# Patient Record
Sex: MALE | Race: WHITE | Employment: OTHER | URBAN - METROPOLITAN AREA
[De-identification: names, ages, dates, MRNs, and addresses within clinical notes are randomized per-mention and may not be internally consistent; named-entity substitution may affect disease eponyms.]

---

## 2017-12-17 PROBLEM — I49.5 SICK SINUS SYNDROME (HCC): Status: ACTIVE | Noted: 2017-12-17

## 2017-12-17 PROBLEM — I35.0 AORTIC STENOSIS: Status: ACTIVE | Noted: 2017-12-17

## 2017-12-17 PROBLEM — I50.9 CHF WITH UNKNOWN LVEF (HCC): Status: ACTIVE | Noted: 2017-12-17

## 2017-12-17 PROBLEM — J81.1 PULMONARY EDEMA: Status: ACTIVE | Noted: 2017-12-17

## 2017-12-17 PROBLEM — Z95.0 PACEMAKER: Status: ACTIVE | Noted: 2017-12-17

## 2017-12-18 PROBLEM — J81.0 ACUTE PULMONARY EDEMA (HCC): Status: RESOLVED | Noted: 2017-12-17 | Resolved: 2017-12-18

## 2017-12-18 PROBLEM — J81.0 ACUTE PULMONARY EDEMA (HCC): Status: ACTIVE | Noted: 2017-12-17

## 2017-12-19 PROBLEM — I50.43 CHF (CONGESTIVE HEART FAILURE), NYHA CLASS I, ACUTE ON CHRONIC, COMBINED (HCC): Status: ACTIVE | Noted: 2017-12-19

## 2019-11-02 ENCOUNTER — HOSPITAL ENCOUNTER (OUTPATIENT)
Age: 84
Setting detail: OBSERVATION
Discharge: HOME | DRG: 683 | End: 2019-11-04
Attending: EMERGENCY MEDICINE | Admitting: FAMILY MEDICINE
Payer: MEDICARE

## 2019-11-02 ENCOUNTER — APPOINTMENT (OUTPATIENT)
Dept: ULTRASOUND IMAGING | Age: 84
DRG: 683 | End: 2019-11-02
Payer: MEDICARE

## 2019-11-02 ENCOUNTER — APPOINTMENT (OUTPATIENT)
Dept: GENERAL RADIOLOGY | Age: 84
DRG: 683 | End: 2019-11-02
Payer: MEDICARE

## 2019-11-02 DIAGNOSIS — I49.5 SICK SINUS SYNDROME (HCC): ICD-10-CM

## 2019-11-02 DIAGNOSIS — I10 HYPERTENSION, UNSPECIFIED TYPE: ICD-10-CM

## 2019-11-02 DIAGNOSIS — E87.5 HYPERKALEMIA: ICD-10-CM

## 2019-11-02 DIAGNOSIS — N17.9 AKI (ACUTE KIDNEY INJURY) (HCC): Primary | ICD-10-CM

## 2019-11-02 DIAGNOSIS — I10 ESSENTIAL HYPERTENSION: ICD-10-CM

## 2019-11-02 DIAGNOSIS — I35.0 AORTIC VALVE STENOSIS, ETIOLOGY OF CARDIAC VALVE DISEASE UNSPECIFIED: ICD-10-CM

## 2019-11-02 DIAGNOSIS — N28.9 ACUTE RENAL INSUFFICIENCY: ICD-10-CM

## 2019-11-02 DIAGNOSIS — E11.9 TYPE 2 DIABETES MELLITUS WITHOUT COMPLICATION, WITHOUT LONG-TERM CURRENT USE OF INSULIN (HCC): ICD-10-CM

## 2019-11-02 LAB
ALBUMIN SERPL-MCNC: 4.5 G/DL (ref 3.5–5.2)
ALP BLD-CCNC: 54 U/L (ref 40–129)
ALT SERPL-CCNC: 9 U/L (ref 0–40)
ANION GAP SERPL CALCULATED.3IONS-SCNC: 16 MMOL/L (ref 7–16)
AST SERPL-CCNC: 10 U/L (ref 0–39)
BASOPHILS ABSOLUTE: 0.02 E9/L (ref 0–0.2)
BASOPHILS RELATIVE PERCENT: 0.3 % (ref 0–2)
BILIRUB SERPL-MCNC: <0.2 MG/DL (ref 0–1.2)
BILIRUBIN URINE: NEGATIVE
BLOOD, URINE: NEGATIVE
BUN BLDV-MCNC: 83 MG/DL (ref 8–23)
CALCIUM SERPL-MCNC: 9.6 MG/DL (ref 8.6–10.2)
CHLORIDE BLD-SCNC: 98 MMOL/L (ref 98–107)
CLARITY: CLEAR
CO2: 21 MMOL/L (ref 22–29)
COLOR: YELLOW
CREAT SERPL-MCNC: 2.4 MG/DL (ref 0.7–1.2)
EOSINOPHILS ABSOLUTE: 0.21 E9/L (ref 0.05–0.5)
EOSINOPHILS RELATIVE PERCENT: 3.5 % (ref 0–6)
GFR AFRICAN AMERICAN: 31
GFR AFRICAN AMERICAN: 33
GFR NON-AFRICAN AMERICAN: 26 ML/MIN/1.73
GFR NON-AFRICAN AMERICAN: 27 ML/MIN/1.73
GLUCOSE BLD-MCNC: 153 MG/DL (ref 74–99)
GLUCOSE BLD-MCNC: 160 MG/DL (ref 74–99)
GLUCOSE URINE: 500 MG/DL
HCT VFR BLD CALC: 37.6 % (ref 37–54)
HEMOGLOBIN: 12.3 G/DL (ref 12.5–16.5)
IMMATURE GRANULOCYTES #: 0.03 E9/L
IMMATURE GRANULOCYTES %: 0.5 % (ref 0–5)
KETONES, URINE: NEGATIVE MG/DL
LEUKOCYTE ESTERASE, URINE: NEGATIVE
LYMPHOCYTES ABSOLUTE: 1.32 E9/L (ref 1.5–4)
LYMPHOCYTES RELATIVE PERCENT: 21.8 % (ref 20–42)
MCH RBC QN AUTO: 33.6 PG (ref 26–35)
MCHC RBC AUTO-ENTMCNC: 32.7 % (ref 32–34.5)
MCV RBC AUTO: 102.7 FL (ref 80–99.9)
METER GLUCOSE: 189 MG/DL (ref 74–99)
MONOCYTES ABSOLUTE: 0.38 E9/L (ref 0.1–0.95)
MONOCYTES RELATIVE PERCENT: 6.3 % (ref 2–12)
NEUTROPHILS ABSOLUTE: 4.09 E9/L (ref 1.8–7.3)
NEUTROPHILS RELATIVE PERCENT: 67.6 % (ref 43–80)
NITRITE, URINE: NEGATIVE
PDW BLD-RTO: 12.2 FL (ref 11.5–15)
PERFORMED ON: ABNORMAL
PH UA: 5.5 (ref 5–9)
PLATELET # BLD: 162 E9/L (ref 130–450)
PMV BLD AUTO: 9.8 FL (ref 7–12)
POC CHLORIDE: 105 MMOL/L (ref 100–108)
POC CREATININE: 2.3 MG/DL (ref 0.7–1.2)
POC POTASSIUM: 5.5 MMOL/L (ref 3.5–5)
POC SODIUM: 135 MMOL/L (ref 132–146)
POTASSIUM REFLEX MAGNESIUM: 5.5 MMOL/L (ref 3.5–5)
PROTEIN UA: NEGATIVE MG/DL
RBC # BLD: 3.66 E12/L (ref 3.8–5.8)
SODIUM BLD-SCNC: 135 MMOL/L (ref 132–146)
SPECIFIC GRAVITY UA: 1.01 (ref 1–1.03)
TOTAL PROTEIN: 8 G/DL (ref 6.4–8.3)
TROPONIN: <0.01 NG/ML (ref 0–0.03)
UROBILINOGEN, URINE: 0.2 E.U./DL
WBC # BLD: 6.1 E9/L (ref 4.5–11.5)

## 2019-11-02 PROCEDURE — 76770 US EXAM ABDO BACK WALL COMP: CPT

## 2019-11-02 PROCEDURE — G0378 HOSPITAL OBSERVATION PER HR: HCPCS

## 2019-11-02 PROCEDURE — 82947 ASSAY GLUCOSE BLOOD QUANT: CPT

## 2019-11-02 PROCEDURE — 84295 ASSAY OF SERUM SODIUM: CPT

## 2019-11-02 PROCEDURE — 2580000003 HC RX 258: Performed by: NURSE PRACTITIONER

## 2019-11-02 PROCEDURE — 71045 X-RAY EXAM CHEST 1 VIEW: CPT

## 2019-11-02 PROCEDURE — 82435 ASSAY OF BLOOD CHLORIDE: CPT

## 2019-11-02 PROCEDURE — 6370000000 HC RX 637 (ALT 250 FOR IP): Performed by: FAMILY MEDICINE

## 2019-11-02 PROCEDURE — 36415 COLL VENOUS BLD VENIPUNCTURE: CPT

## 2019-11-02 PROCEDURE — 82962 GLUCOSE BLOOD TEST: CPT

## 2019-11-02 PROCEDURE — 84132 ASSAY OF SERUM POTASSIUM: CPT

## 2019-11-02 PROCEDURE — 85025 COMPLETE CBC W/AUTO DIFF WBC: CPT

## 2019-11-02 PROCEDURE — 6370000000 HC RX 637 (ALT 250 FOR IP): Performed by: NURSE PRACTITIONER

## 2019-11-02 PROCEDURE — 80053 COMPREHEN METABOLIC PANEL: CPT

## 2019-11-02 PROCEDURE — 81003 URINALYSIS AUTO W/O SCOPE: CPT

## 2019-11-02 PROCEDURE — 99285 EMERGENCY DEPT VISIT HI MDM: CPT

## 2019-11-02 PROCEDURE — 82565 ASSAY OF CREATININE: CPT

## 2019-11-02 PROCEDURE — 84484 ASSAY OF TROPONIN QUANT: CPT

## 2019-11-02 PROCEDURE — 93005 ELECTROCARDIOGRAM TRACING: CPT | Performed by: NURSE PRACTITIONER

## 2019-11-02 RX ORDER — DEXTROSE MONOHYDRATE 50 MG/ML
100 INJECTION, SOLUTION INTRAVENOUS PRN
Status: DISCONTINUED | OUTPATIENT
Start: 2019-11-02 | End: 2019-11-04 | Stop reason: HOSPADM

## 2019-11-02 RX ORDER — SODIUM CHLORIDE 0.9 % (FLUSH) 0.9 %
10 SYRINGE (ML) INJECTION EVERY 12 HOURS SCHEDULED
Status: DISCONTINUED | OUTPATIENT
Start: 2019-11-02 | End: 2019-11-04 | Stop reason: HOSPADM

## 2019-11-02 RX ORDER — SODIUM CHLORIDE 0.9 % (FLUSH) 0.9 %
10 SYRINGE (ML) INJECTION PRN
Status: DISCONTINUED | OUTPATIENT
Start: 2019-11-02 | End: 2019-11-04 | Stop reason: HOSPADM

## 2019-11-02 RX ORDER — SODIUM POLYSTYRENE SULFONATE 4.1 MEQ/G
15 POWDER, FOR SUSPENSION ORAL; RECTAL ONCE
Status: COMPLETED | OUTPATIENT
Start: 2019-11-02 | End: 2019-11-02

## 2019-11-02 RX ORDER — ESCITALOPRAM OXALATE 10 MG/1
10 TABLET ORAL DAILY
Status: DISCONTINUED | OUTPATIENT
Start: 2019-11-03 | End: 2019-11-04 | Stop reason: HOSPADM

## 2019-11-02 RX ORDER — CLONIDINE HYDROCHLORIDE 0.1 MG/1
0.1 TABLET ORAL 2 TIMES DAILY
Status: DISCONTINUED | OUTPATIENT
Start: 2019-11-02 | End: 2019-11-02

## 2019-11-02 RX ORDER — DEXTROSE MONOHYDRATE 25 G/50ML
12.5 INJECTION, SOLUTION INTRAVENOUS PRN
Status: DISCONTINUED | OUTPATIENT
Start: 2019-11-02 | End: 2019-11-04 | Stop reason: HOSPADM

## 2019-11-02 RX ORDER — SODIUM CHLORIDE 9 MG/ML
INJECTION, SOLUTION INTRAVENOUS CONTINUOUS
Status: DISCONTINUED | OUTPATIENT
Start: 2019-11-02 | End: 2019-11-04 | Stop reason: HOSPADM

## 2019-11-02 RX ORDER — DIPHENHYDRAMINE HCL 25 MG
50 TABLET ORAL NIGHTLY PRN
Status: DISCONTINUED | OUTPATIENT
Start: 2019-11-02 | End: 2019-11-04 | Stop reason: HOSPADM

## 2019-11-02 RX ORDER — LOSARTAN POTASSIUM 50 MG/1
100 TABLET ORAL DAILY
Status: DISCONTINUED | OUTPATIENT
Start: 2019-11-03 | End: 2019-11-03

## 2019-11-02 RX ORDER — 0.9 % SODIUM CHLORIDE 0.9 %
500 INTRAVENOUS SOLUTION INTRAVENOUS ONCE
Status: COMPLETED | OUTPATIENT
Start: 2019-11-02 | End: 2019-11-02

## 2019-11-02 RX ORDER — ACETAMINOPHEN 325 MG/1
650 TABLET ORAL EVERY 4 HOURS PRN
Status: DISCONTINUED | OUTPATIENT
Start: 2019-11-02 | End: 2019-11-04 | Stop reason: HOSPADM

## 2019-11-02 RX ORDER — CHOLECALCIFEROL (VITAMIN D3) 50 MCG
5000 TABLET ORAL DAILY
Status: DISCONTINUED | OUTPATIENT
Start: 2019-11-03 | End: 2019-11-04 | Stop reason: HOSPADM

## 2019-11-02 RX ORDER — NICOTINE POLACRILEX 4 MG
15 LOZENGE BUCCAL PRN
Status: DISCONTINUED | OUTPATIENT
Start: 2019-11-02 | End: 2019-11-04 | Stop reason: HOSPADM

## 2019-11-02 RX ORDER — LEVOTHYROXINE SODIUM 0.05 MG/1
50 TABLET ORAL DAILY
Status: DISCONTINUED | OUTPATIENT
Start: 2019-11-03 | End: 2019-11-04 | Stop reason: HOSPADM

## 2019-11-02 RX ORDER — ACETAMINOPHEN 325 MG/1
650 TABLET ORAL 2 TIMES DAILY
Status: DISCONTINUED | OUTPATIENT
Start: 2019-11-03 | End: 2019-11-04 | Stop reason: HOSPADM

## 2019-11-02 RX ORDER — FOLIC ACID 1 MG/1
1000 TABLET ORAL DAILY
Status: DISCONTINUED | OUTPATIENT
Start: 2019-11-03 | End: 2019-11-02

## 2019-11-02 RX ORDER — OLMESARTAN MEDOXOMIL 20 MG/1
40 TABLET ORAL DAILY
Status: ON HOLD | COMMUNITY
End: 2019-11-04 | Stop reason: HOSPADM

## 2019-11-02 RX ORDER — ONDANSETRON 2 MG/ML
4 INJECTION INTRAMUSCULAR; INTRAVENOUS EVERY 6 HOURS PRN
Status: DISCONTINUED | OUTPATIENT
Start: 2019-11-02 | End: 2019-11-04 | Stop reason: HOSPADM

## 2019-11-02 RX ORDER — FOLIC ACID 1 MG/1
1 TABLET ORAL DAILY
Status: DISCONTINUED | OUTPATIENT
Start: 2019-11-03 | End: 2019-11-04 | Stop reason: HOSPADM

## 2019-11-02 RX ORDER — CLONIDINE HYDROCHLORIDE 0.2 MG/1
0.2 TABLET ORAL 2 TIMES DAILY
Status: DISCONTINUED | OUTPATIENT
Start: 2019-11-02 | End: 2019-11-04 | Stop reason: HOSPADM

## 2019-11-02 RX ORDER — ACETAMINOPHEN 325 MG/1
650 TABLET ORAL 2 TIMES DAILY
COMMUNITY

## 2019-11-02 RX ORDER — CHOLECALCIFEROL (VITAMIN D3) 10 MCG
1 TABLET ORAL DAILY
Status: DISCONTINUED | OUTPATIENT
Start: 2019-11-03 | End: 2019-11-04 | Stop reason: HOSPADM

## 2019-11-02 RX ORDER — ASPIRIN 81 MG/1
81 TABLET, CHEWABLE ORAL DAILY
Status: DISCONTINUED | OUTPATIENT
Start: 2019-11-03 | End: 2019-11-04 | Stop reason: HOSPADM

## 2019-11-02 RX ADMIN — METOPROLOL TARTRATE 25 MG: 25 TABLET ORAL at 23:18

## 2019-11-02 RX ADMIN — DIPHENHYDRAMINE HCL 50 MG: 25 TABLET ORAL at 23:18

## 2019-11-02 RX ADMIN — SODIUM CHLORIDE: 9 INJECTION, SOLUTION INTRAVENOUS at 23:26

## 2019-11-02 RX ADMIN — CLONIDINE HYDROCHLORIDE 0.2 MG: 0.2 TABLET ORAL at 23:19

## 2019-11-02 RX ADMIN — INSULIN LISPRO 1 UNITS: 100 INJECTION, SOLUTION INTRAVENOUS; SUBCUTANEOUS at 23:25

## 2019-11-02 RX ADMIN — ACETAMINOPHEN 650 MG: 325 TABLET, FILM COATED ORAL at 23:18

## 2019-11-02 RX ADMIN — SODIUM POLYSTYRENE SULFONATE 15 G: 1 POWDER ORAL; RECTAL at 23:23

## 2019-11-02 RX ADMIN — Medication 10 ML: at 23:26

## 2019-11-02 RX ADMIN — SODIUM CHLORIDE: 9 INJECTION, SOLUTION INTRAVENOUS at 19:33

## 2019-11-02 ASSESSMENT — PAIN SCALES - GENERAL: PAINLEVEL_OUTOF10: 2

## 2019-11-03 LAB
ALBUMIN SERPL-MCNC: 4 G/DL (ref 3.5–5.2)
ALP BLD-CCNC: 37 U/L (ref 40–129)
ALT SERPL-CCNC: 8 U/L (ref 0–40)
ANION GAP SERPL CALCULATED.3IONS-SCNC: 11 MMOL/L (ref 7–16)
ANION GAP SERPL CALCULATED.3IONS-SCNC: 16 MMOL/L (ref 7–16)
ANION GAP SERPL CALCULATED.3IONS-SCNC: 17 MMOL/L (ref 7–16)
AST SERPL-CCNC: 23 U/L (ref 0–39)
BILIRUB SERPL-MCNC: 0.3 MG/DL (ref 0–1.2)
BUN BLDV-MCNC: 55 MG/DL (ref 8–23)
BUN BLDV-MCNC: 67 MG/DL (ref 8–23)
BUN BLDV-MCNC: 70 MG/DL (ref 8–23)
CALCIUM SERPL-MCNC: 8.8 MG/DL (ref 8.6–10.2)
CALCIUM SERPL-MCNC: 8.9 MG/DL (ref 8.6–10.2)
CALCIUM SERPL-MCNC: 9.1 MG/DL (ref 8.6–10.2)
CHLORIDE BLD-SCNC: 102 MMOL/L (ref 98–107)
CHLORIDE BLD-SCNC: 104 MMOL/L (ref 98–107)
CHLORIDE BLD-SCNC: 107 MMOL/L (ref 98–107)
CO2: 17 MMOL/L (ref 22–29)
CO2: 18 MMOL/L (ref 22–29)
CO2: 21 MMOL/L (ref 22–29)
CREAT SERPL-MCNC: 1.6 MG/DL (ref 0.7–1.2)
CREAT SERPL-MCNC: 1.8 MG/DL (ref 0.7–1.2)
CREAT SERPL-MCNC: 1.8 MG/DL (ref 0.7–1.2)
CREATININE URINE: 50 MG/DL (ref 40–278)
EKG ATRIAL RATE: 69 BPM
EKG P AXIS: 37 DEGREES
EKG P-R INTERVAL: 180 MS
EKG Q-T INTERVAL: 378 MS
EKG QRS DURATION: 90 MS
EKG QTC CALCULATION (BAZETT): 405 MS
EKG R AXIS: -35 DEGREES
EKG T AXIS: 103 DEGREES
EKG VENTRICULAR RATE: 69 BPM
GFR AFRICAN AMERICAN: 43
GFR AFRICAN AMERICAN: 43
GFR AFRICAN AMERICAN: 49
GFR NON-AFRICAN AMERICAN: 36 ML/MIN/1.73
GFR NON-AFRICAN AMERICAN: 36 ML/MIN/1.73
GFR NON-AFRICAN AMERICAN: 41 ML/MIN/1.73
GLUCOSE BLD-MCNC: 147 MG/DL (ref 74–99)
GLUCOSE BLD-MCNC: 160 MG/DL (ref 74–99)
GLUCOSE BLD-MCNC: 164 MG/DL (ref 74–99)
HBA1C MFR BLD: 9.3 % (ref 4–5.6)
HCT VFR BLD CALC: 32.7 % (ref 37–54)
HEMOGLOBIN: 11.1 G/DL (ref 12.5–16.5)
LACTIC ACID: 1 MMOL/L (ref 0.5–2.2)
MAGNESIUM: 2.6 MG/DL (ref 1.6–2.6)
MCH RBC QN AUTO: 34.6 PG (ref 26–35)
MCHC RBC AUTO-ENTMCNC: 33.9 % (ref 32–34.5)
MCV RBC AUTO: 101.9 FL (ref 80–99.9)
METER GLUCOSE: 139 MG/DL (ref 74–99)
METER GLUCOSE: 154 MG/DL (ref 74–99)
METER GLUCOSE: 177 MG/DL (ref 74–99)
METER GLUCOSE: 186 MG/DL (ref 74–99)
PDW BLD-RTO: 12.2 FL (ref 11.5–15)
PLATELET # BLD: 143 E9/L (ref 130–450)
PMV BLD AUTO: 10.8 FL (ref 7–12)
POTASSIUM SERPL-SCNC: 4.7 MMOL/L (ref 3.5–5)
POTASSIUM SERPL-SCNC: 5.2 MMOL/L (ref 3.5–5)
POTASSIUM SERPL-SCNC: 5.6 MMOL/L (ref 3.5–5)
RBC # BLD: 3.21 E12/L (ref 3.8–5.8)
SODIUM BLD-SCNC: 136 MMOL/L (ref 132–146)
SODIUM BLD-SCNC: 138 MMOL/L (ref 132–146)
SODIUM BLD-SCNC: 139 MMOL/L (ref 132–146)
SODIUM URINE: 68 MMOL/L
TOTAL PROTEIN: 6.8 G/DL (ref 6.4–8.3)
WBC # BLD: 5.6 E9/L (ref 4.5–11.5)

## 2019-11-03 PROCEDURE — 84300 ASSAY OF URINE SODIUM: CPT

## 2019-11-03 PROCEDURE — 36415 COLL VENOUS BLD VENIPUNCTURE: CPT

## 2019-11-03 PROCEDURE — 6370000000 HC RX 637 (ALT 250 FOR IP): Performed by: FAMILY MEDICINE

## 2019-11-03 PROCEDURE — 96372 THER/PROPH/DIAG INJ SC/IM: CPT

## 2019-11-03 PROCEDURE — 6360000002 HC RX W HCPCS: Performed by: NURSE PRACTITIONER

## 2019-11-03 PROCEDURE — 83036 HEMOGLOBIN GLYCOSYLATED A1C: CPT

## 2019-11-03 PROCEDURE — 82962 GLUCOSE BLOOD TEST: CPT

## 2019-11-03 PROCEDURE — 80053 COMPREHEN METABOLIC PANEL: CPT

## 2019-11-03 PROCEDURE — 80048 BASIC METABOLIC PNL TOTAL CA: CPT

## 2019-11-03 PROCEDURE — 6370000000 HC RX 637 (ALT 250 FOR IP): Performed by: NURSE PRACTITIONER

## 2019-11-03 PROCEDURE — 83605 ASSAY OF LACTIC ACID: CPT

## 2019-11-03 PROCEDURE — 93010 ELECTROCARDIOGRAM REPORT: CPT | Performed by: INTERNAL MEDICINE

## 2019-11-03 PROCEDURE — 82570 ASSAY OF URINE CREATININE: CPT

## 2019-11-03 PROCEDURE — 85027 COMPLETE CBC AUTOMATED: CPT

## 2019-11-03 PROCEDURE — 2580000003 HC RX 258: Performed by: FAMILY MEDICINE

## 2019-11-03 PROCEDURE — G0378 HOSPITAL OBSERVATION PER HR: HCPCS

## 2019-11-03 PROCEDURE — 83735 ASSAY OF MAGNESIUM: CPT

## 2019-11-03 RX ORDER — LORAZEPAM 0.5 MG/1
0.5 TABLET ORAL NIGHTLY PRN
Status: DISCONTINUED | OUTPATIENT
Start: 2019-11-03 | End: 2019-11-04 | Stop reason: HOSPADM

## 2019-11-03 RX ORDER — LORAZEPAM 1 MG/1
1 TABLET ORAL NIGHTLY PRN
Status: DISCONTINUED | OUTPATIENT
Start: 2019-11-03 | End: 2019-11-04 | Stop reason: HOSPADM

## 2019-11-03 RX ORDER — 0.9 % SODIUM CHLORIDE 0.9 %
1000 INTRAVENOUS SOLUTION INTRAVENOUS ONCE
Status: COMPLETED | OUTPATIENT
Start: 2019-11-03 | End: 2019-11-03

## 2019-11-03 RX ORDER — ESZOPICLONE 1 MG/1
2 TABLET, FILM COATED ORAL NIGHTLY PRN
Status: DISCONTINUED | OUTPATIENT
Start: 2019-11-03 | End: 2019-11-03

## 2019-11-03 RX ADMIN — METOPROLOL TARTRATE 25 MG: 25 TABLET ORAL at 09:43

## 2019-11-03 RX ADMIN — METOPROLOL TARTRATE 25 MG: 25 TABLET ORAL at 22:01

## 2019-11-03 RX ADMIN — FOLIC ACID 1 MG: 1 TABLET ORAL at 09:43

## 2019-11-03 RX ADMIN — SODIUM CHLORIDE 1000 ML: 9 INJECTION, SOLUTION INTRAVENOUS at 09:45

## 2019-11-03 RX ADMIN — ESCITALOPRAM 10 MG: 10 TABLET, FILM COATED ORAL at 09:42

## 2019-11-03 RX ADMIN — ACETAMINOPHEN 650 MG: 325 TABLET, FILM COATED ORAL at 09:43

## 2019-11-03 RX ADMIN — NEPHROCAP 1 MG: 1 CAP ORAL at 09:42

## 2019-11-03 RX ADMIN — CLONIDINE HYDROCHLORIDE 0.2 MG: 0.2 TABLET ORAL at 22:00

## 2019-11-03 RX ADMIN — Medication 5000 UNITS: at 09:42

## 2019-11-03 RX ADMIN — INSULIN LISPRO 1 UNITS: 100 INJECTION, SOLUTION INTRAVENOUS; SUBCUTANEOUS at 22:03

## 2019-11-03 RX ADMIN — CLONIDINE HYDROCHLORIDE 0.2 MG: 0.2 TABLET ORAL at 09:42

## 2019-11-03 RX ADMIN — LEVOTHYROXINE SODIUM 50 MCG: 0.05 TABLET ORAL at 06:47

## 2019-11-03 RX ADMIN — INSULIN LISPRO 1 UNITS: 100 INJECTION, SOLUTION INTRAVENOUS; SUBCUTANEOUS at 12:28

## 2019-11-03 RX ADMIN — ENOXAPARIN SODIUM 30 MG: 30 INJECTION SUBCUTANEOUS at 09:44

## 2019-11-03 RX ADMIN — ASPIRIN 81 MG 81 MG: 81 TABLET ORAL at 09:43

## 2019-11-03 RX ADMIN — LORAZEPAM 0.5 MG: 0.5 TABLET ORAL at 22:20

## 2019-11-03 ASSESSMENT — PAIN SCALES - GENERAL
PAINLEVEL_OUTOF10: 0
PAINLEVEL_OUTOF10: 0

## 2019-11-04 VITALS
WEIGHT: 204.5 LBS | HEIGHT: 63 IN | DIASTOLIC BLOOD PRESSURE: 65 MMHG | BODY MASS INDEX: 36.23 KG/M2 | OXYGEN SATURATION: 95 % | TEMPERATURE: 98.6 F | HEART RATE: 71 BPM | RESPIRATION RATE: 16 BRPM | SYSTOLIC BLOOD PRESSURE: 142 MMHG

## 2019-11-04 PROBLEM — N28.9 ACUTE RENAL INSUFFICIENCY: Status: ACTIVE | Noted: 2019-11-04

## 2019-11-04 PROBLEM — E87.5 HYPERKALEMIA: Status: RESOLVED | Noted: 2019-11-02 | Resolved: 2019-11-04

## 2019-11-04 PROBLEM — N17.9 AKI (ACUTE KIDNEY INJURY) (HCC): Status: RESOLVED | Noted: 2019-11-02 | Resolved: 2019-11-04

## 2019-11-04 LAB
ANION GAP SERPL CALCULATED.3IONS-SCNC: 14 MMOL/L (ref 7–16)
BUN BLDV-MCNC: 40 MG/DL (ref 8–23)
CALCIUM SERPL-MCNC: 8.9 MG/DL (ref 8.6–10.2)
CHLORIDE BLD-SCNC: 107 MMOL/L (ref 98–107)
CO2: 21 MMOL/L (ref 22–29)
CREAT SERPL-MCNC: 1.3 MG/DL (ref 0.7–1.2)
GFR AFRICAN AMERICAN: >60
GFR NON-AFRICAN AMERICAN: 52 ML/MIN/1.73
GLUCOSE BLD-MCNC: 242 MG/DL (ref 74–99)
METER GLUCOSE: 148 MG/DL (ref 74–99)
METER GLUCOSE: 177 MG/DL (ref 74–99)
METER GLUCOSE: 180 MG/DL (ref 74–99)
POTASSIUM SERPL-SCNC: 4.6 MMOL/L (ref 3.5–5)
SODIUM BLD-SCNC: 142 MMOL/L (ref 132–146)

## 2019-11-04 PROCEDURE — 6370000000 HC RX 637 (ALT 250 FOR IP): Performed by: FAMILY MEDICINE

## 2019-11-04 PROCEDURE — 82962 GLUCOSE BLOOD TEST: CPT

## 2019-11-04 PROCEDURE — G0378 HOSPITAL OBSERVATION PER HR: HCPCS

## 2019-11-04 PROCEDURE — 36415 COLL VENOUS BLD VENIPUNCTURE: CPT

## 2019-11-04 PROCEDURE — 6370000000 HC RX 637 (ALT 250 FOR IP): Performed by: NURSE PRACTITIONER

## 2019-11-04 PROCEDURE — 1200000000 HC SEMI PRIVATE

## 2019-11-04 PROCEDURE — 96372 THER/PROPH/DIAG INJ SC/IM: CPT

## 2019-11-04 PROCEDURE — 6360000002 HC RX W HCPCS: Performed by: NURSE PRACTITIONER

## 2019-11-04 PROCEDURE — 80048 BASIC METABOLIC PNL TOTAL CA: CPT

## 2019-11-04 RX ADMIN — ENOXAPARIN SODIUM 30 MG: 30 INJECTION SUBCUTANEOUS at 10:04

## 2019-11-04 RX ADMIN — INSULIN LISPRO 1 UNITS: 100 INJECTION, SOLUTION INTRAVENOUS; SUBCUTANEOUS at 12:21

## 2019-11-04 RX ADMIN — ASPIRIN 81 MG 81 MG: 81 TABLET ORAL at 10:03

## 2019-11-04 RX ADMIN — INSULIN LISPRO 1 UNITS: 100 INJECTION, SOLUTION INTRAVENOUS; SUBCUTANEOUS at 06:57

## 2019-11-04 RX ADMIN — LEVOTHYROXINE SODIUM 50 MCG: 0.05 TABLET ORAL at 06:28

## 2019-11-04 RX ADMIN — ESCITALOPRAM 10 MG: 10 TABLET, FILM COATED ORAL at 10:02

## 2019-11-04 RX ADMIN — METOPROLOL TARTRATE 25 MG: 25 TABLET ORAL at 10:03

## 2019-11-04 RX ADMIN — NEPHROCAP 1 MG: 1 CAP ORAL at 10:02

## 2019-11-04 RX ADMIN — Medication 5000 UNITS: at 10:02

## 2019-11-04 RX ADMIN — CLONIDINE HYDROCHLORIDE 0.2 MG: 0.2 TABLET ORAL at 10:04

## 2019-11-04 RX ADMIN — FOLIC ACID 1 MG: 1 TABLET ORAL at 10:03

## 2019-11-04 ASSESSMENT — PAIN SCALES - GENERAL: PAINLEVEL_OUTOF10: 0

## 2019-11-13 ENCOUNTER — HOSPITAL ENCOUNTER (OUTPATIENT)
Age: 84
Discharge: HOME | End: 2019-11-13
Payer: MEDICARE

## 2019-11-13 LAB — POTASSIUM SERPL-SCNC: 4.9 MMOL/L (ref 3.5–5)

## 2019-11-13 PROCEDURE — 36415 COLL VENOUS BLD VENIPUNCTURE: CPT

## 2019-11-13 PROCEDURE — 84132 ASSAY OF SERUM POTASSIUM: CPT

## 2020-04-23 ENCOUNTER — HOSPITAL ENCOUNTER (OUTPATIENT)
Age: 85
Discharge: HOME | End: 2020-04-23
Payer: MEDICARE

## 2020-04-23 LAB
ALBUMIN SERPL-MCNC: 4.3 G/DL (ref 3.5–5.2)
ALP BLD-CCNC: 53 U/L (ref 40–129)
ALT SERPL-CCNC: 7 U/L (ref 0–40)
ANION GAP SERPL CALCULATED.3IONS-SCNC: 13 MMOL/L (ref 7–16)
AST SERPL-CCNC: 10 U/L (ref 0–39)
BILIRUB SERPL-MCNC: 0.3 MG/DL (ref 0–1.2)
BUN BLDV-MCNC: 26 MG/DL (ref 8–23)
CALCIUM SERPL-MCNC: 9.9 MG/DL (ref 8.6–10.2)
CHLORIDE BLD-SCNC: 97 MMOL/L (ref 98–107)
CO2: 25 MMOL/L (ref 22–29)
CREAT SERPL-MCNC: 1.2 MG/DL (ref 0.7–1.2)
GFR AFRICAN AMERICAN: >60
GFR NON-AFRICAN AMERICAN: 57 ML/MIN/1.73
GLUCOSE BLD-MCNC: 144 MG/DL (ref 74–99)
HBA1C MFR BLD: 6 % (ref 4–5.6)
PARATHYROID HORMONE INTACT: 32 PG/ML (ref 15–65)
POTASSIUM SERPL-SCNC: 5.2 MMOL/L (ref 3.5–5)
SODIUM BLD-SCNC: 135 MMOL/L (ref 132–146)
TOTAL PROTEIN: 7 G/DL (ref 6.4–8.3)
TSH SERPL DL<=0.05 MIU/L-ACNC: 2.05 UIU/ML (ref 0.27–4.2)

## 2020-04-23 PROCEDURE — 83036 HEMOGLOBIN GLYCOSYLATED A1C: CPT

## 2020-04-23 PROCEDURE — 80053 COMPREHEN METABOLIC PANEL: CPT

## 2020-04-23 PROCEDURE — 84443 ASSAY THYROID STIM HORMONE: CPT

## 2020-04-23 PROCEDURE — 83970 ASSAY OF PARATHORMONE: CPT

## 2020-04-23 PROCEDURE — 36415 COLL VENOUS BLD VENIPUNCTURE: CPT

## 2020-06-23 ENCOUNTER — HOSPITAL ENCOUNTER (OUTPATIENT)
Age: 85
Discharge: HOME | End: 2020-06-25
Payer: MEDICARE

## 2020-06-23 LAB
ALBUMIN SERPL-MCNC: 4.4 G/DL (ref 3.5–5.2)
ALP BLD-CCNC: 69 U/L (ref 40–129)
ALT SERPL-CCNC: 9 U/L (ref 0–40)
ANION GAP SERPL CALCULATED.3IONS-SCNC: 15 MMOL/L (ref 7–16)
AST SERPL-CCNC: 13 U/L (ref 0–39)
BASOPHILS ABSOLUTE: 0.02 E9/L (ref 0–0.2)
BASOPHILS RELATIVE PERCENT: 0.2 % (ref 0–2)
BILIRUB SERPL-MCNC: 0.4 MG/DL (ref 0–1.2)
BUN BLDV-MCNC: 28 MG/DL (ref 8–23)
CALCIUM SERPL-MCNC: 9.6 MG/DL (ref 8.6–10.2)
CHLORIDE BLD-SCNC: 97 MMOL/L (ref 98–107)
CO2: 23 MMOL/L (ref 22–29)
CREAT SERPL-MCNC: 1.2 MG/DL (ref 0.7–1.2)
EOSINOPHILS ABSOLUTE: 0.08 E9/L (ref 0.05–0.5)
EOSINOPHILS RELATIVE PERCENT: 0.9 % (ref 0–6)
GFR AFRICAN AMERICAN: >60
GFR NON-AFRICAN AMERICAN: 57 ML/MIN/1.73
GLUCOSE BLD-MCNC: 110 MG/DL (ref 74–99)
HCT VFR BLD CALC: 32.8 % (ref 37–54)
HEMOGLOBIN: 10.9 G/DL (ref 12.5–16.5)
IMMATURE GRANULOCYTES #: 0.04 E9/L
IMMATURE GRANULOCYTES %: 0.5 % (ref 0–5)
LYMPHOCYTES ABSOLUTE: 1.04 E9/L (ref 1.5–4)
LYMPHOCYTES RELATIVE PERCENT: 12.2 % (ref 20–42)
MCH RBC QN AUTO: 34 PG (ref 26–35)
MCHC RBC AUTO-ENTMCNC: 33.2 % (ref 32–34.5)
MCV RBC AUTO: 102.2 FL (ref 80–99.9)
MONOCYTES ABSOLUTE: 0.48 E9/L (ref 0.1–0.95)
MONOCYTES RELATIVE PERCENT: 5.6 % (ref 2–12)
NEUTROPHILS ABSOLUTE: 6.87 E9/L (ref 1.8–7.3)
NEUTROPHILS RELATIVE PERCENT: 80.6 % (ref 43–80)
PDW BLD-RTO: 12.6 FL (ref 11.5–15)
PLATELET # BLD: 204 E9/L (ref 130–450)
PMV BLD AUTO: 9.6 FL (ref 7–12)
POTASSIUM SERPL-SCNC: 4.8 MMOL/L (ref 3.5–5)
RBC # BLD: 3.21 E12/L (ref 3.8–5.8)
SODIUM BLD-SCNC: 135 MMOL/L (ref 132–146)
TOTAL PROTEIN: 7.5 G/DL (ref 6.4–8.3)
WBC # BLD: 8.5 E9/L (ref 4.5–11.5)

## 2020-06-23 PROCEDURE — 85025 COMPLETE CBC W/AUTO DIFF WBC: CPT

## 2020-06-23 PROCEDURE — 80053 COMPREHEN METABOLIC PANEL: CPT

## 2020-06-24 ENCOUNTER — HOSPITAL ENCOUNTER (OUTPATIENT)
Dept: GENERAL RADIOLOGY | Age: 85
Discharge: HOME | End: 2020-06-26
Payer: MEDICARE

## 2020-06-24 ENCOUNTER — HOSPITAL ENCOUNTER (OUTPATIENT)
Age: 85
Discharge: HOME | End: 2020-06-26
Payer: MEDICARE

## 2020-06-24 PROCEDURE — 72110 X-RAY EXAM L-2 SPINE 4/>VWS: CPT

## 2020-06-24 PROCEDURE — 72072 X-RAY EXAM THORAC SPINE 3VWS: CPT

## 2020-07-14 PROBLEM — S22.080A COMPRESSION FRACTURE OF T12 VERTEBRA (HCC): Status: ACTIVE | Noted: 2020-07-14

## 2020-07-22 ENCOUNTER — HOSPITAL ENCOUNTER (OUTPATIENT)
Age: 85
Discharge: HOME | End: 2020-07-24
Payer: MEDICARE

## 2020-07-22 LAB
ANION GAP SERPL CALCULATED.3IONS-SCNC: 13 MMOL/L (ref 7–16)
BUN BLDV-MCNC: 24 MG/DL (ref 8–23)
CALCIUM SERPL-MCNC: 9.5 MG/DL (ref 8.6–10.2)
CHLORIDE BLD-SCNC: 93 MMOL/L (ref 98–107)
CO2: 26 MMOL/L (ref 22–29)
CREAT SERPL-MCNC: 1.2 MG/DL (ref 0.7–1.2)
GFR AFRICAN AMERICAN: >60
GFR NON-AFRICAN AMERICAN: 57 ML/MIN/1.73
GLUCOSE BLD-MCNC: 116 MG/DL (ref 74–99)
HBA1C MFR BLD: 6.4 % (ref 4–5.6)
POTASSIUM SERPL-SCNC: 4.9 MMOL/L (ref 3.5–5)
SODIUM BLD-SCNC: 132 MMOL/L (ref 132–146)
TSH SERPL DL<=0.05 MIU/L-ACNC: 2.38 UIU/ML (ref 0.27–4.2)
VITAMIN D 25-HYDROXY: 68 NG/ML (ref 30–100)

## 2020-07-22 PROCEDURE — 84443 ASSAY THYROID STIM HORMONE: CPT

## 2020-07-22 PROCEDURE — 83036 HEMOGLOBIN GLYCOSYLATED A1C: CPT

## 2020-07-22 PROCEDURE — 82306 VITAMIN D 25 HYDROXY: CPT

## 2020-07-22 PROCEDURE — 80048 BASIC METABOLIC PNL TOTAL CA: CPT

## 2020-10-21 ENCOUNTER — HOSPITAL ENCOUNTER (OUTPATIENT)
Age: 85
Discharge: HOME | End: 2020-10-23
Payer: MEDICARE

## 2020-10-21 LAB
ALBUMIN SERPL-MCNC: 4.1 G/DL (ref 3.5–5.2)
ALP BLD-CCNC: 57 U/L (ref 40–129)
ALT SERPL-CCNC: 8 U/L (ref 0–40)
ANION GAP SERPL CALCULATED.3IONS-SCNC: 14 MMOL/L (ref 7–16)
AST SERPL-CCNC: 11 U/L (ref 0–39)
BASOPHILS ABSOLUTE: 0.02 E9/L (ref 0–0.2)
BASOPHILS RELATIVE PERCENT: 0.4 % (ref 0–2)
BILIRUB SERPL-MCNC: 0.4 MG/DL (ref 0–1.2)
BUN BLDV-MCNC: 24 MG/DL (ref 8–23)
CALCIUM SERPL-MCNC: 9.7 MG/DL (ref 8.6–10.2)
CHLORIDE BLD-SCNC: 98 MMOL/L (ref 98–107)
CHOLESTEROL, TOTAL: 167 MG/DL (ref 0–199)
CO2: 24 MMOL/L (ref 22–29)
CREAT SERPL-MCNC: 1.1 MG/DL (ref 0.7–1.2)
EOSINOPHILS ABSOLUTE: 0.11 E9/L (ref 0.05–0.5)
EOSINOPHILS RELATIVE PERCENT: 2.3 % (ref 0–6)
GFR AFRICAN AMERICAN: >60
GFR NON-AFRICAN AMERICAN: >60 ML/MIN/1.73
GLUCOSE BLD-MCNC: 132 MG/DL (ref 74–99)
HCT VFR BLD CALC: 31.8 % (ref 37–54)
HDLC SERPL-MCNC: 47 MG/DL
HEMOGLOBIN: 10.7 G/DL (ref 12.5–16.5)
IMMATURE GRANULOCYTES #: 0.02 E9/L
IMMATURE GRANULOCYTES %: 0.4 % (ref 0–5)
LDL CHOLESTEROL CALCULATED: 95 MG/DL (ref 0–99)
LYMPHOCYTES ABSOLUTE: 0.83 E9/L (ref 1.5–4)
LYMPHOCYTES RELATIVE PERCENT: 17.2 % (ref 20–42)
MCH RBC QN AUTO: 34.7 PG (ref 26–35)
MCHC RBC AUTO-ENTMCNC: 33.6 % (ref 32–34.5)
MCV RBC AUTO: 103.2 FL (ref 80–99.9)
MONOCYTES ABSOLUTE: 0.32 E9/L (ref 0.1–0.95)
MONOCYTES RELATIVE PERCENT: 6.6 % (ref 2–12)
NEUTROPHILS ABSOLUTE: 3.53 E9/L (ref 1.8–7.3)
NEUTROPHILS RELATIVE PERCENT: 73.1 % (ref 43–80)
PDW BLD-RTO: 13.2 FL (ref 11.5–15)
PLATELET # BLD: 183 E9/L (ref 130–450)
PMV BLD AUTO: 9.8 FL (ref 7–12)
POTASSIUM SERPL-SCNC: 4.9 MMOL/L (ref 3.5–5)
RBC # BLD: 3.08 E12/L (ref 3.8–5.8)
SODIUM BLD-SCNC: 136 MMOL/L (ref 132–146)
TOTAL PROTEIN: 6.8 G/DL (ref 6.4–8.3)
TRIGL SERPL-MCNC: 126 MG/DL (ref 0–149)
TSH SERPL DL<=0.05 MIU/L-ACNC: 2.55 UIU/ML (ref 0.27–4.2)
VITAMIN D 25-HYDROXY: 73 NG/ML (ref 30–100)
VLDLC SERPL CALC-MCNC: 25 MG/DL
WBC # BLD: 4.8 E9/L (ref 4.5–11.5)

## 2020-10-21 PROCEDURE — 84443 ASSAY THYROID STIM HORMONE: CPT

## 2020-10-21 PROCEDURE — 80061 LIPID PANEL: CPT

## 2020-10-21 PROCEDURE — 85025 COMPLETE CBC W/AUTO DIFF WBC: CPT

## 2020-10-21 PROCEDURE — 80053 COMPREHEN METABOLIC PANEL: CPT

## 2020-10-21 PROCEDURE — 82306 VITAMIN D 25 HYDROXY: CPT

## 2021-01-29 LAB
ALBUMIN SERPL-MCNC: 4.3 G/DL (ref 3.5–5.2)
ALP BLD-CCNC: 51 U/L (ref 40–129)
ALT SERPL-CCNC: 8 U/L (ref 0–40)
ANION GAP SERPL CALCULATED.3IONS-SCNC: 13 MMOL/L (ref 7–16)
AST SERPL-CCNC: 11 U/L (ref 0–39)
BILIRUB SERPL-MCNC: 0.3 MG/DL (ref 0–1.2)
BUN BLDV-MCNC: 26 MG/DL (ref 8–23)
CALCIUM SERPL-MCNC: 9.4 MG/DL (ref 8.6–10.2)
CHLORIDE BLD-SCNC: 98 MMOL/L (ref 98–107)
CHOLESTEROL, TOTAL: 159 MG/DL (ref 0–199)
CO2: 26 MMOL/L (ref 22–29)
CREAT SERPL-MCNC: 1.1 MG/DL (ref 0.7–1.2)
GFR AFRICAN AMERICAN: >60
GFR NON-AFRICAN AMERICAN: >60 ML/MIN/1.73
GLUCOSE BLD-MCNC: 121 MG/DL (ref 74–99)
HDLC SERPL-MCNC: 45 MG/DL
LDL CHOLESTEROL CALCULATED: 77 MG/DL (ref 0–99)
POTASSIUM SERPL-SCNC: 4.5 MMOL/L (ref 3.5–5)
SODIUM BLD-SCNC: 137 MMOL/L (ref 132–146)
TOTAL PROTEIN: 6.9 G/DL (ref 6.4–8.3)
TRIGL SERPL-MCNC: 183 MG/DL (ref 0–149)
VITAMIN D 25-HYDROXY: 56 NG/ML (ref 30–100)
VLDLC SERPL CALC-MCNC: 37 MG/DL

## 2021-04-24 ENCOUNTER — APPOINTMENT (OUTPATIENT)
Dept: GENERAL RADIOLOGY | Age: 86
DRG: 291 | End: 2021-04-24
Payer: MEDICARE

## 2021-04-24 ENCOUNTER — HOSPITAL ENCOUNTER (INPATIENT)
Age: 86
LOS: 3 days | Discharge: NURSING, REHAB OR LONG-TERM CARE | DRG: 291 | End: 2021-04-27
Attending: EMERGENCY MEDICINE | Admitting: FAMILY MEDICINE
Payer: MEDICARE

## 2021-04-24 ENCOUNTER — APPOINTMENT (OUTPATIENT)
Dept: CT IMAGING | Age: 86
DRG: 291 | End: 2021-04-24
Payer: MEDICARE

## 2021-04-24 DIAGNOSIS — R42 DIZZINESS: ICD-10-CM

## 2021-04-24 DIAGNOSIS — R09.02 HYPOXIA: Primary | ICD-10-CM

## 2021-04-24 DIAGNOSIS — I50.9 DECOMPENSATED HEART FAILURE (HCC): ICD-10-CM

## 2021-04-24 DIAGNOSIS — R31.21 ASYMPTOMATIC MICROSCOPIC HEMATURIA: ICD-10-CM

## 2021-04-24 DIAGNOSIS — I50.9 ACUTE ON CHRONIC CONGESTIVE HEART FAILURE, UNSPECIFIED HEART FAILURE TYPE (HCC): ICD-10-CM

## 2021-04-24 LAB
ALBUMIN SERPL-MCNC: 4.1 G/DL (ref 3.5–5.2)
ALP BLD-CCNC: 46 U/L (ref 40–129)
ALT SERPL-CCNC: 10 U/L (ref 0–40)
ANION GAP SERPL CALCULATED.3IONS-SCNC: 10 MMOL/L (ref 7–16)
ANISOCYTOSIS: ABNORMAL
AST SERPL-CCNC: 31 U/L (ref 0–39)
BACTERIA: ABNORMAL /HPF
BASOPHILS ABSOLUTE: 0.01 E9/L (ref 0–0.2)
BASOPHILS RELATIVE PERCENT: 0.2 % (ref 0–2)
BILIRUB SERPL-MCNC: 0.4 MG/DL (ref 0–1.2)
BILIRUBIN URINE: NEGATIVE
BLOOD, URINE: ABNORMAL
BUN BLDV-MCNC: 20 MG/DL (ref 6–23)
CALCIUM SERPL-MCNC: 9.3 MG/DL (ref 8.6–10.2)
CHLORIDE BLD-SCNC: 96 MMOL/L (ref 98–107)
CHOLESTEROL, FASTING: 154 MG/DL (ref 0–199)
CLARITY: CLEAR
CO2: 25 MMOL/L (ref 22–29)
COLOR: YELLOW
CREAT SERPL-MCNC: 1 MG/DL (ref 0.7–1.2)
EOSINOPHILS ABSOLUTE: 0.02 E9/L (ref 0.05–0.5)
EOSINOPHILS RELATIVE PERCENT: 0.4 % (ref 0–6)
GFR AFRICAN AMERICAN: >60
GFR NON-AFRICAN AMERICAN: >60 ML/MIN/1.73
GLUCOSE BLD-MCNC: 139 MG/DL (ref 74–99)
GLUCOSE URINE: NEGATIVE MG/DL
HBA1C MFR BLD: 5.7 % (ref 4–5.6)
HCT VFR BLD CALC: 28.8 % (ref 37–54)
HDLC SERPL-MCNC: 50 MG/DL
HEMOGLOBIN: 9.9 G/DL (ref 12.5–16.5)
IMMATURE GRANULOCYTES #: 0.03 E9/L
IMMATURE GRANULOCYTES %: 0.7 % (ref 0–5)
IRON SATURATION: 25 % (ref 20–55)
IRON: 71 MCG/DL (ref 59–158)
KETONES, URINE: ABNORMAL MG/DL
LDL CHOLESTEROL CALCULATED: 80 MG/DL (ref 0–99)
LEUKOCYTE ESTERASE, URINE: NEGATIVE
LV EF: 63 %
LVEF MODALITY: NORMAL
LYMPHOCYTES ABSOLUTE: 0.38 E9/L (ref 1.5–4)
LYMPHOCYTES RELATIVE PERCENT: 8.3 % (ref 20–42)
MCH RBC QN AUTO: 35 PG (ref 26–35)
MCHC RBC AUTO-ENTMCNC: 34.4 % (ref 32–34.5)
MCV RBC AUTO: 101.8 FL (ref 80–99.9)
METER GLUCOSE: 141 MG/DL (ref 74–99)
MONOCYTES ABSOLUTE: 0.26 E9/L (ref 0.1–0.95)
MONOCYTES RELATIVE PERCENT: 5.7 % (ref 2–12)
NEUTROPHILS ABSOLUTE: 3.88 E9/L (ref 1.8–7.3)
NEUTROPHILS RELATIVE PERCENT: 84.7 % (ref 43–80)
NITRITE, URINE: NEGATIVE
OVALOCYTES: ABNORMAL
PDW BLD-RTO: 13.2 FL (ref 11.5–15)
PH UA: 6 (ref 5–9)
PLATELET # BLD: 144 E9/L (ref 130–450)
PMV BLD AUTO: 10.1 FL (ref 7–12)
POIKILOCYTES: ABNORMAL
POTASSIUM REFLEX MAGNESIUM: 5.5 MMOL/L (ref 3.5–5)
POTASSIUM SERPL-SCNC: 4.3 MMOL/L (ref 3.5–5)
PRO-BNP: 4338 PG/ML (ref 0–450)
PROTEIN UA: 100 MG/DL
RBC # BLD: 2.83 E12/L (ref 3.8–5.8)
RBC UA: ABNORMAL /HPF (ref 0–2)
SODIUM BLD-SCNC: 131 MMOL/L (ref 132–146)
SPECIFIC GRAVITY UA: 1.02 (ref 1–1.03)
TEAR DROP CELLS: ABNORMAL
TOTAL IRON BINDING CAPACITY: 285 MCG/DL (ref 250–450)
TOTAL PROTEIN: 6.9 G/DL (ref 6.4–8.3)
TRIGLYCERIDE, FASTING: 118 MG/DL (ref 0–149)
TROPONIN: <0.01 NG/ML (ref 0–0.03)
TSH SERPL DL<=0.05 MIU/L-ACNC: 2.61 UIU/ML (ref 0.27–4.2)
UROBILINOGEN, URINE: 0.2 E.U./DL
VLDLC SERPL CALC-MCNC: 24 MG/DL
WBC # BLD: 4.6 E9/L (ref 4.5–11.5)
WBC UA: ABNORMAL /HPF (ref 0–5)

## 2021-04-24 PROCEDURE — 83036 HEMOGLOBIN GLYCOSYLATED A1C: CPT

## 2021-04-24 PROCEDURE — 84484 ASSAY OF TROPONIN QUANT: CPT

## 2021-04-24 PROCEDURE — 80053 COMPREHEN METABOLIC PANEL: CPT

## 2021-04-24 PROCEDURE — 83550 IRON BINDING TEST: CPT

## 2021-04-24 PROCEDURE — 84443 ASSAY THYROID STIM HORMONE: CPT

## 2021-04-24 PROCEDURE — 99285 EMERGENCY DEPT VISIT HI MDM: CPT

## 2021-04-24 PROCEDURE — 83880 ASSAY OF NATRIURETIC PEPTIDE: CPT

## 2021-04-24 PROCEDURE — 93005 ELECTROCARDIOGRAM TRACING: CPT | Performed by: EMERGENCY MEDICINE

## 2021-04-24 PROCEDURE — 84132 ASSAY OF SERUM POTASSIUM: CPT

## 2021-04-24 PROCEDURE — 6360000002 HC RX W HCPCS: Performed by: INTERNAL MEDICINE

## 2021-04-24 PROCEDURE — 83540 ASSAY OF IRON: CPT

## 2021-04-24 PROCEDURE — 96374 THER/PROPH/DIAG INJ IV PUSH: CPT

## 2021-04-24 PROCEDURE — 6370000000 HC RX 637 (ALT 250 FOR IP): Performed by: FAMILY MEDICINE

## 2021-04-24 PROCEDURE — 6370000000 HC RX 637 (ALT 250 FOR IP): Performed by: NURSE PRACTITIONER

## 2021-04-24 PROCEDURE — 6360000002 HC RX W HCPCS: Performed by: FAMILY MEDICINE

## 2021-04-24 PROCEDURE — 70450 CT HEAD/BRAIN W/O DYE: CPT

## 2021-04-24 PROCEDURE — 1200000000 HC SEMI PRIVATE

## 2021-04-24 PROCEDURE — 71045 X-RAY EXAM CHEST 1 VIEW: CPT

## 2021-04-24 PROCEDURE — 81001 URINALYSIS AUTO W/SCOPE: CPT

## 2021-04-24 PROCEDURE — 82962 GLUCOSE BLOOD TEST: CPT

## 2021-04-24 PROCEDURE — 80061 LIPID PANEL: CPT

## 2021-04-24 PROCEDURE — 36415 COLL VENOUS BLD VENIPUNCTURE: CPT

## 2021-04-24 PROCEDURE — 6360000002 HC RX W HCPCS: Performed by: STUDENT IN AN ORGANIZED HEALTH CARE EDUCATION/TRAINING PROGRAM

## 2021-04-24 PROCEDURE — 6360000004 HC RX CONTRAST MEDICATION: Performed by: FAMILY MEDICINE

## 2021-04-24 PROCEDURE — 93306 TTE W/DOPPLER COMPLETE: CPT

## 2021-04-24 PROCEDURE — 85025 COMPLETE CBC W/AUTO DIFF WBC: CPT

## 2021-04-24 PROCEDURE — 2580000003 HC RX 258: Performed by: FAMILY MEDICINE

## 2021-04-24 PROCEDURE — APPSS180 APP SPLIT SHARED TIME > 60 MINUTES: Performed by: NURSE PRACTITIONER

## 2021-04-24 PROCEDURE — 99223 1ST HOSP IP/OBS HIGH 75: CPT | Performed by: FAMILY MEDICINE

## 2021-04-24 PROCEDURE — 99223 1ST HOSP IP/OBS HIGH 75: CPT | Performed by: INTERNAL MEDICINE

## 2021-04-24 RX ORDER — PHENOL 1.4 %
10 AEROSOL, SPRAY (ML) MUCOUS MEMBRANE NIGHTLY
COMMUNITY

## 2021-04-24 RX ORDER — MELOXICAM 7.5 MG/1
7.5 TABLET ORAL DAILY PRN
Status: DISCONTINUED | OUTPATIENT
Start: 2021-04-24 | End: 2021-04-25

## 2021-04-24 RX ORDER — ACETAMINOPHEN 650 MG/1
650 SUPPOSITORY RECTAL EVERY 6 HOURS PRN
Status: DISCONTINUED | OUTPATIENT
Start: 2021-04-24 | End: 2021-04-27 | Stop reason: HOSPADM

## 2021-04-24 RX ORDER — LEVOTHYROXINE SODIUM 0.05 MG/1
50 TABLET ORAL DAILY
Status: DISCONTINUED | OUTPATIENT
Start: 2021-04-24 | End: 2021-04-27 | Stop reason: HOSPADM

## 2021-04-24 RX ORDER — PROMETHAZINE HYDROCHLORIDE 25 MG/1
12.5 TABLET ORAL EVERY 6 HOURS PRN
Status: DISCONTINUED | OUTPATIENT
Start: 2021-04-24 | End: 2021-04-27 | Stop reason: HOSPADM

## 2021-04-24 RX ORDER — ACETAMINOPHEN 325 MG/1
650 TABLET ORAL EVERY 6 HOURS PRN
Status: DISCONTINUED | OUTPATIENT
Start: 2021-04-24 | End: 2021-04-27 | Stop reason: HOSPADM

## 2021-04-24 RX ORDER — SODIUM CHLORIDE 0.9 % (FLUSH) 0.9 %
5-40 SYRINGE (ML) INJECTION PRN
Status: DISCONTINUED | OUTPATIENT
Start: 2021-04-24 | End: 2021-04-27 | Stop reason: HOSPADM

## 2021-04-24 RX ORDER — FUROSEMIDE 10 MG/ML
40 INJECTION INTRAMUSCULAR; INTRAVENOUS 2 TIMES DAILY
Status: DISCONTINUED | OUTPATIENT
Start: 2021-04-24 | End: 2021-04-26

## 2021-04-24 RX ORDER — FINASTERIDE 5 MG/1
5 TABLET, FILM COATED ORAL EVERY EVENING
COMMUNITY

## 2021-04-24 RX ORDER — DEXTROSE MONOHYDRATE 25 G/50ML
12.5 INJECTION, SOLUTION INTRAVENOUS PRN
Status: DISCONTINUED | OUTPATIENT
Start: 2021-04-24 | End: 2021-04-27 | Stop reason: HOSPADM

## 2021-04-24 RX ORDER — ASPIRIN 81 MG/1
81 TABLET, CHEWABLE ORAL DAILY
Status: DISCONTINUED | OUTPATIENT
Start: 2021-04-24 | End: 2021-04-27 | Stop reason: HOSPADM

## 2021-04-24 RX ORDER — POLYETHYLENE GLYCOL 3350 17 G/17G
17 POWDER, FOR SOLUTION ORAL DAILY PRN
Status: DISCONTINUED | OUTPATIENT
Start: 2021-04-24 | End: 2021-04-27 | Stop reason: HOSPADM

## 2021-04-24 RX ORDER — AMLODIPINE BESYLATE 10 MG/1
10 TABLET ORAL NIGHTLY
Status: DISCONTINUED | OUTPATIENT
Start: 2021-04-24 | End: 2021-04-27 | Stop reason: HOSPADM

## 2021-04-24 RX ORDER — VITAMIN B COMPLEX
1 TABLET ORAL DAILY
Status: DISCONTINUED | OUTPATIENT
Start: 2021-04-24 | End: 2021-04-24 | Stop reason: CLARIF

## 2021-04-24 RX ORDER — VITAMIN B COMPLEX
3000 TABLET ORAL DAILY
Status: DISCONTINUED | OUTPATIENT
Start: 2021-04-24 | End: 2021-04-27 | Stop reason: HOSPADM

## 2021-04-24 RX ORDER — LANOLIN ALCOHOL/MO/W.PET/CERES
3 CREAM (GRAM) TOPICAL NIGHTLY PRN
Status: DISCONTINUED | OUTPATIENT
Start: 2021-04-24 | End: 2021-04-24

## 2021-04-24 RX ORDER — ACETAMINOPHEN 325 MG/1
650 TABLET ORAL 2 TIMES DAILY
Status: DISCONTINUED | OUTPATIENT
Start: 2021-04-24 | End: 2021-04-27 | Stop reason: HOSPADM

## 2021-04-24 RX ORDER — LANOLIN ALCOHOL/MO/W.PET/CERES
10 CREAM (GRAM) TOPICAL NIGHTLY PRN
Status: DISCONTINUED | OUTPATIENT
Start: 2021-04-24 | End: 2021-04-25

## 2021-04-24 RX ORDER — TAMSULOSIN HYDROCHLORIDE 0.4 MG/1
0.4 CAPSULE ORAL EVERY EVENING
COMMUNITY

## 2021-04-24 RX ORDER — ONDANSETRON 2 MG/ML
4 INJECTION INTRAMUSCULAR; INTRAVENOUS EVERY 6 HOURS PRN
Status: DISCONTINUED | OUTPATIENT
Start: 2021-04-24 | End: 2021-04-27 | Stop reason: HOSPADM

## 2021-04-24 RX ORDER — ESCITALOPRAM OXALATE 10 MG/1
10 TABLET ORAL DAILY
Status: DISCONTINUED | OUTPATIENT
Start: 2021-04-24 | End: 2021-04-27 | Stop reason: HOSPADM

## 2021-04-24 RX ORDER — SODIUM CHLORIDE 9 MG/ML
25 INJECTION, SOLUTION INTRAVENOUS PRN
Status: DISCONTINUED | OUTPATIENT
Start: 2021-04-24 | End: 2021-04-24

## 2021-04-24 RX ORDER — CARVEDILOL 6.25 MG/1
6.25 TABLET ORAL 2 TIMES DAILY WITH MEALS
Status: DISCONTINUED | OUTPATIENT
Start: 2021-04-24 | End: 2021-04-25

## 2021-04-24 RX ORDER — FUROSEMIDE 10 MG/ML
20 INJECTION INTRAMUSCULAR; INTRAVENOUS ONCE
Status: COMPLETED | OUTPATIENT
Start: 2021-04-24 | End: 2021-04-24

## 2021-04-24 RX ORDER — CLONIDINE HYDROCHLORIDE 0.1 MG/1
0.1 TABLET ORAL 2 TIMES DAILY
Status: DISCONTINUED | OUTPATIENT
Start: 2021-04-24 | End: 2021-04-24

## 2021-04-24 RX ORDER — HYDRALAZINE HYDROCHLORIDE 20 MG/ML
5 INJECTION INTRAMUSCULAR; INTRAVENOUS ONCE
Status: COMPLETED | OUTPATIENT
Start: 2021-04-24 | End: 2021-04-24

## 2021-04-24 RX ORDER — NICOTINE POLACRILEX 4 MG
15 LOZENGE BUCCAL PRN
Status: DISCONTINUED | OUTPATIENT
Start: 2021-04-24 | End: 2021-04-27 | Stop reason: HOSPADM

## 2021-04-24 RX ORDER — LISINOPRIL 5 MG/1
5 TABLET ORAL DAILY
Status: DISCONTINUED | OUTPATIENT
Start: 2021-04-24 | End: 2021-04-24

## 2021-04-24 RX ORDER — AMLODIPINE BESYLATE 10 MG/1
10 TABLET ORAL NIGHTLY
COMMUNITY

## 2021-04-24 RX ORDER — MORPHINE SULFATE 4 MG/ML
4 INJECTION, SOLUTION INTRAMUSCULAR; INTRAVENOUS ONCE
Status: COMPLETED | OUTPATIENT
Start: 2021-04-24 | End: 2021-04-24

## 2021-04-24 RX ORDER — DEXTROSE MONOHYDRATE 50 MG/ML
100 INJECTION, SOLUTION INTRAVENOUS PRN
Status: DISCONTINUED | OUTPATIENT
Start: 2021-04-24 | End: 2021-04-27 | Stop reason: HOSPADM

## 2021-04-24 RX ORDER — VITAMIN C
1 TAB ORAL DAILY
Status: DISCONTINUED | OUTPATIENT
Start: 2021-04-24 | End: 2021-04-27 | Stop reason: HOSPADM

## 2021-04-24 RX ORDER — SODIUM CHLORIDE 0.9 % (FLUSH) 0.9 %
5-40 SYRINGE (ML) INJECTION EVERY 12 HOURS SCHEDULED
Status: DISCONTINUED | OUTPATIENT
Start: 2021-04-24 | End: 2021-04-27 | Stop reason: HOSPADM

## 2021-04-24 RX ORDER — LANOLIN ALCOHOL/MO/W.PET/CERES
800 CREAM (GRAM) TOPICAL DAILY
Status: DISCONTINUED | OUTPATIENT
Start: 2021-04-24 | End: 2021-04-24 | Stop reason: CLARIF

## 2021-04-24 RX ORDER — HYDRALAZINE HYDROCHLORIDE 20 MG/ML
10 INJECTION INTRAMUSCULAR; INTRAVENOUS EVERY 4 HOURS PRN
Status: DISCONTINUED | OUTPATIENT
Start: 2021-04-24 | End: 2021-04-27 | Stop reason: HOSPADM

## 2021-04-24 RX ORDER — FOLIC ACID 1 MG/1
1 TABLET ORAL DAILY
Status: DISCONTINUED | OUTPATIENT
Start: 2021-04-24 | End: 2021-04-27 | Stop reason: HOSPADM

## 2021-04-24 RX ADMIN — AMLODIPINE BESYLATE 10 MG: 10 TABLET ORAL at 20:10

## 2021-04-24 RX ADMIN — FUROSEMIDE 40 MG: 10 INJECTION, SOLUTION INTRAMUSCULAR; INTRAVENOUS at 18:00

## 2021-04-24 RX ADMIN — Medication 10 ML: at 20:10

## 2021-04-24 RX ADMIN — ACETAMINOPHEN 650 MG: 325 TABLET ORAL at 15:23

## 2021-04-24 RX ADMIN — FUROSEMIDE 20 MG: 10 INJECTION, SOLUTION INTRAMUSCULAR; INTRAVENOUS at 13:16

## 2021-04-24 RX ADMIN — FOLIC ACID 1 MG: 1 TABLET ORAL at 15:14

## 2021-04-24 RX ADMIN — SODIUM CHLORIDE, PRESERVATIVE FREE 10 ML: 5 INJECTION INTRAVENOUS at 15:13

## 2021-04-24 RX ADMIN — Medication 3000 UNITS: at 15:14

## 2021-04-24 RX ADMIN — HYDRALAZINE HYDROCHLORIDE 5 MG: 20 INJECTION, SOLUTION INTRAMUSCULAR; INTRAVENOUS at 23:52

## 2021-04-24 RX ADMIN — HYDRALAZINE HYDROCHLORIDE 10 MG: 20 INJECTION, SOLUTION INTRAMUSCULAR; INTRAVENOUS at 21:34

## 2021-04-24 RX ADMIN — Medication 1 TABLET: at 15:14

## 2021-04-24 RX ADMIN — ESCITALOPRAM OXALATE 10 MG: 10 TABLET ORAL at 15:14

## 2021-04-24 RX ADMIN — PERFLUTREN 1.65 MG: 6.52 INJECTION, SUSPENSION INTRAVENOUS at 15:13

## 2021-04-24 RX ADMIN — SODIUM CHLORIDE, PRESERVATIVE FREE 10 ML: 5 INJECTION INTRAVENOUS at 18:00

## 2021-04-24 RX ADMIN — MORPHINE SULFATE 4 MG: 4 INJECTION, SOLUTION INTRAMUSCULAR; INTRAVENOUS at 10:18

## 2021-04-24 RX ADMIN — CARVEDILOL 6.25 MG: 6.25 TABLET, FILM COATED ORAL at 18:00

## 2021-04-24 ASSESSMENT — PAIN DESCRIPTION - FREQUENCY: FREQUENCY: CONTINUOUS

## 2021-04-24 ASSESSMENT — PAIN DESCRIPTION - ONSET: ONSET: ON-GOING

## 2021-04-24 ASSESSMENT — ENCOUNTER SYMPTOMS
VOMITING: 0
ABDOMINAL PAIN: 0
EYE PAIN: 0
ABDOMINAL DISTENTION: 0
SHORTNESS OF BREATH: 1
NAUSEA: 0
RHINORRHEA: 0
DIARRHEA: 0
WHEEZING: 0
COUGH: 0

## 2021-04-24 ASSESSMENT — PAIN SCALES - GENERAL
PAINLEVEL_OUTOF10: 3
PAINLEVEL_OUTOF10: 5
PAINLEVEL_OUTOF10: 5

## 2021-04-24 ASSESSMENT — PAIN DESCRIPTION - PROGRESSION: CLINICAL_PROGRESSION: NOT CHANGED

## 2021-04-24 ASSESSMENT — PAIN DESCRIPTION - LOCATION: LOCATION: HIP

## 2021-04-24 ASSESSMENT — PAIN DESCRIPTION - ORIENTATION: ORIENTATION: LEFT

## 2021-04-24 ASSESSMENT — PAIN DESCRIPTION - PAIN TYPE: TYPE: CHRONIC PAIN

## 2021-04-24 NOTE — ED PROVIDER NOTES
[address] Emergency Room          Pt Name: Tillman Fabry  MRN: [id number]  [place] 4/23/1930  Date of evaluation: 4/24/2021      CHIEF COMPLAINT  Chief Complaint   Patient presents with    Dizziness     Lightheaded dizziness chronic- today worse tremors, HA- \"Not feeling right\"        Tillman Fabry is a 80 y.o. male presenting to the ED with chief complaint of dizziness. Patient's dizziness is described as lightheaded. He states been ongoing for 2 days. The lightheaded feeling is present when he gets up from laying down. It goes away spontaneously when he rests. He also states he has increased anxiety which causes to have tremors. Tremors are intentional in bilateral upper extremities. He also notes a feeling of headache with his dizziness. It is not the worst headache of his life. He also notes some swelling in his lower extremities that have worsened over the past week or so, and exertional short of breath which is also worsened over the past week or so. Short of breath is better when he rests. 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 He does have significant history of CAD and status post bypass. He also admits associated symptoms of urinary issues. He feels he is not able to void all the urine out. He denies hematuria or dysuria or frequency. The patient's daughter is present at bedside provides some HPI as patient is hard of hearing. Patient's complaints have been constant and worsening over time.   HPI       Patient has a medical history as listed below:   Past Medical History:   Diagnosis Date    CAD (coronary artery disease)     Hyperlipidemia     Hypertension      Patient Active Problem List    Diagnosis Date Noted    Decompensated heart failure ([address] 04/24/2021    Hypoxia     Compression fracture of T12 vertebra ([facility] [address] 07/14/2020    Acute renal insufficiency 11/04/2019    Type 2 diabetes mellitus without complication ([facility] 62/32/1001    CHF (congestive heart failure), NYHA class I, acute on chronic, combined ([facility] 98/29/4427    Systolic ejection murmur     Hypertension     CHF with unknown LVEF ([facility] 12/17/2017    Aortic valve stenosis 12/17/2017    Sick sinus syndrome ([facility] 12/17/2017    Pacemaker 12/17/2017       Review of Systems   Constitutional: Negative for chills and fever. HENT: Negative for congestion and rhinorrhea. Eyes: Negative for pain and visual disturbance. Respiratory: Positive for shortness of breath. Negative for cough and wheezing. Cardiovascular: Positive for palpitations and leg swelling. Negative for chest pain. Gastrointestinal: Negative for abdominal distention, abdominal pain, diarrhea, nausea and vomiting. Genitourinary: Positive for difficulty urinating. Negative for dysuria, flank pain, frequency and hematuria. Musculoskeletal: Negative for arthralgias and neck pain. Neurological: Positive for dizziness, tremors, light-headedness and headaches. Negative for weakness and numbness. Physical Exam  Vitals signs and nursing note reviewed. Constitutional:       General: He is not in acute distress. Appearance: He is well-developed. HENT:      Head: Normocephalic and atraumatic. Nose: Nose normal.      Mouth/Throat:      Mouth: Mucous membranes are dry. Pharynx: Oropharynx is clear. Eyes:      Extraocular Movements: Extraocular movements intact. Conjunctiva/sclera: Conjunctivae normal.      Pupils: Pupils are equal, round, and reactive to light. Neck:      Musculoskeletal: Normal range of motion. No neck rigidity. Cardiovascular:      Rate and Rhythm: Normal rate and regular rhythm. Heart sounds: Normal heart sounds. Pulmonary:      Effort: Pulmonary effort is normal. No respiratory distress. Breath sounds: Rales present. No wheezing or rhonchi. Chest:      Chest wall: No tenderness.    Abdominal:      General: Abdomen is flat. There is no distension. Palpations: Abdomen is soft. There is no mass. Tenderness: There is no abdominal tenderness. Musculoskeletal:      Right lower leg: Edema present. Left lower leg: Edema present. Comments: +2 pitting edema in bilateral lower extremities   Skin:     General: Skin is warm and dry. Findings: No rash. Neurological:      General: No focal deficit present. Mental Status: He is alert. Mental status is at baseline. Cranial Nerves: No cranial nerve deficit. Sensory: No sensory deficit. Motor: No weakness. Procedures     MDM     ED Course as of Apr 25 0615   Sat Apr 24, 2021   1147 Pro-BNP(!): 4,338 [WL]   18 reynaldo    [WL]      ED Course User Index  [WL] Nury Marcanoe, DO        Patient presents to the ED for evaluation. This patient was seen and evaluated with the attending. Work-up was performed with concerns for but not limited to ACS, arrhythmia, Acute CHF, CVA, intracranial hemorrhage, mass and Electrolyte abnormality   Patient was found to be hypoxic with pulse ox 80% on room air upon arrival here. He is not normally on oxygen. He was also found to have elevated BNP, this likely consistent with acute exacerbation of CHF. He was diuresed with Lasix. Medicine agreed to admit. His dizziness may be secondary to hypoxia or hypoperfusion from his CHF exacerbation. Patient requires continued workup and management of their symptoms and will be admitted to the hospital for further evaluation and treatment.        --------------------------------------------- PAST HISTORY ---------------------------------------------  Past Medical History:  has a past medical history of CAD (coronary artery disease), Hyperlipidemia, and Hypertension. Past Surgical History:  has a past surgical history that includes Coronary artery bypass graft and hip surgery. Social History:  reports that he has never smoked.  He has never used smokeless tobacco. He reports current alcohol use. Family History: family history is not on file. The patients home medications have been reviewed.     Allergies: Zocor [simvastatin]    -------------------------------------------------- RESULTS -------------------------------------------------    Lab  Results for orders placed or performed during the hospital encounter of 04/24/21   CBC Auto Differential   Result Value Ref Range    WBC 4.6 4.5 - 11.5 E9/L    RBC 2.83 (L) 3.80 - 5.80 E12/L    Hemoglobin 9.9 (L) 12.5 - 16.5 g/dL    Hematocrit 28.8 (L) 37.0 - 54.0 %    .8 (H) 80.0 - 99.9 fL    MCH 35.0 26.0 - 35.0 pg    MCHC 34.4 32.0 - 34.5 %    RDW 13.2 11.5 - 15.0 fL    Platelets 357 204 - 106 E9/L    MPV 10.1 7.0 - 12.0 fL    Neutrophils % 84.7 (H) 43.0 - 80.0 %    Immature Granulocytes % 0.7 0.0 - 5.0 %    Lymphocytes % 8.3 (L) 20.0 - 42.0 %    Monocytes % 5.7 2.0 - 12.0 %    Eosinophils % 0.4 0.0 - 6.0 %    Basophils % 0.2 0.0 - 2.0 %    Neutrophils Absolute 3.88 1.80 - 7.30 E9/L    Immature Granulocytes # 0.03 E9/L    Lymphocytes Absolute 0.38 (L) 1.50 - 4.00 E9/L    Monocytes Absolute 0.26 0.10 - 0.95 E9/L    Eosinophils Absolute 0.02 (L) 0.05 - 0.50 E9/L    Basophils Absolute 0.01 0.00 - 0.20 E9/L    Anisocytosis 1+     Poikilocytes 1+     Ovalocytes 1+     Tear Drop Cells 1+    Troponin   Result Value Ref Range    Troponin <0.01 0.00 - 0.03 ng/mL   Comprehensive Metabolic Panel w/ Reflex to MG   Result Value Ref Range    Sodium 131 (L) 132 - 146 mmol/L    Potassium reflex Magnesium 5.5 (H) 3.5 - 5.0 mmol/L    Chloride 96 (L) 98 - 107 mmol/L    CO2 25 22 - 29 mmol/L    Anion Gap 10 7 - 16 mmol/L    Glucose 139 (H) 74 - 99 mg/dL    BUN 20 6 - 23 mg/dL    CREATININE 1.0 0.7 - 1.2 mg/dL    GFR Non-African American >60 >=60 mL/min/1.73    GFR African American >60     Calcium 9.3 8.6 - 10.2 mg/dL    Total Protein 6.9 6.4 - 8.3 g/dL    Albumin 4.1 3.5 - 5.2 g/dL    Total Bilirubin 0.4 0.0 - 1.2 mg/dL    Alkaline Phosphatase 46 40 - 129 U/L    ALT 10 0 - 40 U/L    AST 31 0 - 39 U/L   Urinalysis with Microscopic   Result Value Ref Range    Color, UA Yellow Straw/Yellow    Clarity, UA Clear Clear    Glucose, Ur Negative Negative mg/dL    Bilirubin Urine Negative Negative    Ketones, Urine TRACE (A) Negative mg/dL    Specific Gravity, UA 1.025 1.005 - 1.030    Blood, Urine LARGE (A) Negative    pH, UA 6.0 5.0 - 9.0    Protein,  (A) Negative mg/dL    Urobilinogen, Urine 0.2 <2.0 E.U./dL    Nitrite, Urine Negative Negative    Leukocyte Esterase, Urine Negative Negative    WBC, UA NONE 0 - 5 /HPF    RBC, UA 10-20 (A) 0 - 2 /HPF    Bacteria, UA NONE SEEN None Seen /HPF   Brain Natriuretic Peptide   Result Value Ref Range    Pro-BNP 4,338 (H) 0 - 450 pg/mL   Potassium   Result Value Ref Range    Potassium 4.3 3.5 - 5.0 mmol/L   Iron and TIBC   Result Value Ref Range    Iron 71 59 - 158 mcg/dL    TIBC 285 250 - 450 mcg/dL    Iron Saturation 25 20 - 55 %   TSH without Reflex   Result Value Ref Range    TSH 2.610 0.270 - 4.200 uIU/mL   Troponin   Result Value Ref Range    Troponin <0.01 0.00 - 0.03 ng/mL   Troponin   Result Value Ref Range    Troponin <0.01 0.00 - 0.03 ng/mL   Lipid, Fasting   Result Value Ref Range    Cholesterol, Fasting 154 0 - 199 mg/dL    Triglyceride, Fasting 118 0 - 149 mg/dL    HDL 50 >40 mg/dL    LDL Calculated 80 0 - 99 mg/dL    VLDL Cholesterol Calculated 24 mg/dL   Hemoglobin A1C   Result Value Ref Range    Hemoglobin A1C 5.7 (H) 4.0 - 5.6 %   POCT Glucose   Result Value Ref Range    Meter Glucose 141 (H) 74 - 99 mg/dL   POCT Glucose   Result Value Ref Range    Meter Glucose 133 (H) 74 - 99 mg/dL   EKG 12 Lead   Result Value Ref Range    Ventricular Rate 83 BPM    Atrial Rate 83 BPM    P-R Interval 164 ms    QRS Duration 90 ms    Q-T Interval 370 ms    QTc Calculation (Bazett) 434 ms    P Axis 3 degrees    R Axis -51 degrees    T Axis 70 degrees       Radiology  CT HEAD WO CONTRAST   Final Result   No CT evidence of acute intracranial abnormality. XR CHEST PORTABLE   Final Result   Findings may related to mild congestive heart failure with prominence of the   central vessels. Patchy left lung base atelectasis or infiltrate                 ------------------------- NURSING NOTES AND VITALS REVIEWED ---------------------------  Date / Time Roomed:  4/24/2021  7:47 AM  ED Bed Assignment:  [id number]    The nursing notes within the ED encounter and vital signs as below have been reviewed. Patient Vitals for the past 24 hrs:   BP Temp Temp src Pulse Resp SpO2 Height Weight   04/25/21 0312 -- -- -- -- -- -- -- 179 lb (81.2 kg)   04/25/21 0147 (!) 162/71 97.6 °F (36.4 °C) Oral 93 18 92 % -- --   04/25/21 0000 (!) 187/74 -- -- -- -- -- -- --   04/24/21 2300 (!) 180/76 -- -- -- -- -- -- --   04/24/21 2115 (!) 171/76 -- -- 82 16 -- -- --   04/24/21 2000 (!) 176/77 98.3 °F (36.8 °C) Oral 77 16 94 % -- --   04/24/21 1421 (!) 181/79 98.3 °F (36.8 °C) Oral 88 24 (!) 89 % 5' 3\" (1.6 m) 177 lb 8 oz (80.5 kg)   04/24/21 1313 (!) 166/73 98.8 °F (37.1 °C) Oral 82 16 95 % -- --   04/24/21 1007 -- -- -- -- -- 95 % -- --   04/24/21 1006 -- -- -- -- -- (!) 87 % -- --   04/24/21 0802 (!) 189/71 98.4 °F (36.9 °C) Oral 84 18 90 % 5' 3\" (1.6 m) --       Oxygen Saturation Interpretation: Abnormal and Improved after treatment        I have spoken with the patient and discussed todays results, in addition to providing specific details for the plan of care and counseling regarding the diagnosis and prognosis. Their questions are answered at this time and they are agreeable with the plan.         This patient's ED course included: a personal history and physicial examination, re-evaluation prior to disposition, IV medications, cardiac monitoring, continuous pulse oximetry and complex medical decision making and emergency management    This patient has been closely monitored and initially deteriorated, but then stabilized during their ED course. Please note that the withdrawal or failure to initiate urgent interventions for this patient would likely result in a life threatening deterioration or permanent disability. Accordingly this patient received 33 minutes of critical care time, excluding separately billable procedures. Clinical Impression  1. Hypoxia    2. Dizziness    3. Acute on chronic congestive heart failure, unspecified heart failure type ([facility] [address]    4. Asymptomatic microscopic hematuria          Disposition  Patient's disposition: Admit to telemetry  Patient's condition is stable. NOTE:  This report was transcribed using voice recognition software. Efforts were made to ensure accuracy; however, inadvertent computerized transcription errors may be present.            Juan Carlos Chavez, DO  Resident  04/25/21 0009

## 2021-04-24 NOTE — H&P
[facility] History and Physical      CHIEF COMPLAINT: Decreased blood pressure one standing    History of Present Illness: Patient is a 51-year-old gentleman who has issues with aspiration and has a ileostomy. He has noticed increased stool output over the past few days. He normally takes his blood pressure at different positions. He noticed when he stood up his blood pressure dropped quite a bit. He presented to the emergency room for further evaluation of his. He has noticed increased shortness of breath which has worsened with activity over the past 1 to 2 weeks. He also has noticed ankle swelling. He has difficulty sleeping more for nocturia every 2 hours. His appetite is not the greatest.  He denies fever, chills, chest pain, nausea, vomiting, indigestion, diarrhea or constipation. He reports insomnia and nocturia. Patient has type 2 diabetes mellitus, chronic kidney disease, stage II hypertension and left hip pain. His medications are Levothyroxine 50 mcg a day, Lexapro 10 mg a day, Clonidine 0.2 mg twice a day, Amlodipine 10 mg daily, Tamsulosin 0.4 mg daily, Proscar 5 mg daily, Metoprolol XL 25 mg daily, Melatonin 10 mg daily, Folic Acid 593 mg daily, Vitamin B 12 2500 mcg daily, Vitamin D 3000 international units daily and a baby Aspirin 81 mg daily. Lives with wife and son. No one is ill at home. Patient was following with Dr. Berenice Fritz, who  in 2020.     Informant(s) for H&P: self and wife    REVIEW OF SYSTEMS:  A comprehensive review of systems was negative except for: what is in the HPI      PMH:  Past Medical History:   Diagnosis Date    CAD (coronary artery disease)     Hyperlipidemia     Hypertension        Surgical History:  Past Surgical History:   Procedure Laterality Date    CORONARY ARTERY BYPASS GRAFT      HIP SURGERY      right       Medications Prior to Admission:    Prior to Admission medications    Medication Sig Start Date End Date Taking? Authorizing Provider   metoprolol tartrate (LOPRESSOR) 25 MG tablet Take 25 mg by mouth 2 times daily    Historical Provider, MD   acetaminophen (TYLENOL) 325 MG tablet Take 650 mg by mouth 2 times daily    Historical Provider, MD   cloNIDine (CATAPRES) 0.1 MG tablet Take 1 tablet by mouth 2 times daily  Patient taking differently: Take 0.2 mg by mouth 2 times daily  17   Junior Damaris MD   levothyroxine (SYNTHROID) 50 MCG tablet Take 50 mcg by mouth Daily    Historical Provider, MD   escitalopram (LEXAPRO) 10 MG tablet Take 10 mg by mouth daily    Historical Provider, MD   B Complex Vitamins (B-COMPLEX/B-12 PO) Take 2,500 mg by mouth    Historical Provider, MD   Cholecalciferol (VITAMIN D3) 3000 UNITS TABS Take 5,000 Units by mouth    Historical Provider, MD   aspirin 81 MG chewable tablet Take 81 mg by mouth daily    Historical Provider, MD   folic acid (FOLVITE) 033 MCG tablet Take 800 mcg by mouth daily    Historical Provider, MD       Allergies:    Zocor [simvastatin]    Social History:    reports that he has never smoked. He has never used smokeless tobacco. He reports current alcohol use. Family History:   family history is not on file.  Dad  of a stroke      PHYSICAL EXAM:  Vitals:  BP (!) 189/71   Pulse 84   Temp 98.4 °F (36.9 °C) (Oral)   Resp 18   Ht 5' 3\" (1.6 m)   SpO2 95%   BMI 36.23 kg/m²     General Appearance: alert and oriented to person, place and time and in no acute distress  Skin: warm and dry  Head: normocephalic and atraumatic  Eyes: pupils equal, round, and reactive to light, extraocular eye movements intact, conjunctivae normal  Neck: neck supple and non tender without mass   Pulmonary/Chest: clear to auscultation bilaterally- no wheezes, rales or rhonchi, normal air movement, no respiratory distress  Cardiovascular: normal rate, normal S1 and S2 and no carotid bruits  Abdomen: soft, non-tender, non-distended, normal bowel sounds, no masses or organomegaly  Extremities: no cyanosis, no clubbing and no edema  Neurologic: no cranial nerve deficit and speech normal        LABS:  Recent Labs     04/24/21  0923 04/24/21  1148   *  --    K 5.5* 4.3   CL 96*  --    CO2 25  --    BUN 20  --    CREATININE 1.0  --    GLUCOSE 139*  --    CALCIUM 9.3  --        Recent Labs     04/24/21  0923   WBC 4.6   RBC 2.83*   HGB 9.9*   HCT 28.8*   .8*   MCH 35.0   MCHC 34.4   RDW 13.2      MPV 10.1       Radiology:   CT HEAD WO CONTRAST   Final Result   No CT evidence of acute intracranial abnormality. XR CHEST PORTABLE   Final Result   Findings may related to mild congestive heart failure with prominence of the   central vessels. Patchy left lung base atelectasis or infiltrate             EKG:   Normal sinus rhythm  Left anterior fascicular block  Moderate voltage criteria for LVH, may be normal variant  Abnormal ECG  No previous ECGs available    ASSESSMENT:      Active Problems:  Decompensated heart failure (HCC)  T2DM  Hypertension  Hypothyroidism  Anxiety/depression  Short gut syndrome  Hypotension    Resolved Problems:    * No resolved hospital problems. *      PLAN:    1. Decompensated heart failure/combined -DC IV fluids. Strict I's and O's.  IV diuretics. Trend labs and treat accordingly. 2.  T2DM -glucose POCT. Hemoglobin A1c. Diabetic diet. Strict I's and O's.  3.  Hypertension -continue meds. 4.  Short gut syndrome -continue supportive care. 5.  Anxiety/depression -continue meds  6. Hypotension -monitor vitals. IV hydration. Trend labs and treat accordingly. Blood pressure medicines on hold    Code Status: Full  DVT prophylaxis: Lovenox    NOTE: This report was transcribed using voice recognition software. Every effort was made to ensure accuracy; however, inadvertent computerized transcription errors may be present.     Electronically signed by Dawn Friedman MD on 4/24/2021 at 1:01 PM

## 2021-04-24 NOTE — CONSULTS
Inpatient Cardiology Consultation      Reason for Consult:  Worsening CHF    Consulting Physician: Dr. Anaya Brenner     Requesting Physician:  Dr. Jovita Suh    Date of Consultation: 4/24/2021    HISTORY OF PRESENT ILLNESS:   Mr. Zak Dumont is a 80year old  male who was seen in consultation with Dr. Andrés Lopez on 12/18/2017. His medical history includes CAD s/p CABG in 2006, HTN, HLD, and obesity. Mr. Zak Dumont presented to [facility] ED on 04/24/2021 with complaints of dyspnea and weakness. He states that prior to presentation, he was having GUTIERREZ \"lately\". He denies accompanying chest pain. Complains of orthopnea and PND with a weight gain of 2 pounds this week. Denies change in his diet and states compliance with his medications. States that \"lately\" he has been [place]" and has been \"off balance and lightheaded\". He denies fall or LOC. He states that he has also noticed intermittent palpitations and feelings of his heart racing. Upon arrival to the ED his VS were 98.4-84-/71-90% on RA. EKG SR, LVH. CT of the head unremarkable. CXR with mild CHF. ProBNP 4338. He received Lasix 40mg IV and Morphine. He was admitted to a telemetry monitored unit. An echocardiogram was ordered. Cardiology was consulted by Dr. Jovita Suh for worsening CHF. Please note: past medical records were reviewed per electronic medical record (EMR) - see detailed reports under Past Medical/ Surgical History. Past Medical and Surgical History:    1. HTN  2. HLD  3. Obesity  4. CAD s/p CABG 09/15/2006 (Dr. Pearletha Peabody): LIMA-LAD, SVG-Diagonal, SVG-OM, SVG-RCA, SVG-RPDA  5. Echocardiogram 12/18/2017 (Dr. Andrés Lopez): Ejection fraction is visually estimated at 55%. No regional wall motion abnormalities seen. There is doppler evidence of stage I diastolic dysfunction. Normal right ventricle structure and function. Left atrial volume index of 34 ml per meters squared BSA. Mild aortic stenosis is present.  The aortic valve area is 1.8 cm2 with a maximum gradient of 19 mmHg and a mean gradient of 11 mmHg. Mild aortic valve regurgitation. Mild mitral regurgitation is present. Moderate tricuspid regurgitation. Pulmonary hypertension is mild. RVSP is 46 mmHg. 6. S/p Right hip replacement            Medications Prior to admit:  Prior to Admission medications    Medication Sig Start Date End Date Taking?  Authorizing Provider   amLODIPine (NORVASC) 10 MG tablet Take 10 mg by mouth nightly   Yes Historical Provider, MD   tamsulosin (FLOMAX) 0.4 MG capsule Take 0.4 mg by mouth every evening   Yes Historical Provider, MD   finasteride (PROSCAR) 5 MG tablet Take 5 mg by mouth every evening   Yes Historical Provider, MD   melatonin 3 MG TABS tablet Take 10 mg by mouth nightly   Yes Historical Provider, MD   metoprolol tartrate (LOPRESSOR) 25 MG tablet Take 25 mg by mouth 2 times daily   Yes Historical Provider, MD   acetaminophen (TYLENOL) 325 MG tablet Take 650 mg by mouth 2 times daily   Yes Historical Provider, MD   cloNIDine (CATAPRES) 0.1 MG tablet Take 1 tablet by mouth 2 times daily  Patient taking differently: Take 0.2 mg by mouth 2 times daily  12/19/17  Yes Junior Damaris MD   levothyroxine (SYNTHROID) 50 MCG tablet Take 50 mcg by mouth Daily   Yes Historical Provider, MD   escitalopram (LEXAPRO) 10 MG tablet Take 10 mg by mouth daily   Yes Historical Provider, MD   B Complex Vitamins (B-COMPLEX/B-12 PO) Take 2,500 mg by mouth   Yes Historical Provider, MD   Cholecalciferol (VITAMIN D3) 3000 UNITS TABS Take 5,000 Units by mouth   Yes Historical Provider, MD   aspirin 81 MG chewable tablet Take 81 mg by mouth daily   Yes Historical Provider, MD   folic acid (FOLVITE) 455 MCG tablet Take 800 mcg by mouth daily   Yes Historical Provider, MD       Current Medications:    Current Facility-Administered Medications: escitalopram (LEXAPRO) tablet 10 mg, 10 mg, Oral, Daily  [Held by provider] levothyroxine (SYNTHROID) tablet 50 mcg, 50 mcg, Oral, Daily  [Held by provider] metoprolol tartrate (LOPRESSOR) tablet 25 mg, 25 mg, Oral, BID  aspirin chewable tablet 81 mg, 81 mg, Oral, Daily  acetaminophen (TYLENOL) tablet 650 mg, 650 mg, Oral, BID  [Held by provider] cloNIDine (CATAPRES) tablet 0.1 mg, 0.1 mg, Oral, BID  sodium chloride flush 0.9 % injection 5-40 mL, 5-40 mL, Intravenous, 2 times per day  sodium chloride flush 0.9 % injection 5-40 mL, 5-40 mL, Intravenous, PRN  promethazine (PHENERGAN) tablet 12.5 mg, 12.5 mg, Oral, Q6H PRN **OR** ondansetron (ZOFRAN) injection 4 mg, 4 mg, Intravenous, Q6H PRN  polyethylene glycol (GLYCOLAX) packet 17 g, 17 g, Oral, Daily PRN  acetaminophen (TYLENOL) tablet 650 mg, 650 mg, Oral, Q6H PRN **OR** acetaminophen (TYLENOL) suppository 650 mg, 650 mg, Rectal, Q6H PRN  enoxaparin (LOVENOX) injection 40 mg, 40 mg, Subcutaneous, Daily  [Held by provider] lisinopril (PRINIVIL;ZESTRIL) tablet 5 mg, 5 mg, Oral, Daily  furosemide (LASIX) injection 40 mg, 40 mg, Intravenous, BID  melatonin tablet 10.5 mg, 10.5 mg, Oral, Nightly PRN  folic acid (FOLVITE) tablet 1 mg, 1 mg, Oral, Daily  vitamin B and C (TOTAL B-C) 1 tablet, 1 tablet, Oral, Daily  Vitamin D (CHOLECALCIFEROL) tablet 3,000 Units, 3,000 Units, Oral, Daily  glucose (GLUTOSE) 40 % oral gel 15 g, 15 g, Oral, PRN  dextrose 50 % IV solution, 12.5 g, Intravenous, PRN  glucagon (rDNA) injection 1 mg, 1 mg, Intramuscular, PRN  dextrose 5 % solution, 100 mL/hr, Intravenous, PRN    Allergies:  Zocor [simvastatin]    Social History:    Denies tobacco ans illicit drug use. Drinks one beer a day. Drinks one cup of coffee a day. Family History:   Please note this information was not obtained at this time as it is limited in nature due to the patient's advanced age. REVIEW OF SYSTEMS:     Please see HPI.      PHYSICAL EXAM:   BP (!) 181/79   Pulse 88   Temp 98.3 °F (36.8 °C) (Oral)   Resp 24   Ht 5' 3\" (1.6 m)   Wt 177 lb 8 oz (80.5 kg)   SpO2 (!) 89%   BMI 31.44 kg/m²   CONST: Well developed, obese, elderly male who appears stated age. Awake, alert, cooperative, no apparent distress  HEENT:   Head- Normocephalic, atraumatic   Eyes- Conjunctivae pink, anicteric  Throat- Oral mucosa pink and moist  Neck-  No stridor, trachea midline, no jugular venous distention. No adenopathy   CHEST: Chest symmetrical and non-tender to palpation. No accessory muscle use or intercostal retractions  RESPIRATORY: Lung sounds - diminished throughout fields   CARDIOVASCULAR:     No carotid bruit  Heart Inspection- shows no noted pulsations  Heart Palpation- no heaves or thrills; PMI is non-displaced   Heart Ausculation- Regular rate and rhythm, systolic murmur. No s3, s4 or rub   PV: Trace-1+ nonpitting bilateral lower extremity edema. No varicosities. Pedal pulses palpable, no clubbing or cyanosis   ABDOMEN: Soft, obese, non-tender to light palpation. Bowel sounds present. No palpable masses no organomegaly; no abdominal bruit  MS: Good muscle strength and tone. No atrophy or abnormal movements. : Deferred  SKIN: Warm and dry no statis dermatitis or ulcers   NEURO / PSYCH: Oriented to person, place and time. Speech clear and appropriate. Follows all commands.  Pleasant affect     DATA:    ECG: SR   Tele strips: SR  Diagnostic:      Intake/Output Summary (Last 24 hours) at 4/24/2021 1606  Last data filed at 4/24/2021 1445  Gross per 24 hour   Intake --   Output 345 ml   Net -345 ml       Labs:   CBC:   Recent Labs     04/24/21 0923   WBC 4.6   HGB 9.9*   HCT 28.8*        BMP:   Recent Labs     04/24/21  0923 04/24/21  1148   *  --    K 5.5* 4.3   CO2 25  --    BUN 20  --    CREATININE 1.0  --    LABGLOM >60  --    CALCIUM 9.3  --    HgA1c:   Lab Results   Component Value Date    LABA1C 6.4 (H) 07/22/2020     Recent Labs     04/24/21  0923 04/24/21  1530   TROPONINI <0.01 <0.01     FASTING LIPID PANEL:  Lab Results   Component Value Date    CHOL 159 01/29/2021    HDL 45 01/29/2021    LDLCALC 77 01/29/2021    TRIG 183 01/29/2021     LIVER PROFILE:  Recent Labs     04/24/21  0923   AST 31   ALT 10   LABALBU 4.1     04/24/2021 CT Head:   No CT evidence of acute intracranial abnormality. 04/24/2021 CXR:   Findings may related to mild congestive heart failure with prominence of the central vessels.   Patchy left lung base atelectasis or infiltrate    IMPRESSION and PLAN to follow per Dr. Elbert Villanueva     Electronically signed by MIHIR Thomas CNP on 4/24/2021 at 4:06 PM

## 2021-04-25 LAB
ANION GAP SERPL CALCULATED.3IONS-SCNC: 10 MMOL/L (ref 7–16)
BUN BLDV-MCNC: 18 MG/DL (ref 6–23)
CALCIUM SERPL-MCNC: 9.7 MG/DL (ref 8.6–10.2)
CHLORIDE BLD-SCNC: 91 MMOL/L (ref 98–107)
CHOLESTEROL, TOTAL: 150 MG/DL (ref 0–199)
CO2: 30 MMOL/L (ref 22–29)
CREAT SERPL-MCNC: 1.1 MG/DL (ref 0.7–1.2)
EKG ATRIAL RATE: 83 BPM
EKG P AXIS: 3 DEGREES
EKG P-R INTERVAL: 164 MS
EKG Q-T INTERVAL: 370 MS
EKG QRS DURATION: 90 MS
EKG QTC CALCULATION (BAZETT): 434 MS
EKG R AXIS: -51 DEGREES
EKG T AXIS: 70 DEGREES
EKG VENTRICULAR RATE: 83 BPM
GFR AFRICAN AMERICAN: >60
GFR NON-AFRICAN AMERICAN: >60 ML/MIN/1.73
GLUCOSE BLD-MCNC: 193 MG/DL (ref 74–99)
HDLC SERPL-MCNC: 48 MG/DL
LDL CHOLESTEROL CALCULATED: 80 MG/DL (ref 0–99)
MAGNESIUM: 1.8 MG/DL (ref 1.6–2.6)
METER GLUCOSE: 131 MG/DL (ref 74–99)
METER GLUCOSE: 133 MG/DL (ref 74–99)
METER GLUCOSE: 141 MG/DL (ref 74–99)
METER GLUCOSE: 169 MG/DL (ref 74–99)
POTASSIUM SERPL-SCNC: 4 MMOL/L (ref 3.5–5)
PRO-BNP: 3475 PG/ML (ref 0–450)
SODIUM BLD-SCNC: 131 MMOL/L (ref 132–146)
TRIGL SERPL-MCNC: 110 MG/DL (ref 0–149)
VLDLC SERPL CALC-MCNC: 22 MG/DL

## 2021-04-25 PROCEDURE — 6360000002 HC RX W HCPCS: Performed by: FAMILY MEDICINE

## 2021-04-25 PROCEDURE — 2580000003 HC RX 258: Performed by: FAMILY MEDICINE

## 2021-04-25 PROCEDURE — 83735 ASSAY OF MAGNESIUM: CPT

## 2021-04-25 PROCEDURE — 1200000000 HC SEMI PRIVATE

## 2021-04-25 PROCEDURE — 6370000000 HC RX 637 (ALT 250 FOR IP): Performed by: FAMILY MEDICINE

## 2021-04-25 PROCEDURE — 36415 COLL VENOUS BLD VENIPUNCTURE: CPT

## 2021-04-25 PROCEDURE — 6370000000 HC RX 637 (ALT 250 FOR IP): Performed by: NURSE PRACTITIONER

## 2021-04-25 PROCEDURE — 82962 GLUCOSE BLOOD TEST: CPT

## 2021-04-25 PROCEDURE — 6370000000 HC RX 637 (ALT 250 FOR IP): Performed by: INTERNAL MEDICINE

## 2021-04-25 PROCEDURE — 6360000002 HC RX W HCPCS: Performed by: INTERNAL MEDICINE

## 2021-04-25 PROCEDURE — 83880 ASSAY OF NATRIURETIC PEPTIDE: CPT

## 2021-04-25 PROCEDURE — 80048 BASIC METABOLIC PNL TOTAL CA: CPT

## 2021-04-25 PROCEDURE — 99233 SBSQ HOSP IP/OBS HIGH 50: CPT | Performed by: FAMILY MEDICINE

## 2021-04-25 PROCEDURE — 93010 ELECTROCARDIOGRAM REPORT: CPT | Performed by: INTERNAL MEDICINE

## 2021-04-25 PROCEDURE — 88112 CYTOPATH CELL ENHANCE TECH: CPT

## 2021-04-25 PROCEDURE — 99233 SBSQ HOSP IP/OBS HIGH 50: CPT | Performed by: INTERNAL MEDICINE

## 2021-04-25 PROCEDURE — 80061 LIPID PANEL: CPT

## 2021-04-25 RX ORDER — LANOLIN ALCOHOL/MO/W.PET/CERES
10.5 CREAM (GRAM) TOPICAL NIGHTLY
Status: DISCONTINUED | OUTPATIENT
Start: 2021-04-25 | End: 2021-04-27 | Stop reason: HOSPADM

## 2021-04-25 RX ORDER — CARVEDILOL 6.25 MG/1
12.5 TABLET ORAL 2 TIMES DAILY WITH MEALS
Status: DISCONTINUED | OUTPATIENT
Start: 2021-04-25 | End: 2021-04-27 | Stop reason: HOSPADM

## 2021-04-25 RX ADMIN — Medication 10.5 MG: at 21:00

## 2021-04-25 RX ADMIN — ONDANSETRON 4 MG: 2 INJECTION INTRAMUSCULAR; INTRAVENOUS at 16:20

## 2021-04-25 RX ADMIN — CARVEDILOL 6.25 MG: 6.25 TABLET, FILM COATED ORAL at 09:38

## 2021-04-25 RX ADMIN — FUROSEMIDE 40 MG: 10 INJECTION, SOLUTION INTRAMUSCULAR; INTRAVENOUS at 09:19

## 2021-04-25 RX ADMIN — FUROSEMIDE 40 MG: 10 INJECTION, SOLUTION INTRAMUSCULAR; INTRAVENOUS at 18:15

## 2021-04-25 RX ADMIN — HYDRALAZINE HYDROCHLORIDE 10 MG: 20 INJECTION, SOLUTION INTRAMUSCULAR; INTRAVENOUS at 10:30

## 2021-04-25 RX ADMIN — Medication 10 ML: at 21:01

## 2021-04-25 RX ADMIN — Medication 1 TABLET: at 09:18

## 2021-04-25 RX ADMIN — Medication 10 ML: at 09:17

## 2021-04-25 RX ADMIN — ESCITALOPRAM OXALATE 10 MG: 10 TABLET ORAL at 09:17

## 2021-04-25 RX ADMIN — SODIUM CHLORIDE, PRESERVATIVE FREE 10 ML: 5 INJECTION INTRAVENOUS at 18:15

## 2021-04-25 RX ADMIN — ACETAMINOPHEN 650 MG: 325 TABLET ORAL at 09:17

## 2021-04-25 RX ADMIN — ACETAMINOPHEN 650 MG: 325 TABLET ORAL at 16:21

## 2021-04-25 RX ADMIN — Medication 3000 UNITS: at 09:19

## 2021-04-25 RX ADMIN — MELOXICAM 7.5 MG: 7.5 TABLET ORAL at 01:30

## 2021-04-25 RX ADMIN — FOLIC ACID 1 MG: 1 TABLET ORAL at 09:17

## 2021-04-25 RX ADMIN — AMLODIPINE BESYLATE 10 MG: 10 TABLET ORAL at 20:59

## 2021-04-25 RX ADMIN — SODIUM CHLORIDE, PRESERVATIVE FREE 10 ML: 5 INJECTION INTRAVENOUS at 16:21

## 2021-04-25 RX ADMIN — ASPIRIN 81 MG: 81 TABLET, CHEWABLE ORAL at 09:17

## 2021-04-25 RX ADMIN — CARVEDILOL 12.5 MG: 6.25 TABLET, FILM COATED ORAL at 16:20

## 2021-04-25 ASSESSMENT — PAIN DESCRIPTION - DESCRIPTORS: DESCRIPTORS: ACHING;DISCOMFORT;SORE

## 2021-04-25 ASSESSMENT — PAIN DESCRIPTION - ORIENTATION: ORIENTATION: RIGHT;LEFT

## 2021-04-25 ASSESSMENT — PAIN SCALES - GENERAL
PAINLEVEL_OUTOF10: 0

## 2021-04-25 ASSESSMENT — PAIN - FUNCTIONAL ASSESSMENT: PAIN_FUNCTIONAL_ASSESSMENT: PREVENTS OR INTERFERES SOME ACTIVE ACTIVITIES AND ADLS

## 2021-04-25 NOTE — PROGRESS NOTES
1030      dextrose         Physical Exam:  BP (!) 164/72 Comment: manual  Pulse 86   Temp 99 °F (37.2 °C) (Oral)   Resp 18   Ht 5' 3\" (1.6 m)   Wt 179 lb (81.2 kg)   SpO2 96%   BMI 31.71 kg/m²   Wt Readings from Last 3 Encounters:   04/25/21 179 lb (81.2 kg)   11/04/19 204 lb 8 oz (92.8 kg)   12/19/17 188 lb 3.2 oz (85.4 kg)     Appearance: Awake, alert, no acute respiratory distress  Skin: Intact, no rash  Head: Normocephalic, atraumatic  ENMT: MMM, no rhinorrhea  Neck: Supple, no carotid bruits, JVD is elevated with a positive hepatojugular reflex  Lungs: Clear to auscultation bilaterally. No wheezes, rales, or rhonchi. Cardiac: Regular rate and rhythm, +Q6H5, ejection systolic murmur 3/6 heard over the right upper sternal border with radiation to neck. Abdomen: Soft, +bowel sounds, has miguel cath  Extremities: Moves all extremities x 4, 1+ lower extremity edema  Neurologic: No focal motor deficits apparent, normal mood and affect  Intake/Output:    Intake/Output Summary (Last 24 hours) at 4/25/2021 1239  Last data filed at 4/25/2021 0220  Gross per 24 hour   Intake --   Output 2220 ml   Net -2220 ml     No intake/output data recorded.     Laboratory Tests:  Recent Labs     04/24/21  0923 04/24/21  1148 04/25/21  0828   *  --  131*   K 5.5* 4.3 4.0   CL 96*  --  91*   CO2 25  --  30*   BUN 20  --  18   CREATININE 1.0  --  1.1   GLUCOSE 139*  --  193*   CALCIUM 9.3  --  9.7     Lab Results   Component Value Date    MG 1.8 04/25/2021     Recent Labs     04/24/21  0923   ALKPHOS 46   ALT 10   AST 31   PROT 6.9   BILITOT 0.4   LABALBU 4.1     Recent Labs     04/24/21  0923   WBC 4.6   RBC 2.83*   HGB 9.9*   HCT 28.8*   .8*   MCH 35.0   MCHC 34.4   RDW 13.2      MPV 10.1     Lab Results   Component Value Date    TROPONINI <0.01 04/24/2021    TROPONINI <0.01 04/24/2021    TROPONINI <0.01 04/24/2021     No results found for: INR, PROTIME  Lab Results   Component Value Date    TSH 2.610 04/24/2021     Lab Results   Component Value Date    LABA1C 5.7 (H) 04/24/2021     No results found for: EAG  Lab Results   Component Value Date    CHOL 150 04/25/2021    CHOL 159 01/29/2021    CHOL 167 10/21/2020     Lab Results   Component Value Date    TRIG 110 04/25/2021    TRIG 183 (H) 01/29/2021    TRIG 126 10/21/2020     Lab Results   Component Value Date    HDL 48 04/25/2021    HDL 50 04/24/2021    HDL 45 01/29/2021     Lab Results   Component Value Date    LDLCALC 80 04/25/2021    LDLCALC 80 04/24/2021    LDLCALC 77 01/29/2021     Lab Results   Component Value Date    LABVLDL 22 04/25/2021    LABVLDL 24 04/24/2021    LABVLDL 37 01/29/2021     No results found for: CHOLHDLRATIO    Cardiac Tests:  Telemetry findings reviewed: SR with heart rate in the 80sm, no arrhythmias over night      EKG: Normal sinus rhythm, left anterior fascicular block, LVH, abnormal EKG.    Vitals and labs reviewed. Blood pressure 164/72, HR 86, sat 96 on 2 L of O2, negative orthostatic blood pressures     Labs-potassium 131>>131 normal renal functions proBNP 4338 troponin less than 0.01x2 cholesterol 154 LDL 80 HDL 50 triglycerides 118 liver function normal TSH 2.6 A1c 5.7 hemoglobin 9.9     1. Echocardiogram 12/18/2017 (Dr. Elaine Canseco): Ejection fraction is visually estimated at 55%. No regional wall motion abnormalities seen. There is doppler evidence of stage I diastolic dysfunction. Normal right ventricle structure and function. Left atrial volume index of 34 ml per meters squared BSA. Mild aortic stenosis is present. The aortic valve area is 1.8 cm2 with a maximum gradient of 19 mmHg and a mean gradient of 11 mmHg. Mild aortic valve regurgitation. Mild mitral regurgitation is present. Moderate tricuspid regurgitation. Pulmonary hypertension is mild.  RVSP is 46 mmHg.        TTE-4/24/2021   Normal left ventricle size and systolic function.   Ejection fraction is visually estimated at 60-65%.   No regional wall motion abnormalities seen.   Normal left ventricle wall thickness.   Stage II diastolic dysfunction.   The left atrium is severely dilated.   Patent foramen ovale with left-to-right shunt was visualized. No right to   left shunt on bubble study.   Normal right ventricular size and function. TAPSE 18 mm.   There is moderate aortic stenosis with valve area of 1.2 sq cm. Aortic   valve peak velocity 2.9 m/s, mean gradient 17 mmHg. DVI 0.34.   Mild tricuspid regurgitation.  RVSP is 72 mmHg. Pulmonary hypertension is severe .   No evidence for hemodynamically significant pericardial effusion.        Assessment:  · Acute on chronic HFpEF, improving   · Chronic dizziness multifactorial including clonidine and metoprolol combination, improving after stopping clonidine. · Diastolic heart failure with stage II diastolic function  · VHD: Moderate aortic stenosis, mild TR. 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 Check BNP in AM  · No clonidine due to severe dizziness. · Increase Coreg to 12.5  mg twice daily for hypertension and continue Norvasc 5 mg p.o. daily. If BP remains high then consider adding low ACEI/ARB  · Continue  hydralazine 10 mg every 4 hours as needed if systolic blood pressure is more than 140. · Continue rest of the current medications     Pippa Mckeon MD., Anju Navarro.   Formerly [facility]) Cardiology

## 2021-04-25 NOTE — PROGRESS NOTES
[facility] Progress Note    Admitting Date and Time: 4/24/2021  7:47 AM  Admit Dx: Decompensated heart failure ([facility] [address] [I50.9]    Subjective:  Patient is being followed for Decompensated heart failure ([facility] [address] [I50.9]     Pt feels unable to sleep. Concerned about the blood in the urine. Urology note appreciated. Current [place] catheter shows tea colored urine. No gross hematuria noted    Per RN: Received hydralazine 10 mg IV; patient said made nauseous. Discontinued. Oral Coreg on board. ROS: denies fever, chills, cp, sob, n/v, HA unless stated above.       escitalopram  10 mg Oral Daily    [Held by provider] levothyroxine  50 mcg Oral Daily    aspirin  81 mg Oral Daily    acetaminophen  650 mg Oral BID    sodium chloride flush  5-40 mL Intravenous 2 times per day    enoxaparin  40 mg Subcutaneous Daily    furosemide  40 mg Intravenous BID    folic acid  1 mg Oral Daily    vitamin B and C  1 tablet Oral Daily    Vitamin D  3,000 Units Oral Daily    amLODIPine  10 mg Oral Nightly    carvedilol  6.25 mg Oral BID WC     sodium chloride flush, 5-40 mL, PRN  promethazine, 12.5 mg, Q6H PRN    Or  ondansetron, 4 mg, Q6H PRN  polyethylene glycol, 17 g, Daily PRN  acetaminophen, 650 mg, Q6H PRN    Or  acetaminophen, 650 mg, Q6H PRN  melatonin, 10.5 mg, Nightly PRN  glucose, 15 g, PRN  dextrose, 12.5 g, PRN  glucagon (rDNA), 1 mg, PRN  dextrose, 100 mL/hr, PRN  meloxicam, 7.5 mg, Daily PRN  hydrALAZINE, 10 mg, Q4H PRN         Objective:    BP (!) 164/72 Comment: manual  Pulse 86   Temp 99 °F (37.2 °C) (Oral)   Resp 18   Ht 5' 3\" (1.6 m)   Wt 179 lb (81.2 kg)   SpO2 96%   BMI 31.71 kg/m²     General Appearance: alert and oriented to person, place and time and in no acute distress  Skin: warm and dry  Head: normocephalic and atraumatic  Eyes: pupils equal, round, and reactive to light, extraocular eye movements intact, conjunctivae normal  Neck: neck supple and non tender without mass Pulmonary/Chest: clear to auscultation bilaterally- no wheezes, rales or rhonchi, normal air movement, no respiratory distress  Cardiovascular: normal rate, normal S1 and S2 and no carotid bruits  Abdomen: soft, non-tender, non-distended, normal bowel sounds, no masses or organomegaly. [place] catheter with tea colored urine. No gross hematuria  Extremities: no cyanosis, no clubbing and no edema  Neurologic: no cranial nerve deficit and speech normal        Recent Labs     04/24/21  0923 04/24/21  1148 04/25/21  0828   *  --  131*   K 5.5* 4.3 4.0   CL 96*  --  91*   CO2 25  --  30*   BUN 20  --  18   CREATININE 1.0  --  1.1   GLUCOSE 139*  --  193*   CALCIUM 9.3  --  9.7       Recent Labs     04/24/21  0923   WBC 4.6   RBC 2.83*   HGB 9.9*   HCT 28.8*   .8*   MCH 35.0   MCHC 34.4   RDW 13.2      MPV 10.1       Radiology:  Echo Complete    Result Date: 4/24/2021  Transthoracic Echocardiography Report (TTE)  Demographics   Patient Name    [place] Gender            Male                  Turner Hernandez  [id number]      Room Number       9247  Number   Account #       [de-identified]     Procedure Date    04/24/2021   Corporate ID                  Ordering                                Physician   Accession       [id number]    Referring         Kellen Lazar  Number                        Physician                                                  Yoni Chau   Date of Birth   04/23/1930    Sonographer       Jolanda Brittle [facility]   Age             80 year(s)    Interpreting      [address] [place] [address]                                Physician         Physician Cardiology                                                  Quiana Neal MD                                 Any Other  Procedure Type of Study   TTE procedure:Echo Complete W/Doppler & Color Flow. Procedure Date Date: 04/24/2021 Start: 02:45 PM Study Location: Portable Technical Quality: Poor visualization due to body habitus. Indications:Congestive heart failure. Patient Status: Routine Contrast Medium: Definity. Height: 63 inches Weight: 177 pounds BSA: 1.84 m^2 BMI: 31.35 kg/m^2 HR: 84 bpm BP: 166/73 mmHg  Findings   Left Ventricle  Normal left ventricle size and systolic function. Ejection fraction is visually estimated at 60-65%. No regional wall motion abnormalities seen. Normal left ventricle wall thickness. Stage II diastolic dysfunction. Right Ventricle  Normal right ventricular size and function. TAPSE 18 mm. Left Atrium  The left atrium is severely dilated. Patent foramen ovale with left-to-right shunt was visualized. No right to  left shunt on bubble study. Right Atrium  Mildly enlarged right atrium size. Mitral Valve  Mild mitral annular calcification. No evidence of mitral valve stenosis. Physiologic and/or trace mitral regurgitation is present. Tricuspid Valve  The tricuspid valve appears structurally normal.  Mild tricuspid regurgitation. RVSP is 72 mmHg. Pulmonary hypertension is severe . Aortic Valve  The aortic valve appears moderately sclerotic. Individual aortic valve leaflets are not clearly visualized. There is moderate aortic stenosis with valve area of 1.2 sq cm. Aortic  valve peak velocity 2.9 m/s, mean gradient 17 mmHg. DVI 0.34. No evidence of aortic valve regurgitation. Pulmonic Valve  Pulmonic valve is structurally normal.  Physiologic and/or trace pulmonic regurgitation present. No evidence of pulmonic valve stenosis. Pericardial Effusion  No evidence for hemodynamically significant pericardial effusion. Pleural Effusion  No evidence of pleural effusion. Aorta  Normal aortic root and ascending aorta. Miscellaneous  The inferior vena cava diameter is normal with normal respiratory  variation. Conclusions   Summary  Normal left ventricle size and systolic function. Ejection fraction is visually estimated at 60-65%. No regional wall motion abnormalities seen.   Normal left ventricle wall thickness. Stage II diastolic dysfunction. The left atrium is severely dilated. Patent foramen ovale with left-to-right shunt was visualized. No right to  left shunt on bubble study. Normal right ventricular size and function. TAPSE 18 mm. There is moderate aortic stenosis with valve area of 1.2 sq cm. Aortic  valve peak velocity 2.9 m/s, mean gradient 17 mmHg. DVI 0.34. Mild tricuspid regurgitation. RVSP is 72 mmHg. Pulmonary hypertension is severe . No evidence for hemodynamically significant pericardial effusion.    Signature   ----------------------------------------------------------------  Electronically signed by Christophe Schroeder MD(Interpreting  physician) on 04/24/2021 06:14 PM  ----------------------------------------------------------------  M-Mode/2D Measurements & Calculations   LV Diastolic    LV Systolic Dimension: 3.6   AV Cusp Separation: 1.2 cmLA  Dimension: 5.3  cm                           Dimension: 5.3 cmAO Root  cm              LV Volume Diastolic: 097.5   Dimension: 2.6 cm  LV FS:32.1 %    ml  LV PW           LV Volume Systolic: 66.1 ml  Diastolic: 0.8  LV EDV/LV EDV Index: 135.5  cm              ml/74 ml/m^2LV ESV/LV ESV    RV Diastolic Dimension: 3.5  LV PW Systolic: Index: 55.1 XN/27FR/ m^2     cm  1.2 cm          EF Calculated: 59.5 %  Septum          LV Mass Index: 82 l/min*m^2  LA/Aorta: 6.41  Diastolic: 0.8                               Ascending Aorta: 2.9 cm  cm                                           LA volume/Index: 104 ml  Septum          LVOT: 2.1 cm                 /08.63IH/Q^7  Systolic: 1.2                                RA Area: 25 cm^2  cm  CO: 6.48 l/min                               IVC Expiration: 1.8 cm  CI: 3.52  l/m*m^2  LV Mass: 150.05  g  Doppler Measurements & Calculations   MV Peak E-Wave: 1.08 AV Peak Velocity:     LVOT Peak Velocity: 1.02 m/s  m/s                  2.92 m/s              LVOT Mean Velocity: 0.69 m/s  MV Peak A-Wave: 0.9  AV Peak Gradient:     LVOT Peak Gradient: 4.1  m/s                  34.13 mmHg            mmHgLVOT Mean Gradient: 2.1  MV E/A Ratio: 1.19   AV Mean Velocity:     mmHg  MV Peak Gradient:    1.95 m/s              Estimated RVSP: 71.6 mmHg  5.1 mmHg             AV Mean Gradient: 17  Estimated RAP:3 mmHg  MV Mean Gradient:    mmHg  2.2 mmHg             AV VTI: 66.3 cm  MV Mean Velocity:    AV Area               TR Velocity:4.14 m/s  0.7 m/s              (Continuity):1.16     TR Gradient:68.56 mmHg  MV Deceleration      cm^2                  PV Peak Velocity: 0.94 m/s  Time: 171.8 msec                           PV Peak Gradient: 3.54 mmHg  MV P1/2t: 50.8 msec  LVOT VTI: 22.3 cm     PV Mean Velocity: 0.61 m/s  MVA by PHT:4.33 cm^2 IVRT: 46.1 msec       PV Mean Gradient: 1.7 mmHg  MV Area              Estimated PASP: 71.56  (continuity): 2.8    mmHg  cm^2  MV E' Septal  Velocity: 0.05 m/s  MV E' Lateral  Velocity: 8 m/s  [email or web address] [id number]    Ct Head Wo Contrast    Result Date: 4/24/2021  EXAMINATION: CT OF THE HEAD WITHOUT CONTRAST  4/24/2021 8:20 am TECHNIQUE: CT of the head was performed without the administration of intravenous contrast. Dose modulation, iterative reconstruction, and/or weight based adjustment of the mA/kV was utilized to reduce the radiation dose to as low as reasonably achievable. COMPARISON: 12/17/2017 HISTORY: ORDERING SYSTEM PROVIDED HISTORY: dizzy TECHNOLOGIST PROVIDED HISTORY: Has a \"code stroke\" or \"stroke alert\" been called? ->No Reason for exam:->dizzy Decision Support Exception->Emergency Medical Condition (MA) FINDINGS: BRAIN/VENTRICLES: There is no acute intracranial hemorrhage, mass effect or midline shift. No abnormal extra-axial fluid collection. The gray-white differentiation is maintained without evidence of an acute infarct. There is no evidence of hydrocephalus.  There are involutional changes, with prominence of the ventricles and the sulci. ORBITS: The visualized portion of the orbits demonstrate no acute abnormality. SINUSES: The visualized paranasal sinuses and mastoid air cells demonstrate no acute abnormality. SOFT TISSUES/SKULL:  No acute abnormality of the visualized skull or soft tissues. No CT evidence of acute intracranial abnormality. Xr Chest Portable    Result Date: 4/24/2021  EXAMINATION: ONE XRAY VIEW OF THE CHEST 4/24/2021 7:50 am COMPARISON: 04/02/2019 HISTORY: ORDERING SYSTEM PROVIDED HISTORY: dizzy TECHNOLOGIST PROVIDED HISTORY: Reason for exam:->dizzy FINDINGS: Prominent cardiomediastinal silhouette. There are prominent central pulmonary vessels. Patchy left lung base airspace opacity. No large effusion or pneumothorax. No acute osseous abnormality. Findings may related to mild congestive heart failure with prominence of the central vessels. Patchy left lung base atelectasis or infiltrate       Assessment:    Active Problems:    Decompensated heart failure (HCC)    Hypoxia  Resolved Problems:    * No resolved hospital problems. *      Plan:  1. Decompensated heart failure/combined -DC IV fluids. Strict I's and O's.  IV diuretics. Trend labs and treat accordingly. 2.  T2DM -glucose POCT. Hemoglobin A1c - 5.7% on 4/24/21. Diabetic diet. Strict I's and O's.  3.  Hypertension -continue meds. 4.  Short gut syndrome -continue supportive care. 5.  Anxiety/depression -continue meds  6. Hypotension -monitor vitals. IV hydration. Trend labs and treat accordingly. Blood pressure medicines on hold     NOTE: This report was transcribed using voice recognition software. Every effort was made to ensure accuracy; however, inadvertent computerized transcription errors may be present.     Electronically signed by Natalio Sánchez MD on 4/25/2021 at 12:16 PM

## 2021-04-25 NOTE — CONSULTS
4/25/2021 10:08 AM  Service: Urology  Group: [facility] (Hari/Jefry/Conor)    Kindra Sheffield  [id number]     Chief Complaint:    Gross hematuria    History of Present Illness: The patient is a 80 y.o. male patient who presented to the hospital yesterday with complaints of dizziness and lightheadedness. We are asked to evaluate for gross hematuria. Per the electronic health record he was straight cathed in the emergency department yesterday for 300 mL. Urinalysis performed at that time showed yellow urine. Following this she had gross hematuria. Past Medical History:   Diagnosis Date    CAD (coronary artery disease)     Hyperlipidemia     Hypertension          Past Surgical History:   Procedure Laterality Date    CORONARY ARTERY BYPASS GRAFT      HIP SURGERY      right       Medications Prior to Admission:    Medications Prior to Admission: amLODIPine (NORVASC) 10 MG tablet, Take 10 mg by mouth nightly  tamsulosin (FLOMAX) 0.4 MG capsule, Take 0.4 mg by mouth every evening  finasteride (PROSCAR) 5 MG tablet, Take 5 mg by mouth every evening  melatonin 3 MG TABS tablet, Take 10 mg by mouth nightly  metoprolol tartrate (LOPRESSOR) 25 MG tablet, Take 25 mg by mouth 2 times daily  acetaminophen (TYLENOL) 325 MG tablet, Take 650 mg by mouth 2 times daily  cloNIDine (CATAPRES) 0.1 MG tablet, Take 1 tablet by mouth 2 times daily (Patient taking differently: Take 0.2 mg by mouth 2 times daily )  levothyroxine (SYNTHROID) 50 MCG tablet, Take 50 mcg by mouth Daily  escitalopram (LEXAPRO) 10 MG tablet, Take 10 mg by mouth daily  B Complex Vitamins (B-COMPLEX/B-12 PO), Take 2,500 mg by mouth  Cholecalciferol (VITAMIN D3) 3000 UNITS TABS, Take 5,000 Units by mouth  aspirin 81 MG chewable tablet, Take 81 mg by mouth daily  folic acid (FOLVITE) 434 MCG tablet, Take 800 mcg by mouth daily    Allergies:    Zocor [simvastatin]    Social History:    reports that he has never smoked.  He has never used smokeless

## 2021-04-25 NOTE — PROGRESS NOTES
18 - Notified Dr. She Mcclain of patient's elevated blood pressure. Patient already received prn dose of Hydralazine and BP still elevated. New order received.

## 2021-04-25 NOTE — PROGRESS NOTES
Patient ambulated to chair for dinner and tolerated well.  Patient was a one assist Will continue to promote education on the importance of ambulation

## 2021-04-26 LAB
ANION GAP SERPL CALCULATED.3IONS-SCNC: 11 MMOL/L (ref 7–16)
BUN BLDV-MCNC: 28 MG/DL (ref 6–23)
CALCIUM SERPL-MCNC: 9.6 MG/DL (ref 8.6–10.2)
CHLORIDE BLD-SCNC: 92 MMOL/L (ref 98–107)
CO2: 32 MMOL/L (ref 22–29)
CREAT SERPL-MCNC: 1.3 MG/DL (ref 0.7–1.2)
GFR AFRICAN AMERICAN: >60
GFR NON-AFRICAN AMERICAN: 52 ML/MIN/1.73
GLUCOSE BLD-MCNC: 145 MG/DL (ref 74–99)
HBA1C MFR BLD: 5.9 % (ref 4–5.6)
MAGNESIUM: 1.9 MG/DL (ref 1.6–2.6)
METER GLUCOSE: 108 MG/DL (ref 74–99)
METER GLUCOSE: 127 MG/DL (ref 74–99)
METER GLUCOSE: 132 MG/DL (ref 74–99)
METER GLUCOSE: 192 MG/DL (ref 74–99)
POTASSIUM SERPL-SCNC: 4.1 MMOL/L (ref 3.5–5)
SODIUM BLD-SCNC: 135 MMOL/L (ref 132–146)

## 2021-04-26 PROCEDURE — 6370000000 HC RX 637 (ALT 250 FOR IP): Performed by: INTERNAL MEDICINE

## 2021-04-26 PROCEDURE — 6370000000 HC RX 637 (ALT 250 FOR IP): Performed by: NURSE PRACTITIONER

## 2021-04-26 PROCEDURE — 82962 GLUCOSE BLOOD TEST: CPT

## 2021-04-26 PROCEDURE — 6360000002 HC RX W HCPCS: Performed by: FAMILY MEDICINE

## 2021-04-26 PROCEDURE — 2580000003 HC RX 258: Performed by: FAMILY MEDICINE

## 2021-04-26 PROCEDURE — 80048 BASIC METABOLIC PNL TOTAL CA: CPT

## 2021-04-26 PROCEDURE — 99232 SBSQ HOSP IP/OBS MODERATE 35: CPT | Performed by: STUDENT IN AN ORGANIZED HEALTH CARE EDUCATION/TRAINING PROGRAM

## 2021-04-26 PROCEDURE — 36415 COLL VENOUS BLD VENIPUNCTURE: CPT

## 2021-04-26 PROCEDURE — 6370000000 HC RX 637 (ALT 250 FOR IP): Performed by: STUDENT IN AN ORGANIZED HEALTH CARE EDUCATION/TRAINING PROGRAM

## 2021-04-26 PROCEDURE — 6370000000 HC RX 637 (ALT 250 FOR IP): Performed by: FAMILY MEDICINE

## 2021-04-26 PROCEDURE — 99233 SBSQ HOSP IP/OBS HIGH 50: CPT | Performed by: INTERNAL MEDICINE

## 2021-04-26 PROCEDURE — 97161 PT EVAL LOW COMPLEX 20 MIN: CPT

## 2021-04-26 PROCEDURE — 6360000002 HC RX W HCPCS: Performed by: INTERNAL MEDICINE

## 2021-04-26 PROCEDURE — 83036 HEMOGLOBIN GLYCOSYLATED A1C: CPT

## 2021-04-26 PROCEDURE — 1200000000 HC SEMI PRIVATE

## 2021-04-26 PROCEDURE — 83735 ASSAY OF MAGNESIUM: CPT

## 2021-04-26 RX ORDER — FUROSEMIDE 40 MG/1
40 TABLET ORAL DAILY
Status: DISCONTINUED | OUTPATIENT
Start: 2021-04-26 | End: 2021-04-27 | Stop reason: HOSPADM

## 2021-04-26 RX ADMIN — FOLIC ACID 1 MG: 1 TABLET ORAL at 09:02

## 2021-04-26 RX ADMIN — Medication 1 TABLET: at 09:07

## 2021-04-26 RX ADMIN — SALINE NASAL SPRAY 1 SPRAY: 1.5 SOLUTION NASAL at 16:31

## 2021-04-26 RX ADMIN — Medication 10.5 MG: at 22:14

## 2021-04-26 RX ADMIN — Medication 3000 UNITS: at 09:01

## 2021-04-26 RX ADMIN — Medication 10 ML: at 22:15

## 2021-04-26 RX ADMIN — AMLODIPINE BESYLATE 10 MG: 10 TABLET ORAL at 22:14

## 2021-04-26 RX ADMIN — Medication 10 ML: at 10:48

## 2021-04-26 RX ADMIN — HYDRALAZINE HYDROCHLORIDE 10 MG: 20 INJECTION, SOLUTION INTRAMUSCULAR; INTRAVENOUS at 06:04

## 2021-04-26 RX ADMIN — INSULIN LISPRO 1 UNITS: 100 INJECTION, SOLUTION INTRAVENOUS; SUBCUTANEOUS at 12:39

## 2021-04-26 RX ADMIN — ACETAMINOPHEN 650 MG: 325 TABLET ORAL at 09:02

## 2021-04-26 RX ADMIN — POLYETHYLENE GLYCOL 3350 17 G: 17 POWDER, FOR SOLUTION ORAL at 11:03

## 2021-04-26 RX ADMIN — CARVEDILOL 12.5 MG: 6.25 TABLET, FILM COATED ORAL at 16:30

## 2021-04-26 RX ADMIN — FUROSEMIDE 40 MG: 40 TABLET ORAL at 09:01

## 2021-04-26 RX ADMIN — ESCITALOPRAM OXALATE 10 MG: 10 TABLET ORAL at 09:01

## 2021-04-26 RX ADMIN — ENOXAPARIN SODIUM 40 MG: 40 INJECTION SUBCUTANEOUS at 09:47

## 2021-04-26 RX ADMIN — ACETAMINOPHEN 650 MG: 325 TABLET ORAL at 16:30

## 2021-04-26 RX ADMIN — ASPIRIN 81 MG: 81 TABLET, CHEWABLE ORAL at 09:01

## 2021-04-26 RX ADMIN — ONDANSETRON 4 MG: 2 INJECTION INTRAMUSCULAR; INTRAVENOUS at 06:03

## 2021-04-26 RX ADMIN — CARVEDILOL 12.5 MG: 6.25 TABLET, FILM COATED ORAL at 09:01

## 2021-04-26 ASSESSMENT — PAIN SCALES - GENERAL
PAINLEVEL_OUTOF10: 0
PAINLEVEL_OUTOF10: 2
PAINLEVEL_OUTOF10: 0

## 2021-04-26 ASSESSMENT — PAIN DESCRIPTION - DESCRIPTORS: DESCRIPTORS: ACHING;DISCOMFORT;SORE

## 2021-04-26 ASSESSMENT — PAIN DESCRIPTION - ORIENTATION: ORIENTATION: LEFT

## 2021-04-26 ASSESSMENT — PAIN DESCRIPTION - LOCATION: LOCATION: HIP

## 2021-04-26 NOTE — CARE COORDINATION
Met with daughter Neil Thomson at bs re; d/c planning; states pt resides with spouse/ son; uses ww prn. Currently on 2lnc ; none at home;has hx  Valerio Art; agreeable to [facility] at discharge; prefers [facility]; referral called; orders on chart. await PT/OT evals; PCP is 's office. plan for now is home at d/c;currently on iv lasix bid; CHF coordinator to see. Asaf Burrell.

## 2021-04-26 NOTE — PROGRESS NOTES
INPATIENT CARDIOLOGY FOLLOW-UP    Name: Azalia Pike    Age: 80 y.o. Date of Admission: 4/24/2021  7:47 AM    Date of Service: 4/26/2021    Chief Complaint: Follow-up for worsening of CHF    Interim History:  No new overnight cardiac complaints. Currently with no complaints of CP, SOB, palpitations, dizziness, or lightheadedness. SR on telemetry. Reported I/O's net negative 2.6 L thus far. Review of Systems:   Cardiac: As per HPI  General: No fever, chills  Pulmonary: As per HPI  HEENT: No visual disturbances, difficult swallowing  GI: No nausea, vomiting  : No dysuria, hematuria  Endocrine: +hypothyroidism, +DM  Musculoskeletal: ARCHER x 4, no focal motor deficits  Skin: Intact, no rashes  Neuro: No headache, seizures  Psych: Currently with no depression, anxiety    Problem List:  Patient Active Problem List   Diagnosis    [facility]F with unknown LVEF ([facility] [address]    Aortic valve stenosis    Sick sinus syndrome ([facility] [address]    Pacemaker    Systolic ejection murmur    Hypertension    CHF (congestive heart failure), NYHA class I, acute on chronic, combined ([address]    Type 2 diabetes mellitus without complication (HCC)    Acute renal insufficiency    Compression fracture of T12 vertebra (HCC)    Decompensated heart failure (HCC)    Hypoxia       Allergies:   Allergies   Allergen Reactions    Zocor [Simvastatin] Rash     GENERIC STATIN - CAN TAKE BRAND       Current Medications:  Current Facility-Administered Medications   Medication Dose Route Frequency Provider Last Rate Last Admin    carvedilol (COREG) tablet 12.5 mg  12.5 mg Oral BID WC Celine Johnson MD   12.5 mg at 04/25/21 1620    melatonin tablet 10.5 mg  10.5 mg Oral Nightly Heath Newby MD   10.5 mg at 04/25/21 2100    escitalopram (LEXAPRO) tablet 10 mg  10 mg Oral Daily Heath Newby MD   10 mg at 04/25/21 2759    [Held by provider] levothyroxine (SYNTHROID) tablet 50 mcg  50 mcg Oral Daily Heath Newby MD        aspirin chewable tablet 81 mg  81 mg Oral Daily Dyllan Pickens MD   81 mg at 04/25/21 7604    acetaminophen (TYLENOL) tablet 650 mg  650 mg Oral BID Dyllan Pickens MD   650 mg at 04/25/21 1621    sodium chloride flush 0.9 % injection 5-40 mL  5-40 mL Intravenous 2 times per day Dyllan Pickens MD   10 mL at 04/25/21 2101    sodium chloride flush 0.9 % injection 5-40 mL  5-40 mL Intravenous PRN Dyllan Pickens MD   10 mL at 04/25/21 1815    promethazine (PHENERGAN) tablet 12.5 mg  12.5 mg Oral Q6H PRN Dyllan Pickens MD        Or    [facility]ron [facility] injection 4 mg  4 mg Intravenous Q6H PRN Dyllan Pickens MD   4 mg at 04/26/21 0603    polyethylene glycol (GLYCOLAX) packet 17 g  17 g Oral Daily PRN Dyllan Pickens MD        acetaminophen (TYLENOL) tablet 650 mg  650 mg Oral Q6H PRN Dyllan Pickens MD        Or    acetaminophen (TYLENOL) suppository 650 mg  650 mg Rectal Q6H PRN Dyllan Pickens MD        enoxaparin (LOVENOX) injection 40 mg  40 mg Subcutaneous Daily Dyllan Pickens MD        furosemide (LASIX) injection 40 mg  40 mg Intravenous BID Dyllan Pickens MD   40 mg at 23/03/89 3898    folic acid (FOLVITE) tablet 1 mg  1 mg Oral Daily Dyllan Pickens MD   1 mg at 04/25/21 0917    vitamin B and C (TOTAL B-C) 1 tablet  1 tablet Oral Daily Dyllan Pickens MD   1 tablet at 04/25/21 1093    Vitamin D (CHOLECALCIFEROL) tablet 3,000 Units  3,000 Units Oral Daily Dyllan Pickens MD   3,000 Units at 04/25/21 0919    glucose (GLUTOSE) 40 % oral gel 15 g  15 g Oral PRN Dyllan Pickens MD        dextrose 50 % IV solution  12.5 g Intravenous PRN Dyllan Pickens MD        glucagon (rDNA) injection 1 mg  1 mg Intramuscular PRN Dyllan Pickens MD        dextrose 5 % solution  100 mL/hr Intravenous PRN Dyllan Pickens MD        amLODIPine (NORVASC) tablet 10 mg  10 mg Oral Nightly Jem Amanda, APRN - CNP   10 mg at 04/25/21 2059    hydrALAZINE (APRESOLINE) injection 10 mg  10 mg Intravenous Q4H PRN Adelso Neeta Mulligan MD   10 mg at 04/26/21 0604      dextrose         Physical Exam:  /62   Pulse 95   Temp 98.6 °F (37 °C) (Oral)   Resp 16   Ht 5' 3\" (1.6 m)   Wt 179 lb (81.2 kg)   SpO2 95%   BMI 31.71 kg/m²   Wt Readings from Last 3 Encounters:   04/25/21 179 lb (81.2 kg)   11/04/19 204 lb 8 oz (92.8 kg)   12/19/17 188 lb 3.2 oz (85.4 kg)     Appearance: Awake, alert, no acute respiratory distress  Skin: Intact, no rash  Head: Normocephalic, atraumatic  ENMT: MMM, no rhinorrhea  Neck: Supple, no carotid bruits, JVD is elevated with positive hepatojugular reflex  Lungs: Decreased BS B/L, no wheezing  Cardiac: Regular rate and rhythm, +Q0G2, 3/6 systolic murmur  Abdomen: Soft, +bowel sounds, has miguel cath  Extremities: Moves all extremities x 4, 1+ lower extremity edema  Neurologic: No focal motor deficits apparent, normal mood and affect    Intake/Output:    Intake/Output Summary (Last 24 hours) at 4/26/2021 0741  Last data filed at 4/25/2021 1348  Gross per 24 hour   Intake --   Output 350 ml   Net -350 ml     No intake/output data recorded.     Laboratory Tests:  Recent Labs     04/24/21  0923 04/24/21  1148 04/25/21  0828 04/26/21  0350   *  --  131* 135   K 5.5* 4.3 4.0 4.1   CL 96*  --  91* 92*   CO2 25  --  30* 32*   BUN 20  --  18 28*   CREATININE 1.0  --  1.1 1.3*   GLUCOSE 139*  --  193* 145*   CALCIUM 9.3  --  9.7 9.6     Lab Results   Component Value Date    MG 1.9 04/26/2021     Recent Labs     04/24/21  0923   ALKPHOS 46   ALT 10   AST 31   PROT 6.9   BILITOT 0.4   LABALBU 4.1     Recent Labs     04/24/21  0923   WBC 4.6   RBC 2.83*   HGB 9.9*   HCT 28.8*   .8*   MCH 35.0   MCHC 34.4   RDW 13.2      MPV 10.1     Lab Results   Component Value Date    TROPONINI <0.01 04/24/2021    TROPONINI <0.01 04/24/2021    TROPONINI <0.01 04/24/2021     No results found for: INR, PROTIME  Lab Results   Component Value Date    TSH 2.610 04/24/2021     Lab Results   Component Value Date LABA1C 5.7 (H) 04/24/2021     No results found for: EAG  Lab Results   Component Value Date    CHOL 150 04/25/2021    CHOL 159 01/29/2021    CHOL 167 10/21/2020     Lab Results   Component Value Date    TRIG 110 04/25/2021    TRIG 183 (H) 01/29/2021    TRIG 126 10/21/2020     Lab Results   Component Value Date    HDL 48 04/25/2021    HDL 50 04/24/2021    HDL 45 01/29/2021     Lab Results   Component Value Date    LDLCALC 80 04/25/2021    LDLCALC 80 04/24/2021    LDLCALC 77 01/29/2021     Lab Results   Component Value Date    LABVLDL 22 04/25/2021    LABVLDL 24 04/24/2021    LABVLDL 37 01/29/2021     No results found for: CHOLHDLRATIO    Cardiac Tests:  Telemetry findings reviewed: SR, rate 90's     EKG: Normal sinus rhythm, left anterior fascicular block, LVH     Vitals and labs reviewed. Blood pressure 164/72, HR 86, sat 96 on 2 L of O2, negative orthostatic blood pressures     1. Echocardiogram 12/18/2017 (Dr. Dorlene Lefort Miller): Ejection fraction is visually estimated at 55%. No regional wall motion abnormalities seen. There is doppler evidence of stage I diastolic dysfunction. Normal right ventricle structure and function. Left atrial volume index of 34 ml per meters squared BSA. Mild aortic stenosis is present. The aortic valve area is 1.8 cm2 with a maximum gradient of 19 mmHg and a mean gradient of 11 mmHg. Mild aortic valve regurgitation. Mild mitral regurgitation is present. Moderate tricuspid regurgitation. Pulmonary hypertension is mild. RVSP is 46 mmHg.        TTE-4/24/2021   Normal left ventricle size and systolic function.   Ejection fraction is visually estimated at 60-65%.   No regional wall motion abnormalities seen.   Normal left ventricle wall thickness.   Stage II diastolic dysfunction.   The left atrium is severely dilated.   Patent foramen ovale with left-to-right shunt was visualized. No right to   left shunt on bubble study.   Normal right ventricular size and function.  TAPSE 18 mm.  Arneta Bel is moderate aortic stenosis with valve area of 1.2 sq cm. Aortic   valve peak velocity 2.9 m/s, mean gradient 17 mmHg. DVI 0.34.   Mild tricuspid regurgitation.  RVSP is 72 mmHg. Pulmonary hypertension is severe .   No evidence for hemodynamically significant pericardial effusion.        Assessment:  · Acute on chronic HFpEF -- improving, reported I/O's net negative 2.6 L thus far  · Chronic dizziness multifactorial including clonidine and BB combination, improving after stopping clonidine. · VHD: Moderate aortic stenosis, mild TR. · Severe pulmonary hypertension with an RVSP of 72 mmHg  · Hypertension  · Type 2 diabetes -- diet controlled, Hgb A1c 5.7  · Hyperlipidemia -- on statin  · Mild obesity with possible obstructive sleep apnea  · Hypothyroidism     Plan:   · Increase in BUN/Cr noted  · Will stop IV lasix 40 mg BID  · Add lasix 40 mg po hermelindo  · Follow-up repeat BMP  · Continue current medications otherwise (including coreg)  · Results of 4/24/21 echocardiogram outlined above  · Monitor telemetry       Greater than 35 minutes was spent counseling the patient, reviewing the rationale for the above recommendations and reviewing the patient's current medication list, problem list and results of all previously ordered testing.     Gale Jesus MD  [facility]) Cardiology

## 2021-04-26 NOTE — DISCHARGE INSTR - COC
Continuity of Care Form    Patient Name: Savita Wolff   :  1930  MRN:  [id number]    Admit date:  2021  Discharge date:  21    Code Status Order: Full Code   Advance Directives:   Advance Care Flowsheet Documentation     Date/Time Healthcare Directive Type of Healthcare Directive Copy in [address] Agent's Name Healthcare Agent's Phone Number    [phone]  Yes, patient has an advance directive for healthcare treatment  Living will;Durable power of  for health care  No, copy requested from family  --  --  --          Admitting Physician:  Yari Marie MD  PCP: Elsa Mills MD    Discharging Nurse: [address] [place] [address] Unit/Room#: 4645/9676-Y  Discharging Unit Phone Number: [phone]    Emergency Contact:   Extended Emergency Contact Information  Primary Emergency Contact: Mary Jane Sevilla  Address: [address] Phone: [phone]  Relation: Spouse  Secondary Emergency Contact: Dolores Porter  Address: [address] Phone: [phone]  Mobile Phone: [phone]  Relation: Brother/Sister    Past Surgical History:  Past Surgical History:   Procedure Laterality Date    CORONARY ARTERY BYPASS GRAFT      HIP SURGERY      right       Immunization History: There is no immunization history on file for this patient.     Active Problems:  Patient Active Problem List   Diagnosis Code    CHF with unknown LVEF ([facility]) I50.9    Aortic valve stenosis I35.0    Sick sinus syndrome ([facility] I49.5    Pacemaker N78.7    Systolic ejection murmur H67.7    Hypertension I10    CHF (congestive heart failure), NYHA class I, acute on chronic, combined ([facility] [facility] I50.43    Type 2 diabetes mellitus without complication ([facility]) K01.6    Acute renal insufficiency N28.9    Compression fracture of T12 vertebra ([facility]) S22.080A  Decompensated heart failure (HCC) I50.9    Hypoxia R09.02       Isolation/Infection:   Isolation          No Isolation        Patient Infection Status     None to display          Nurse Assessment:  Last Vital Signs: BP (!) 154/69   Pulse 90   Temp 98 °F (36.7 °C) (Oral)   Resp 18   Ht 5' 3\" (1.6 m)   Wt 179 lb (81.2 kg)   SpO2 98%   BMI 31.71 kg/m²     Last documented pain score (0-10 scale): Pain Level: 2  Last Weight:   Wt Readings from Last 1 Encounters:   04/25/21 179 lb (81.2 kg)     Mental Status:  oriented, alert and forgetful at times    IV Access:  - None    Nursing Mobility/ADLs:  Walking   Assisted  Transfer  Assisted  Bathing  Assisted  Dressing  Assisted  Toileting  Assisted  Feeding  Independent  Med Admin  Assisted  Med Delivery   whole    Wound Care Documentation and Therapy:        Elimination:  Continence:   · Bowel: Yes  · Bladder: No, leaks at times  Urinary Catheter: external catheter   Colostomy/Ileostomy/Ileal Conduit: No       Date of Last BM: 4/24/21    Intake/Output Summary (Last 24 hours) at 4/26/2021 1019  Last data filed at 4/26/2021 0936  Gross per 24 hour   Intake --   Output 550 ml   Net -550 ml     I/O last 3 completed shifts:  In: -   Out: 350 [Urine:350]    Safety Concerns: At Risk for Falls    Impairments/Disabilities:      Hearing    Nutrition Therapy:  Current Nutrition Therapy:   - Oral Diet:  Low Sodium (2gm)    Routes of Feeding: Oral  Liquids: No Restrictions  Daily Fluid Restriction: no  Last Modified Barium Swallow with Video (Video Swallowing Test): not done    Treatments at the Time of Hospital Discharge:   Respiratory Treatments: 2 L O2 continuous   Oxygen Therapy:  is not on home oxygen therapy.   Ventilator:    - No ventilator support    Rehab Therapies: Physical Therapy and Occupational Therapy  Weight Bearing Status/Restrictions: No weight bearing restirctions  Other Medical Equipment (for information only, NOT a DME order):  walker  Other Treatments: blood sugar checks AC/HS    Patient's personal belongings (please select all that are sent with patient):  Glasses, Dentures upper and lower    RN SIGNATURE:  Electronically signed by Sarkis Matute RN on 4/27/21 at 3:21 PM EDT    CASE MANAGEMENT/SOCIAL WORK SECTION    Inpatient Status Date: ***    Readmission Risk Assessment Score:  Readmission Risk              Risk of Unplanned Readmission:        14           Discharging to Facility/ Agency   · Name: [facility]  · [id number]  · Phone:[phone]  · Fax:[phone]    Dialysis Facility (if applicable)   · Name:  · Address:  · Dialysis Schedule:  · Phone:  · Fax:    / signature: {Esignature:[id number]} Electronically signed by Lenice Mcburney, LSW on 4/27/21 at 10:35 AM EDT      PHYSICIAN SECTION    Prognosis: Fair    Condition at Discharge: Stable    Rehab Potential (if transferring to Rehab): Good    Recommended Labs or Other Treatments After Discharge:     Physician Certification: I certify the above information and transfer of Makayla Del Castillo  is necessary for the continuing treatment of the diagnosis listed and that he requires [facility] for less 30 days.      Update Admission H&P: No change in H&P    PHYSICIAN SIGNATURE:  Alhaji Quiñones MD on 4/27/2021 at 2:00 PM

## 2021-04-26 NOTE — CARE COORDINATION
Social Work / Discharge Planning : SW and CM met with patient daughter Bonifacio Naranjo in patient room. Patient slept during assessment. Patient was admitted with CHF. Patient plan at discharge is HOME where he resides with spouse and son. Patient uses a walker. Patient has a hx with [facility] and BF for SNF. Patient currently on 2 liters of 02 and new. Does NOT have preference if needed at discharge for DME. HHC discussed and daughter receptive. No HHC preference. They [facility] NOTw ant ,[facility]. CM made referral to Marielle from [facility]. [facility] and Lobo Montemayor on silverio. Daughter asked about VA benefits and MATT mathur dd [facility] [place] [facility] contact for family to follow up for VA benefits in the community. MATT to follow.  Electronically signed by ALEXANDRO Cloud on 4/26/21 at 10:15 AM EDT

## 2021-04-26 NOTE — CONSULTS
history of she sisters or strokes. He has history of thyroid disease he has elevated blood sugars which he manages with diet. He has stage II kidney problems. 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 Otherwise review of systems negative  Physical Examination    Vitals:  VITALS:  /61   Pulse 78   Temp 98.5 °F (36.9 °C) (Oral)   Resp 18   Ht 5' 3\" (1.6 m)   Wt 179 lb (81.2 kg)   SpO2 98%   BMI 31.71 kg/m²   24HR INTAKE/OUTPUT:      Intake/Output Summary (Last 24 hours) at 4/26/2021 1721  Last data filed at 4/26/2021 0936  Gross per 24 hour   Intake --   Output 200 ml   Net -200 ml         General: No distress. Alert. Eyes: PERRL. No sclera icterus. ENT: No discharge. Pharynx clear. Nasal septum midline   Neck: Trachea midline. Normal thyroid. no JVD  Resp: No accessory muscle use. No crackles. No wheezing. No rhonchi. Symmetrical exansion  CV: PMI non displaced. Regular rate. Regular rhythm. No mumur or rub. ABD: Non-tender. Non-distended. No organmegaly. Normal bowel sounds. Skin: Warm and dry. No rash on exposed extremities. Lymph: No cervical LAD. No supraclavicular LAD. Ext: No joint deformity. No clubbing. No cyanosis. No edema  Neuro: Awake. Follows commands. No focal deficits. Moves all ext     Lab Results:    CBC:   Recent Labs     04/24/21  0923   WBC 4.6   HGB 9.9*   HCT 28.8*   .8*        BMP:   Recent Labs     04/24/21  0923 04/24/21  1148 04/25/21  0828 04/26/21  0350   *  --  131* 135   K 5.5* 4.3 4.0 4.1   CL 96*  --  91* 92*   CO2 25  --  30* 32*   BUN 20  --  18 28*   CREATININE 1.0  --  1.1 1.3*      ALB:3,BILIDIR:3,BILITOT:3,ALKPHOS:3)@  PT/INR: No results for input(s): PROTIME, INR in the last 72 hours. Cultures:  -    Films:  CXR 4/24 mild congestion    Assessment/Plan:   80y.o. year old male who presented with dizziness  4/24 CT head negative  chest x-ray with congestion.   proBNP 4338  4/24 patient also had gross hematuria seen by urology  4/26 desats to 87% on room air    1. Dizziness  2. acute hypoxic respiratory failure requiring 2 L  3. Hypochloremia  4. CKD  5. macrocytic anemia  6. history of snoring  7. swelling of the legs  8. CAD status post CABG 2006  9. acute on chronic diastolic heart failure echo 2/24/2021 showing EF 60% stage II DD moderate aortic stenosis RSVP 72 severe pulmonary hypertension  10. HTN  11. HLD  12. left hip pain  13. T12 compression fracture  14. hypothyroid  15. recent admission 11/2/2019 with CHF BRIGETTE        Dizziness is felt to be due to secondary to medications which are being adjusted  it can also be from aortic stenosis  severe pulmonary hypertension most likely from cardiac cause cannot rule out obstructive sleep apnea  if patient is on room air can do an overnight Sleep study in the hospital.  If he is on oxygen he will have to be done as an outpatient  on diuretics  on insulin coverage      Thanks for letting us see this patient in consultation. Please contact us with any questions.       Electronically signed by Odella Duverney, MD on 4/26/2021 at 5:21 PM

## 2021-04-26 NOTE — PROGRESS NOTES
[facility] Progress Note    Admitting Date and Time: 4/24/2021  7:47 AM  Admit Dx: Decompensated heart failure ([facility] [address] [I50.9]    Subjective:  Patient is being followed for Decompensated heart failure ([facility] [address] [I50.9]   Pt feels his breathing is slightly improved and definitely has lower extremity swelling has gone down. Care was discussed with family present at bedside, family wanted the patient to be evaluated by pulmonology while inpatient, also had questions about Lovenox which was being held for hematuria. Per RN: Patient gets hypoxic with movement, was weaned to room air had to be put back on 2 L this a.m.     ROS: denies fever, chills, cp, sob, n/v, HA unless stated above.       furosemide  40 mg Oral Daily    carvedilol  12.5 mg Oral BID WC    melatonin  10.5 mg Oral Nightly    escitalopram  10 mg Oral Daily    [Held by provider] levothyroxine  50 mcg Oral Daily    aspirin  81 mg Oral Daily    acetaminophen  650 mg Oral BID    sodium chloride flush  5-40 mL Intravenous 2 times per day    enoxaparin  40 mg Subcutaneous Daily    folic acid  1 mg Oral Daily    vitamin B and C  1 tablet Oral Daily    Vitamin D  3,000 Units Oral Daily    amLODIPine  10 mg Oral Nightly     sodium chloride flush, 5-40 mL, PRN  promethazine, 12.5 mg, Q6H PRN    Or  ondansetron, 4 mg, Q6H PRN  polyethylene glycol, 17 g, Daily PRN  acetaminophen, 650 mg, Q6H PRN    Or  acetaminophen, 650 mg, Q6H PRN  glucose, 15 g, PRN  dextrose, 12.5 g, PRN  glucagon (rDNA), 1 mg, PRN  dextrose, 100 mL/hr, PRN  hydrALAZINE, 10 mg, Q4H PRN         Objective:    BP (!) 154/69   Pulse 90   Temp 98 °F (36.7 °C) (Oral)   Resp 18   Ht 5' 3\" (1.6 m)   Wt 179 lb (81.2 kg)   SpO2 98%   BMI 31.71 kg/m²     General Appearance: alert and oriented to person, place and time and in no acute distress, on 2 L of oxygen by nasal cannula  Skin: warm and dry  Head: normocephalic and atraumatic  Eyes: pupils equal, round, and reactive to light, extraocular eye movements intact, conjunctivae normal  Neck: neck supple and non tender without mass   Pulmonary/Chest: clear to auscultation bilaterally - no wheezes, rales or rhonchi, normal air movement, no respiratory distress  Cardiovascular: normal rate, normal S1 and S2 and no carotid bruits, systolic murmur  Abdomen: soft, non-tender, non-distended, normal bowel sounds, no masses or organomegaly, + miguel ( [place]) cath with blood tinged urine   Extremities: no cyanosis, no clubbing and +1 edema - left > rt  Neurologic: no cranial nerve deficit and speech normal        Recent Labs     04/24/21  0923 04/24/21  1148 04/25/21  0828 04/26/21  0350   *  --  131* 135   K 5.5* 4.3 4.0 4.1   CL 96*  --  91* 92*   CO2 25  --  30* 32*   BUN 20  --  18 28*   CREATININE 1.0  --  1.1 1.3*   GLUCOSE 139*  --  193* 145*   CALCIUM 9.3  --  9.7 9.6       Recent Labs     04/24/21  0923   WBC 4.6   RBC 2.83*   HGB 9.9*   HCT 28.8*   .8*   MCH 35.0   MCHC 34.4   RDW 13.2      MPV 10.1       Micro:  No components found for: [facility])    Radiology:   Echo Complete    Result Date: 4/24/2021  Transthoracic Echocardiography Report (TTE)  Demographics   Patient Name    Neyda Hannah Gender            Male                  [address] Record  [id number]      Room Number       0934  Number   Account #       [de-identified]     Procedure Date    04/24/2021   Corporate ID                  Ordering                                Physician   Accession       [id number]    Referring         Curry Craft  Number                        Physician                                                  Henny Mendes   Date of Birth   04/23/1930    Sonographer       Binta Rizzo [facility]   Age             80 year(s)    Interpreting      [address] [place] [address]                                Physician         Physician Cardiology                                                  Felipa Muller MD                                 Any Other Procedure Type of Study   TTE procedure:Echo Complete W/Doppler & Color Flow. Procedure Date Date: 04/24/2021 Start: 02:45 PM Study Location: Portable Technical Quality: Poor visualization due to body habitus. Indications:Congestive heart failure. Patient Status: Routine Contrast Medium: Definity. Height: 63 inches Weight: 177 pounds BSA: 1.84 m^2 BMI: 31.35 kg/m^2 HR: 84 bpm BP: 166/73 mmHg  Findings   Left Ventricle  Normal left ventricle size and systolic function. Ejection fraction is visually estimated at 60-65%. No regional wall motion abnormalities seen. Normal left ventricle wall thickness. Stage II diastolic dysfunction. Right Ventricle  Normal right ventricular size and function. TAPSE 18 mm. Left Atrium  The left atrium is severely dilated. Patent foramen ovale with left-to-right shunt was visualized. No right to  left shunt on bubble study. Right Atrium  Mildly enlarged right atrium size. Mitral Valve  Mild mitral annular calcification. No evidence of mitral valve stenosis. Physiologic and/or trace mitral regurgitation is present. Tricuspid Valve  The tricuspid valve appears structurally normal.  Mild tricuspid regurgitation. RVSP is 72 mmHg. Pulmonary hypertension is severe . Aortic Valve  The aortic valve appears moderately sclerotic. Individual aortic valve leaflets are not clearly visualized. There is moderate aortic stenosis with valve area of 1.2 sq cm. Aortic  valve peak velocity 2.9 m/s, mean gradient 17 mmHg. DVI 0.34. No evidence of aortic valve regurgitation. Pulmonic Valve  Pulmonic valve is structurally normal.  Physiologic and/or trace pulmonic regurgitation present. No evidence of pulmonic valve stenosis. Pericardial Effusion  No evidence for hemodynamically significant pericardial effusion. Pleural Effusion  No evidence of pleural effusion. Aorta  Normal aortic root and ascending aorta.   Miscellaneous  The inferior vena cava diameter is normal with normal respiratory  variation. Conclusions   Summary  Normal left ventricle size and systolic function. Ejection fraction is visually estimated at 60-65%. No regional wall motion abnormalities seen. Normal left ventricle wall thickness. Stage II diastolic dysfunction. The left atrium is severely dilated. Patent foramen ovale with left-to-right shunt was visualized. No right to  left shunt on bubble study. Normal right ventricular size and function. TAPSE 18 mm. There is moderate aortic stenosis with valve area of 1.2 sq cm. Aortic  valve peak velocity 2.9 m/s, mean gradient 17 mmHg. DVI 0.34. Mild tricuspid regurgitation. RVSP is 72 mmHg. Pulmonary hypertension is severe . No evidence for hemodynamically significant pericardial effusion.    Signature   ----------------------------------------------------------------  Electronically signed by Alexandria Hernandez MD(Interpreting  physician) on 04/24/2021 06:14 PM  ----------------------------------------------------------------  M-Mode/2D Measurements & Calculations   LV Diastolic    LV Systolic Dimension: 3.6   AV Cusp Separation: 1.2 cmLA  Dimension: 5.3  cm                           Dimension: 5.3 cmAO Root  cm              LV Volume Diastolic: 777.9   Dimension: 2.6 cm  LV FS:32.1 %    ml  LV PW           LV Volume Systolic: 97.2 ml  Diastolic: 0.8  LV EDV/LV EDV Index: 135.5  cm              ml/74 ml/m^2LV ESV/LV ESV    RV Diastolic Dimension: 3.5  LV PW Systolic: Index: 05.2 KI/64SQ/ m^2     cm  1.2 cm          EF Calculated: 59.5 %  Septum          LV Mass Index: 82 l/min*m^2  LA/Aorta: 3.50  Diastolic: 0.8                               Ascending Aorta: 2.9 cm  cm                                           LA volume/Index: 104 ml  Septum          LVOT: 2.1 cm                 /53.39CK/H^6  Systolic: 1.2                                RA Area: 25 cm^2  cm  CO: 6.48 l/min                               IVC Expiration: 1.8 cm  CI: 3.52  l/m*m^2  LV Mass: 150.05  g Doppler Measurements & Calculations   MV Peak E-Wave: 1.08 AV Peak Velocity:     LVOT Peak Velocity: 1.02 m/s  m/s                  2.92 m/s              LVOT Mean Velocity: 0.69 m/s  MV Peak A-Wave: 0.9  AV Peak Gradient:     LVOT Peak Gradient: 4.1  m/s                  34.13 mmHg            mmHgLVOT Mean Gradient: 2.1  MV E/A Ratio: 1.19   AV Mean Velocity:     mmHg  MV Peak Gradient:    1.95 m/s              Estimated RVSP: 71.6 mmHg  5.1 mmHg             AV Mean Gradient: 17  Estimated RAP:3 mmHg  MV Mean Gradient:    mmHg  2.2 mmHg             AV VTI: 66.3 cm  MV Mean Velocity:    AV Area               TR Velocity:4.14 m/s  0.7 m/s              (Continuity):1.16     TR Gradient:68.56 mmHg  MV Deceleration      cm^2                  PV Peak Velocity: 0.94 m/s  Time: 171.8 msec                           PV Peak Gradient: 3.54 mmHg  MV P1/2t: 50.8 msec  LVOT VTI: 22.3 cm     PV Mean Velocity: 0.61 m/s  MVA by PHT:4.33 cm^2 IVRT: 46.1 msec       PV Mean Gradient: 1.7 mmHg  MV Area              Estimated PASP: 71.56  (continuity): 2.8    mmHg  cm^2  MV E' Septal  Velocity: 0.05 m/s  MV E' Lateral  Velocity: 8 m/s  [id number]      Assessment:    Active Problems:    Decompensated heart failure (HCC)    Hypoxia  Resolved Problems:    * No resolved hospital problems. *      Plan:  1. Ac on chr  Decompensated stage 2 Diastolic heart failure -  Strict I's and O's.  IV diuretics switched to po.  Trend labs and treat accordingly. Cardiology on board. On coreg. 2.  T2DM -glucose POCT.  Hemoglobin A1c - 5.7% on 4/24/21.  Diabetic diet.  Strict I's and O's.  3.  Hypertension -continue meds. 4.  Short gut syndrome -continue supportive care. 5.  Anxiety/depression -continue meds  6.  Hypotension -monitor vitals.  IV hydration.  Trend labs and treat accordingly.  Blood pressure medicines on hold.   7.

## 2021-04-26 NOTE — PROGRESS NOTES
Physical Therapy    Facility/Department: [address] MED SURG/TELE  Initial Assessment    NAME: Darien Baker  : 1930  MRN: [id number]    Date of Service: 2021      Attending Provider:  Bennett Hu MD    Evaluating PT:  Heidi Fuchs. Joon Manrique P.T. Room #:  3018/1854-J  Diagnosis:  Decompensated heart failure  Precautions:  Falls, bed/chair alarm    SUBJECTIVE:    Pt lives with his wife and son in a tri-level home with 4 stairs and 2 rails to enter to the family room. There are 7 steps and 2 rails to main floor where kitchen is and then 7 steps with 2 rails to 2nd floor bed and bath. Pt ambulated with ww short distances, but wife states pt is not very active and usually sits in his recliner down stairs in the family room. OBJECTIVE:   Initial Evaluation  Date: 21 Treatment Short Term/ Long Term   Goals   Was pt agreeable to Eval/treatment? yes     Does pt have pain? No c/o pain     Bed Mobility  NA, pt was found sitting on EOB. supervision   Transfers Sit to stand: MIN A  Stand to sit: MIN A  Stand pivot: MIN A with ww  SBA   Ambulation   3 feet with ww MIN A  50 feet with ww SBA   Stair negotiation: ascended and descended NA  7 steps with 1 rail SBA   AM-PAC 6 Clicks 28/20       BLE ROM is WFL. BLE strength is grossly 3-/5 to 4-/5. Balance: sitting is supervision and standing with ww is MIN A  Endurance: fair-    Patient education  Pt educated on hand placement during transfers. Patient response to education:   Pt verbalized understanding Pt demonstrated skill Pt requires further education in this area   yes yes yes     ASSESSMENT:    Comments:  Pt was found sitting up on EOB and wanted to sit in chair to eat his lunch. He walked with ww to chair and c/o fatigue that limited amb distance at this time. Pt was left sitting up in chair with call light left by patient. Chair/bed alarm: chair alarm was activated. Pt's/ family goals   1. To get stronger and go home. Patient and or family understand(s) diagnosis, prognosis, and plan of care. PLAN OF CARE:    Current Treatment Recommendations      [x] Strengthening     [] ROM   [x] Balance Training   [x] Endurance Training   [x] Transfer Training   [x] Gait Training   [x] Stair Training   [] Positioning   [] Safety and Education Training   [x] Patient/Caregiver Education   [] HEP  [] Other     PT care will be provided in accordance with the objectives noted above. Exercises and functional mobility practice will be used as well as appropriate assistive devices or modalities to obtain goals. Patient and family education will also be administered as needed. Frequency of treatments: 2-5x/week x 1-2 weeks. Time in  11:25  Time out  11:40    Evaluation Time includes thorough review of current medical information, gathering information on past medical history/social history and prior level of function, completion of standardized testing/informal observation of tasks, assessment of data and education on plan of care and goals. CPT codes:  [x] Low Complexity PT evaluation 12014  [] Moderate Complexity PT evaluation 10003  [] High Complexity PT evaluation 68740  [] PT Re-evaluation 10875  [] Gait training 66801 ** minutes  [] Manual therapy 80241 ** minutes  [] Therapeutic activities 93420 ** minutes  [] Therapeutic exercises 90957 ** minutes  [] Neuromuscular reeducation 91136 ** minutes     Neptali Fernandes., P.T.   License Number: [id number]

## 2021-04-27 VITALS
DIASTOLIC BLOOD PRESSURE: 66 MMHG | BODY MASS INDEX: 31.71 KG/M2 | OXYGEN SATURATION: 94 % | RESPIRATION RATE: 28 BRPM | HEART RATE: 78 BPM | WEIGHT: 179 LBS | HEIGHT: 63 IN | TEMPERATURE: 98.8 F | SYSTOLIC BLOOD PRESSURE: 144 MMHG

## 2021-04-27 LAB
ANION GAP SERPL CALCULATED.3IONS-SCNC: 10 MMOL/L (ref 7–16)
BUN BLDV-MCNC: 35 MG/DL (ref 6–23)
CALCIUM SERPL-MCNC: 9.5 MG/DL (ref 8.6–10.2)
CHLORIDE BLD-SCNC: 92 MMOL/L (ref 98–107)
CO2: 32 MMOL/L (ref 22–29)
CREAT SERPL-MCNC: 1.4 MG/DL (ref 0.7–1.2)
GFR AFRICAN AMERICAN: 57
GFR NON-AFRICAN AMERICAN: 48 ML/MIN/1.73
GLUCOSE BLD-MCNC: 148 MG/DL (ref 74–99)
MAGNESIUM: 1.9 MG/DL (ref 1.6–2.6)
METER GLUCOSE: 127 MG/DL (ref 74–99)
METER GLUCOSE: 136 MG/DL (ref 74–99)
METER GLUCOSE: 162 MG/DL (ref 74–99)
POTASSIUM SERPL-SCNC: 4.5 MMOL/L (ref 3.5–5)
PRO-BNP: 1409 PG/ML (ref 0–450)
SARS-COV-2, NAAT: NOT DETECTED
SODIUM BLD-SCNC: 134 MMOL/L (ref 132–146)

## 2021-04-27 PROCEDURE — 2580000003 HC RX 258: Performed by: FAMILY MEDICINE

## 2021-04-27 PROCEDURE — 97530 THERAPEUTIC ACTIVITIES: CPT

## 2021-04-27 PROCEDURE — 2700000000 HC OXYGEN THERAPY PER DAY

## 2021-04-27 PROCEDURE — 82962 GLUCOSE BLOOD TEST: CPT

## 2021-04-27 PROCEDURE — 6360000002 HC RX W HCPCS: Performed by: FAMILY MEDICINE

## 2021-04-27 PROCEDURE — 83880 ASSAY OF NATRIURETIC PEPTIDE: CPT

## 2021-04-27 PROCEDURE — 99239 HOSP IP/OBS DSCHRG MGMT >30: CPT | Performed by: STUDENT IN AN ORGANIZED HEALTH CARE EDUCATION/TRAINING PROGRAM

## 2021-04-27 PROCEDURE — 87635 SARS-COV-2 COVID-19 AMP PRB: CPT

## 2021-04-27 PROCEDURE — 99233 SBSQ HOSP IP/OBS HIGH 50: CPT | Performed by: INTERNAL MEDICINE

## 2021-04-27 PROCEDURE — 6370000000 HC RX 637 (ALT 250 FOR IP): Performed by: FAMILY MEDICINE

## 2021-04-27 PROCEDURE — 6370000000 HC RX 637 (ALT 250 FOR IP): Performed by: STUDENT IN AN ORGANIZED HEALTH CARE EDUCATION/TRAINING PROGRAM

## 2021-04-27 PROCEDURE — 6370000000 HC RX 637 (ALT 250 FOR IP): Performed by: INTERNAL MEDICINE

## 2021-04-27 PROCEDURE — 80048 BASIC METABOLIC PNL TOTAL CA: CPT

## 2021-04-27 PROCEDURE — 36415 COLL VENOUS BLD VENIPUNCTURE: CPT

## 2021-04-27 PROCEDURE — 97165 OT EVAL LOW COMPLEX 30 MIN: CPT

## 2021-04-27 PROCEDURE — 83735 ASSAY OF MAGNESIUM: CPT

## 2021-04-27 RX ORDER — FUROSEMIDE 40 MG/1
40 TABLET ORAL DAILY
Qty: 60 TABLET | Refills: 0 | Status: SHIPPED | OUTPATIENT
Start: 2021-04-28

## 2021-04-27 RX ORDER — CARVEDILOL 12.5 MG/1
12.5 TABLET ORAL 2 TIMES DAILY WITH MEALS
Qty: 60 TABLET | Refills: 3 | Status: SHIPPED | OUTPATIENT
Start: 2021-04-27

## 2021-04-27 RX ADMIN — INSULIN LISPRO 1 UNITS: 100 INJECTION, SOLUTION INTRAVENOUS; SUBCUTANEOUS at 11:40

## 2021-04-27 RX ADMIN — ACETAMINOPHEN 650 MG: 325 TABLET ORAL at 08:48

## 2021-04-27 RX ADMIN — Medication 1 TABLET: at 08:49

## 2021-04-27 RX ADMIN — ASPIRIN 81 MG: 81 TABLET, CHEWABLE ORAL at 08:48

## 2021-04-27 RX ADMIN — Medication 10 ML: at 08:50

## 2021-04-27 RX ADMIN — POLYETHYLENE GLYCOL 3350 17 G: 17 POWDER, FOR SOLUTION ORAL at 08:49

## 2021-04-27 RX ADMIN — FOLIC ACID 1 MG: 1 TABLET ORAL at 08:49

## 2021-04-27 RX ADMIN — CARVEDILOL 12.5 MG: 6.25 TABLET, FILM COATED ORAL at 08:49

## 2021-04-27 RX ADMIN — ESCITALOPRAM OXALATE 10 MG: 10 TABLET ORAL at 08:49

## 2021-04-27 RX ADMIN — Medication 3000 UNITS: at 08:48

## 2021-04-27 RX ADMIN — FUROSEMIDE 40 MG: 40 TABLET ORAL at 08:49

## 2021-04-27 RX ADMIN — ACETAMINOPHEN 650 MG: 325 TABLET ORAL at 16:59

## 2021-04-27 RX ADMIN — ENOXAPARIN SODIUM 40 MG: 40 INJECTION SUBCUTANEOUS at 08:49

## 2021-04-27 RX ADMIN — CARVEDILOL 12.5 MG: 6.25 TABLET, FILM COATED ORAL at 16:59

## 2021-04-27 RX ADMIN — LEVOTHYROXINE SODIUM 50 MCG: 50 TABLET ORAL at 06:11

## 2021-04-27 ASSESSMENT — PAIN SCALES - GENERAL
PAINLEVEL_OUTOF10: 0

## 2021-04-27 NOTE — PROGRESS NOTES
Notified pt and wife of pt's discharge today to [facility]. Wife will tell daughter. Notified social work to set up transport time.  kennysumi will pick him up at 6:30 pm.

## 2021-04-27 NOTE — PROGRESS NOTES
N17 completed. nurse to nurse called to [facility].  Paperwork faxed to [phone]

## 2021-04-27 NOTE — CARE COORDINATION
Social Work / Discharge Planning : SW and CM followed up with patient, spouse and daughter. They want [facility]. [facility] made to [facility] and [facility] accepted. N 17 generated, HENS and transport. They do NOT need pre-cetr click six. Will need COVID. Await discharge. SW to follow. Electronically signed by ALEXANDRO Meehan on 4/27/21 at 10:24 AM EDT    Addendum : Patient will be discharged to [facility] via [facility] between 6 and 6:30 pm. Patient and spouse and RN updated. SW to follow.  Electronically signed by ALEXANDRO Meehan on 4/27/21 at 1:55 PM EDT

## 2021-04-27 NOTE — PLAN OF CARE
Problem: Falls - Risk of:  Goal: Will remain free from falls  Description: Will remain free from falls  Outcome: Ongoing  Goal: Absence of physical injury  Description: Absence of physical injury  Outcome: Ongoing     Problem: Fluid Volume:  Goal: Hemodynamic stability will improve  Description: Hemodynamic stability will improve  Outcome: Ongoing  Goal: Ability to maintain a balanced intake and output will improve  Description: Ability to maintain a balanced intake and output will improve  Outcome: Ongoing

## 2021-04-27 NOTE — FLOWSHEET NOTE
04/27/21 1130 04/27/21 1140 04/27/21 1148   Oxygen Therapy   SpO2 97 % (!) 88 % 94 %   Pulse Oximeter Device Mode Intermittent Intermittent Intermittent   Pulse Oximeter Device Location Left;Finger Left;Finger Left;Finger   O2 Device Nasal cannula None (Room air) Nasal cannula   O2 Flow Rate (L/min) 2 L/min  --  2 L/min

## 2021-04-27 NOTE — PROGRESS NOTES
Pulmonary Progress Note    Admit Date: 2021                            PCP: Faraz Sanchez MD  Active Problems:    Decompensated heart failure ([address]    Hypoxia  Resolved Problems:    * No resolved hospital problems. *      Subjective:  Ambulating around room on 2L. Wife at bedside. Denies dyspnea or cough. Feeling better     Medications:   dextrose          furosemide  40 mg Oral Daily    insulin lispro  0-6 Units Subcutaneous TID WC    insulin lispro  0-3 Units Subcutaneous Nightly    carvedilol  12.5 mg Oral BID WC    melatonin  10.5 mg Oral Nightly    escitalopram  10 mg Oral Daily    levothyroxine  50 mcg Oral Daily    aspirin  81 mg Oral Daily    acetaminophen  650 mg Oral BID    sodium chloride flush  5-40 mL Intravenous 2 times per day    enoxaparin  40 mg Subcutaneous Daily    folic acid  1 mg Oral Daily    vitamin B and C  1 tablet Oral Daily    Vitamin D  3,000 Units Oral Daily    amLODIPine  10 mg Oral Nightly       Vitals:  VITALS:  BP (!) 159/70   Pulse 76   Temp 99 °F (37.2 °C) (Oral)   Resp 18   Ht 5' 3\" (1.6 m)   Wt 179 lb (81.2 kg)   SpO2 93%   BMI 31.71 kg/m²   24HR INTAKE/OUTPUT:      Intake/Output Summary (Last 24 hours) at 2021 1101  Last data filed at 2021 0912  Gross per 24 hour   Intake --   Output 660 ml   Net -660 ml     CURRENT PULSE OXIMETRY:  SpO2: 93 %  24HR PULSE OXIMETRY RANGE:  SpO2  Av.3 %  Min: 93 %  Max: 98 %  CVP:    VENT SETTINGS:   Vent Information  SpO2: 93 %  Additional Respiratory  Assessments  Pulse: 76  Resp: 18  SpO2: 93 %  Oral Care: Teeth brushed      EXAM:  General: Alert, in NAD  ENT: No discharge. Pharynx clear. membranes moist   Neck: Supple, Trachea midline. Resp: No accessory muscle use. Non-labored. Lungs diminished wirt bibasilar rales    CV: Regular rate. Regular rhythm. ++ murmur . 1+ BLE edema. ABD: Non-tender. Non-distended. Soft, round . Normal bowel sounds. Skin: Warm and dry. M/S: No cyanosis.  No joint deformity. No clubbing. Neuro: Awake. Follows commands. O x 3, ARCHER  Psych:  calm and interactive     I/O: I/O last 3 completed shifts:  In: -   Out: 500 [Urine:500]  I/O this shift:  In: -   Out: 360 [Urine:360]     Results:  CBC: No results for input(s): WBC, HGB, HCT, MCV, PLT in the last 72 hours. BMP:   Recent Labs     04/25/21  0828 04/26/21  0350 04/27/21  0142   * 135 134   K 4.0 4.1 4.5   CL 91* 92* 92*   CO2 30* 32* 32*   BUN 18 28* 35*   CREATININE 1.1 1.3* 1.4*         Results for Nelson Mcclain (MRN [id number]) as of 4/27/2021 11:02   Ref. Range 4/24/2021 09:23 4/25/2021 15:17 4/27/2021 01:42   Pro-BNP Latest Ref Range: 0 - 450 pg/mL 4,338 (H) 3,475 (H) 1,409 (H)     LFT: No results for input(s): ALKPHOS, ALT, AST, PROT, BILITOT, BILIDIR, LABALBU in the last 72 hours. PT/INR: No results for input(s): PROTIME, INR in the last 72 hours. Procalcitonin: No results for input(s): PROCAL in the last 72 hours. Cultures:  No results for input(s): CULTRESP in the last 72 hours. ABG:   No results for input(s): PH, PO2, PCO2, HCO3, BE, O2SAT in the last 72 hours.     Films:  Echo Complete    Result Date: 4/24/2021  Transthoracic Echocardiography Report (TTE)  Demographics   Patient Name    Mariama Villaseñor Gender            Male                  M   Medical Record  [id number]      Room Number       0295  Number   Account #       [de-identified]     Procedure Date    04/24/2021   Corporate ID                  Ordering                                Physician   Accession       [id number]    Referring         Thelma Pitts  Number                        Physician                                                  Mandy Caraballo   Date of Birth   04/23/1930    Sonographer       [address]   Age             80 year(s)    Interpreting      [address]                                Physician         Physician Cardiology                                                  Lorena Nix MD Any Other  Procedure Type of Study   TTE procedure:Echo Complete W/Doppler & Color Flow. Procedure Date Date: 04/24/2021 Start: 02:45 PM Study Location: Portable Technical Quality: Poor visualization due to body habitus. Indications:Congestive heart failure. Patient Status: Routine Contrast Medium: Definity. Height: 63 inches Weight: 177 pounds BSA: 1.84 m^2 BMI: 31.35 kg/m^2 HR: 84 bpm BP: 166/73 mmHg  Findings   Left Ventricle  Normal left ventricle size and systolic function. Ejection fraction is visually estimated at 60-65%. No regional wall motion abnormalities seen. Normal left ventricle wall thickness. Stage II diastolic dysfunction. Right Ventricle  Normal right ventricular size and function. TAPSE 18 mm. Left Atrium  The left atrium is severely dilated. Patent foramen ovale with left-to-right shunt was visualized. No right to  left shunt on bubble study. Right Atrium  Mildly enlarged right atrium size. Mitral Valve  Mild mitral annular calcification. No evidence of mitral valve stenosis. Physiologic and/or trace mitral regurgitation is present. Tricuspid Valve  The tricuspid valve appears structurally normal.  Mild tricuspid regurgitation. RVSP is 72 mmHg. Pulmonary hypertension is severe . Aortic Valve  The aortic valve appears moderately sclerotic. Individual aortic valve leaflets are not clearly visualized. There is moderate aortic stenosis with valve area of 1.2 sq cm. Aortic  valve peak velocity 2.9 m/s, mean gradient 17 mmHg. DVI 0.34. No evidence of aortic valve regurgitation. Pulmonic Valve  Pulmonic valve is structurally normal.  Physiologic and/or trace pulmonic regurgitation present. No evidence of pulmonic valve stenosis. Pericardial Effusion  No evidence for hemodynamically significant pericardial effusion. Pleural Effusion  No evidence of pleural effusion. Aorta  Normal aortic root and ascending aorta.   Miscellaneous  The inferior vena cava diameter is normal with normal respiratory  variation. Conclusions   Summary  Normal left ventricle size and systolic function. Ejection fraction is visually estimated at 60-65%. No regional wall motion abnormalities seen. Normal left ventricle wall thickness. Stage II diastolic dysfunction. The left atrium is severely dilated. Patent foramen ovale with left-to-right shunt was visualized. No right to  left shunt on bubble study. Normal right ventricular size and function. TAPSE 18 mm. There is moderate aortic stenosis with valve area of 1.2 sq cm. Aortic  valve peak velocity 2.9 m/s, mean gradient 17 mmHg. DVI 0.34. Mild tricuspid regurgitation. RVSP is 72 mmHg. Pulmonary hypertension is severe . No evidence for hemodynamically significant pericardial effusion.    Signature   ----------------------------------------------------------------  Electronically signed by Jeanette Fajardo MD(Interpreting  physician) on 04/24/2021 06:14 PM  ----------------------------------------------------------------  M-Mode/2D Measurements & Calculations   LV Diastolic    LV Systolic Dimension: 3.6   AV Cusp Separation: 1.2 cmLA  Dimension: 5.3  cm                           Dimension: 5.3 cmAO Root  cm              LV Volume Diastolic: 922.3   Dimension: 2.6 cm  LV FS:32.1 %    ml  LV PW           LV Volume Systolic: 22.7 ml  Diastolic: 0.8  LV EDV/LV EDV Index: 135.5  cm              ml/74 ml/m^2LV ESV/LV ESV    RV Diastolic Dimension: 3.5  LV PW Systolic: Index: 89.8 ZP/70VA/ m^2     cm  1.2 cm          EF Calculated: 59.5 %  Septum          LV Mass Index: 82 l/min*m^2  LA/Aorta: 0.31  Diastolic: 0.8                               Ascending Aorta: 2.9 cm  cm                                           LA volume/Index: 104 ml  Septum          LVOT: 2.1 cm                 /11.76HE/W^3  Systolic: 1.2                                RA Area: 25 cm^2  cm  CO: 6.48 l/min                               IVC Expiration: 1.8 cm  CI: 3.52  l/m*m^2  LV Mass: 150.05  g  Doppler Measurements & Calculations   MV Peak E-Wave: 1.08 AV Peak Velocity:     LVOT Peak Velocity: 1.02 m/s  m/s                  2.92 m/s              LVOT Mean Velocity: 0.69 m/s  MV Peak A-Wave: 0.9  AV Peak Gradient:     LVOT Peak Gradient: 4.1  m/s                  34.13 mmHg            mmHgLVOT Mean Gradient: 2.1  MV E/A Ratio: 1.19   AV Mean Velocity:     mmHg  MV Peak Gradient:    1.95 m/s              Estimated RVSP: 71.6 mmHg  5.1 mmHg             AV Mean Gradient: 17  Estimated RAP:3 mmHg  MV Mean Gradient:    mmHg  2.2 mmHg             AV VTI: 66.3 cm  MV Mean Velocity:    AV Area               TR Velocity:4.14 m/s  0.7 m/s              (Continuity):1.16     TR Gradient:68.56 mmHg  MV Deceleration      cm^2                  PV Peak Velocity: 0.94 m/s  Time: 171.8 msec                           PV Peak Gradient: 3.54 mmHg  MV P1/2t: 50.8 msec  LVOT VTI: 22.3 cm     PV Mean Velocity: 0.61 m/s  MVA by PHT:4.33 cm^2 IVRT: 46.1 msec       PV Mean Gradient: 1.7 mmHg  MV Area              Estimated PASP: 71.56  (continuity): 2.8    mmHg  cm^2  MV E' Septal  Velocity: 0.05 m/s  MV E' Lateral  Velocity: 8 m/s  [email or web address] [id number]    Ct Head Wo Contrast    Result Date: 4/24/2021  EXAMINATION: CT OF THE HEAD WITHOUT CONTRAST  4/24/2021 8:20 am TECHNIQUE: CT of the head was performed without the administration of intravenous contrast. Dose modulation, iterative reconstruction, and/or weight based adjustment of the mA/kV was utilized to reduce the radiation dose to as low as reasonably achievable. COMPARISON: 12/17/2017 HISTORY: ORDERING SYSTEM PROVIDED HISTORY: dizzy TECHNOLOGIST PROVIDED HISTORY: Has a \"code stroke\" or \"stroke alert\" been called? ->No Reason for exam:->dizzy Decision Support Exception->Emergency Medical Condition (MA) FINDINGS: BRAIN/VENTRICLES: There is no acute intracranial hemorrhage, mass effect or midline shift. No abnormal extra-axial fluid collection. The gray-white differentiation is maintained without evidence of an acute infarct. There is no evidence of hydrocephalus. There are involutional changes, with prominence of the ventricles and the sulci. ORBITS: The visualized portion of the orbits demonstrate no acute abnormality. SINUSES: The visualized paranasal sinuses and mastoid air cells demonstrate no acute abnormality. SOFT TISSUES/SKULL:  No acute abnormality of the visualized skull or soft tissues. No CT evidence of acute intracranial abnormality. Xr Chest Portable    Result Date: 4/24/2021  EXAMINATION: ONE XRAY VIEW OF THE CHEST 4/24/2021 7:50 am COMPARISON: 04/02/2019 HISTORY: ORDERING SYSTEM PROVIDED HISTORY: dizzy TECHNOLOGIST PROVIDED HISTORY: Reason for exam:->dizzy FINDINGS: Prominent cardiomediastinal silhouette. There are prominent central pulmonary vessels. Patchy left lung base airspace opacity. No large effusion or pneumothorax. No acute osseous abnormality. Findings may related to mild congestive heart failure with prominence of the central vessels. Patchy left lung base atelectasis or infiltrate   4/24 2019      Assessment:  80y.o. year old male who presented with dizziness  4/24 CT head negative  chest x-ray with congestion. proBNP 4338  4/24 patient also had gross hematuria seen by urology  4/26 desats to 87% on room air  4/27 2L     1. Dizziness  2. acute hypoxic respiratory failure requiring 2 L  3. Hypochloremia  4. CKD  5. macrocytic anemia  6. history of snoring  7. swelling of the legs  8. CAD status post CABG 2006  9. acute on chronic diastolic heart failure echo 2/24/2021 showing EF 60% stage II DD moderate aortic stenosis RSVP 72 severe pulmonary hypertension  10. HTN  11. HLD  12. left hip pain  13.  T12 compression fracture  14. hypothyroid  15. recent admission 11/2/2019 with CHF BRIGETTE           · Dizziness is felt to be due to secondary to medications which are being adjusted, it can also be from aortic stenosis  · severe pulmonary hypertension most likely from cardiac cause cannot rule out obstructive sleep apnea, in-patient sleep study ordered, although patient still requiring o2 therefore will need to be done in sleep lab will need outpatient PSG set up   · Continue o2 on 2L, baseline is room air at home, will need ambulatory pox for home o2 evaluation  · BNP > 4,000 on diuretic and trending down now 1400  · Jackson catheter intact   · Activity as tolerated   · cxr with increased congestion   · IS for pulmonary hygiene     Electronically signed by MIHIR Hansen on 4/27/2021 at 11:01 AM

## 2021-04-27 NOTE — PLAN OF CARE
Problem: Pain:  Goal: Pain level will decrease  Description: Pain level will decrease  Outcome: Met This Shift     Problem: Respiratory:  Goal: Respiratory status will improve  Description: Respiratory status will improve  Outcome: Not Met This Shift     Problem: Pain:  Goal: Pain level will decrease  Description: Pain level will decrease  Outcome: Met This Shift

## 2021-04-27 NOTE — PROGRESS NOTES
Physical Therapy  Facility/Department: [address] MED SURG/TELE  Daily Treatment Note  NAME: Nicho Cervantes  : 1930  MRN: [id number]    Date of Service: 2021    Patient Diagnosis(es): The primary encounter diagnosis was Hypoxia. Diagnoses of Dizziness, Acute on chronic congestive heart failure, unspecified heart failure type ([address] and Asymptomatic microscopic hematuria were also pertinent to this visit. has a past medical history of CAD (coronary artery disease), Hyperlipidemia, and Hypertension. has a past surgical history that includes Coronary artery bypass graft and hip surgery. Attending Provider:  Sebastian Maciel MD     Evaluating PT:  Riley Bob. Norberto Berger, P.T.     Room #:  8980/1853-D  Diagnosis:  Decompensated heart failure  Precautions:  Falls, bed/chair alarm     SUBJECTIVE:     Pt lives with his wife and son in a tri-level home with 4 stairs and 2 rails to enter to the family room. There are 7 steps and 2 rails to main floor where kitchen is and then 7 steps with 2 rails to 2nd floor bed and bath. Pt ambulated with ww short distances, but wife states pt is not very active and usually sits in his recliner down stairs in the family room.      OBJECTIVE:    Initial Evaluation  Date: 21 Treatment   Short Term/ Long Term   Goals   Was pt agreeable to Eval/treatment? yes yes      Does pt have pain? No c/o pain  no c/o pain     Bed Mobility  NA, pt was found sitting on EOB.  rolling:  NT  Supine to sit:  Min A  Stand to supine:  NT  Scooting:  SBA to sitting EOB supervision   Transfers Sit to stand: MIN A  Stand to sit: MIN A  Stand pivot: MIN A with ww  sit to stand:  Min A from the bed. SBA from the commode.   Stand to sit:  Min A  Stand pivot:  Min A with w/w SBA   Ambulation   3 feet with ww MIN A  15 feet x 2 and 60 feet with w/w SBA 50 feet with ww SBA   Stair negotiation: ascended and descended NA  NT 7 steps with 1 rail SBA   AM-PAC 6 Clicks 85/38            Patient education  Pt educated on PT objectives during treatment session, hand placement during transfers, safety during transfers and mobility. Patient response to education:   Pt verbalized understanding Pt demonstrated skill Pt requires further education in this area   Yes  With cueing yes     ASSESSMENT:    Comments:  Pt found in bed and left sitting up in the chair with call light in reach and pt's wife present. Treatment:  Patient practiced and was instructed in the following treatment:    Functional mobility performed as documented above. Pt reported feeling dizzy once sitting EOB which decreased with seated rest.  Cueing required for hand placement during transfers. Cueing required for safety when going to sit with proper alignment to the chair of commode. No LOB during ambulation. PLAN:    Patient is making good progress towards established goals. Will continue with current POC.      Time in  1039  Time out  1102    Total Treatment Time  23 minutes     CPT codes:    [x] Therapeutic activities 09252 23minutes  [] Therapeutic exercises 73920  minutes      [place] Jin, [place] [address]

## 2021-04-27 NOTE — PROGRESS NOTES
INPATIENT CARDIOLOGY FOLLOW-UP    Name: Julia Fuentes    Age: 80 y.o. Date of Admission: 4/24/2021  7:47 AM    Date of Service: 4/27/2021    Chief Complaint: Follow-up for worsening of CHF    Interim History:  No new overnight cardiac complaints. Currently with no complaints of CP, SOB, palpitations, dizziness, or lightheadedness. SR on telemetry. Reported I/O's net negative 3.1 L thus far. Review of Systems:   Cardiac: As per HPI  General: No fever, chills  Pulmonary: As per HPI  HEENT: No visual disturbances, difficult swallowing  GI: No nausea, vomiting  : No dysuria, hematuria  Endocrine: +hypothyroidism, +DM  Musculoskeletal: ARCHER x 4, no focal motor deficits  Skin: Intact, no rashes  Neuro: No headache, seizures  Psych: Currently with no depression, anxiety    Problem List:  Patient Active Problem List   Diagnosis    CHF with unknown LVEF ([address]    Aortic valve stenosis    Sick sinus syndrome ([address]    Pacemaker    Systolic ejection murmur    Hypertension    CHF (congestive heart failure), NYHA class I, acute on chronic, combined ([address]    Type 2 diabetes mellitus without complication (HCC)    Acute renal insufficiency    Compression fracture of T12 vertebra (HCC)    Decompensated heart failure (HCC)    Hypoxia       Allergies:   Allergies   Allergen Reactions    Zocor [Simvastatin] Rash     GENERIC STATIN - CAN TAKE BRAND       Current Medications:  Current Facility-Administered Medications   Medication Dose Route Frequency Provider Last Rate Last Admin    furosemide (LASIX) tablet 40 mg  40 mg Oral Daily Alvin Grider MD   40 mg at 04/26/21 0901    sodium chloride (OCEAN, BABY AYR) 0.65 % nasal spray 1 spray  1 spray Each Nostril PRN Matthieu Gordon MD   1 spray at 04/26/21 1631    insulin lispro (HUMALOG) injection vial 0-6 Units  0-6 Units Subcutaneous TID WC Matthieu Gordon MD   1 Units at 04/26/21 1239    insulin lispro (HUMALOG) injection vial 0-3 Units  0-3 Units Subcutaneous Nightly Nisreen Steele MD        carvedilol (COREG) tablet 12.5 mg  12.5 mg Oral BID WC Sridevi Liu MD   12.5 mg at 04/26/21 1630    melatonin tablet 10.5 mg  10.5 mg Oral Nightly Gricel Weiss MD   10.5 mg at 04/26/21 2214    escitalopram (LEXAPRO) tablet 10 mg  10 mg Oral Daily Gricel Weiss MD   10 mg at 04/26/21 0901    levothyroxine (SYNTHROID) tablet 50 mcg  50 mcg Oral Daily Gricel Weiss MD   50 mcg at 04/27/21 2503    aspirin chewable tablet 81 mg  81 mg Oral Daily Gricel Weiss MD   81 mg at 04/26/21 0901    acetaminophen (TYLENOL) tablet 650 mg  650 mg Oral BID Gricel Weiss MD   650 mg at 04/26/21 1630    sodium chloride flush 0.9 % injection 5-40 mL  5-40 mL Intravenous 2 times per day Gricel Weiss MD   10 mL at 04/26/21 2215    sodium chloride flush 0.9 % injection 5-40 mL  5-40 mL Intravenous PRN Gricel Weiss MD   10 mL at 04/25/21 1815    promethazine (PHENERGAN) tablet 12.5 mg  12.5 mg Oral Q6H PRN Gricel Weiss MD        Or    ondansetron [facility]) injection 4 mg  4 mg Intravenous Q6H PRN Gricel Weiss MD   4 mg at 04/26/21 0603    polyethylene glycol (GLYCOLAX) packet 17 g  17 g Oral Daily PRN Gricel Weiss MD   17 g at 04/26/21 1103    acetaminophen (TYLENOL) tablet 650 mg  650 mg Oral Q6H PRN Gricel Weiss MD        Or    acetaminophen (TYLENOL) suppository 650 mg  650 mg Rectal Q6H PRN Gricel Weiss MD        enoxaparin (LOVENOX) injection 40 mg  40 mg Subcutaneous Daily Gricel Weiss MD   40 mg at 35/57/69 5842    folic acid (FOLVITE) tablet 1 mg  1 mg Oral Daily Gricel Weiss MD   1 mg at 04/26/21 0902    vitamin B and C (TOTAL B-C) 1 tablet  1 tablet Oral Daily Gricel Weiss MD   1 tablet at 04/26/21 5785    Vitamin D (CHOLECALCIFEROL) tablet 3,000 Units  3,000 Units Oral Daily Gricel Weiss MD   3,000 Units at 04/26/21 0901    glucose (GLUTOSE) 40 % oral gel 15 g  15 g Oral PRN Gricel Weiss MD        dextrose 50 % IV solution  12.5 g Intravenous PRN nAaya Hurt MD        glucagon (rDNA) injection 1 mg  1 mg Intramuscular PRN Anaya Hurt MD        dextrose 5 % solution  100 mL/hr Intravenous PRN Anaya Hurt MD        amLODIPine (NORVASC) tablet 10 mg  10 mg Oral Nightly Antoniette Tolsona, APRN - CNP   10 mg at 04/26/21 2214    hydrALAZINE (APRESOLINE) injection 10 mg  10 mg Intravenous Q4H PRN Lani Maya MD   10 mg at 04/26/21 0604      dextrose         Physical Exam:  BP (!) 157/70   Pulse 92   Temp 98.4 °F (36.9 °C) (Oral)   Resp 18   Ht 5' 3\" (1.6 m)   Wt 179 lb (81.2 kg)   SpO2 95%   BMI 31.71 kg/m²   Wt Readings from Last 3 Encounters:   04/25/21 179 lb (81.2 kg)   11/04/19 204 lb 8 oz (92.8 kg)   12/19/17 188 lb 3.2 oz (85.4 kg)     Appearance: Awake, alert, no acute respiratory distress  Skin: Intact, no rash  Head: Normocephalic, atraumatic  ENMT: MMM, no rhinorrhea  Neck: Supple, no carotid bruits, JVD is elevated with positive hepatojugular reflex  Lungs: Decreased BS B/L, no wheezing  Cardiac: Regular rate and rhythm, +Y1H1, 3/6 systolic murmur  Abdomen: Soft, +bowel sounds, has miguel cath  Extremities: Moves all extremities x 4, 1+ lower extremity edema  Neurologic: No focal motor deficits apparent, normal mood and affect    Intake/Output:    Intake/Output Summary (Last 24 hours) at 4/27/2021 0827  Last data filed at 4/26/2021 1802  Gross per 24 hour   Intake --   Output 500 ml   Net -500 ml     No intake/output data recorded.     Laboratory Tests:  Recent Labs     04/25/21  0828 04/26/21  0350 04/27/21  0142   * 135 134   K 4.0 4.1 4.5   CL 91* 92* 92*   CO2 30* 32* 32*   BUN 18 28* 35*   CREATININE 1.1 1.3* 1.4*   GLUCOSE 193* 145* 148*   CALCIUM 9.7 9.6 9.5     Lab Results   Component Value Date    MG 1.9 04/27/2021     Recent Labs     04/24/21  0923   ALKPHOS 46   ALT 10   AST 31   PROT 6.9   BILITOT 0.4   LABALBU 4.1     Recent Labs     04/24/21  0923   WBC 4.6   RBC 2.83* HGB 9.9*   HCT 28.8*   .8*   MCH 35.0   MCHC 34.4   RDW 13.2      MPV 10.1     Lab Results   Component Value Date    TROPONINI <0.01 04/24/2021    TROPONINI <0.01 04/24/2021    TROPONINI <0.01 04/24/2021     No results found for: INR, PROTIME  Lab Results   Component Value Date    TSH 2.610 04/24/2021     Lab Results   Component Value Date    LABA1C 5.9 (H) 04/26/2021     No results found for: EAG  Lab Results   Component Value Date    CHOL 150 04/25/2021    CHOL 159 01/29/2021    CHOL 167 10/21/2020     Lab Results   Component Value Date    TRIG 110 04/25/2021    TRIG 183 (H) 01/29/2021    TRIG 126 10/21/2020     Lab Results   Component Value Date    HDL 48 04/25/2021    HDL 50 04/24/2021    HDL 45 01/29/2021     Lab Results   Component Value Date    LDLCALC 80 04/25/2021    LDLCALC 80 04/24/2021    LDLCALC 77 01/29/2021     Lab Results   Component Value Date    LABVLDL 22 04/25/2021    LABVLDL 24 04/24/2021    LABVLDL 37 01/29/2021     No results found for: CHOLHDLRATIO    Cardiac Tests:  Telemetry findings reviewed: SR, rate 90's     EKG: Normal sinus rhythm, left anterior fascicular block, LVH     Vitals and labs reviewed. Blood pressure 164/72, HR 86, sat 96 on 2 L of O2, negative orthostatic blood pressures     1. Echocardiogram 12/18/2017 (Dr. Amanda Canseco): Ejection fraction is visually estimated at 55%. No regional wall motion abnormalities seen. There is doppler evidence of stage I diastolic dysfunction. Normal right ventricle structure and function. Left atrial volume index of 34 ml per meters squared BSA. Mild aortic stenosis is present. The aortic valve area is 1.8 cm2 with a maximum gradient of 19 mmHg and a mean gradient of 11 mmHg. Mild aortic valve regurgitation. Mild mitral regurgitation is present. Moderate tricuspid regurgitation. Pulmonary hypertension is mild.  RVSP is 46 mmHg.        TTE-4/24/2021   Normal left ventricle size and systolic function.   Ejection fraction is

## 2021-04-27 NOTE — PROGRESS NOTES
Occupational Therapy  OCCUPATIONAL THERAPY INITIAL EVALUATION      Date:2021  Patient Name: Daniel Enriquez  MRN: [id number]  : 1930  Room: [address] [place] [address]    Referring Provider: Noemí Calix MD  Evaluating OT: Lelia Carter. Katlin Ng - [phone]    AM-PAC Daily Activity Raw Score: 15    Recommended Adaptive Equipment: Continue to assess. Diagnosis: Decompensated heart failure (HCC) [I50.9]     Pertinent Medical History: CAD, HTN     Precautions: falls, bed/chair alarms, skin integrity, O2 via nasal cannula    Home Living: Patient lives with his wife in a tri-level home; patient's bedroom and a bathroom are on the upper level. Patient prefers to spend time in the family room on the lowest level of their home. Bathroom Setup: walk-in shower (with handheld shower head, seat, and grab bar) and elevated toilet seat (without grab bar)  Equipment Owned: wheeled walker    Prior Level of Function (PLOF): Patient and patient's wife reported that patient was mostly independent with ADLs, but needed assistance with IADLs. Patient was independent with functional mobility (with use of wheeled walker on lowest level only - patient noted that he used furniture/walls to stabilize his balance on the other two floors of his home) prior to this hospitalization. Patient's wife assisted with shower transfers and LB bathing tasks. Driving: Yes    Pain Level: Patient denied experiencing pain. Cognition: Patient alert and oriented x3. WFL command follow demonstrated. Patient pleasant, cooperative, and motivated to return to PLOF.    Memory: Fair   Sequencing: Fair   Problem Solving: Fair   Judgement/Safety: Fair+    Functional Assessment:   Initial Eval Status  Date: 2021 Treatment Status  Date:  Short Term Goals   Feeding Setup     Grooming Min A  Supervision  (seated/standing at sink)   UB Dressing SBA  Setup   LB Dressing Max A  Min A - with use of AE, as needed/appropriate   Bathing Max A  Min A - with use of AE/DME, as needed/appropriate   Toileting Mod A  Patient with urinary catheter currently. SBA   Bed Mobility  Supine-to-Sit: Min A      Functional Transfers Sit-to-Stand: Min A   from EOB  Independent   Functional Mobility Min A   (with walker) within patient's room. Assistance needed with management of O2 line during functional mobility. Supervision with functional mobility (with device, as needed/appropriate) in order to maximize independence with ADLs/IADLs and other functional tasks. Balance Sitting: Good  (at EOB)  Standing: Fair-  (with walker)  Fair+ dynamic standing balance during completion of ADLs/IADLs and other functional tasks. Activity Tolerance Fair  Patient will demonstrate Good understanding and consistent implementation of energy conservation techniques and work simplification techniques into ADL routines. Visual/  Perceptual WFL     N/A   B UE Strength 4-/5  Patient will demonstrate 4+/5 B UE strength in order to maximize independence with ADLs and functional transfers. Long-Term Goal: Patient will increase functional independence to PLOF in order to allow patient to live in least restrictive environment. ROM: Additional Information:    R UE  WFL Mild tremor noted. L UE WFL Mild tremor noted. Hearing: [facility]  Sensation: No complaints of numbness/tingling in B UEs. Tone: WFL  Edema: No    Comments: Upon arrival, patient supine in bed with patient's wife present. At end of session, patient seated in bedside chair with call light and phone within reach, chair alarm activated, waffle cushion in place, patient's wife present, and all lines and tubes intact. Patient would benefit from continued skilled OT to increase safety and independence with completion of ADL/IADL tasks for functional independence and quality of life. Treatment: OT treatment provided this date included:    Instruction/training on safety and adapted techniques for completion of ADLs.     Instruction/training on safe functional mobility/transfer techniques.   Instruction/training on energy conservation/work simplification for completion of ADLs. Patient education provided regardin) potential benefits of handheld urinal and/or BSC use, as needed (particularly at night since patient noted that he frequently gets up to urinate), to maximize safety upon return home, 2) potential benefits of continued therapy services in SNF/[facility]O setting, 3) techniques to maximize safety with management of walker. Patient indicated [address] understanding. Further skilled OT treatment indicated to increase patient's safety and independence with completion of ADL/IADL tasks in order to maximize patient's functional independence and quality of life.     Assessment of Current Deficits:   Functional Mobility [x]  ADLs [x] Strength [x]  Cognition []  Functional Transfers  [x] IADLs [x] Safety Awareness [x]  Endurance [x]  Fine Motor Coordination [] Balance [x] Vision/Perception [] Sensation []   Gross Motor Coordination [] ROM [] Delirium []                  Motor Control []    Plan of Care: 2-5 days/week for 1-2 weeks PRN  [x]ADL retraining/adaptive techniques and AE recommendations to increase functional independence within precautions  [x]Energy conservation techniques to improve tolerance for ADLs/IADLs  [x]Functional transfer/mobility training/DME recommendations for increased independence, safety and fall prevention  [x]Patient/family education to increase safety and functional independence  [x]Environmental modifications for safe mobility and completion of ADLs/IADLs  []Cognitive retraining exercises to improve problem solving skills & safe participation in ADLs/IADLs   []Sensory re-education techniques to improve extremity awareness, maintain skin integrity and improve hand function   []Visual/Perceptual retraining  to improve body awareness and safety during transfers and ADLs   []Splinting/positioning needs to maintain joint/skin integrity and prevent contractures   [x]Therapeutic activity to improve functional performance during ADLs/IADLs  [x]Therapeutic exercise to improve tolerance and functional strength for ADLs/IADLs  [x]Balance retraining/tolerance tasks for facilitation of postural control with dynamic challenges during ADLs   [x]Neuromuscular re-education: facilitate righting/equilibrium reactions, midline orientation, scapular stability/mobility, normalize muscle tone, and facilitate active functional movement/attention  []Delirium prevention/treatment   []Positioning to improve functional independence and prevent skin breakdown   []Other:     Rehab Potential: Good for established goals. Patient / Family Goal: Patient stated that he anticipates going to a SNF/[facility] upon discharge. Patient and/or family were instructed on functional diagnosis, prognosis/goals, and OT plan of care. Demonstrated Good understanding. Eval Complexity: Low    Time In: 1350  Time Out: 1415  Total Treatment Time: 10 minutes      Minutes Units   OT Eval Low 92546 15 1   OT Eval Medium 50148     OT Eval High 12119     OT Re-Eval [id number]     Therapeutic Ex 19466     Therapeutic Activities 50064 10 1   ADL/Self Care [id number]     Orthotic Management [id number]     Neuro Re-Ed 17569     Non-Billable Time N/A ---     Evaluation time includes thorough review of current medical information, gathering information on past medical history/social history and prior level of function, completion of standardized testing/informal observation of tasks, assessment of data, and education on plan of care and goals. Neeru Sanchez OTR/L  License Number: [id number]

## 2021-04-27 NOTE — DISCHARGE SUMMARY
[address] Physician Discharge Summary       [facility]  Please call Neil Margarettedeborah from [address] [place] [address] and ask what community based services your family member is eligible for. Thanks so much !!!        [facility] at [id number]  [phone]          Activity level: As tolerated     Dispo: GINO     Condition on discharge: Stable     Patient ID:  Bela Gutierrez  [id number]  51 y.o.  4/23/1930    Admit date: 4/24/2021    Discharge date and time:  4/27/2021  1:40 PM    Admission Diagnoses: Active Problems:    Decompensated heart failure (HCC)    Hypoxia  Resolved Problems:    * No resolved hospital problems. *      Discharge Diagnoses: Active Problems:    Decompensated heart failure (HCC)    Hypoxia  Resolved Problems:    * No resolved hospital problems. *      Consults:  IP CONSULT TO HEART FAILURE NURSE/COORDINATOR  IP CONSULT TO DIETITIAN  IP CONSULT TO CARDIOLOGY  IP CONSULT TO UROLOGY  IP CONSULT TO PULMONOLOGY  IP CONSULT TO HOME CARE NEEDS    Procedures: As per hospital course    Hospital Course:   Patient Bela Gutierrez is a 80 y.o. presented with Decompensated heart failure ([address] [I50.9]  Patient was admitted with difficulty breathing and lower extremity swelling, was managed on IV diuresis later changed to p.o. diuresis for acute on chronic decompensated stage II diastolic heart failure, cardiology was on board. Patient also has Obesity with possible BREANN, hypoxia being discharged on 2 L of oxygen by nasal cannula (room air at baseline) - Severe pulmonary hypertension with an RVSP of 72 mmHg, witnessed apneic spells per family, will consulted pulmonology, will need outpatient sleep study. Patient is being discharged on p.o. Lasix, will need to follow-up BMP to see renal function and potassium 3 days post discharge, dose might need adjustment, discontinuation pending the outpatient lab.   Patient had hematuria, urology had evaluated, recommend its posttraumatic, patient restarted on aspirin. Patient had an HbA1c of 5.7, needs dietary management for now. Due to patient's orthostatics, clonidine was held. Metoprolol home dose was changed to Coreg. Patient needs to follow-up with cardiology and pulmonology outpatient. Discharge Exam:  General Appearance: alert and oriented to person, place and time and in no acute distress  Skin: warm and dry  Head: normocephalic and atraumatic  Eyes: pupils equal, round, and reactive to light, extraocular eye movements intact, conjunctivae normal  Neck: neck supple and non tender without mass   Pulmonary/Chest: clear to auscultation bilaterally- no wheezes, rales or rhonchi, normal air movement, no respiratory distress  Cardiovascular: normal rate, normal S1 and S2 and no carotid bruits  Abdomen: soft, non-tender, non-distended, normal bowel sounds, no masses or organomegaly  Extremities: no cyanosis, no clubbing and no edema  Neurologic: no cranial nerve deficit and speech normal    I/O last 3 completed shifts:  In: -   Out: 500 [Urine:500]  I/O this shift:  In: 180 [P.O.:180]  Out: 535 [Urine:535]      LABS:  Recent Labs     04/25/21  0828 04/26/21  0350 04/27/21  0142   * 135 134   K 4.0 4.1 4.5   CL 91* 92* 92*   CO2 30* 32* 32*   BUN 18 28* 35*   CREATININE 1.1 1.3* 1.4*   GLUCOSE 193* 145* 148*   CALCIUM 9.7 9.6 9.5       No results for input(s): WBC, RBC, HGB, HCT, MCV, MCH, MCHC, RDW, PLT, MPV in the last 72 hours. No results for input(s): POCGLU in the last 72 hours.     Imaging:  Echo Complete    Result Date: 4/24/2021  Transthoracic Echocardiography Report (TTE)  Demographics   Patient Name    Christopher Weinstein Gender            Male                  M   Medical Record  [id number]      Room Number       4014  Number   Account #       [de-identified]     Procedure Date    04/24/2021   Corporate ID                  Ordering                                Physician   Accession [id number]    Referring         Katelyn Marion  Number                        Physician                                                  Yesenia Tijerina   Date of Birth   04/23/1930    Sonographer       Rebeca Friedman MARIANELA   Age             80 year(s)    Interpreting      [address] [place] [address]                                Physician         Physician Cardiology                                                  Prisca Mack MD                                 Any Other  Procedure Type of Study   TTE procedure:Echo Complete W/Doppler & Color Flow. Procedure Date Date: 04/24/2021 Start: 02:45 PM Study Location: Portable Technical Quality: Poor visualization due to body habitus. Indications:Congestive heart failure. Patient Status: Routine Contrast Medium: Definity. Height: 63 inches Weight: 177 pounds BSA: 1.84 m^2 BMI: 31.35 kg/m^2 HR: 84 bpm BP: 166/73 mmHg  Findings   Left Ventricle  Normal left ventricle size and systolic function. Ejection fraction is visually estimated at 60-65%. No regional wall motion abnormalities seen. Normal left ventricle wall thickness. Stage II diastolic dysfunction. Right Ventricle  Normal right ventricular size and function. TAPSE 18 mm. Left Atrium  The left atrium is severely dilated. Patent foramen ovale with left-to-right shunt was visualized. No right to  left shunt on bubble study. Right Atrium  Mildly enlarged right atrium size. Mitral Valve  Mild mitral annular calcification. No evidence of mitral valve stenosis. Physiologic and/or trace mitral regurgitation is present. Tricuspid Valve  The tricuspid valve appears structurally normal.  Mild tricuspid regurgitation. RVSP is 72 mmHg. Pulmonary hypertension is severe . Aortic Valve  The aortic valve appears moderately sclerotic. Individual aortic valve leaflets are not clearly visualized. There is moderate aortic stenosis with valve area of 1.2 sq cm. Aortic  valve peak velocity 2.9 m/s, mean gradient 17 mmHg. DVI 0.34. No evidence of aortic valve regurgitation. Pulmonic Valve  Pulmonic valve is structurally normal.  Physiologic and/or trace pulmonic regurgitation present. No evidence of pulmonic valve stenosis. Pericardial Effusion  No evidence for hemodynamically significant pericardial effusion. Pleural Effusion  No evidence of pleural effusion. Aorta  Normal aortic root and ascending aorta. Miscellaneous  The inferior vena cava diameter is normal with normal respiratory  variation. Conclusions   Summary  Normal left ventricle size and systolic function. Ejection fraction is visually estimated at 60-65%. No regional wall motion abnormalities seen. Normal left ventricle wall thickness. Stage II diastolic dysfunction. The left atrium is severely dilated. Patent foramen ovale with left-to-right shunt was visualized. No right to  left shunt on bubble study. Normal right ventricular size and function. TAPSE 18 mm. There is moderate aortic stenosis with valve area of 1.2 sq cm. Aortic  valve peak velocity 2.9 m/s, mean gradient 17 mmHg. DVI 0.34. Mild tricuspid regurgitation. RVSP is 72 mmHg. Pulmonary hypertension is severe . No evidence for hemodynamically significant pericardial effusion.    Signature   ----------------------------------------------------------------  Electronically signed by Tra Rodriguez MD(Interpreting  physician) on 04/24/2021 06:14 PM  ----------------------------------------------------------------  M-Mode/2D Measurements & Calculations   LV Diastolic    LV Systolic Dimension: 3.6   AV Cusp Separation: 1.2 cmLA  Dimension: 5.3  cm                           Dimension: 5.3 cmAO Root  cm              LV Volume Diastolic: 194.5   Dimension: 2.6 cm  LV FS:32.1 %    ml  LV PW           LV Volume Systolic: 06.0 ml  Diastolic: 0.8  LV EDV/LV EDV Index: 135.5  cm              ml/74 ml/m^2LV ESV/LV ESV    RV Diastolic Dimension: 3.5  LV PW Systolic: Index: 05.8 OJ/99OM/ m^2     cm 1.2 cm          EF Calculated: 59.5 %  Septum          LV Mass Index: 82 l/min*m^2  LA/Aorta: 0.83  Diastolic: 0.8                               Ascending Aorta: 2.9 cm  cm                                           LA volume/Index: 104 ml  Septum          LVOT: 2.1 cm                 /83.17GC/I^9  Systolic: 1.2                                RA Area: 25 cm^2  cm  CO: 6.48 l/min                               IVC Expiration: 1.8 cm  CI: 3.52  l/m*m^2  LV Mass: 150.05  g  Doppler Measurements & Calculations   MV Peak E-Wave: 1.08 AV Peak Velocity:     LVOT Peak Velocity: 1.02 m/s  m/s                  2.92 m/s              LVOT Mean Velocity: 0.69 m/s  MV Peak A-Wave: 0.9  AV Peak Gradient:     LVOT Peak Gradient: 4.1  m/s                  34.13 mmHg            mmHgLVOT Mean Gradient: 2.1  MV E/A Ratio: 1.19   AV Mean Velocity:     mmHg  MV Peak Gradient:    1.95 m/s              Estimated RVSP: 71.6 mmHg  5.1 mmHg             AV Mean Gradient: 17  Estimated RAP:3 mmHg  MV Mean Gradient:    mmHg  2.2 mmHg             AV VTI: 66.3 cm  MV Mean Velocity:    AV Area               TR Velocity:4.14 m/s  0.7 m/s              (Continuity):1.16     TR Gradient:68.56 mmHg  MV Deceleration      cm^2                  PV Peak Velocity: 0.94 m/s  Time: 171.8 msec                           PV Peak Gradient: 3.54 mmHg  MV P1/2t: 50.8 msec  LVOT VTI: 22.3 cm     PV Mean Velocity: 0.61 m/s  MVA by PHT:4.33 cm^2 IVRT: 46.1 msec       PV Mean Gradient: 1.7 mmHg  MV Area              Estimated PASP: 71.56  (continuity): 2.8    mmHg  cm^2  MV E' Septal  Velocity: 0.05 m/s  MV E' Lateral  Velocity: 8 m/s  [email or web address] [id number]    Ct Head Wo Contrast    Result Date: 4/24/2021  EXAMINATION: CT OF THE HEAD WITHOUT CONTRAST  4/24/2021 8:20 am TECHNIQUE: CT of the head was performed without the administration of intravenous contrast.

## 2021-04-28 LAB
ALBUMIN SERPL-MCNC: 3.6 G/DL (ref 3.5–5.2)
ALP BLD-CCNC: 40 U/L (ref 40–129)
ALT SERPL-CCNC: 7 U/L (ref 0–40)
ANION GAP SERPL CALCULATED.3IONS-SCNC: 9 MMOL/L (ref 7–16)
AST SERPL-CCNC: 11 U/L (ref 0–39)
BASOPHILS ABSOLUTE: 0.01 E9/L (ref 0–0.2)
BASOPHILS RELATIVE PERCENT: 0.3 % (ref 0–2)
BILIRUB SERPL-MCNC: 0.4 MG/DL (ref 0–1.2)
BUN BLDV-MCNC: 40 MG/DL (ref 6–23)
CALCIUM SERPL-MCNC: 9 MG/DL (ref 8.6–10.2)
CHLORIDE BLD-SCNC: 94 MMOL/L (ref 98–107)
CHOLESTEROL, TOTAL: 135 MG/DL (ref 0–199)
CO2: 31 MMOL/L (ref 22–29)
CREAT SERPL-MCNC: 1.4 MG/DL (ref 0.7–1.2)
EOSINOPHILS ABSOLUTE: 0.13 E9/L (ref 0.05–0.5)
EOSINOPHILS RELATIVE PERCENT: 3.3 % (ref 0–6)
GFR AFRICAN AMERICAN: 57
GFR NON-AFRICAN AMERICAN: 48 ML/MIN/1.73
GLUCOSE BLD-MCNC: 127 MG/DL (ref 74–99)
HCT VFR BLD CALC: 28.2 % (ref 37–54)
HDLC SERPL-MCNC: 41 MG/DL
HEMOGLOBIN: 9.5 G/DL (ref 12.5–16.5)
IMMATURE GRANULOCYTES #: 0.02 E9/L
IMMATURE GRANULOCYTES %: 0.5 % (ref 0–5)
LDL CHOLESTEROL CALCULATED: 72 MG/DL (ref 0–99)
LYMPHOCYTES ABSOLUTE: 0.63 E9/L (ref 1.5–4)
LYMPHOCYTES RELATIVE PERCENT: 15.8 % (ref 20–42)
MCH RBC QN AUTO: 35.3 PG (ref 26–35)
MCHC RBC AUTO-ENTMCNC: 33.7 % (ref 32–34.5)
MCV RBC AUTO: 104.8 FL (ref 80–99.9)
MONOCYTES ABSOLUTE: 0.37 E9/L (ref 0.1–0.95)
MONOCYTES RELATIVE PERCENT: 9.3 % (ref 2–12)
NEUTROPHILS ABSOLUTE: 2.84 E9/L (ref 1.8–7.3)
NEUTROPHILS RELATIVE PERCENT: 70.8 % (ref 43–80)
PDW BLD-RTO: 13.2 FL (ref 11.5–15)
PLATELET # BLD: 139 E9/L (ref 130–450)
PMV BLD AUTO: 9.1 FL (ref 7–12)
POTASSIUM SERPL-SCNC: 4.3 MMOL/L (ref 3.5–5)
RBC # BLD: 2.69 E12/L (ref 3.8–5.8)
SODIUM BLD-SCNC: 134 MMOL/L (ref 132–146)
T4 TOTAL: 6.5 MCG/DL (ref 4.5–11.7)
TOTAL PROTEIN: 6.1 G/DL (ref 6.4–8.3)
TRIGL SERPL-MCNC: 109 MG/DL (ref 0–149)
VITAMIN B-12: 1993 PG/ML (ref 211–946)
VITAMIN D 25-HYDROXY: 68 NG/ML (ref 30–100)
VLDLC SERPL CALC-MCNC: 22 MG/DL
WBC # BLD: 4 E9/L (ref 4.5–11.5)

## 2021-04-29 ENCOUNTER — TELEPHONE (OUTPATIENT)
Dept: CARDIOLOGY CLINIC | Age: 86
End: 2021-04-29

## 2021-04-29 DIAGNOSIS — I50.43 CHF (CONGESTIVE HEART FAILURE), NYHA CLASS I, ACUTE ON CHRONIC, COMBINED (HCC): Primary | ICD-10-CM

## 2021-04-29 NOTE — TELEPHONE ENCOUNTER
Patient's wife called back and accepted appointment on 5/7/21. Her daughter will decide on CHF Clinic. Berenice at [facility] notified of appt date/time.

## 2021-04-29 NOTE — TELEPHONE ENCOUNTER
Good morning this is Maxwell. Just checking to see in you're able to schedule [facility] on Dr. Lam Husbands schedule? Marc Nicholas doesn't have any other open dates that I'm aware of.

## 2021-04-29 NOTE — TELEPHONE ENCOUNTER
F/U scheduled for 5/7/21 at 1:20 p.m. Referral placed to [facility]F Clinic. Left message for patient to call the office.

## 2021-04-30 ENCOUNTER — TELEPHONE (OUTPATIENT)
Dept: OTHER | Age: 86
End: 2021-04-30

## 2021-04-30 ENCOUNTER — HOSPITAL ENCOUNTER (OUTPATIENT)
Dept: OTHER | Age: 86
Setting detail: THERAPIES SERIES
Discharge: HOME | End: 2021-04-30
Payer: MEDICARE

## 2021-04-30 ASSESSMENT — EJECTION FRACTION: EF_SOURCE: 2D ECHO

## 2021-05-07 ENCOUNTER — OFFICE VISIT (OUTPATIENT)
Dept: CARDIOLOGY CLINIC | Age: 86
End: 2021-05-07
Payer: MEDICARE

## 2021-05-07 VITALS
WEIGHT: 174 LBS | HEIGHT: 63 IN | HEART RATE: 62 BPM | DIASTOLIC BLOOD PRESSURE: 56 MMHG | RESPIRATION RATE: 16 BRPM | BODY MASS INDEX: 30.83 KG/M2 | SYSTOLIC BLOOD PRESSURE: 120 MMHG

## 2021-05-07 DIAGNOSIS — I35.0 AORTIC VALVE STENOSIS, ETIOLOGY OF CARDIAC VALVE DISEASE UNSPECIFIED: Primary | ICD-10-CM

## 2021-05-07 PROCEDURE — 93000 ELECTROCARDIOGRAM COMPLETE: CPT | Performed by: INTERNAL MEDICINE

## 2021-05-07 PROCEDURE — 99214 OFFICE O/P EST MOD 30 MIN: CPT | Performed by: INTERNAL MEDICINE

## 2021-05-07 RX ORDER — HYDROXYZINE PAMOATE 25 MG/1
25 CAPSULE ORAL NIGHTLY
COMMUNITY

## 2021-05-07 RX ORDER — POLYETHYLENE GLYCOL 3350 17 G/17G
17 POWDER, FOR SOLUTION ORAL DAILY PRN
COMMUNITY

## 2021-05-07 NOTE — PATIENT INSTRUCTIONS
· We will continue current dose of Lasix 40 mg p.o. daily  · He was advised to check weight on a daily basis and call me if he gains more than 3 pounds in 1 day or 5 pounds in 1 week. · He was also recommended to take an extra dose of Lasix if he gains weight or notices increased swelling. · Continue current dose of Coreg 12.5 mg p.o. twice daily, amlodipine 10 mg p.o. daily for hypertension  · Continue rest of the current medications.   · Follow-up with me in 3 months

## 2021-05-07 NOTE — PROGRESS NOTES
OUTPATIENT CARDIOLOGY FOLLOW-UP    Name: Raquel Alfonso    Age: 80 y.o. Primary Care Physician: Jeremy Tracy MD    Date of Service: 5/7/2021    Chief Complaint:   Chief Complaint   Patient presents with    Follow-Up from Hospital       Interim History:   Mr. Gaurang Tena is a 60-year-old  male with history of CAD status post CABG underwent 2006 with a LIMA to LAD, SVG to D1, SVG to OM1, SVG to RCA and SVG to RPDA, mild obesity, hypertension, hyperlipidemia, type 2 diabetes diet controlled presented to the hospital with severe dizziness difficulty balancing and progressive increasing dyspnea, leg edema without any chest pain. Patient also had orthopnea and chronic PND and severe dizziness. He was seen as a consult on 4/24/2021 after he was admitted to the hospital on 4/24/2021 and was diagnosed with acute on chronic heart failure with preserved ejection fraction and chronic dizziness secondary to clonidine and metoprolol combination. He was initiated on IV diuretic therapy with the Lasix and clonidine was discontinued. He was initiated on Norvascfor hypertension. His dizziness significantly improved after stopping clonidine. Patient became euvolemic and was discharged home on 4/27/2021. Since he was discharged from the hospital, he has not had any ER visits or hospitalizations. He is compliant with medications, as well as salt and fluid intake. He does not take any over-the-counter arthritis medications. No new cardiac complaints since last cardiology evaluation. He has some edema in his legs without dyspnea. He denies recent chest pain, SOB, palpitations, lightheadedness, dizziness, syncope, PND, or orthopnea. SR on EKG.     Review of Systems:   Cardiac: As per HPI  General: No fever, chills  Pulmonary: As per HPI  HEENT: No visual disturbances, difficult swallowing  GI: No nausea, vomiting  Endocrine: No thyroid disease or DM  Musculoskeletal: ARCHER x 4, no focal motor deficits  Skin: Intact, no rashes  Neuro/Psych: No headache or seizures    Past Medical History:  Past Medical History:   Diagnosis Date    CAD (coronary artery disease)     Hyperlipidemia     Hypertension        Past Surgical History:  Past Surgical History:   Procedure Laterality Date    CORONARY ARTERY BYPASS GRAFT      HIP SURGERY      right       Family History:  History reviewed. No pertinent family history. Social History:  Social History     Socioeconomic History    Marital status:      Spouse name: Not on file    Number of children: Not on file    Years of education: Not on file    Highest education level: Not on file   Occupational History    Not on file   Social Needs    Financial resource strain: Not on file    Food insecurity     Worry: Not on file     Inability: Not on file    Transportation needs     Medical: Not on file     Non-medical: Not on file   Tobacco Use    Smoking status: Never Smoker    Smokeless tobacco: Never Used   Substance and Sexual Activity    Alcohol use: Yes     Comment: 1 beer a day     Drug use: Not on file    Sexual activity: Not on file   Lifestyle    Physical activity     Days per week: Not on file     Minutes per session: Not on file    Stress: Not on file   Relationships    Social connections     Talks on phone: Not on file     Gets together: Not on file     Attends Sikhism service: Not on file     Active member of club or organization: Not on file     Attends meetings of clubs or organizations: Not on file     Relationship status: Not on file    Intimate partner violence     Fear of current or ex partner: Not on file     Emotionally abused: Not on file     Physically abused: Not on file     Forced sexual activity: Not on file   Other Topics Concern    Not on file   Social History Narrative    Not on file       Allergies:   Allergies   Allergen Reactions    Zocor [Simvastatin] Rash     GENERIC STATIN - CAN TAKE BRAND       Current Medications:  Current Outpatient Medications   Medication Sig Dispense Refill    hydrOXYzine (VISTARIL) 25 MG capsule Take 25 mg by mouth 3 times daily as needed for Itching      polyethylene glycol (GLYCOLAX) 17 g packet Take 17 g by mouth daily as needed for Constipation      carvedilol (COREG) 12.5 MG tablet Take 1 tablet by mouth 2 times daily (with meals) 60 tablet 3    furosemide (LASIX) 40 MG tablet Take 1 tablet by mouth daily 60 tablet 0    amLODIPine (NORVASC) 10 MG tablet Take 10 mg by mouth nightly      tamsulosin (FLOMAX) 0.4 MG capsule Take 0.4 mg by mouth every evening      finasteride (PROSCAR) 5 MG tablet Take 5 mg by mouth every evening      melatonin 3 MG TABS tablet Take 10 mg by mouth nightly      acetaminophen (TYLENOL) 325 MG tablet Take 650 mg by mouth 2 times daily      levothyroxine (SYNTHROID) 50 MCG tablet Take 50 mcg by mouth Daily      escitalopram (LEXAPRO) 10 MG tablet Take 10 mg by mouth daily      B Complex Vitamins (B-COMPLEX/B-12 PO) Take 2,500 mg by mouth      Cholecalciferol (VITAMIN D3) 3000 UNITS TABS Take 5,000 Units by mouth      aspirin 81 MG chewable tablet Take 81 mg by mouth daily      folic acid (FOLVITE) 046 MCG tablet Take 800 mcg by mouth daily       No current facility-administered medications for this visit. Physical Exam:  BP (!) 120/56   Pulse 62   Resp 16   Ht 5' 3\" (1.6 m)   Wt 174 lb (78.9 kg)   BMI 30.82 kg/m²   Wt Readings from Last 3 Encounters:   05/07/21 174 lb (78.9 kg)   04/25/21 179 lb (81.2 kg)   11/04/19 204 lb 8 oz (92.8 kg)     Appearance: Awake, alert, no acute respiratory distress  Skin: Intact, no rash  Head: Normocephalic, atraumatic  ENMT: MMM, no rhinorrhea  Neck: Supple, no carotid bruits, JVD is elevated with a positive hepatojugular reflex  Lungs: Clear to auscultation bilaterally. No wheezes, rales, or rhonchi.   Cardiac: Regular rate and rhythm, +O5T2, ejection systolic murmur 3/6 heard over the right upper sternal border with radiation to neck. Abdomen: Soft, +bowel sounds, has miguel cath  Extremities: Moves all extremities x 4, 1+ lower extremity edema  Neurologic: No focal motor deficits apparent, normal mood and affect    Laboratory Tests:  Lab Results   Component Value Date    CREATININE 1.4 (H) 04/28/2021    BUN 40 (H) 04/28/2021     04/28/2021    K 4.3 04/28/2021    CL 94 (L) 04/28/2021    CO2 31 (H) 04/28/2021     Lab Results   Component Value Date    MG 1.9 04/27/2021     Lab Results   Component Value Date    WBC 4.0 (L) 04/28/2021    HGB 9.5 (L) 04/28/2021    HCT 28.2 (L) 04/28/2021    .8 (H) 04/28/2021     04/28/2021     Lab Results   Component Value Date    ALT 7 04/28/2021    AST 11 04/28/2021    ALKPHOS 40 04/28/2021    BILITOT 0.4 04/28/2021     Lab Results   Component Value Date    TROPONINI <0.01 04/24/2021    TROPONINI <0.01 04/24/2021    TROPONINI <0.01 04/24/2021     No results found for: INR, PROTIME  Lab Results   Component Value Date    TSH 2.610 04/24/2021     Lab Results   Component Value Date    LABA1C 5.9 (H) 04/26/2021     No results found for: EAG  Lab Results   Component Value Date    CHOL 135 04/28/2021    CHOL 150 04/25/2021    CHOL 159 01/29/2021     Lab Results   Component Value Date    TRIG 109 04/28/2021    TRIG 110 04/25/2021    TRIG 183 (H) 01/29/2021     Lab Results   Component Value Date    HDL 41 04/28/2021    HDL 48 04/25/2021    HDL 50 04/24/2021     Lab Results   Component Value Date    LDLCALC 72 04/28/2021    LDLCALC 80 04/25/2021    LDLCALC 80 04/24/2021     Lab Results   Component Value Date    LABVLDL 22 04/28/2021    LABVLDL 22 04/25/2021    LABVLDL 24 04/24/2021     No results found for: CHOLHDLRATIO    Cardiac Tests:  ECG: Normal sinus rhythm, incomplete right bundle branch block, abnormal EKG. 1. Echocardiogram 12/18/2017 (Dr. Shara Canseco): Ejection fraction is visually estimated at 55%. No regional wall motion abnormalities seen.  There is doppler evidence of stage I diastolic dysfunction. Normal right ventricle structure and function. Left atrial volume index of 34 ml per meters squared BSA. Mild aortic stenosis is present. The aortic valve area is 1.8 cm2 with a maximum gradient of 19 mmHg and a mean gradient of 11 mmHg. Mild aortic valve regurgitation. Mild mitral regurgitation is present. Moderate tricuspid regurgitation. Pulmonary hypertension is mild. RVSP is 46 mmHg.        TTE-4/24/2021   Normal left ventricle size and systolic function.   Ejection fraction is visually estimated at 60-65%.   No regional wall motion abnormalities seen.   Normal left ventricle wall thickness.   Stage II diastolic dysfunction.   The left atrium is severely dilated.   Patent foramen ovale with left-to-right shunt was visualized. No right to   left shunt on bubble study.   Normal right ventricular size and function. TAPSE 18 mm.   There is moderate aortic stenosis with valve area of 1.2 sq cm. Aortic   valve peak velocity 2.9 m/s, mean gradient 17 mmHg. DVI 0.34.   Mild tricuspid regurgitation.  RVSP is 72 mmHg. Pulmonary hypertension is severe .   No evidence for hemodynamically significant pericardial effusion. The ASCVD Risk score (Sarah Harmon., et al., 2013) failed to calculate for the following reasons: The 2013 ASCVD risk score is only valid for ages 36 to 78        ASSESSMENT:  · Chronic HFpEF, stable   · Chronic dizziness multifactorial including clonidine and metoprolol combination, improved after stopping clonidine. · Diastolic heart failure with stage II diastolic function. · VHD: Moderate aortic stenosis, mild TR.   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 daily  · He was advised to check weight on a daily basis and call me if he gains more than 3 pounds in 1 day or 5 pounds in 1 week. · He was also recommended to take an extra dose of Lasix if he gains weight or notices increased swelling. · Continue current dose of Coreg 12.5 mg p.o. twice daily, amlodipine 10 mg p.o. daily for hypertension  · Continue rest of the current medications. · Follow-up with me in 3 months    The patient's current medication list, allergies, problem list and results of all previously ordered testing were reviewed at today's visit.   Violeta Negrete MD  [facility]) Cardiology

## 2021-06-03 ENCOUNTER — TELEPHONE (OUTPATIENT)
Dept: CARDIOLOGY CLINIC | Age: 86
End: 2021-06-03

## 2021-06-03 NOTE — TELEPHONE ENCOUNTER
Patient's daughter called to inquire whether or not patient needs premed prior to image guided hip injection. Please advise.

## 2021-06-24 ENCOUNTER — HOSPITAL ENCOUNTER (OUTPATIENT)
Age: 86
Discharge: HOME | End: 2021-06-24
Payer: MEDICARE

## 2021-06-24 LAB
ANION GAP SERPL CALCULATED.3IONS-SCNC: 10 MMOL/L (ref 7–16)
BUN BLDV-MCNC: 43 MG/DL (ref 6–23)
CALCIUM SERPL-MCNC: 9.4 MG/DL (ref 8.6–10.2)
CHLORIDE BLD-SCNC: 103 MMOL/L (ref 98–107)
CO2: 29 MMOL/L (ref 22–29)
CREAT SERPL-MCNC: 1.5 MG/DL (ref 0.7–1.2)
GFR AFRICAN AMERICAN: 53
GFR NON-AFRICAN AMERICAN: 44 ML/MIN/1.73
GLUCOSE BLD-MCNC: 159 MG/DL (ref 74–99)
POTASSIUM SERPL-SCNC: 4.5 MMOL/L (ref 3.5–5)
SODIUM BLD-SCNC: 142 MMOL/L (ref 132–146)

## 2021-06-24 PROCEDURE — 36415 COLL VENOUS BLD VENIPUNCTURE: CPT

## 2021-06-24 PROCEDURE — 80048 BASIC METABOLIC PNL TOTAL CA: CPT

## 2021-07-09 LAB
ALBUMIN SERPL-MCNC: 4.1 G/DL (ref 3.5–5.2)
ALP BLD-CCNC: 50 U/L (ref 40–129)
ALT SERPL-CCNC: 7 U/L (ref 0–40)
ANION GAP SERPL CALCULATED.3IONS-SCNC: 14 MMOL/L (ref 7–16)
AST SERPL-CCNC: 13 U/L (ref 0–39)
BASOPHILS ABSOLUTE: 0.01 E9/L (ref 0–0.2)
BASOPHILS RELATIVE PERCENT: 0.2 % (ref 0–2)
BILIRUB SERPL-MCNC: 0.3 MG/DL (ref 0–1.2)
BUN BLDV-MCNC: 31 MG/DL (ref 6–23)
CALCIUM SERPL-MCNC: 9.4 MG/DL (ref 8.6–10.2)
CHLORIDE BLD-SCNC: 104 MMOL/L (ref 98–107)
CHOLESTEROL, TOTAL: 173 MG/DL (ref 0–199)
CO2: 24 MMOL/L (ref 22–29)
CREAT SERPL-MCNC: 1.3 MG/DL (ref 0.7–1.2)
EOSINOPHILS ABSOLUTE: 0.23 E9/L (ref 0.05–0.5)
EOSINOPHILS RELATIVE PERCENT: 5.3 % (ref 0–6)
GFR AFRICAN AMERICAN: >60
GFR NON-AFRICAN AMERICAN: 52 ML/MIN/1.73
GLUCOSE BLD-MCNC: 130 MG/DL (ref 74–99)
HCT VFR BLD CALC: 33.2 % (ref 37–54)
HDLC SERPL-MCNC: 38 MG/DL
HEMOGLOBIN: 10.9 G/DL (ref 12.5–16.5)
IMMATURE GRANULOCYTES #: 0.01 E9/L
IMMATURE GRANULOCYTES %: 0.2 % (ref 0–5)
LDL CHOLESTEROL CALCULATED: 98 MG/DL (ref 0–99)
LYMPHOCYTES ABSOLUTE: 0.83 E9/L (ref 1.5–4)
LYMPHOCYTES RELATIVE PERCENT: 19 % (ref 20–42)
MCH RBC QN AUTO: 34.2 PG (ref 26–35)
MCHC RBC AUTO-ENTMCNC: 32.8 % (ref 32–34.5)
MCV RBC AUTO: 104.1 FL (ref 80–99.9)
MONOCYTES ABSOLUTE: 0.31 E9/L (ref 0.1–0.95)
MONOCYTES RELATIVE PERCENT: 7.1 % (ref 2–12)
NEUTROPHILS ABSOLUTE: 2.98 E9/L (ref 1.8–7.3)
NEUTROPHILS RELATIVE PERCENT: 68.2 % (ref 43–80)
PDW BLD-RTO: 13.1 FL (ref 11.5–15)
PLATELET # BLD: 160 E9/L (ref 130–450)
PMV BLD AUTO: 9.9 FL (ref 7–12)
POTASSIUM SERPL-SCNC: 4.9 MMOL/L (ref 3.5–5)
RBC # BLD: 3.19 E12/L (ref 3.8–5.8)
SODIUM BLD-SCNC: 142 MMOL/L (ref 132–146)
TOTAL PROTEIN: 6.9 G/DL (ref 6.4–8.3)
TRIGL SERPL-MCNC: 186 MG/DL (ref 0–149)
TSH SERPL DL<=0.05 MIU/L-ACNC: 2.33 UIU/ML (ref 0.27–4.2)
VLDLC SERPL CALC-MCNC: 37 MG/DL
WBC # BLD: 4.4 E9/L (ref 4.5–11.5)

## 2021-07-10 LAB — VITAMIN D 25-HYDROXY: 69 NG/ML (ref 30–100)

## 2021-07-28 ENCOUNTER — TELEPHONE (OUTPATIENT)
Dept: CARDIOLOGY CLINIC | Age: 86
End: 2021-07-28

## 2021-07-28 ENCOUNTER — TELEPHONE (OUTPATIENT)
Dept: ADMINISTRATIVE | Age: 86
End: 2021-07-28

## 2021-07-28 NOTE — TELEPHONE ENCOUNTER
Patient's wife states that patient was seen at pain management clinic and Lyrica was suggested for his Hip pain. Patient's wife is asking if Lyrica is safe for him to take with his other meds.  Patient did not start Lyrica yet, please advise

## 2021-08-23 ENCOUNTER — OFFICE VISIT (OUTPATIENT)
Dept: CARDIOLOGY CLINIC | Age: 86
End: 2021-08-23
Payer: MEDICARE

## 2021-08-23 VITALS
RESPIRATION RATE: 18 BRPM | DIASTOLIC BLOOD PRESSURE: 62 MMHG | BODY MASS INDEX: 30.12 KG/M2 | WEIGHT: 170 LBS | SYSTOLIC BLOOD PRESSURE: 128 MMHG | HEART RATE: 64 BPM | HEIGHT: 63 IN

## 2021-08-23 DIAGNOSIS — I50.43 CHF (CONGESTIVE HEART FAILURE), NYHA CLASS I, ACUTE ON CHRONIC, COMBINED (HCC): Primary | ICD-10-CM

## 2021-08-23 DIAGNOSIS — I35.0 AORTIC VALVE STENOSIS, ETIOLOGY OF CARDIAC VALVE DISEASE UNSPECIFIED: ICD-10-CM

## 2021-08-23 PROCEDURE — 93000 ELECTROCARDIOGRAM COMPLETE: CPT | Performed by: INTERNAL MEDICINE

## 2021-08-23 PROCEDURE — 99214 OFFICE O/P EST MOD 30 MIN: CPT | Performed by: INTERNAL MEDICINE

## 2021-08-23 NOTE — PATIENT INSTRUCTIONS
· We will continue current dose of Lasix 40 mg and 20 mg po on alternate days. · He was advised to check weight on a daily basis and call me if he gains more than 3 pounds in 1 day or 5 pounds in 1 week. · He was advised to follow up with ortho service for left hip pain. · He was also recommended to take an extra dose of Lasix if he gains weight or notices increased swelling. · Continue current dose of Coreg 12.5 mg p.o. twice daily, amlodipine 10 mg p.o. daily for hypertension  · Continue rest of the current medications. · Follow-up with me in 6 months.

## 2021-08-23 NOTE — PROGRESS NOTES
OUTPATIENT CARDIOLOGY FOLLOW-UP    Name: Shira Carter    Age: 80 y.o. Primary Care Physician: Rosalino Pennington, APRN - CNP    Date of Service: 8/23/2021    Chief Complaint:   Chief Complaint   Patient presents with    Congestive Heart Failure     3 month ov- pt has complaints of dizziness       Interim History:   Mr. Lucho Walker is a 80-year-old  male with history of CAD status post CABG underwent 2006 with a LIMA to LAD, SVG to D1, SVG to OM1, SVG to RCA and SVG to RPDA, mild obesity, hypertension, hyperlipidemia, type 2 diabetes diet controlled presented to the hospital with severe dizziness difficulty balancing and progressive increasing dyspnea, leg edema without any chest pain. Patient also had orthopnea and chronic PND and severe dizziness. He was seen as a consult on 4/24/2021 after he was admitted to the hospital on 4/24/2021 and was diagnosed with acute on chronic heart failure with preserved ejection fraction and chronic dizziness secondary to clonidine and metoprolol combination. He was initiated on IV diuretic therapy with the Lasix and clonidine was discontinued. He was initiated on Norvascfor hypertension. His dizziness significantly improved after stopping clonidine. Patient became euvolemic and was discharged home on 4/27/2021. He was seen in office on 5/7/2021 since he was last seen in the office he has not had any ER visits or hospitalizations. He is compliant with medications, as well as salt and fluid intake. He does not take any over-the-counter arthritis medications. Now, dizziness improved after stopping the clonidine. He is complaining of lot of left hip pain and has difficulty ambulating and uses walker for walking. He received multiple steroid injections to his left hip and also lower back without any significant improvement. No new cardiac complaints since last cardiology evaluation. He has some edema in his legs without dyspnea.   He denies recent chest pain, Friends and Family:     Attends Catholic Services:     Active Member of Clubs or Organizations:     Attends Club or Organization Meetings:     Marital Status:    Intimate Partner Violence:     Fear of Current or Ex-Partner:     Emotionally Abused:     Physically Abused:     Sexually Abused: Allergies: Allergies   Allergen Reactions    Zocor [Simvastatin] Rash     GENERIC STATIN - CAN TAKE BRAND       Current Medications:  Current Outpatient Medications   Medication Sig Dispense Refill    hydrOXYzine (VISTARIL) 25 MG capsule Take 25 mg by mouth nightly       polyethylene glycol (GLYCOLAX) 17 g packet Take 17 g by mouth daily as needed for Constipation      carvedilol (COREG) 12.5 MG tablet Take 1 tablet by mouth 2 times daily (with meals) 60 tablet 3    furosemide (LASIX) 40 MG tablet Take 1 tablet by mouth daily (Patient taking differently: Take 40 mg by mouth Alternating 20mg and 40mg) 60 tablet 0    amLODIPine (NORVASC) 10 MG tablet Take 10 mg by mouth nightly      tamsulosin (FLOMAX) 0.4 MG capsule Take 0.4 mg by mouth every evening      finasteride (PROSCAR) 5 MG tablet Take 5 mg by mouth every evening      melatonin 3 MG TABS tablet Take 10 mg by mouth nightly      acetaminophen (TYLENOL) 325 MG tablet Take 650 mg by mouth 2 times daily      levothyroxine (SYNTHROID) 50 MCG tablet Take 50 mcg by mouth Daily      escitalopram (LEXAPRO) 10 MG tablet Take 10 mg by mouth daily      B Complex Vitamins (B-COMPLEX/B-12 PO) Take 2,500 mg by mouth      Cholecalciferol (VITAMIN D3) 3000 UNITS TABS Take 5,000 Units by mouth      aspirin 81 MG chewable tablet Take 81 mg by mouth daily      folic acid (FOLVITE) 670 MCG tablet Take 800 mcg by mouth daily       No current facility-administered medications for this visit.        Physical Exam:  Resp 18   Ht 5' 3\" (1.6 m)   Wt 170 lb (77.1 kg)   BMI 30.11 kg/m²   Wt Readings from Last 3 Encounters:   08/23/21 170 lb (77.1 kg)   05/07/21 174 lb (78.9 kg)   04/25/21 179 lb (81.2 kg)     Appearance: Awake, alert, no acute respiratory distress  Skin: Intact, no rash  Head: Normocephalic, atraumatic  ENMT: MMM, no rhinorrhea  Neck: Supple, no carotid bruits, JVD is elevated with a positive hepatojugular reflex  Lungs: Clear to auscultation bilaterally. No wheezes, rales, or rhonchi. Cardiac: Regular rate and rhythm, +D5W0, ejection systolic murmur 3/6 heard over the right upper sternal border with radiation to neck.   Abdomen: Soft, +bowel sounds, has miguel cath  Extremities: Moves all extremities x 4, 1+ lower extremity edema  Neurologic: No focal motor deficits apparent, normal mood and affect    Laboratory Tests:  Lab Results   Component Value Date    CREATININE 1.3 (H) 07/09/2021    BUN 31 (H) 07/09/2021     07/09/2021    K 4.9 07/09/2021     07/09/2021    CO2 24 07/09/2021     Lab Results   Component Value Date    MG 1.9 04/27/2021     Lab Results   Component Value Date    WBC 4.4 (L) 07/09/2021    HGB 10.9 (L) 07/09/2021    HCT 33.2 (L) 07/09/2021    .1 (H) 07/09/2021     07/09/2021     Lab Results   Component Value Date    ALT 7 07/09/2021    AST 13 07/09/2021    ALKPHOS 50 07/09/2021    BILITOT 0.3 07/09/2021     Lab Results   Component Value Date    TROPONINI <0.01 04/24/2021    TROPONINI <0.01 04/24/2021    TROPONINI <0.01 04/24/2021     No results found for: INR, PROTIME  Lab Results   Component Value Date    TSH 2.330 07/09/2021     Lab Results   Component Value Date    LABA1C 5.9 (H) 04/26/2021     No results found for: EAG  Lab Results   Component Value Date    CHOL 173 07/09/2021    CHOL 135 04/28/2021    CHOL 150 04/25/2021     Lab Results   Component Value Date    TRIG 186 (H) 07/09/2021    TRIG 109 04/28/2021    TRIG 110 04/25/2021     Lab Results   Component Value Date    HDL 38 07/09/2021    HDL 41 04/28/2021    HDL 48 04/25/2021     Lab Results   Component Value Date    LDLCALC 98 07/09/2021    LDLCALC 72 04/28/2021    [address] 04/25/2021     Lab Results   Component Value Date    LABVLDL 37 07/09/2021    LABVLDL 22 04/28/2021    LABVLDL 22 04/25/2021     No results found for: CHOLHDLRATIO    Cardiac Tests:  ECG: Normal sinus rhythm, incomplete right bundle branch block, abnormal EKG. 1. Echocardiogram 12/18/2017 (Dr. Alysha Canseco): Ejection fraction is visually estimated at 55%. No regional wall motion abnormalities seen. There is doppler evidence of stage I diastolic dysfunction. Normal right ventricle structure and function. Left atrial volume index of 34 ml per meters squared BSA. Mild aortic stenosis is present. The aortic valve area is 1.8 cm2 with a maximum gradient of 19 mmHg and a mean gradient of 11 mmHg. Mild aortic valve regurgitation. Mild mitral regurgitation is present. Moderate tricuspid regurgitation. Pulmonary hypertension is mild. RVSP is 46 mmHg.        TTE-4/24/2021   Normal left ventricle size and systolic function.   Ejection fraction is visually estimated at 60-65%.   No regional wall motion abnormalities seen.   Normal left ventricle wall thickness.   Stage II diastolic dysfunction.   The left atrium is severely dilated.   Patent foramen ovale with left-to-right shunt was visualized. No right to   left shunt on bubble study.   Normal right ventricular size and function. TAPSE 18 mm.   There is moderate aortic stenosis with valve area of 1.2 sq cm. Aortic   valve peak velocity 2.9 m/s, mean gradient 17 mmHg. DVI 0.34.   Mild tricuspid regurgitation.  RVSP is 72 mmHg. Pulmonary hypertension is severe .   No evidence for hemodynamically significant pericardial effusion. The ASCVD Risk score (Stan Arriaga., et al., 2013) failed to calculate for the following reasons:     The 2013 ASCVD risk score is only valid for ages 36 to 78        ASSESSMENT:  · Chronic HFpEF, stable and euvolemic  · Chronic dizziness multifactorial including clonidine and metoprolol combination, improved after

## 2021-10-06 ENCOUNTER — TELEPHONE (OUTPATIENT)
Dept: CARDIOLOGY CLINIC | Age: 86
End: 2021-10-06

## 2021-10-06 NOTE — TELEPHONE ENCOUNTER
Patient needs cardiac clearance of total left hip arthoplasty by Dr. Melani Horton under spinal/general at either [address] or [address] [facility] (not yet scheduled). Left message for patient to call the office to assess for any cardiac symptoms since last visit in August 2021.      Fax: [phone]

## 2021-10-06 NOTE — TELEPHONE ENCOUNTER
Spoke with patient's wife. Per wife, patient is not going to proceed with surgery at this time. Patrice Argueta in Dr. Urban Ahumada office notified (via voicemail).

## 2021-10-11 LAB
ALBUMIN SERPL-MCNC: 4.1 G/DL (ref 3.5–5.2)
ALP BLD-CCNC: 52 U/L (ref 40–129)
ALT SERPL-CCNC: 5 U/L (ref 0–40)
ANION GAP SERPL CALCULATED.3IONS-SCNC: 15 MMOL/L (ref 7–16)
AST SERPL-CCNC: 12 U/L (ref 0–39)
BASOPHILS ABSOLUTE: 0.02 E9/L (ref 0–0.2)
BASOPHILS RELATIVE PERCENT: 0.5 % (ref 0–2)
BILIRUB SERPL-MCNC: 0.4 MG/DL (ref 0–1.2)
BUN BLDV-MCNC: 32 MG/DL (ref 6–23)
CALCIUM SERPL-MCNC: 9.7 MG/DL (ref 8.6–10.2)
CHLORIDE BLD-SCNC: 102 MMOL/L (ref 98–107)
CHOLESTEROL, TOTAL: 195 MG/DL (ref 0–199)
CO2: 25 MMOL/L (ref 22–29)
CREAT SERPL-MCNC: 1.3 MG/DL (ref 0.7–1.2)
EOSINOPHILS ABSOLUTE: 0.15 E9/L (ref 0.05–0.5)
EOSINOPHILS RELATIVE PERCENT: 3.5 % (ref 0–6)
GFR AFRICAN AMERICAN: >60
GFR NON-AFRICAN AMERICAN: 52 ML/MIN/1.73
GLUCOSE BLD-MCNC: 135 MG/DL (ref 74–99)
HCT VFR BLD CALC: 35 % (ref 37–54)
HDLC SERPL-MCNC: 38 MG/DL
HEMOGLOBIN: 11.7 G/DL (ref 12.5–16.5)
IMMATURE GRANULOCYTES #: 0.01 E9/L
IMMATURE GRANULOCYTES %: 0.2 % (ref 0–5)
LDL CHOLESTEROL CALCULATED: 117 MG/DL (ref 0–99)
LYMPHOCYTES ABSOLUTE: 0.94 E9/L (ref 1.5–4)
LYMPHOCYTES RELATIVE PERCENT: 21.7 % (ref 20–42)
MCH RBC QN AUTO: 34.3 PG (ref 26–35)
MCHC RBC AUTO-ENTMCNC: 33.4 % (ref 32–34.5)
MCV RBC AUTO: 102.6 FL (ref 80–99.9)
MONOCYTES ABSOLUTE: 0.28 E9/L (ref 0.1–0.95)
MONOCYTES RELATIVE PERCENT: 6.5 % (ref 2–12)
NEUTROPHILS ABSOLUTE: 2.94 E9/L (ref 1.8–7.3)
NEUTROPHILS RELATIVE PERCENT: 67.6 % (ref 43–80)
PDW BLD-RTO: 13.2 FL (ref 11.5–15)
PLATELET # BLD: 154 E9/L (ref 130–450)
PMV BLD AUTO: 9.7 FL (ref 7–12)
POTASSIUM SERPL-SCNC: 4.7 MMOL/L (ref 3.5–5)
RBC # BLD: 3.41 E12/L (ref 3.8–5.8)
SODIUM BLD-SCNC: 142 MMOL/L (ref 132–146)
TOTAL PROTEIN: 6.8 G/DL (ref 6.4–8.3)
TRIGL SERPL-MCNC: 200 MG/DL (ref 0–149)
TSH SERPL DL<=0.05 MIU/L-ACNC: 1.9 UIU/ML (ref 0.27–4.2)
VITAMIN D 25-HYDROXY: 67 NG/ML (ref 30–100)
VLDLC SERPL CALC-MCNC: 40 MG/DL
WBC # BLD: 4.3 E9/L (ref 4.5–11.5)

## 2022-02-22 ENCOUNTER — OFFICE VISIT (OUTPATIENT)
Dept: CARDIOLOGY CLINIC | Age: 87
End: 2022-02-22
Payer: MEDICARE

## 2022-02-22 VITALS
SYSTOLIC BLOOD PRESSURE: 122 MMHG | WEIGHT: 175 LBS | DIASTOLIC BLOOD PRESSURE: 68 MMHG | RESPIRATION RATE: 16 BRPM | HEART RATE: 65 BPM | HEIGHT: 63 IN | BODY MASS INDEX: 31.01 KG/M2

## 2022-02-22 DIAGNOSIS — I35.0 AORTIC VALVE STENOSIS, ETIOLOGY OF CARDIAC VALVE DISEASE UNSPECIFIED: Primary | ICD-10-CM

## 2022-02-22 DIAGNOSIS — I50.43 CHF (CONGESTIVE HEART FAILURE), NYHA CLASS I, ACUTE ON CHRONIC, COMBINED (HCC): ICD-10-CM

## 2022-02-22 PROCEDURE — 99214 OFFICE O/P EST MOD 30 MIN: CPT | Performed by: INTERNAL MEDICINE

## 2022-02-22 PROCEDURE — 93000 ELECTROCARDIOGRAM COMPLETE: CPT | Performed by: INTERNAL MEDICINE

## 2022-02-22 NOTE — PROGRESS NOTES
OUTPATIENT CARDIOLOGY FOLLOW-UP    Name: Adria Davis    Age: 80 y.o. Primary Care Physician: Gregoria Johnson, APRN - CNP    Date of Service: 2/22/2022    Chief Complaint:   Chief Complaint   Patient presents with    6 Month Follow-Up       Interim History:   Mr. [facility] is a 72-year-old  male with history of CAD status post CABG underwent 2006 with a LIMA to LAD, SVG to D1, SVG to OM1, SVG to RCA and SVG to RPDA, mild obesity, hypertension, hyperlipidemia, type 2 diabetes diet controlled presented to the hospital with severe dizziness difficulty balancing and progressive increasing dyspnea, leg edema without any chest pain. Patient also had orthopnea and chronic PND and severe dizziness. He was seen as a consult on 4/24/2021 after he was admitted to the hospital on 4/24/2021 and was diagnosed with acute on chronic heart failure with preserved ejection fraction and chronic dizziness secondary to clonidine and metoprolol combination. He was initiated on IV diuretic therapy with the Lasix and clonidine was discontinued. He was initiated on Norvascfor hypertension. His dizziness significantly improved after stopping clonidine. Patient became euvolemic and was discharged home on 4/27/2021. He was seen in office on 8/23/2021 since he was last seen in the office, he has not had any ER visits or hospitalizations. He is compliant with medications, as well as salt and fluid intake. He does not take any over-the-counter arthritis medications. He is complaining of lot of left hip pain and has difficulty ambulating and uses walker for walking. He received multiple steroid injections to his left hip and also lower back without any significant improvement. Now, he is hesitant to go for surgery. No new cardiac complaints since last cardiology evaluation. He has some edema in his legs without dyspnea.   He denies recent chest pain, SOB, palpitations, lightheadedness, dizziness, syncope, PND, or orthopnea. SR on EKG. Review of Systems:   Cardiac: As per HPI  General: No fever, chills  Pulmonary: As per HPI  HEENT: No visual disturbances, difficult swallowing  GI: No nausea, vomiting  Endocrine: No thyroid disease or DM  Musculoskeletal: ARCHER x 4, no focal motor deficits  Skin: Intact, no rashes  Neuro/Psych: No headache or seizures    Past Medical History:  Past Medical History:   Diagnosis Date    CAD (coronary artery disease)     Hyperlipidemia     Hypertension        Past Surgical History:  Past Surgical History:   Procedure Laterality Date    CORONARY ARTERY BYPASS GRAFT      HIP SURGERY      right       Family History:  History reviewed. No pertinent family history. Social History:  Social History     Socioeconomic History    Marital status:      Spouse name: Not on file    Number of children: Not on file    Years of education: Not on file    Highest education level: Not on file   Occupational History    Not on file   Tobacco Use    Smoking status: Never Smoker    Smokeless tobacco: Never Used   Vaping Use    Vaping Use: Never used   Substance and Sexual Activity    Alcohol use: Yes     Comment: 1 beer a day     Drug use: Never    Sexual activity: Not on file   Other Topics Concern    Not on file   Social History Narrative    Not on file     Social Determinants of Health     Financial Resource Strain:     Difficulty of Paying Living Expenses: Not on file   Food Insecurity:     Worried About Running Out of Food in the Last Year: Not on file    Rocky of Food in the Last Year: Not on file   Transportation Needs:     Lack of Transportation (Medical): Not on file    Lack of Transportation (Non-Medical):  Not on file   Physical Activity:     Days of Exercise per Week: Not on file    Minutes of Exercise per Session: Not on file   Stress:     Feeling of Stress : Not on file   Social Connections:     Frequency of Communication with Friends and Family: Not on file    Frequency of Social Gatherings with Friends and Family: Not on file    Attends Lutheran Services: Not on file    Active Member of Clubs or Organizations: Not on file    Attends Club or Organization Meetings: Not on file    Marital Status: Not on file   Intimate Partner Violence:     Fear of Current or Ex-Partner: Not on file    Emotionally Abused: Not on file    Physically Abused: Not on file    Sexually Abused: Not on file   Housing Stability:     Unable to Pay for Housing in the Last Year: Not on file    Number of [place] in the Last Year: Not on file    Unstable Housing in the Last Year: Not on file       Allergies:   Allergies   Allergen Reactions    Zocor [Simvastatin] Rash     GENERIC STATIN - CAN TAKE BRAND       Current Medications:  Current Outpatient Medications   Medication Sig Dispense Refill    hydrOXYzine (VISTARIL) 25 MG capsule Take 25 mg by mouth nightly       polyethylene glycol (GLYCOLAX) 17 g packet Take 17 g by mouth daily as needed for Constipation      carvedilol (COREG) 12.5 MG tablet Take 1 tablet by mouth 2 times daily (with meals) 60 tablet 3    furosemide (LASIX) 40 MG tablet Take 1 tablet by mouth daily (Patient taking differently: Take 40 mg by mouth every other day Alternating 20mg and 40mg) 60 tablet 0    amLODIPine (NORVASC) 10 MG tablet Take 10 mg by mouth nightly      tamsulosin (FLOMAX) 0.4 MG capsule Take 0.4 mg by mouth every evening      finasteride (PROSCAR) 5 MG tablet Take 5 mg by mouth every evening      Melatonin 10 MG TABS Take 10 mg by mouth nightly       acetaminophen (TYLENOL) 325 MG tablet Take 650 mg by mouth 2 times daily      levothyroxine (SYNTHROID) 50 MCG tablet Take 50 mcg by mouth Daily      escitalopram (LEXAPRO) 10 MG tablet Take 10 mg by mouth daily      B Complex Vitamins (B-COMPLEX/B-12 PO) Take 2,500 mg by mouth      Cholecalciferol (VITAMIN D3) 3000 UNITS TABS Take 5,000 Units by mouth      aspirin 81 MG chewable tablet Take 81 mg by mouth daily      folic acid (FOLVITE) 565 MCG tablet Take 800 mcg by mouth daily        No current facility-administered medications for this visit. Physical Exam:  /68   Pulse 65   Resp 16   Ht 5' 3\" (1.6 m)   Wt 175 lb (79.4 kg)   BMI 31.00 kg/m²   Wt Readings from Last 3 Encounters:   02/22/22 175 lb (79.4 kg)   08/23/21 170 lb (77.1 kg)   05/07/21 174 lb (78.9 kg)     Appearance: Awake, alert, no acute respiratory distress  Skin: Intact, no rash  Head: Normocephalic, atraumatic  ENMT: MMM, no rhinorrhea  Neck: Supple, no carotid bruits, JVD is elevated with a positive hepatojugular reflex  Lungs: Clear to auscultation bilaterally. No wheezes, rales, or rhonchi. Cardiac: Regular rate and rhythm, +B2F4, ejection systolic murmur 3/6 heard over the right upper sternal border with radiation to neck.   Abdomen: Soft, +bowel sounds, has miguel cath  Extremities: Moves all extremities x 4, 1+ lower extremity edema  Neurologic: No focal motor deficits apparent, normal mood and affect    Laboratory Tests:  Lab Results   Component Value Date    CREATININE 1.3 (H) 10/11/2021    BUN 32 (H) 10/11/2021     10/11/2021    K 4.7 10/11/2021     10/11/2021    CO2 25 10/11/2021     Lab Results   Component Value Date    MG 1.9 04/27/2021     Lab Results   Component Value Date    WBC 4.3 (L) 10/11/2021    HGB 11.7 (L) 10/11/2021    HCT 35.0 (L) 10/11/2021    .6 (H) 10/11/2021     10/11/2021     Lab Results   Component Value Date    ALT 5 10/11/2021    AST 12 10/11/2021    ALKPHOS 52 10/11/2021    BILITOT 0.4 10/11/2021     Lab Results   Component Value Date    TROPONINI <0.01 04/24/2021    TROPONINI <0.01 04/24/2021    TROPONINI <0.01 04/24/2021     No results found for: INR, PROTIME  Lab Results   Component Value Date    TSH 1.900 10/11/2021     Lab Results   Component Value Date    LABA1C 5.9 (H) 04/26/2021     No results found for: EAG  Lab Results   Component Value Date CHOL 195 10/11/2021    CHOL 173 07/09/2021    CHOL 135 04/28/2021     Lab Results   Component Value Date    TRIG 200 (H) 10/11/2021    TRIG 186 (H) 07/09/2021    TRIG 109 04/28/2021     Lab Results   Component Value Date    HDL 38 10/11/2021    HDL 38 07/09/2021    HDL 41 04/28/2021     Lab Results   Component Value Date    LDLCALC 117 (H) 10/11/2021    LDLCALC 98 07/09/2021    LDLCALC 72 04/28/2021     Lab Results   Component Value Date    LABVLDL 40 10/11/2021    LABVLDL 37 07/09/2021    LABVLDL 22 04/28/2021     No results found for: CHOLHDLRATIO    Cardiac Tests:  ECG: Normal sinus rhythm, incomplete right bundle branch block, Low voltage QRS, NSST, abnormal EKG. 1. Echocardiogram 12/18/2017 (Dr. Harriet Canseco): Ejection fraction is visually estimated at 55%. No regional wall motion abnormalities seen. There is doppler evidence of stage I diastolic dysfunction. Normal right ventricle structure and function. Left atrial volume index of 34 ml per meters squared BSA. Mild aortic stenosis is present. The aortic valve area is 1.8 cm2 with a maximum gradient of 19 mmHg and a mean gradient of 11 mmHg. Mild aortic valve regurgitation. Mild mitral regurgitation is present. Moderate tricuspid regurgitation. Pulmonary hypertension is mild. RVSP is 46 mmHg.        TTE-4/24/2021   Normal left ventricle size and systolic function.   Ejection fraction is visually estimated at 60-65%.   No regional wall motion abnormalities seen.   Normal left ventricle wall thickness.   Stage II diastolic dysfunction.   The left atrium is severely dilated.   Patent foramen ovale with left-to-right shunt was visualized. No right to   left shunt on bubble study.   Normal right ventricular size and function. TAPSE 18 mm.   There is moderate aortic stenosis with valve area of 1.2 sq cm. Aortic   valve peak velocity 2.9 m/s, mean gradient 17 mmHg. DVI 0.34.   Mild tricuspid regurgitation.  RVSP is 72 mmHg.  Pulmonary hypertension is severe Elena Yo evidence for hemodynamically significant pericardial effusion. The ASCVD Risk score (Jojo Tyler, et al., 2013) failed to calculate for the following reasons: The 2013 ASCVD risk score is only valid for ages 36 to 78        ASSESSMENT:  · Chronic HFpEF, stable and euvolemic  · Chronic dizziness multifactorial including clonidine and metoprolol combination, improved after stopping clonidine. No further episodes of recurrence. · Diastolic heart failure with stage II diastolic function. · VHD: Moderate aortic stenosis, mild TR. 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 · He was advised to check weight on a daily basis and call me if he gains more than 3 pounds in 1 day or 5 pounds in 1 week. · He was advised to follow up with ortho service for left hip pain. · He was also recommended to take an extra dose of Lasix if he gains weight or notices increased swelling. · Continue current dose of Coreg 12.5 mg p.o. twice daily, amlodipine 10 mg p.o. daily for hypertension  · Continue rest of the current medications. · Follow-up with me in 12 months    The patient's current medication list, allergies, problem list and results of all previously ordered testing were reviewed at today's visit.   Arie Farias MD  [facility]) Cardiology

## 2022-02-22 NOTE — PATIENT INSTRUCTIONS
· We will continue current dose of Lasix 40 mg and 20 mg po on alternate days. · He was advised to check weight on a daily basis and call me if he gains more than 3 pounds in 1 day or 5 pounds in 1 week. · He was advised to follow up with ortho service for left hip pain. · He was also recommended to take an extra dose of Lasix if he gains weight or notices increased swelling. · Continue current dose of Coreg 12.5 mg p.o. twice daily, amlodipine 10 mg p.o. daily for hypertension  · Continue rest of the current medications.   · Follow-up with me in 12 months

## 2022-05-01 NOTE — CONSULTS
Daily Progress Note - [address] 76 y o  female MRN: [id number]  Unit/Bed#: MICU 02 Encounter: [id number]        ----------------------------------------------------------------------------------------  HPI/24hr events: 67yo F with metastatic colon cancer, now s/p open pelvic exenteration including DILMA-BSO, lysis of adhesion, omentectomy, splenectomy, diaphragm repair, and HIPEC with left chest tube placement on 04/29  Patient remains stable yesterday on nasal cannula  She maintain adequate urine output throughout the day, and her pain was well controlled with her oral PCA pump     ---------------------------------------------------------------------------------------  SUBJECTIVE  No complaints overnight  Patient reports incisional site pain, but remains well controlled PCA pump  Has been out of bed to chair  Denies any nausea, or bowel function  Review of Systems  Review of systems was reviewed and negative unless stated above in HPI/24-hour events   ---------------------------------------------------------------------------------------  Assessment and Plan:    Neuro:    Diagnosis:  Postoperative pain  o Plan:  Dilaudid PCA place  o APS deferred epidural placement due to adequate pain control with PCA   Diagnosis:  Delirium precautions  o Plan:  CAM ICU, regular sleep-wake cycles      CV:    Diagnosis:  Postoperative hypertension improved  o Plan:  P r n  Hydralazine labetalol  o Goal systolic < 225    Pulm:   Diagnosis:  Diaphragm repair  o Plan:  Currently on nasal cannula at 4 L satting above 94%  o Left chest tube to water seal   320 cc over last 24 hours  No air leak  o Continue to monitor chest tube output    Management per thoracic surgery  o Incentive spirometry  o airway precautions  o Pulmonary toilet      GI:    Diagnosis:  Metastatic colon cancer s/p DILMA-BSO, splenectomy, omentectomy  o Plan:  NPO  o NGT in place with 50 cc gastric output over 24 hours  o D/c Nutrition Education    Type and Reason for Visit:  Diet Instruction     Nutrition Assessment:   Pt consult for heart failure nutrition education. Chart reviewed. Discussed education w/ pt & wife. · Verbally reviewed information with Patient & Wife  · Educated on Heart Failure MNT   · Written educational materials provided. · Contact name and number provided.     Electronically signed by Shiloh Dhillon RD, LD on 4/26/21 at 1:28 PM EDT    Contact: [phone] NGT  o Will start clears following NGT removal      :    Diagnosis:  No acute issues  o Plan: Mg in place with 1 9 L urine output over 24 hours, 0 9 cc/kg/hour  o Creatinine 0 9  o Strict in's and out's  o Mg to be discontinued today      F/E/N:    Plan:  Peripheral IVs in place   Replete electrolytes as needed   Currently Plasmalyte at 125cc      Heme/Onc:    Diagnosis:  Postoperative acute blood loss anemia  o Plan:  Hemoglobin 6 9  o Transfuse 1 unit PRBCs with post transfusion hemoglobin  o SubQ heparin DVT prophylaxis    Endo:    Diagnosis:  No acute issues  o Plan:  Maintain blood glucose 140-180      ID:    Diagnosis:  Postoperative leukocytosis  Patient remains afebrile and hemodynamically stable  o Plan:  WBC 17 from 13  o Continue to monitor white blood cell count off antibiotics for now  o Will obtain cultures if WBC does not begin to decline, or is febrile within the next 24 hours    MSK/Skin:    Diagnosis:  Postoperative fatigue  o Plan:  Out of bed to chair  o PT/OT  o Ambulate    Patient appropriate for transfer out of the ICU today?: No  Disposition: Continue Critical Care   Code Status: Level 1 - Full Code  ---------------------------------------------------------------------------------------  ICU CORE MEASURES    Prophylaxis   VTE Pharmacologic Prophylaxis: Heparin  VTE Mechanical Prophylaxis: sequential compression device  Stress Ulcer Prophylaxis: Prophylaxis Not Indicated     ABCDE Protocol (if indicated)  Plan to perform spontaneous awakening trial today? Not applicable  Plan to perform spontaneous breathing trial today? Not applicable  Obvious barriers to extubation?  Not applicable  CAM-ICU: Negative    Invasive Devices Review  Invasive Devices  Report    Central Venous Catheter Line            Port A Cath Right Chest -- days          Peripheral Intravenous Line            Peripheral IV 04/29/22 Left Antecubital 1 day    Peripheral IV 04/29/22 Left Hand 1 day          Drain Chest Tube 3 Left Pleural 28 Fr  1 day    Closed/Suction Drain Left LLQ Bulb 19 Fr  1 day    Closed/Suction Drain Left LUQ Bulb 19 Fr  1 day    NG/OG/Enteral Tube Orogastric 18 Fr Center mouth 1 day    Urethral Catheter Non-latex; Temperature probe 16 Fr  1 day              Can any invasive devices be discontinued today? Not applicable  ---------------------------------------------------------------------------------------  OBJECTIVE    Vitals   Vitals:    22 0300 22 0400 22 0500 22 0600   BP: 143/60 135/59 136/65 144/55   Pulse: 102 104 104 102   Resp:  21 (!) 26   Temp:  97 8 °F (36 6 °C)     TempSrc:  Oral     SpO2: 94% 93% 91% 94%   Weight:       Height:         Temp (24hrs), Av 4 °F (36 9 °C), Min:97 8 °F (36 6 °C), Max:98 9 °F (37 2 °C)    Temp:  [97 8 °F (36 6 °C)-98 9 °F (37 2 °C)] 97 8 °F (36 6 °C)  HR:  [] 102  Resp:  [10-28] 26  BP: ()/() 144/55  SpO2:  [91 %-100 %] 94 %  1901 -  0700  In: 6583 4 [I V :6031 7]  Out: 4619 [Urine:3555; Drains:530]          Respiratory:  SpO2: SpO2: 94 %  Nasal Cannula O2 Flow Rate (L/min): 4 L/min    Invasive/non-invasive ventilation settings   Respiratory  Report   Lab Data (Last 4 hours)    None         O2/Vent Data (Last 4 hours)    None                Physical Exam        Laboratory and Diagnostics:  Results from last 7 days   Lab Units 22  0508 22  0618 22  1804   WBC Thousand/uL 16 97* 13 31* 16 56*   HEMOGLOBIN g/dL 6 9* 7 2* 8 4*   HEMATOCRIT % 22 4* 23 2* 28 6*   PLATELETS Thousands/uL 172 162 188   NEUTROS PCT %  --  83*  --    BANDS PCT %  --   --  19*   MONOS PCT %  --  5  --    MONO PCT %  --   --  3*     Results from last 7 days   Lab Units 22  0508 22  0618 22  0018 22  1847 22  1804   SODIUM mmol/L 141 140 138 138 137   POTASSIUM mmol/L 4 6 4 5 4 4 5 4* 5 7*   CHLORIDE mmol/L 111* 112* 113* 114* 113*   CO2 mmol/L 24 23 19* 16* 17*   ANION GAP mmol/L 6 5 6 8 7   BUN mg/dL 11 12 13 15 14   CREATININE mg/dL 0 90 0 90 0 89 1 16 1 21   CALCIUM mg/dL 8 1* 8 4 7 7* 7 6* 7 7*   GLUCOSE RANDOM mg/dL 119 120 132 180* 164*   ALT U/L 31  --   --   --  60   AST U/L 48*  --   --   --  76*   ALK PHOS U/L 40*  --   --   --  38*   ALBUMIN g/dL 2 3*  --   --   --  2 8*   TOTAL BILIRUBIN mg/dL 1 04*  --   --   --  0 55     Results from last 7 days   Lab Units 05/01/22  0508 04/30/22  0618 04/30/22  0018 04/29/22  1847   MAGNESIUM mg/dL 2 4 2 4 2 4 1 4*   PHOSPHORUS mg/dL 2 7 3 3 2 9 3 0      Results from last 7 days   Lab Units 04/29/22  1804   INR  1 13   PTT seconds 23          Results from last 7 days   Lab Units 04/30/22  0618 04/30/22  0133 04/29/22  2135 04/29/22  1804   LACTIC ACID mmol/L 1 5 1 8 3 9* 3 9*     ABG:    VBG:          Micro        EKG:  None  Imaging:  I have personally reviewed pertinent reports  XR chest portable    Result Date: 4/30/2022  Impression: Left chest tube with no pneumothorax  Mild bibasilar atelectasis  Workstation performed: [id number]       Intake and Output  I/O       04/29 0701  04/30 0700 04/30 0701  05/01 0700    I V  (mL/kg) 91612 4 (103 9) 2438 1 (25)    IV Piggyback 1241 7 60    Total Intake(mL/kg) 87383 1 (116 6) 2498 1 (25 6)    Urine (mL/kg/hr) 2120 (0 9) 2000 (0 9)    Emesis/NG output 0 50    Drains 380 250    Blood 500     Chest Tube 229 320    Total Output 3229 2620    Net +8155 1 -121 9              UOP:  0 9 kg/ml/hr     Height and Weights   Height: 4' 9" (144 8 cm)     Body mass index is 46 56 kg/m²  Weight (last 2 days)     Date/Time Weight    04/29/22 2200 97 6 (215 17)    04/29/22 0650 94 3 (208)            Nutrition       Diet Orders   (From admission, onward)             Start     Ordered    04/29/22 1726  Diet NPO  Diet effective now        References:    Nutrtion Support Algorithm Enteral vs  Parenteral   Question Answer Comment   Diet Type NPO    RD to adjust diet per protocol?  Yes        04/29/22 1726 Active Medications  Scheduled Meds:  Current Facility-Administered Medications   Medication Dose Route Frequency Provider Last Rate    acetaminophen  650 mg Oral Once Elise Luna MD      diphenhydrAMINE  25 mg Intravenous Once Elise Luna MD      heparin (porcine)  5,000 Units Subcutaneous [facility] Loly Joe MD      hydrALAZINE  10 mg Intravenous Q4H PRN Gumaro Adam MD      HYDROmorphone   Intravenous Continuous Cira Mcelroy MD      labetalol  10 mg Intravenous Q4H PRN Gumaro Adam MD      multi-electrolyte  125 mL/hr Intravenous Continuous Elise Luna  mL/hr (05/01/22 0535)    ondansetron  4 mg Intravenous Q6H PRN Loly Joe MD      phenol  1 spray Mouth/Throat Q2H PRN Kristal Adams DO       Continuous Infusions:  HYDROmorphone,   multi-electrolyte, 125 mL/hr, Last Rate: 125 mL/hr (05/01/22 0535)      PRN Meds:   hydrALAZINE, 10 mg, Q4H PRN  labetalol, 10 mg, Q4H PRN  ondansetron, 4 mg, Q6H PRN  phenol, 1 spray, Q2H PRN        Allergies   Allergies   Allergen Reactions    Medical Tape Rash     Skin irritation  Skin peeling  Skin irritation  Skin peeling  Blisters  Rash     ---------------------------------------------------------------------------------------  Advance Directive and Living Will:      Power of :    POLST:    ---------------------------------------------------------------------------------------  Care Time Delivered:   No Critical Care time spent     Elise Luna MD      Portions of the record may have been created with voice recognition software  Occasional wrong word or "sound a like" substitutions may have occurred due to the inherent limitations of voice recognition software    Read the chart carefully and recognize, using context, where substitutions have occurred

## 2023-03-08 ENCOUNTER — HOSPITAL ENCOUNTER (INPATIENT)
Age: 88
DRG: 554 | End: 2023-03-08
Attending: EMERGENCY MEDICINE | Admitting: INTERNAL MEDICINE
Payer: MEDICARE

## 2023-03-08 ENCOUNTER — APPOINTMENT (OUTPATIENT)
Dept: CT IMAGING | Age: 88
DRG: 554 | End: 2023-03-08
Payer: MEDICARE

## 2023-03-08 ENCOUNTER — APPOINTMENT (OUTPATIENT)
Dept: GENERAL RADIOLOGY | Age: 88
DRG: 554 | End: 2023-03-08
Payer: MEDICARE

## 2023-03-08 DIAGNOSIS — S01.01XA LACERATION OF SCALP, INITIAL ENCOUNTER: ICD-10-CM
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
## 2023-03-08 DIAGNOSIS — M16.31 OSTEOARTHRITIS OF RIGHT HIP JOINT DUE TO DYSPLASIA: ICD-10-CM

## 2023-03-08 PROBLEM — R26.2 AMBULATORY DYSFUNCTION: Status: ACTIVE | Noted: 2023-03-08

## 2023-03-08 LAB
ANION GAP SERPL CALCULATED.3IONS-SCNC: 11 MMOL/L (ref 7–16)
BASOPHILS ABSOLUTE: 0.02 E9/L (ref 0–0.2)
BASOPHILS RELATIVE PERCENT: 0.5 % (ref 0–2)
BUN BLDV-MCNC: 25 MG/DL (ref 6–23)
CALCIUM SERPL-MCNC: 9.2 MG/DL (ref 8.6–10.2)
CHLORIDE BLD-SCNC: 100 MMOL/L (ref 98–107)
CO2: 28 MMOL/L (ref 22–29)
CREAT SERPL-MCNC: 1.4 MG/DL (ref 0.7–1.2)
EOSINOPHILS ABSOLUTE: 0.1 E9/L (ref 0.05–0.5)
EOSINOPHILS RELATIVE PERCENT: 2.3 % (ref 0–6)
GFR SERPL CREATININE-BSD FRML MDRD: 47 ML/MIN/1.73
GLUCOSE BLD-MCNC: 118 MG/DL (ref 74–99)
HCT VFR BLD CALC: 28.8 % (ref 37–54)
HEMOGLOBIN: 9.5 G/DL (ref 12.5–16.5)
IMMATURE GRANULOCYTES #: 0.02 E9/L
IMMATURE GRANULOCYTES %: 0.5 % (ref 0–5)
LYMPHOCYTES ABSOLUTE: 0.78 E9/L (ref 1.5–4)
LYMPHOCYTES RELATIVE PERCENT: 17.6 % (ref 20–42)
MCH RBC QN AUTO: 33.9 PG (ref 26–35)
MCHC RBC AUTO-ENTMCNC: 33 % (ref 32–34.5)
MCV RBC AUTO: 102.9 FL (ref 80–99.9)
MONOCYTES ABSOLUTE: 0.28 E9/L (ref 0.1–0.95)
MONOCYTES RELATIVE PERCENT: 6.3 % (ref 2–12)
NEUTROPHILS ABSOLUTE: 3.22 E9/L (ref 1.8–7.3)
NEUTROPHILS RELATIVE PERCENT: 72.8 % (ref 43–80)
PDW BLD-RTO: 12.9 FL (ref 11.5–15)
PLATELET # BLD: 115 E9/L (ref 130–450)
PMV BLD AUTO: 8.6 FL (ref 7–12)
POTASSIUM SERPL-SCNC: 4.3 MMOL/L (ref 3.5–5)
RBC # BLD: 2.8 E12/L (ref 3.8–5.8)
SODIUM BLD-SCNC: 139 MMOL/L (ref 132–146)
WBC # BLD: 4.4 E9/L (ref 4.5–11.5)

## 2023-03-08 PROCEDURE — 85025 COMPLETE CBC W/AUTO DIFF WBC: CPT

## 2023-03-08 PROCEDURE — 6360000002 HC RX W HCPCS: Performed by: EMERGENCY MEDICINE

## 2023-03-08 PROCEDURE — 73564 X-RAY EXAM KNEE 4 OR MORE: CPT

## 2023-03-08 PROCEDURE — 0HQ0XZZ REPAIR SCALP SKIN, EXTERNAL APPROACH: ICD-10-PCS | Performed by: EMERGENCY MEDICINE

## 2023-03-08 PROCEDURE — 96374 THER/PROPH/DIAG INJ IV PUSH: CPT

## 2023-03-08 PROCEDURE — 71045 X-RAY EXAM CHEST 1 VIEW: CPT

## 2023-03-08 PROCEDURE — 73502 X-RAY EXAM HIP UNI 2-3 VIEWS: CPT

## 2023-03-08 PROCEDURE — G0378 HOSPITAL OBSERVATION PER HR: HCPCS

## 2023-03-08 PROCEDURE — 80048 BASIC METABOLIC PNL TOTAL CA: CPT

## 2023-03-08 PROCEDURE — 73700 CT LOWER EXTREMITY W/O DYE: CPT

## 2023-03-08 PROCEDURE — 1200000000 HC SEMI PRIVATE

## 2023-03-08 PROCEDURE — 73552 X-RAY EXAM OF FEMUR 2/>: CPT

## 2023-03-08 PROCEDURE — 96375 TX/PRO/DX INJ NEW DRUG ADDON: CPT

## 2023-03-08 PROCEDURE — 72125 CT NECK SPINE W/O DYE: CPT

## 2023-03-08 PROCEDURE — 12002 RPR S/N/AX/GEN/TRNK2.6-7.5CM: CPT

## 2023-03-08 PROCEDURE — 70450 CT HEAD/BRAIN W/O DYE: CPT

## 2023-03-08 PROCEDURE — 2580000003 HC RX 258: Performed by: FAMILY MEDICINE

## 2023-03-08 PROCEDURE — 6360000002 HC RX W HCPCS: Performed by: FAMILY MEDICINE

## 2023-03-08 PROCEDURE — 6370000000 HC RX 637 (ALT 250 FOR IP): Performed by: FAMILY MEDICINE

## 2023-03-08 PROCEDURE — 72131 CT LUMBAR SPINE W/O DYE: CPT

## 2023-03-08 PROCEDURE — 99285 EMERGENCY DEPT VISIT HI MDM: CPT

## 2023-03-08 RX ORDER — SODIUM CHLORIDE 0.9 % (FLUSH) 0.9 %
10 SYRINGE (ML) INJECTION PRN
Status: DISCONTINUED | OUTPATIENT
Start: 2023-03-08 | End: 2023-03-14 | Stop reason: HOSPADM

## 2023-03-08 RX ORDER — FINASTERIDE 5 MG/1
5 TABLET, FILM COATED ORAL EVERY EVENING
Status: DISCONTINUED | OUTPATIENT
Start: 2023-03-08 | End: 2023-03-14 | Stop reason: HOSPADM

## 2023-03-08 RX ORDER — ESCITALOPRAM OXALATE 10 MG/1
10 TABLET ORAL DAILY
Status: DISCONTINUED | OUTPATIENT
Start: 2023-03-08 | End: 2023-03-14 | Stop reason: HOSPADM

## 2023-03-08 RX ORDER — ACETAMINOPHEN 650 MG/1
650 SUPPOSITORY RECTAL EVERY 6 HOURS PRN
Status: DISCONTINUED | OUTPATIENT
Start: 2023-03-08 | End: 2023-03-14 | Stop reason: HOSPADM

## 2023-03-08 RX ORDER — ONDANSETRON 2 MG/ML
4 INJECTION INTRAMUSCULAR; INTRAVENOUS EVERY 6 HOURS PRN
Status: DISCONTINUED | OUTPATIENT
Start: 2023-03-08 | End: 2023-03-14 | Stop reason: HOSPADM

## 2023-03-08 RX ORDER — LEVOTHYROXINE SODIUM 0.05 MG/1
50 TABLET ORAL DAILY
Status: DISCONTINUED | OUTPATIENT
Start: 2023-03-09 | End: 2023-03-14 | Stop reason: HOSPADM

## 2023-03-08 RX ORDER — FUROSEMIDE 20 MG/1
40 TABLET ORAL EVERY OTHER DAY
Status: DISCONTINUED | OUTPATIENT
Start: 2023-03-09 | End: 2023-03-14 | Stop reason: HOSPADM

## 2023-03-08 RX ORDER — LIDOCAINE HYDROCHLORIDE AND EPINEPHRINE 10; 10 MG/ML; UG/ML
20 INJECTION, SOLUTION INFILTRATION; PERINEURAL ONCE
Status: DISCONTINUED | OUTPATIENT
Start: 2023-03-08 | End: 2023-03-08

## 2023-03-08 RX ORDER — MECOBALAMIN 5000 MCG
10 TABLET,DISINTEGRATING ORAL NIGHTLY
Status: DISCONTINUED | OUTPATIENT
Start: 2023-03-08 | End: 2023-03-14 | Stop reason: HOSPADM

## 2023-03-08 RX ORDER — ASPIRIN 81 MG/1
81 TABLET, CHEWABLE ORAL DAILY
Status: DISCONTINUED | OUTPATIENT
Start: 2023-03-08 | End: 2023-03-14 | Stop reason: HOSPADM

## 2023-03-08 RX ORDER — FOLIC ACID 1 MG/1
1 TABLET ORAL DAILY
Status: DISCONTINUED | OUTPATIENT
Start: 2023-03-09 | End: 2023-03-14 | Stop reason: HOSPADM

## 2023-03-08 RX ORDER — TAMSULOSIN HYDROCHLORIDE 0.4 MG/1
0.4 CAPSULE ORAL EVERY EVENING
Status: DISCONTINUED | OUTPATIENT
Start: 2023-03-08 | End: 2023-03-14 | Stop reason: HOSPADM

## 2023-03-08 RX ORDER — AMLODIPINE BESYLATE 10 MG/1
10 TABLET ORAL NIGHTLY
Status: DISCONTINUED | OUTPATIENT
Start: 2023-03-08 | End: 2023-03-14 | Stop reason: HOSPADM

## 2023-03-08 RX ORDER — POLYETHYLENE GLYCOL 3350 17 G/17G
17 POWDER, FOR SOLUTION ORAL DAILY PRN
Status: DISCONTINUED | OUTPATIENT
Start: 2023-03-08 | End: 2023-03-14 | Stop reason: HOSPADM

## 2023-03-08 RX ORDER — SODIUM CHLORIDE 9 MG/ML
INJECTION, SOLUTION INTRAVENOUS PRN
Status: DISCONTINUED | OUTPATIENT
Start: 2023-03-08 | End: 2023-03-14 | Stop reason: HOSPADM

## 2023-03-08 RX ORDER — ACETAMINOPHEN 325 MG/1
650 TABLET ORAL EVERY 6 HOURS PRN
Status: DISCONTINUED | OUTPATIENT
Start: 2023-03-08 | End: 2023-03-14 | Stop reason: HOSPADM

## 2023-03-08 RX ORDER — PROMETHAZINE HYDROCHLORIDE 12.5 MG/1
12.5 TABLET ORAL EVERY 6 HOURS PRN
Status: DISCONTINUED | OUTPATIENT
Start: 2023-03-08 | End: 2023-03-14 | Stop reason: HOSPADM

## 2023-03-08 RX ORDER — FENTANYL CITRATE 50 UG/ML
50 INJECTION, SOLUTION INTRAMUSCULAR; INTRAVENOUS ONCE
Status: COMPLETED | OUTPATIENT
Start: 2023-03-08 | End: 2023-03-08

## 2023-03-08 RX ORDER — KETOROLAC TROMETHAMINE 30 MG/ML
15 INJECTION, SOLUTION INTRAMUSCULAR; INTRAVENOUS ONCE
Status: COMPLETED | OUTPATIENT
Start: 2023-03-08 | End: 2023-03-08

## 2023-03-08 RX ORDER — SODIUM CHLORIDE 0.9 % (FLUSH) 0.9 %
10 SYRINGE (ML) INJECTION EVERY 12 HOURS SCHEDULED
Status: DISCONTINUED | OUTPATIENT
Start: 2023-03-08 | End: 2023-03-14 | Stop reason: HOSPADM

## 2023-03-08 RX ORDER — ENOXAPARIN SODIUM 100 MG/ML
40 INJECTION SUBCUTANEOUS DAILY
Status: DISCONTINUED | OUTPATIENT
Start: 2023-03-08 | End: 2023-03-14 | Stop reason: HOSPADM

## 2023-03-08 RX ORDER — HYDROXYZINE PAMOATE 25 MG/1
25 CAPSULE ORAL NIGHTLY
Status: DISCONTINUED | OUTPATIENT
Start: 2023-03-08 | End: 2023-03-14 | Stop reason: HOSPADM

## 2023-03-08 RX ORDER — CARVEDILOL 6.25 MG/1
12.5 TABLET ORAL 2 TIMES DAILY WITH MEALS
Status: DISCONTINUED | OUTPATIENT
Start: 2023-03-08 | End: 2023-03-14 | Stop reason: HOSPADM

## 2023-03-08 RX ADMIN — CARVEDILOL 12.5 MG: 6.25 TABLET, FILM COATED ORAL at 22:11

## 2023-03-08 RX ADMIN — FINASTERIDE 5 MG: 5 TABLET, FILM COATED ORAL at 22:11

## 2023-03-08 RX ADMIN — ASPIRIN 81 MG CHEWABLE TABLET 81 MG: 81 TABLET CHEWABLE at 22:11

## 2023-03-08 RX ADMIN — Medication 10 ML: at 22:10

## 2023-03-08 RX ADMIN — HYDROXYZINE PAMOATE 25 MG: 25 CAPSULE ORAL at 22:11

## 2023-03-08 RX ADMIN — ESCITALOPRAM OXALATE 10 MG: 10 TABLET, FILM COATED ORAL at 22:11

## 2023-03-08 RX ADMIN — TAMSULOSIN HYDROCHLORIDE 0.4 MG: 0.4 CAPSULE ORAL at 22:11

## 2023-03-08 RX ADMIN — Medication 10 MG: at 22:10

## 2023-03-08 RX ADMIN — ENOXAPARIN SODIUM 40 MG: 100 INJECTION SUBCUTANEOUS at 22:10

## 2023-03-08 RX ADMIN — AMLODIPINE BESYLATE 10 MG: 10 TABLET ORAL at 22:11

## 2023-03-08 RX ADMIN — FENTANYL CITRATE 50 MCG: 50 INJECTION, SOLUTION INTRAMUSCULAR; INTRAVENOUS at 15:36

## 2023-03-08 RX ADMIN — KETOROLAC TROMETHAMINE 15 MG: 30 INJECTION, SOLUTION INTRAMUSCULAR at 14:07

## 2023-03-08 ASSESSMENT — PAIN DESCRIPTION - LOCATION
LOCATION: HIP
LOCATION: ANKLE

## 2023-03-08 ASSESSMENT — PAIN DESCRIPTION - ORIENTATION
ORIENTATION: LEFT
ORIENTATION: LEFT

## 2023-03-08 ASSESSMENT — PAIN SCALES - GENERAL
PAINLEVEL_OUTOF10: 10
PAINLEVEL_OUTOF10: 9
PAINLEVEL_OUTOF10: 4
PAINLEVEL_OUTOF10: 10

## 2023-03-08 ASSESSMENT — PAIN - FUNCTIONAL ASSESSMENT: PAIN_FUNCTIONAL_ASSESSMENT: 0-10

## 2023-03-08 ASSESSMENT — LIFESTYLE VARIABLES
HOW MANY STANDARD DRINKS CONTAINING ALCOHOL DO YOU HAVE ON A TYPICAL DAY: 1 OR 2
HOW OFTEN DO YOU HAVE A DRINK CONTAINING ALCOHOL: 4 OR MORE TIMES A WEEK

## 2023-03-08 ASSESSMENT — PAIN DESCRIPTION - PAIN TYPE: TYPE: CHRONIC PAIN

## 2023-03-08 NOTE — ED PROVIDER NOTES
Department of Emergency Medicine   ED  Provider Note  Admit Date/RoomTime: 3/8/2023 11:34 AM  ED Room: /          History of Present Illness:  3/8/23, Time: 1:14 PM EST  Chief Complaint   Patient presents with    Bindu Antoine backwards down 6-7 steps. No LOC,takes Aspirin, denies pain, sustained laceration to posterior head. Patient isnt sure if he slipped or missed step. Americo Laguna is a 80 y.o. male presenting to the ED for fall. Patient had a mechanical fall at home. Fell down 6-7 steps. Did hit his head. No anticoagulation use other than aspirin. Did not lose consciousness. Was unable to get back up after he fell. He states he has left hip pain and low back pain. This is chronic, but does seem worse after the fall. He was not able to ambulate. Denies nausea or vomiting. Denies neck pain or stiffness. Denies any cough or sputum. Denies any other symptoms or complaints. Review of Systems:   Pertinent positives and negatives are stated within HPI, all other systems reviewed and are negative.        --------------------------------------------- PAST HISTORY ---------------------------------------------  Past Medical History:  has a past medical history of CAD (coronary artery disease), Hyperlipidemia, and Hypertension. Past Surgical History:  has a past surgical history that includes Coronary artery bypass graft and hip surgery. Social History:  reports that he has never smoked. He has never used smokeless tobacco. He reports current alcohol use of about 7.0 standard drinks per week. He reports that he does not use drugs. Family History: family history is not on file. . Unless otherwise noted, family history is non contributory    The patients home medications have been reviewed.     Allergies: Zocor [simvastatin]        ---------------------------------------------------PHYSICAL EXAM--------------------------------------    Constitutional/General: Alert and oriented x3  Head: Normocephalic with a 4 cm laceration over the occipital area  Eyes: PERRL, EOMI, sclera non icteric  Mouth: Oropharynx clear, handling secretions, no trismus, no asymmetry of the posterior oropharynx or uvular edema  Neck: Supple, full ROM, no stridor, no meningeal signs  Respiratory: Lungs clear to auscultation bilaterally, no wheezes, rales, or rhonchi. Not in respiratory distress  Cardiovascular:  Regular rate. Regular rhythm. 2+ distal pulses. Equal extremity pulses. Chest: No chest wall tenderness  GI:  Abdomen Soft, Non tender, Non distended. No rebound, guarding, or rigidity. No pulsatile masses. Musculoskeletal: Moves all extremities x 4. Left lower extremity shows no gross signs of injury or trauma, dorsalis pedis 2+, range of motion limited due to pain at the hip  Integument: skin warm and dry. No rashes. Neurologic: GCS 15, no focal deficits, symmetric strength 5/5 in the upper and lower extremities bilaterally  Psychiatric: Normal Affect          -------------------------------------------------- RESULTS -------------------------------------------------  I have personally reviewed all laboratory and imaging results for this patient. Results are listed below. LABS: (Lab results interpreted by me)  No results found for this visit on 03/08/23.,       RADIOLOGY:  Interpreted by Radiologist unless otherwise specified  CT LUMBAR SPINE WO CONTRAST   Final Result   1. Redemonstration of chronic compression fracture of T12 of approximately   80% with retropulsion and central canal stenosis. 2. No new lumbar spine compression fracture or malalignment. CT HIP LEFT WO CONTRAST   Final Result   1. Severe osteoarthritis involving left hip. 2. No fracture or dislocation. XR KNEE LEFT (MIN 4 VIEWS)   Final Result   No acute osseous abnormality involving the left knee. XR HIP LEFT (2-3 VIEWS)   Final Result   1.  Severe osteoarthritis involving left hip with flattening of left femoral   head and mild superior subluxation. 2. No fracture or dislocation of left hip or femur. XR FEMUR LEFT (MIN 2 VIEWS)   Final Result   1. Severe osteoarthritis involving left hip with flattening of left femoral   head and mild superior subluxation. 2. No fracture or dislocation of left hip or femur. XR CHEST PORTABLE   Final Result   1. There are no findings of failure or pneumonia   2. Chronic blunting the left costophrenic angle   3. Chronic linear scarring seen within the left lung base. CT HEAD WO CONTRAST   Final Result   No acute intracranial abnormality. CT CERVICAL SPINE WO CONTRAST   Final Result   No acute abnormality of the cervical spine.                 ------------------------- NURSING NOTES AND VITALS REVIEWED ---------------------------   The nursing notes within the ED encounter and vital signs as below have been reviewed by myself  BP (!) 143/84   Pulse 62   Temp 98.2 °F (36.8 °C)   Resp 16   Ht 5' 3\" (1.6 m)   Wt 170 lb (77.1 kg)   SpO2 95%   BMI 30.11 kg/m²     Oxygen Saturation Interpretation: Normal    The patients available past medical records and past encounters were reviewed. ------------------------------ ED COURSE/MEDICAL DECISION MAKING----------------------  Medications   lidocaine-EPINEPHrine 1 %-1:389811 injection 20 mL (has no administration in time range)   ketorolac (TORADOL) injection 15 mg (15 mg IntraVENous Given 3/8/23 1407)   fentaNYL (SUBLIMAZE) injection 50 mcg (50 mcg IntraVENous Given 3/8/23 1536)           The cardiac monitor revealed sinus with a heart rate in the 80s as interpreted by me. The cardiac monitor was ordered secondary to the patient's fall and to monitor the patient for dysrhythmia. CPT [id number]     PROCEDURE NOTE  3/08/23       Time: 1900    LACERATION REPAIR  Risks, benefits and alternatives (for applicable procedures below) described.    Performed By: Medical Student with my assistance. Informed consent: Verbal consent obtained. The patient was counseled regarding the procedure in person, it's indications, risks, potential complications and alternatives and any questions were answered. Verbal consent was obtained. Laceration #: 1. Location: occipital scalp  Length: 4cm. The wound area was irrigated with sterile saline and draped in a sterile fashion. Local Anesthesia:  obtained with Lidocaine 1% with epinephrine. The wound was explored with the following results: Thickness: superficial. no foreign body or tendon injury seen. Debridement: None. Undermining: None. Wound Margins Revised: None. Flaps Aligned: no. The wound was closed with #3 staple(s). Dressing:  bacitracin. There were no additional wounds requiring formal closure. Medical Decision Making:     Patient presents after a fall. Concern for intracranial hemorrhage, skull fracture, pneumothorax, among other pathologies. Did have a laceration over his head. He also pain in his leg. Did receive fentanyl IV for pain control. Otherwise exam was unremarkable. Labs interpreted by me shows a leukopenia of 4.4, creatinine of 1.5 on the CMP, otherwise labs reviewed as above. Chest x-ray interpreted me shows no obvious pneumothorax, radiology agrees. Otherwise head CT, x-rays of the hips and knees noted as above. On reevaluation, patient does continue complain of pain. States he cannot walk. CT of the hip obtained, reviewed by myself, no obvious fracture, radiology agrees. Chart reviewed. Patient was seen by cardiology on 2/20 2/2022. Has history of aortic stenosis. He is also status post CABG per that note. He was stable at that time. On reevaluation once again, patient cannot ambulate. Did receive Toradol IV with no relief. Given his persistent pain, inability ambulate, discussed with the hospitalist, patient be admitted. Counseling:    The emergency provider has spoken with the patient and discussed todays results, in addition to providing specific details for the plan of care and counseling regarding the diagnosis and prognosis. Questions are answered at this time and they are agreeable with the plan.       --------------------------------- IMPRESSION AND DISPOSITION ---------------------------------    IMPRESSION  1. Fall, initial encounter    2. Laceration of scalp, initial encounter        DISPOSITION  Disposition: Admit to med/surg floor  Patient condition is stable        NOTE: This report was transcribed using voice recognition software.  Every effort was made to ensure accuracy; however, inadvertent computerized transcription errors may be present        Radha Palacios MD  03/11/23 [id number]

## 2023-03-08 NOTE — ED NOTES
Patient states they are unable to stand, and they do not feel safe to return home dur to increased left hip pain, inhibiting them to ambulate without assist, and perform activities of daily living      [place], [address] [facility]  03/08/23 [id number]

## 2023-03-08 NOTE — ED NOTES
Patient walking up stairs and they lost their balance they stated they flopped all the way to the bottom left aide pain patient arrived in C-collar, denies loss of consciousness, laceration to crown of head, patient alert and oriented x4 able to make needs known, connected to monitor     Justus Ward RN  03/08/23 3915 [address]  03/08/23 5637

## 2023-03-09 LAB
ANION GAP SERPL CALCULATED.3IONS-SCNC: 11 MMOL/L (ref 7–16)
BASOPHILS ABSOLUTE: 0.02 E9/L (ref 0–0.2)
BASOPHILS RELATIVE PERCENT: 0.5 % (ref 0–2)
BUN BLDV-MCNC: 26 MG/DL (ref 6–23)
CALCIUM SERPL-MCNC: 8.5 MG/DL (ref 8.6–10.2)
CHLORIDE BLD-SCNC: 101 MMOL/L (ref 98–107)
CO2: 25 MMOL/L (ref 22–29)
CREAT SERPL-MCNC: 1.4 MG/DL (ref 0.7–1.2)
EOSINOPHILS ABSOLUTE: 0.09 E9/L (ref 0.05–0.5)
EOSINOPHILS RELATIVE PERCENT: 2.4 % (ref 0–6)
GFR SERPL CREATININE-BSD FRML MDRD: 47 ML/MIN/1.73
GLUCOSE BLD-MCNC: 90 MG/DL (ref 74–99)
HCT VFR BLD CALC: 28.9 % (ref 37–54)
HEMOGLOBIN: 9.8 G/DL (ref 12.5–16.5)
IMMATURE GRANULOCYTES #: 0.01 E9/L
IMMATURE GRANULOCYTES %: 0.3 % (ref 0–5)
LYMPHOCYTES ABSOLUTE: 0.78 E9/L (ref 1.5–4)
LYMPHOCYTES RELATIVE PERCENT: 20.6 % (ref 20–42)
MCH RBC QN AUTO: 34.9 PG (ref 26–35)
MCHC RBC AUTO-ENTMCNC: 33.9 % (ref 32–34.5)
MCV RBC AUTO: 102.8 FL (ref 80–99.9)
MONOCYTES ABSOLUTE: 0.26 E9/L (ref 0.1–0.95)
MONOCYTES RELATIVE PERCENT: 6.9 % (ref 2–12)
NEUTROPHILS ABSOLUTE: 2.63 E9/L (ref 1.8–7.3)
NEUTROPHILS RELATIVE PERCENT: 69.3 % (ref 43–80)
PDW BLD-RTO: 12.8 FL (ref 11.5–15)
PLATELET # BLD: 131 E9/L (ref 130–450)
PMV BLD AUTO: 9.3 FL (ref 7–12)
POTASSIUM REFLEX MAGNESIUM: 3.8 MMOL/L (ref 3.5–5)
RBC # BLD: 2.81 E12/L (ref 3.8–5.8)
SODIUM BLD-SCNC: 137 MMOL/L (ref 132–146)
WBC # BLD: 3.8 E9/L (ref 4.5–11.5)

## 2023-03-09 PROCEDURE — 80048 BASIC METABOLIC PNL TOTAL CA: CPT

## 2023-03-09 PROCEDURE — 97161 PT EVAL LOW COMPLEX 20 MIN: CPT

## 2023-03-09 PROCEDURE — 6370000000 HC RX 637 (ALT 250 FOR IP): Performed by: FAMILY MEDICINE

## 2023-03-09 PROCEDURE — 1200000000 HC SEMI PRIVATE

## 2023-03-09 PROCEDURE — G0378 HOSPITAL OBSERVATION PER HR: HCPCS

## 2023-03-09 PROCEDURE — 6360000002 HC RX W HCPCS: Performed by: FAMILY MEDICINE

## 2023-03-09 PROCEDURE — 97530 THERAPEUTIC ACTIVITIES: CPT

## 2023-03-09 PROCEDURE — 85025 COMPLETE CBC W/AUTO DIFF WBC: CPT

## 2023-03-09 PROCEDURE — 97165 OT EVAL LOW COMPLEX 30 MIN: CPT

## 2023-03-09 PROCEDURE — 2580000003 HC RX 258: Performed by: FAMILY MEDICINE

## 2023-03-09 PROCEDURE — 36415 COLL VENOUS BLD VENIPUNCTURE: CPT

## 2023-03-09 PROCEDURE — 99221 1ST HOSP IP/OBS SF/LOW 40: CPT | Performed by: ORTHOPAEDIC SURGERY

## 2023-03-09 RX ADMIN — HYDROXYZINE PAMOATE 25 MG: 25 CAPSULE ORAL at 21:21

## 2023-03-09 RX ADMIN — FOLIC ACID 1 MG: 1 TABLET ORAL at 09:39

## 2023-03-09 RX ADMIN — Medication 10 ML: at 09:39

## 2023-03-09 RX ADMIN — CARVEDILOL 12.5 MG: 6.25 TABLET, FILM COATED ORAL at 17:55

## 2023-03-09 RX ADMIN — AMLODIPINE BESYLATE 10 MG: 10 TABLET ORAL at 21:21

## 2023-03-09 RX ADMIN — ESCITALOPRAM OXALATE 10 MG: 10 TABLET, FILM COATED ORAL at 09:38

## 2023-03-09 RX ADMIN — Medication 10 MG: at 21:22

## 2023-03-09 RX ADMIN — ENOXAPARIN SODIUM 40 MG: 100 INJECTION SUBCUTANEOUS at 09:38

## 2023-03-09 RX ADMIN — Medication 10 ML: at 21:21

## 2023-03-09 RX ADMIN — LEVOTHYROXINE SODIUM 50 MCG: 0.05 TABLET ORAL at 06:11

## 2023-03-09 RX ADMIN — TAMSULOSIN HYDROCHLORIDE 0.4 MG: 0.4 CAPSULE ORAL at 17:56

## 2023-03-09 RX ADMIN — CARVEDILOL 12.5 MG: 6.25 TABLET, FILM COATED ORAL at 09:38

## 2023-03-09 RX ADMIN — FINASTERIDE 5 MG: 5 TABLET, FILM COATED ORAL at 17:56

## 2023-03-09 RX ADMIN — ASPIRIN 81 MG CHEWABLE TABLET 81 MG: 81 TABLET CHEWABLE at 09:39

## 2023-03-09 RX ADMIN — FUROSEMIDE 40 MG: 20 TABLET ORAL at 09:38

## 2023-03-09 NOTE — PROGRESS NOTES
Physical Therapy  Physical Therapy Initial Evaluation    Name: Shakeel Meehan  : 1930  MRN: [id number]      Date of Service: 3/9/2023    Evaluating PT:  Mariama Topete, PT [id number]    Referring provider/PT Order:  PT Eval and Treat   23  PT eval and treat      Jeremy Gibbs DO     Room #:  3381/0814-T  Diagnosis:  Ambulatory dysfunction [R26.2]  PMHx/PSHx:     has a past medical history of CAD (coronary artery disease), Hyperlipidemia, and Hypertension. has a past surgical history that includes Coronary artery bypass graft and hip surgery. Procedure/Surgery:  none  Precautions:  Falls, FWB (full weight bearing), Hx DJD, anxious. Equipment Needs: To be determined,    SUBJECTIVE:    Patient lives with wife   in a tri level home  with 4 steps to enter without Rail  Bed and bathroom on level patient typically stays. Patient ambulated independently, with wheeled walker  PTA. Equipment owned: [employer],  each level. OBJECTIVE:   Initial Evaluation  Date: 3/9/23 Treatment Short Term/ Long Term   Goals   AM-PAC 6 Clicks      Was pt agreeable to Eval/treatment? yes     Does pt have pain? Yes with activity. Bed Mobility  Rolling: Mod   Supine to sit:   Mod    Sit to supine: Mod    Scooting: Mod    Rolling: Ind  Supine to sit: Ind  Sit to supine: Ind  Scooting: Ind   Transfers Sit to stand: Mod  to fww  Stand to sit: Mod   Stand pivot: Mod  with fww  Sit to stand: Mod Ind  to fww  Stand to sit: Mod Ind    Stand pivot: Mod Ind  with fww   Ambulation    2 side steps only. Anxious regarding falling. Used fww Mod A  20 feet with Mod Ind  fww   Stair negotiation: ascended and descended  NT  4 steps with Min 2 rail      Strength/ROM:     RLE grossly 3+/5  LLE grossly 3+/5  RLE AROM decreased  LLE AROM decreased    Balance:     Static Sitting: SBA  Dynamic Sitting: SBA  Static Standing: Mod  with fww  Dynamic Standing:  Mod  with fww      Patient is Alert & Oriented x person, place, time, and situation and follows directions   Sensation:  Pt denies numbness and tingling to extremities  Edema:  none  Therapeutic Exercises:  Functional activity as ststaed above. Anxious regarding falling. Patient education  Pt educated on role of Physical Therapy, risks of immobility, safety and plan of care,  importance of mobility while in hospital , safety , and weight bearing status  and use of call light for safety. Patient response to education:   Pt verbalized understanding Pt demonstrated skill Pt requires further education in this area   yes yes Reminders     ASSESSMENT:    Conditions Requiring Skilled Therapeutic Intervention:    [x]Decreased strength     [x]Decreased ROM  [x]Decreased functional mobility  [x]Decreased balance   [x]Decreased endurance   [x]Decreased posture  []Decreased sensation  []Decreased coordination   []Decreased vision  [x]Decreased safety awareness   []Increased pain       Comments:    RN cleared patient for participation in therapy session. Patient was seen this date for PT evaluation. Patient was agreeable to intervention. Results of the functional assessment are noted above. Upon entering the room patient was found supine in bed. Family present. Assisted to EOB with increased assist due to discomfort. Sat EOB x 10 minutes to increase dynamic sitting balance and activity tolerance. STS attempted x 2 reps. Anxious regarding falling when up. Only willing to attempt side steps. At end of session, patient in bed with [facility] elevated call light and phone within reach,  all lines and tubes intact, nursing notified. This patient can benefit from the continuation of skilled PT possibly in a rehab setting to maximize functional level and return to PLOF.    Treatment:  Patient completed and was instructed in the following treatment:      Bed mobility training - pt given verbal and tactile cues to facilitate proper sequencing and safety during rolling and supine>sit as well as provided with physical assistance to complete task    Assistive device training - pt educated on using [employer] during gait, [employer] approximation/negotiation, and hand placement during sit<>stand to [employer]  STS and transfer training - educated on hand/foot placement, safety, and sequencing during STS and pivot transfers using assistive device  Gait training - verbal cues for  assistive device approximation/negotiation, upright posture, and safety during 90 and 180 degree turns during gait   Education regarding use of call light for safety. Skillful positioning in bed to protect skin/joint integrity. Vitals and symptoms were closely monitored throughout session. Pt's/ family goals      Participate in Rehab process    Prognosis is good  for reaching above PT goals.     Patient and or family understand(s) diagnosis, prognosis, and plan of care.  yes,     PHYSICAL THERAPY PLAN OF CARE:    PT POC is established based on physician order and patient diagnosis     Diagnosis:  Ambulatory dysfunction [R26.2]  Specific instructions for next treatment:  Sit EOB, postural exercises, Transfer to bedside chair, and Increase ambulation distance  Current Treatment Recommendations:     [x] Strengthening to improve independence with functional mobility   [x] ROM to improve independence with functional mobility   [x] Balance Training to improve static/dynamic balance and to reduce fall risk  [x] Endurance Training to improve activity tolerance during functional mobility   [x] Transfer Training to improve safety and independence with all functional transfers   [x] Gait Training to improve gait mechanics, endurance and asses need for appropriate assistive device  [x] Stair Training in preparation for safe discharge home and/or into the community when appropriate  [] Positioning to prevent skin breakdown and contractures  [x] Safety and Education Training   [] Patient/Caregiver Education   [] HEP  [] Gait Team to be added to POC  [] Other     PT long term treatment goals are located in above grid    Frequency of treatments: 2-5x/week x 1-2 weeks. Time in  0830  Time out  0900    Total Treatment Time  10 minutes     Evaluation Time includes thorough review of current medical information, gathering information on past medical history/social history and prior level of function, completion of standardized testing/informal observation of tasks, assessment of data and education on plan of care and goals.     CPT codes:  [x] Low Complexity PT evaluation 00172  [] Moderate Complexity PT evaluation 99792  [] High Complexity PT evaluation 16163  [] PT Re-evaluation 04121  [] Gait training 04071 - minutes  [] Manual therapy 01683  minutes  [x] Therapeutic activities 41125 -10 minutes  [] Therapeutic exercises 85259 - minutes  [] Neuromuscular reeducation [address] [place] minutes     Jose Armando Padilla, [address] [facility]

## 2023-03-09 NOTE — PROGRESS NOTES
Hospitalist Progress Note      Synopsis:   Briefly, patient with PMH significant for CAD, HPL, HPT presented to ED and was admitted due to falling at home off of steps. Patient has concerns of living without assistance and feels he would benefit from placement. Imaging shows no acute injury, however severe osteoarthritis involving left hip was appreciated. Upon interview patient states he is unable to bear any weight on this leg. Orthopedic surgery has been consulted. Hospital day 0     Subjective:  Patient states he has terrible left hip pain, was not able to accomplish much with PT OT today. Stable overnight. No issues reported. Patient seen and examined  Records reviewed. Temp (24hrs), Av.8 °F (36.6 °C), Min:97.8 °F (36.6 °C), Max:97.8 °F (36.6 °C)    DIET: ADULT DIET; Regular  CODE: Full Code    Intake/Output Summary (Last 24 hours) at 3/9/2023 1247  Last data filed at 3/8/2023 2310  Gross per 24 hour   Intake 200 ml   Output --   Net 200 ml         Objective:    BP (!) 129/59   Pulse 66   Temp 97.8 °F (36.6 °C) (Temporal)   Resp 18   Ht 5' 3\" (1.6 m)   Wt 170 lb (77.1 kg)   SpO2 93%   BMI 30.11 kg/m²     General appearance: No apparent distress, appears stated age and cooperative. HEENT: Conjunctivae/corneas clear. Mucous membranes moist.  Neck: Supple. No JVD. Respiratory:  Clear to auscultation bilaterally. Normal respiratory effort. Cardiovascular:  RRR. S1, S2 without MRG. PV: Pulses palpable. No edema. Abdomen: Soft, non-tender, non-distended. +BS  Musculoskeletal: No obvious deformities. Skin: Normal skin color. No rashes or lesions. Good turgor. Neurologic:  Grossly non-focal. Awake, alert, following commands.    Psychiatric: Alert and oriented, thought content appropriate, normal insight and judgement    Medications:  REVIEWED DAILY    Infusion Medications    sodium chloride       Scheduled Medications    amLODIPine  10 mg Oral Nightly    aspirin  81 mg Oral Daily    carvedilol  12.5 mg Oral BID     escitalopram  10 mg Oral Daily    finasteride  5 mg Oral QPM    folic acid  1 mg Oral Daily    furosemide  40 mg Oral Every Other Day    hydrOXYzine pamoate  25 mg Oral Nightly    levothyroxine  50 mcg Oral Daily    melatonin  10 mg Oral Nightly    tamsulosin  0.4 mg Oral QPM    sodium chloride flush  10 mL IntraVENous 2 times per day    enoxaparin  40 mg SubCUTAneous Daily     PRN Meds: sodium chloride flush, sodium chloride, promethazine **OR** ondansetron, polyethylene glycol, acetaminophen **OR** acetaminophen    Labs:     Recent Labs     03/08/23 1955 03/09/23 0429   WBC 4.4* 3.8*   HGB 9.5* 9.8*   HCT 28.8* 28.9*   * 131       Recent Labs     03/08/23 1955 03/09/23 0429    137   K 4.3 3.8    101   CO2 28 25   BUN 25* 26*   CREATININE 1.4* 1.4*   CALCIUM 9.2 8.5*       No results for input(s): PROT, ALB, ALKPHOS, ALT, AST, BILITOT, AMYLASE, LIPASE in the last 72 hours. No results for input(s): INR in the last 72 hours. No results for input(s): Kaleen Hope in the last 72 hours. Chronic labs:    Lab Results   Component Value Date    CHOL 195 10/11/2021    TRIG 200 (H) 10/11/2021    HDL 38 10/11/2021    LDLCALC 117 (H) 10/11/2021    TSH 1.900 10/11/2021    PSA 0.76 12/07/2015    LABA1C 5.9 (H) 04/26/2021       Radiology: REVIEWED DAILY    Assessment:  Ambulatory dysfunction with subsequent fall at home  Coronary artery disease  Severe osteoarthritis of left hip generalized on CT  Hypertension  Hyperlipidemia  Hypothyroidism  Pancytopenia  Chronic kidney disease, baseline creatinine seems to be 1.3-1.4  Plan:  Reviewed CT of left hip revealing severe osteoarthritis, also reviewed ED notes, previous hospitalization notes, in light of findings and patient complaint we will consult orthopedic surgery for evaluation of left hip.   PT OT consulted  Case management social work consulted for placement  Continue appropriate home medications  Pancytopenia, reviewed CBC, WBC 3.8, hemoglobin 9.8, hematocrit 28.9, seems to be patient's baseline, will continue to monitor  Monitor labs replete as indicated      DVT Prophylaxis [x] Lovenox  []  Heparin [] DOAC [] PCDs [] Ambulation    GI Prophylaxis [] PPI  [] H2 Blocker   [] Carafate  [x] Diet/Tube Feeds   Level of care [x] Med/Surg  [] Intermediate  []  ICU   Diet ADULT DIET; Regular    Family contact [x]  N/A    [] At bedside  [] Phone call   Disposition Patient requires continued admission patient can likely discharge tomorrow pending further recommendations from orthopedic surgery   MDM [] Low    [x] Moderate  []   High       Discharge Plan: Patient will need placement as he is not able to care for himself at home    +++++++++++++++++++++++++++++++++++++++++++++++++  MIHIR Schaeffer  [facility] Physician - [address] [place] [address]  +++++++++++++++++++++++++++++++++++++++++++++++++  NOTE: This report was transcribed using voice recognition software. Every effort was made to ensure accuracy; however, inadvertent computerized transcription errors may be present.

## 2023-03-09 NOTE — CARE COORDINATION
3/9. Met with the pt, his daughter and son-in-law at the bedside to discuss transition of care. The pt lives in a multi-level home with his wife and son and daughter-in-law. He uses a walker, which he has one each level of hie home. He needs assistance with the stairs. He is admitted to the hospital after falling down the stairs. He is aware that he will need rehab before he returns home. He has been to [facility] in the past and would like to go there for rehab again. His daughter is in agreement. Referral made. Pt accepted. Passr/envlope completed and placed in soft chart Venkatesh Barnes RN  Case Management Assessment  Initial Evaluation    Date/Time of Evaluation: 3/9/2023 10:37 AM  Assessment Completed by: Venkatesh Barnes RN    If patient is discharged prior to next notation, then this note serves as note for discharge by case management. Patient Name: Briana Rothman                   YOB: 1930  Diagnosis: Ambulatory dysfunction [R26.2]                   Date / Time: 3/8/2023 11:34 AM    Patient Admission Status: Observation   Readmission Risk (Low < 19, Mod (19-27), High > 27): Readmission Risk Score: 14.3    Current PCP: MIHIR Diego CNP  PCP verified by ? Yes    Chart Reviewed: Yes      History Provided by: Patient, Child/Family  Patient Orientation: Alert and Oriented    Patient Cognition: Alert    Hospitalization in the last 30 days (Readmission):  No    If yes, Readmission Assessment in CM Navigator will be completed.     Advance Directives:      Code Status: Full Code   Patient's Primary Decision Maker is: Legal Next of Kin      Discharge Planning:    Patient lives with: Family Members, Spouse/Significant Other Type of Home: House  Primary Care Giver: Family  Patient Support Systems include: Spouse/Significant Other, Children   Current Financial resources:    Current community resources:    Current services prior to admission: None            Current DME: Type of Home Care services:  None    ADLS  Prior functional level: Assistance with the following:, Mobility, Cooking, Housework, Shopping  Current functional level: Assistance with the following:, Bathing, Dressing, Toileting, Cooking, Housework, Shopping, Mobility    PT AM-PAC: 11 /24  OT AM-PAC: 15 /24    Family can provide assistance at DC: Yes  Would you like Case Management to discuss the discharge plan with any other family members/significant others, and if so, who? Yes (daughter was present during the discussion)  Plans to Return to Present Housing: No  Other Identified Issues/Barriers to RETURNING to current housing: 3 different fights of stairs  Potential Assistance needed at discharge: N/A            Potential DME:    Patient expects to discharge to: [address] [place] [address] for transportation at discharge:      Financial    Payor: Ken Ashby / Plan: Donta Ramirez ESSENTIAL/PLUS / Product Type: *No Product type* /     Does insurance require precert for SNF: Yes    Potential assistance Purchasing Medications:    Meds-to-Beds request: Yes      [id number] - [facility] [phone] Usman Ramirez [phone]  [address] [facility] [address]  Phone: [phone] Fax: [phone]      Notes:    Factors facilitating achievement of predicted outcomes: Family support, Motivated, Cooperative, Pleasant, and Knowledge about rehab    Barriers to discharge: Decreased endurance, Lower extremity weakness, and Stairs at home    Additional Case Management Notes: see above    The Plan for Transition of Care is related to the following treatment goals of Ambulatory dysfunction [W61.1]    IF APPLICABLE: The Patient and/or patient representative Maurice Mac and his family were provided with a choice of provider and agrees with the discharge plan.  Freedom of choice list with basic dialogue that supports the patient's individualized plan of care/goals and shares the quality data associated with the providers was provided to:     Patient Representative Name:       The Patient and/or Patient Representative Agree with the Discharge Plan? Melody Colin RN  Case Management Department  Ph: [phone] Fax: [phone]    The Plan for Transition of Care is related to the following treatment goals: to receive reahb to be able to return to plof    The Patient and/or patient representative, daughter,  was provided with a choice of provider and agrees   with the discharge plan. [x] Yes [] No    Freedom of choice list was provided with basic dialogue that supports the patient's individualized plan of care/goals, treatment preferences and shares the quality data associated with the providers.  [x] Yes [] No

## 2023-03-09 NOTE — H&P
Hospitalist History & Physical      PCP: Liban Carcamo, APRN - CNP    Date of Service: Pt seen/examined on 3/8/2023     Chief Complaint:  had concerns including Fall (Fell backwards down 6-7 steps. No LOC,takes Aspirin, denies pain, sustained laceration to posterior head. Patient isnt sure if he slipped or missed step.). History Of Present Illness:    Mr. Rhianna Sandy, a 80y.o. year old male  who  has a past medical history of CAD (coronary artery disease), Hyperlipidemia, and Hypertension. Patient presented to the emergency department after falling at home. Fell down backwards on 6-7 steps. Denies loss of consciousness. Patient is not sure if he slipped or missed a step. Lewis Alexander Has a laceration to the back of his head. Vital signs are within normal limits and stable. Imaging of the left leg/hip does not show any acute findings. Patient believes he is unable to safely live at home anymore and would like placement. Medicine consulted for admission. Past Medical History:   Diagnosis Date    CAD (coronary artery disease)     Hyperlipidemia     Hypertension        Past Surgical History:   Procedure Laterality Date    CORONARY ARTERY BYPASS GRAFT      HIP SURGERY      right       Prior to Admission medications    Medication Sig Start Date End Date Taking?  Authorizing Provider   hydrOXYzine (VISTARIL) 25 MG capsule Take 25 mg by mouth nightly     Historical Provider, MD   polyethylene glycol (GLYCOLAX) 17 g packet Take 17 g by mouth daily as needed for Constipation    Historical Provider, MD   carvedilol (COREG) 12.5 MG tablet Take 1 tablet by mouth 2 times daily (with meals) 4/27/21   Sabi Payne MD   furosemide (LASIX) 40 MG tablet Take 1 tablet by mouth daily  Patient taking differently: Take 40 mg by mouth every other day Alternating 20mg and 40mg 4/28/21   Sabi Payne MD   amLODIPine (NORVASC) 10 MG tablet Take 10 mg by mouth nightly    Historical Provider, MD   tamsulosin (FLOMAX) 0.4 MG capsule Take 0.4 mg by mouth every evening    Historical Provider, MD   finasteride (PROSCAR) 5 MG tablet Take 5 mg by mouth every evening    Historical Provider, MD   Melatonin 10 MG TABS Take 10 mg by mouth nightly     Historical Provider, MD   acetaminophen (TYLENOL) 325 MG tablet Take 650 mg by mouth 2 times daily    Historical Provider, MD   levothyroxine (SYNTHROID) 50 MCG tablet Take 50 mcg by mouth Daily    Historical Provider, MD   escitalopram (LEXAPRO) 10 MG tablet Take 10 mg by mouth daily    Historical Provider, MD   B Complex Vitamins (B-COMPLEX/B-12 PO) Take 2,500 mg by mouth    Historical Provider, MD   Cholecalciferol (VITAMIN D3) 3000 UNITS TABS Take 5,000 Units by mouth    Historical Provider, MD   aspirin 81 MG chewable tablet Take 81 mg by mouth daily    Historical Provider, MD   folic acid (FOLVITE) 483 MCG tablet Take 800 mcg by mouth daily     Historical Provider, MD         Allergies:  Zocor [simvastatin]    Social History:    TOBACCO:   reports that he has never smoked. He has never used smokeless tobacco.  ETOH:   reports current alcohol use of about 7.0 standard drinks per week. Family History:    Reviewed in detail and negative for DM, CAD, Cancer, CVA. Positive as follows\"  No family history on file. REVIEW OF SYSTEMS:   Pertinent positives as noted in the HPI. All other systems reviewed and negative. PHYSICAL EXAM:  BP (!) 143/84   Pulse 62   Temp 98.2 °F (36.8 °C)   Resp 16   Ht 5' 3\" (1.6 m)   Wt 170 lb (77.1 kg)   SpO2 95%   BMI 30.11 kg/m²   General appearance: No apparent distress, appears stated age and cooperative. HEENT: Normal cephalic, atraumatic without obvious deformity. Pupils equal, round, and reactive to light. Extra ocular muscles intact. Conjunctivae/corneas clear. Neck: Supple, with full range of motion. No jugular venous distention. Trachea midline.   Respiratory: CTA  Cardiovascular: RRR  Abdomen: S/NT/ND  Musculoskeletal: No clubbing, cyanosis, edema of bilateral lower extremities. Brisk capillary refill. Skin: Normal skin color. No rashes or lesions. Neurologic:  Neurovascularly intact without any focal sensory/motor deficits. Cranial nerves: II-XII intact, grossly non-focal.  Psychiatric: Alert and oriented, thought content appropriate, normal insight    Reviewed EKG and CXR personally      CBC:   No results for input(s): WBC, RBC, HGB, HCT, MCV, RDW, PLT in the last 72 hours. BMP: No results for input(s): NA, K, CL, CO2, BUN, CREATININE, CA, MG, PHOS in the last 72 hours. LFT:  No results for input(s): PROT, ALB, ALKPHOS, ALT, AST, BILITOT, AMYLASE, LIPASE in the last 72 hours. CE:  No results for input(s): Magdalene Patricia in the last 72 hours. PT/INR: No results for input(s): INR, APTT in the last 72 hours. BNP: No results for input(s): BNP in the last 72 hours.   ESR: No results found for: SEDRATE  CRP: No results found for: CRP  D Dimer: No results found for: DDIMER   Folate and B12:   Lab Results   Component Value Date    [address] (H) 04/28/2021   ,   Lab Results   Component Value Date    FOLATE >20.0 12/19/2017     Lactic Acid:   Lab Results   Component Value Date    LACTA 1.0 11/03/2019     Thyroid Studies:   Lab Results   Component Value Date    TSH 1.900 10/11/2021    T6YINYL 6.5 04/28/2021       Oupatient labs:  Lab Results   Component Value Date    CHOL 195 10/11/2021    TRIG 200 (H) 10/11/2021    HDL 38 10/11/2021    LDLCALC 117 (H) 10/11/2021    TSH 1.900 10/11/2021    PSA 0.76 12/07/2015    LABA1C 5.9 (H) 04/26/2021       Urinalysis:    Lab Results   Component Value Date/Time    NITRU Negative 04/24/2021 09:23 AM    WBCUA NONE 04/24/2021 09:23 AM    BACTERIA NONE SEEN 04/24/2021 09:23 AM    RBCUA 10-20 04/24/2021 09:23 AM    BLOODU LARGE 04/24/2021 09:23 AM    SPECGRAV 1.025 04/24/2021 09:23 AM    GLUCOSEU Negative 04/24/2021 09:23 AM       Imaging:  XR HIP LEFT (2-3 VIEWS)    Result Date: 3/8/2023  EXAMINATION: XRAY VIEWS OF THE LEFT FEMUR; TWO XRAY VIEWS OF THE LEFT HIP 3/8/2023 1:06 pm COMPARISON: July 5, 2017 HISTORY: ORDERING SYSTEM PROVIDED HISTORY: fall, r/o fx TECHNOLOGIST PROVIDED HISTORY: Reason for exam:->fall, r/o fx What reading provider will be dictating this exam?->CRC FINDINGS: No fracture or dislocation of the left hip. Severe osteoarthritic changes are associated with left hip with flattening of left femoral head, narrowing of superior aspect of the joint space, and subchondral sclerosis. No fracture involving mid or distal left femur. Vascular calcifications are present. 1. Severe osteoarthritis involving left hip with flattening of left femoral head and mild superior subluxation. 2. No fracture or dislocation of left hip or femur. XR FEMUR LEFT (MIN 2 VIEWS)    Result Date: 3/8/2023  EXAMINATION: XRAY VIEWS OF THE LEFT FEMUR; TWO XRAY VIEWS OF THE LEFT HIP 3/8/2023 1:06 pm COMPARISON: July 5, 2017 HISTORY: ORDERING SYSTEM PROVIDED HISTORY: fall, r/o fx TECHNOLOGIST PROVIDED HISTORY: Reason for exam:->fall, r/o fx What reading provider will be dictating this exam?->CRC FINDINGS: No fracture or dislocation of the left hip. Severe osteoarthritic changes are associated with left hip with flattening of left femoral head, narrowing of superior aspect of the joint space, and subchondral sclerosis. No fracture involving mid or distal left femur. Vascular calcifications are present. 1. Severe osteoarthritis involving left hip with flattening of left femoral head and mild superior subluxation. 2. No fracture or dislocation of left hip or femur. XR KNEE LEFT (MIN 4 VIEWS)    Result Date: 3/8/2023  EXAMINATION: FOUR XRAY VIEWS OF THE LEFT KNEE 3/8/2023 1:06 pm COMPARISON: None. HISTORY: ORDERING SYSTEM PROVIDED HISTORY: trauma TECHNOLOGIST PROVIDED HISTORY: Reason for exam:->trauma What reading provider will be dictating this exam?->CRC FINDINGS: No fracture or dislocation of left knee.   Patella is in satisfactory position.  No synovial effusion.  Vascular calcifications are present.     No acute osseous abnormality involving the left knee.     CT HEAD WO CONTRAST    Result Date: 3/8/2023  EXAMINATION: CT OF THE HEAD WITHOUT CONTRAST  3/8/2023 12:20 pm TECHNIQUE: CT of the head was performed without the administration of intravenous contrast. Automated exposure control, iterative reconstruction, and/or weight based adjustment of the mA/kV was utilized to reduce the radiation dose to as low as reasonably achievable. COMPARISON: CT head dated 04/24/2021 HISTORY: ORDERING SYSTEM PROVIDED HISTORY: trauma TECHNOLOGIST PROVIDED HISTORY: Has a \"code stroke\" or \"stroke alert\" been called?->No Reason for exam:->trauma Decision Support Exception - unselect if not a suspected or confirmed emergency medical condition->Emergency Medical Condition (MA) What reading provider will be dictating this exam?->CRC FINDINGS: BRAIN/VENTRICLES: There is no acute intracranial hemorrhage, mass effect or midline shift.  No abnormal extra-axial fluid collection.  The gray-white differentiation is maintained without evidence of an acute infarct.  There is no evidence of hydrocephalus. Moderate amount of low attenuation in the periventricular deep white matter suggesting chronic small vessel ischemic disease with moderate prominence of sulci towards the vertex of moderate generalized atrophy. ORBITS: The visualized portion of the orbits demonstrate no acute abnormality. SINUSES: The visualized paranasal sinuses and mastoid air cells demonstrate no acute abnormality. SOFT TISSUES/SKULL:  No acute abnormality of the visualized skull or soft tissues.     No acute intracranial abnormality.     CT CERVICAL SPINE WO CONTRAST    Result Date: 3/8/2023  EXAMINATION: CT OF THE CERVICAL SPINE WITHOUT CONTRAST 3/8/2023 12:20 pm TECHNIQUE: CT of the cervical spine was performed without the administration of intravenous contrast. Multiplanar reformatted  images are provided for review. Automated exposure control, iterative reconstruction, and/or weight based adjustment of the mA/kV was utilized to reduce the radiation dose to as low as reasonably achievable. COMPARISON: CT C-spine 12/17/2017 HISTORY: ORDERING SYSTEM PROVIDED HISTORY: trauma TECHNOLOGIST PROVIDED HISTORY: Reason for exam:->trauma Decision Support Exception - unselect if not a suspected or confirmed emergency medical condition->Emergency Medical Condition (MA) What reading provider will be dictating this exam?->CRC FINDINGS: BONES/ALIGNMENT: There is no acute fracture or traumatic malalignment. DEGENERATIVE CHANGES: Moderate multilevel degenerative changes. SOFT TISSUES: There is no prevertebral soft tissue swelling. No acute abnormality of the cervical spine. CT LUMBAR SPINE WO CONTRAST    Result Date: 3/8/2023  EXAMINATION: CT OF THE LUMBAR SPINE WITHOUT CONTRAST  3/8/2023 TECHNIQUE: CT of the lumbar spine was performed without the administration of intravenous contrast. Multiplanar reformatted images are provided for review. Adjustment of mA and/or kV according to patient size was utilized. Automated exposure control, iterative reconstruction, and/or weight based adjustment of the mA/kV was utilized to reduce the radiation dose to as low as reasonably achievable. COMPARISON: Correlation made with MRI from August 15, 2017 and radiographs from June 24, 2020 HISTORY: ORDERING SYSTEM PROVIDED HISTORY: trauma TECHNOLOGIST PROVIDED HISTORY: Reason for exam:->trauma Decision Support Exception - unselect if not a suspected or confirmed emergency medical condition->Emergency Medical Condition (MA) What reading provider will be dictating this exam?->CRC FINDINGS: Redemonstration of compression fracture involving T12 of approximately 80% with retropulsion and moderate central canal stenosis. This finding appears similar when compared with prior MRI from August 15, 2017.   No new fracture or malalignment of the lumbar spine. Disc herniation present at L4/L5 resulting in moderate central canal stenosis. 1. Redemonstration of chronic compression fracture of T12 of approximately 80% with retropulsion and central canal stenosis. 2. No new lumbar spine compression fracture or malalignment. XR CHEST PORTABLE    Result Date: 3/8/2023  EXAMINATION: ONE XRAY VIEW OF THE CHEST 3/8/2023 1:06 pm COMPARISON: None. HISTORY: ORDERING SYSTEM PROVIDED HISTORY: Shortness of breath TECHNOLOGIST PROVIDED HISTORY: Reason for exam:->Shortness of breath What reading provider will be dictating this exam?->CRC FINDINGS: The cardiac silhouette is at upper limits of normal in size. Postop sternotomy changes are noted There is no right or left lung infiltrate. There is a linear scarring scar seen within the left lung base. There is chronic blunting the left costophrenic angle. There is no pneumothorax There is no gross rib fracture. 1. There are no findings of failure or pneumonia 2. Chronic blunting the left costophrenic angle 3. Chronic linear scarring seen within the left lung base. CT HIP LEFT WO CONTRAST    Result Date: 3/8/2023  EXAMINATION: CT OF THE LEFT HIP WITHOUT CONTRAST 3/8/2023 4:33 pm TECHNIQUE: CT of the left hip was performed without the administration of intravenous contrast.  Multiplanar reformatted images are provided for review. Automated exposure control, iterative reconstruction, and/or weight based adjustment of the mA/kV was utilized to reduce the radiation dose to as low as reasonably achievable. COMPARISON: Correlation made with radiographs performed today.  HISTORY ORDERING SYSTEM PROVIDED HISTORY: kaelma TECHNOLOGIST PROVIDED HISTORY: Reason for exam:->truama Decision Support Exception - unselect if not a suspected or confirmed emergency medical condition->Emergency Medical Condition (MA) What reading provider will be dictating this exam?->CRC FINDINGS: Severe osteoarthritis involving the left hip with multiple degenerative subchondral cysts, narrowing involving superior aspect of the joint space, subchondral sclerosis, and marginal osteophytosis involving superior aspect of left acetabulum and femoral head. There is flattening of femoral head. No significant synovial effusion. There is no fracture or dislocation involving the left hip. No pelvis fracture. No free fluid collections in the pelvis. There is a right hip arthroplasty. Multiple diverticula are associated with the sigmoid colon. 1. Severe osteoarthritis involving left hip. 2. No fracture or dislocation. ASSESSMENT:  -Ambulatory dysfunction  -Fall at home  -Coronary artery disease  -Hypertension  -Hyperlipidemia  -Hypothyroidism      PLAN:  -Admit to medicine  -CM/SW consult for placement  -PT/OT  -Continue home medications        Diet: No diet orders on file  Code Status: Prior  Surrogate decision maker confirmed with patient:   Extended Emergency Contact Information  Primary Emergency Contact: Mary Jane Sevilla  Address: [address] [place] [address] Phone: [phone]  Relation: Spouse  Secondary Emergency Contact: Dolores Porter  Address: [address] [place] [address] Phone: [phone]  Mobile Phone: [phone]  Relation: Child    DVT Prophylaxis: []Lovenox []Heparin []PCD [] [address] []Encouraged ambulation  Disposition: []Med/Surg [] Intermediate [] ICU/CCU  Admit status: [] Observation [] Inpatient     +++++++++++++++++++++++++++++++++++++++++++++++++  Verena Gregory DO  +++++++++++++++++++++++++++++++++++++++++++++++++  NOTE: This report was transcribed using voice recognition software. Every effort was made to ensure accuracy; however, inadvertent computerized transcription errors may be present.

## 2023-03-09 NOTE — DISCHARGE INSTR - COC
Continuity of Care Form    Patient Name: Taylor Gar   :  1930  MRN:  [id number]    [address] [place] [address] date:  3/8/2023  Discharge date:  ***    Code Status Order: Full Code   Advance Directives:     Admitting Physician:  Luis Fernando Hoang DO  PCP: MIHIR Fierro CNP    Discharging Nurse: [facility] Unit/Room#: 9822/6617-U  Discharging Unit Phone Number: ***    Emergency Contact:   Extended Emergency Contact Information  Primary Emergency Contact: Mary Jane Sevilla  Address: [address] Phone: [phone]  Relation: Spouse  Secondary Emergency Contact: CharlotteDolores  Address: [address] Phone: [phone]  Mobile Phone: [phone]  Relation: Child    Past Surgical History:  Past Surgical History:   Procedure Laterality Date    CORONARY ARTERY BYPASS GRAFT      HIP SURGERY      right       Immunization History:   Immunization History   Administered Date(s) Administered    COVID-19, PFIZER Bivalent BOOSTER, DO NOT Dilute, (age 12y+), IM, 30 mcg/0.3 mL 10/07/2022    COVID-19, PFIZER PURPLE top, DILUTE for use, (age 15 y+), 30mcg/0.3mL 2021, 2021, 10/08/2021       Active Problems:  Patient Active Problem List   Diagnosis Code    CHF with unknown LVEF ([facility] [address] I50.9    Aortic valve stenosis I35.0    Sick sinus syndrome ([facility] [address] I49.5    Pacemaker B03.1    Systolic ejection murmur D41.2    Hypertension I10    CHF (congestive heart failure), NYHA class I, acute on chronic, combined (HCC) I50.43    Type 2 diabetes mellitus without complication (HCC) L25.8    Acute renal insufficiency N28.9    Compression fracture of T12 vertebra ([facility] [address] S22.080A    Decompensated heart failure (HCC) I50.9    Hypoxia R09.02    Ambulatory dysfunction R26.2       Isolation/Infection:   Isolation            No Isolation          Patient Infection Status       None to display Nurse Assessment:  Last Vital Signs: BP (!) 129/59   Pulse 66   Temp 97.8 °F (36.6 °C) (Temporal)   Resp 18   Ht 5' 3\" (1.6 m)   Wt 170 lb (77.1 kg)   SpO2 93%   BMI 30.11 kg/m²     Last documented pain score (0-10 scale): Pain Level: 10  Last Weight:   Wt Readings from Last 1 Encounters:   03/08/23 170 lb (77.1 kg)     Mental Status:  {IP PT MENTAL STATUS:20030}    IV Access:  { BERRY IV ACCESS:499535910}    Nursing Mobility/ADLs:  Walking   {P DME HDOC:737766167}  Transfer  {P DME LYPS:818583263}  Bathing  {P DME WATW:896654844}  Dressing  {CHP DME ORKQ:392644240}  Toileting  {CHP DME QOGT:197691107}  Feeding  {[facility] DME XTOE:515100411}  Med Admin  {[facility] DME BYZR:443473395}  Med Delivery   { BERRY MED Delivery:624778129}    Wound Care Documentation and Therapy:  Wound 03/08/23 Head Left;Upper;Posterior LT Posterior Scalp Lac (Active)   Wound Etiology Traumatic 03/09/23 1234   Dressing Status Other (Comment) 03/09/23 1234   Dressing/Treatment Open to air 03/09/23 1234   Wound Length (cm) 4 cm 03/08/23 2133   Wound Width (cm) 0 cm 03/08/23 2133   Wound Surface Area (cm^2) 0 cm^2 03/08/23 2133   Drainage Amount None 03/09/23 1234   Drainage Description Sanguinous 03/08/23 2310   Number of days: 0        Elimination:  Continence: Bowel: {YES / ZU:47331}  Bladder: {YES / YN:84017}  Urinary Catheter: {Urinary Catheter:978100878}   Colostomy/Ileostomy/Ileal Conduit: {YES / EM:83907}       Date of Last BM: ***    Intake/Output Summary (Last 24 hours) at 3/9/2023 1246  Last data filed at 3/8/2023 2310  Gross per 24 hour   Intake 200 ml   Output --   Net 200 ml     I/O last 3 completed shifts:   In: 200 [P.O.:200]  Out: -     Safety Concerns:     [address] SMART Safety Concerns:672723097}    Impairments/Disabilities:      [address] SMART Impairments/Disabilities:392369740}    Nutrition Therapy:  Current Nutrition Therapy:   [address] SMART Diet List:802044485}    Routes of Feeding: {CHP DME Other Feedings:806138064}  Liquids: {Slp liquid thickness:68615}  Daily Fluid Restriction: {CHP DME Yes amt example:950067734}  Last Modified Barium Swallow with Video (Video Swallowing Test): {Done Not Done KFSL:122644835}    Treatments at the Time of Hospital Discharge:   Respiratory Treatments: ***  Oxygen Therapy:  {Therapy; copd oxygen:57409}  Ventilator:    {MH CC Vent QW}    Rehab Therapies: {THERAPEUTIC INTERVENTION:3387740270}  Weight Bearing Status/Restrictions: 50Shai EVANS Weight Bearin}  Other Medical Equipment (for information only, NOT a DME order):  {EQUIPMENT:960225589}  Other Treatments: ***    Patient's personal belongings (please select all that are sent with patient):  {CHP DME Belongings:997735389}    RN SIGNATURE:  {Esignature:657756586}    CASE MANAGEMENT/SOCIAL WORK SECTION    Inpatient Status Date: ***    Readmission Risk Assessment Score:  Readmission Risk              Risk of Unplanned Readmission:  15           Discharging to Facility/ Agency   Name: [facility]  Address:  Phone:  Fax:    Dialysis Facility (if applicable)   Name:  Address:  Dialysis Schedule:  Phone:  Fax:    / signature: Electronically signed by ALEXANDRO Ray on 3/9/23 at 12:46 PM EST    PHYSICIAN SECTION    Prognosis: {Prognosis:1071112789}    Condition at Discharge: 50[address] Daniels Patient Condition:325003707}    Rehab Potential (if transferring to Rehab): {Prognosis:0181618391}    Recommended Labs or Other Treatments After Discharge: ***    Physician Certification: I certify the above information and transfer of Leidy Mccauley  is necessary for the continuing treatment of the diagnosis listed and that he requires [facility] for less 30 days.      Update Admission H&P: {CHP DME Changes in OBPOV:774037197}    PHYSICIAN SIGNATURE:  {Esignature:968893721}

## 2023-03-09 NOTE — ED NOTES
Contacted unit spoke with Teofilo Degroot, patient in transport      Albert, [address] [facility]  03/08/23 2022

## 2023-03-09 NOTE — PROGRESS NOTES
[address]      Date:3/9/2023                                                Patient Name: Rhianna Sandy  MRN: [id number]  : 1930  Room: [address] [place] [address]     Evaluating OT:Diamante Almanzar OTR/L   License #  [id number]       Referring Provider: Jerel Horowitz DO    Specific Provider Orders/Date: OT evaluation & treatment        Diagnosis: Ambulatory dysfunction [R26.2]      Pertinent Medical History:  has a past medical history of CAD (coronary artery disease), Hyperlipidemia, and Hypertension. Surgery: none this admit    Past Surgical History:  has a past surgical history that includes Coronary artery bypass graft and hip surgery. Precautions:  Fall Risk, bed alarm, L hip OA      Assessment of current deficits    [x] Functional mobility            [x]ADLs           [x] Strength                  [x]Cognition    [x] Functional transfers          [x] IADLs         [x] Safety Awareness   [x]Endurance    [x] Fine Coordination                         [x] Balance      [] Vision/perception   [x]Sensation      []Gross Motor Coordination             [] ROM           [] Delirium                   [] Motor Control      OT PLAN OF CARE   OT POC based on physician orders, patient diagnosis and results of clinical assessment     Frequency/Duration: 2-4 days/wk for 2 weeks PRN   Specific OT Treatment Interventions to include:    Instruction/training on adapted ADL techniques and AE recommendations to increase functional independence within precautions  Training on energy conservation strategies, correct breathing pattern and techniques to improve independence/tolerance for self-care routine  Functional transfer/mobility training/DME recommendations for increased independence, safety, and fall prevention  Patient/Family education to increase follow through with safety techniques and functional independence  Recommendation of environmental modifications for increased safety with functional transfers/mobility and ADLs  Cognitive retraining/development of therapeutic activities to improve problem solving, judgement, memory, and attention for increased safety/participation in ADL/IADL tasks  Therapeutic exercise to improve motor endurance, ROM, and functional strength for ADLs/functional transfers  Therapeutic activities to facilitate/challenge dynamic balance, stand tolerance for increased safety and independence with ADLs  Therapeutic activities to facilitate gross/fine motor skills for increased independence with ADLs  Positioning to improve skin integrity, interaction with environment and functional independence     Recommended Adaptive Equipment:  TBD      Home Living: Pt lives with wife in a split level with 5-6 steps to enter with B HR. B&B on lower level. 7-8 split level  Bathroom setup: walk in shower on lower level & std. commode   Equipment owned: ww on each level of home, shower seat     Prior Level of Function: assist from wife with ADLs such as bathing & dressing, assist with IADLs; ambulated ww. Driving: not active, children  Occupation: retired, [employer]      Pain Level: minimal at rest; moderate L hip in standing  Cognition: A&O: x3; Follows 2 step directions              Memory:  F              Sequencing:  F              Problem solving:  F              Judgement/safety:  F                Functional Assessment:  AM-PAC Daily Activity Raw Score: 15/24    Initial Eval Status  Date: 3-9-23 Treatment Status  Date: STGs = LTGs  Time frame: 10-14 days   Feeding Set up   Mod I/ Ind   Grooming SBA   Modified [place]    UB Dressing SBA with gown   Modified [place]    LB Dressing Max A with socks at EOB   Stand by Assist    Bathing Mod A with sim.  task   Stand by Assist    Toileting Max A with bedpan placement   Stand by Assist    Bed Mobility  Supine to sit: Mod A  Sit to supine: Mod A   Supine to sit: Modified [place]   Sit to supine: Modified [place]    Functional Transfers Mod A with sit <> stand with ww   Supervision    Functional Mobility Mod A with ww side steps towards HOB, limited d/t pain & increased fear of falling when up. Supervision    Balance Sitting:     Static:  SBA    Dynamic:CGA  Standing: Min/Mod  A       Activity Tolerance F- with lt. Ax.   F+    Visual/  Perceptual Glasses: yes          Vitals spO2 on RA & HR WFL   WFL      Hand Dominance R    AROM (PROM) Strength Additional Info:    RUE  WFL Grossly 4/5 good  and wfl FMC/dexterity noted during ADL tasks      LUE L shld. To 120  WFL distal Grossly 4/5 good  and wfl FMC/dexterity noted during ADL tasks         [facility] [employer] [place]   Sensation:  No c/o numbness or tingling B UE  Tone: WFL B UE  Edema: none noted B UE     Comments: Upon arrival patient supine in bed, agreeable to OT, cleared by Nursing, dtr. & POLLO present but in hallway during functional evaluation/ session. Therapist facilitated bed mobility/ADLs/functional transfers/mobility training with focus on safety, technique & precautions. Pt. & family Instructed RE: safe transfers/mobility, ADLs, role of OT, treatment plan, recs. , prec. At end of session, patient returned to bed, bedpan placed, alarm intact, all needs met, RN notified, with call light and phone within reach, all lines and tubes intact. Overall patient demonstrated decreased strength, balance, independence & safety during completion of ADL/functional transfer/mobility tasks. Pt would benefit from continued skilled OT to increase safety and independence with completion of ADL/IADL tasks for functional independence and quality of life.      Treatment: OT treatment provided this date includes:   Instruction/training on safety and adapted techniques for completion of ADLs: to increase [place] in self care   Instruction/training on safe functional mobility/transfer techniques: with focus on safety, technique & precautions   Instruction/training on energy conservation/work simplification for completion of ADLs: techniques to increase [place] with self care ADLs & iADLs, work simplification to improve endurance   Proper Positioning/Alignment: for optimal healing, skin integrity to prevent breakdown, decrease edema  Skilled monitoring of vitals: to include BP, spO2 & HR during session  Sitting/standing Balance/Tolerance- to increased balance & activity tolerance during ADLs as well as facilitate proper posture and/or positioning. Therapeutic exercise- Instruction on B UE ROM exercises to improve strength/function for increased [place] with ADLs & iADLs     Rehab Potential: Good for established goals     Patient / Family Goal: to regain strength       Patient and/or family were instructed on functional diagnosis, prognosis/goals and OT plan of care. Demonstrated F+ understanding. Eval Complexity: Low     Time In: 8:40  Time Out: 9:10  Total Treatment Time: 15    Min Units   OT Eval Low 30524  x     OT Eval Medium 19829       OT Eval High 26919       OT Re-Eval [id number]       Therapeutic Ex 39727       Therapeutic Activities 98466  15  1   ADL/Self Care 03608       Orthotic Management 17695       Manual [id number]       Neuro Re-Ed 49205       Non-Billable Time          Evaluation Time additionally includes thorough review of current medical information, gathering information on past medical history/social history and prior level of function, interpretation of standardized testing/informal observation of tasks, assessment of data and development of plan of care and goals. Diamante Almanzar, OTR/L   License #  [id number]

## 2023-03-09 NOTE — ED NOTES
3 staples placed near crown of head, at the bedside by resident student     Lopez RN  03/08/23 Brook Giraldo RN  03/08/23 2011

## 2023-03-10 ENCOUNTER — APPOINTMENT (OUTPATIENT)
Dept: MRI IMAGING | Age: 88
DRG: 554 | End: 2023-03-10
Payer: MEDICARE

## 2023-03-10 PROCEDURE — 2580000003 HC RX 258: Performed by: FAMILY MEDICINE

## 2023-03-10 PROCEDURE — 6370000000 HC RX 637 (ALT 250 FOR IP)

## 2023-03-10 PROCEDURE — 72148 MRI LUMBAR SPINE W/O DYE: CPT

## 2023-03-10 PROCEDURE — G0378 HOSPITAL OBSERVATION PER HR: HCPCS

## 2023-03-10 PROCEDURE — 6370000000 HC RX 637 (ALT 250 FOR IP): Performed by: FAMILY MEDICINE

## 2023-03-10 PROCEDURE — 6360000002 HC RX W HCPCS: Performed by: FAMILY MEDICINE

## 2023-03-10 PROCEDURE — 1200000000 HC SEMI PRIVATE

## 2023-03-10 RX ORDER — LORAZEPAM 0.5 MG/1
0.5 TABLET ORAL
Status: COMPLETED | OUTPATIENT
Start: 2023-03-10 | End: 2023-03-10

## 2023-03-10 RX ADMIN — LORAZEPAM 0.5 MG: 0.5 TABLET ORAL at 22:03

## 2023-03-10 RX ADMIN — Medication 10 ML: at 20:09

## 2023-03-10 RX ADMIN — ASPIRIN 81 MG CHEWABLE TABLET 81 MG: 81 TABLET CHEWABLE at 08:32

## 2023-03-10 RX ADMIN — ENOXAPARIN SODIUM 40 MG: 100 INJECTION SUBCUTANEOUS at 08:32

## 2023-03-10 RX ADMIN — Medication 10 ML: at 08:33

## 2023-03-10 RX ADMIN — AMLODIPINE BESYLATE 10 MG: 10 TABLET ORAL at 20:05

## 2023-03-10 RX ADMIN — TAMSULOSIN HYDROCHLORIDE 0.4 MG: 0.4 CAPSULE ORAL at 18:04

## 2023-03-10 RX ADMIN — LEVOTHYROXINE SODIUM 50 MCG: 0.05 TABLET ORAL at 06:02

## 2023-03-10 RX ADMIN — ESCITALOPRAM OXALATE 10 MG: 10 TABLET, FILM COATED ORAL at 08:32

## 2023-03-10 RX ADMIN — CARVEDILOL 12.5 MG: 6.25 TABLET, FILM COATED ORAL at 08:32

## 2023-03-10 RX ADMIN — CARVEDILOL 12.5 MG: 6.25 TABLET, FILM COATED ORAL at 18:04

## 2023-03-10 RX ADMIN — HYDROXYZINE PAMOATE 25 MG: 25 CAPSULE ORAL at 20:05

## 2023-03-10 RX ADMIN — FINASTERIDE 5 MG: 5 TABLET, FILM COATED ORAL at 18:04

## 2023-03-10 RX ADMIN — Medication 10 MG: at 20:05

## 2023-03-10 RX ADMIN — FOLIC ACID 1 MG: 1 TABLET ORAL at 08:32

## 2023-03-10 NOTE — CONSULTS
Department of Orthopedic Surgery  Resident Consult Note          Reason for Consult: Left hip pain    HISTORY OF PRESENT ILLNESS:       Patient is a 80 y.o. male who presents with acute on chronic left hip pain. Patient states that his hip is hurting for at least 2 years, states he ambulates with a walker and the other day he had a fall down approximately 7 steps when he was ambulating. Initial x-rays and CT scan were read as negative, however patient has not not been able to ambulate since the injury. Denies numbness/tingling/paresthesias. Denies any other orthopedic complaints at this time.       Past Medical History:        Diagnosis Date    CAD (coronary artery disease)     Hyperlipidemia     Hypertension      Past Surgical History:        Procedure Laterality Date    CORONARY ARTERY BYPASS GRAFT      HIP SURGERY      right     Current Medications:   Current Facility-Administered Medications: amLODIPine (NORVASC) tablet 10 mg, 10 mg, Oral, Nightly  aspirin chewable tablet 81 mg, 81 mg, Oral, Daily  carvedilol (COREG) tablet 12.5 mg, 12.5 mg, Oral, BID WC  escitalopram (LEXAPRO) tablet 10 mg, 10 mg, Oral, Daily  finasteride (PROSCAR) tablet 5 mg, 5 mg, Oral, QPM  folic acid (FOLVITE) tablet 1 mg, 1 mg, Oral, Daily  furosemide (LASIX) tablet 40 mg, 40 mg, Oral, Every Other Day  hydrOXYzine pamoate (VISTARIL) capsule 25 mg, 25 mg, Oral, Nightly  levothyroxine (SYNTHROID) tablet 50 mcg, 50 mcg, Oral, Daily  melatonin disintegrating tablet 10 mg, 10 mg, Oral, Nightly  tamsulosin (FLOMAX) capsule 0.4 mg, 0.4 mg, Oral, QPM  sodium chloride flush 0.9 % injection 10 mL, 10 mL, IntraVENous, 2 times per day  sodium chloride flush 0.9 % injection 10 mL, 10 mL, IntraVENous, PRN  0.9 % sodium chloride infusion, , IntraVENous, PRN  enoxaparin (LOVENOX) injection 40 mg, 40 mg, SubCUTAneous, Daily  promethazine (PHENERGAN) tablet 12.5 mg, 12.5 mg, Oral, Q6H PRN **OR** ondansetron (ZOFRAN) injection 4 mg, 4 mg, IntraVENous, Q6H PRN  polyethylene glycol (GLYCOLAX) packet 17 g, 17 g, Oral, Daily PRN  acetaminophen (TYLENOL) tablet 650 mg, 650 mg, Oral, Q6H PRN **OR** acetaminophen (TYLENOL) suppository 650 mg, 650 mg, Rectal, Q6H PRN  Allergies:  Zocor [simvastatin]    Social History:   TOBACCO:   reports that he has never smoked. He has never used smokeless tobacco.  ETOH:   reports current alcohol use of about 7.0 standard drinks per week.  DRUGS:   reports no history of drug use.  ACTIVITIES OF DAILY LIVING:    OCCUPATION:    Family History:   No family history on file.    REVIEW OF SYSTEMS:  CONSTITUTIONAL:  negative for  fevers, chills  EYES:  negative for blurred vision, visual disturbance  HEENT:  negative for  hearing loss, voice change  RESPIRATORY:  negative for  dyspnea, wheezing  CARDIOVASCULAR:  negative for  chest pain, palpitations  GASTROINTESTINAL:  negative for nausea, vomiting  GENITOURINARY:  negative for frequency, urinary incontinence  HEMATOLOGIC/LYMPHATIC:  negative for bleeding and petechiae  MUSCULOSKELETAL:  positive for left hip pain  NEUROLOGICAL:  negative for headaches, dizziness  BEHAVIOR/PSYCH:  negative for increased agitation and anxiety    PHYSICAL EXAM:    VITALS:  /62   Pulse 71   Temp 97.9 °F (36.6 °C) (Temporal)   Resp 17   Ht 5' 3\" (1.6 m)   Wt 170 lb (77.1 kg)   SpO2 92%   BMI 30.11 kg/m²     CONSTITUTIONAL: awake, alert, cooperative, no apparent distress, and appears stated age  EYES: Lids and lashes normal, pupils equal, round and reactive to light, extra ocular muscles intact, sclera clear, conjunctiva normal  ENT: Normocephalic, without obvious abnormality, atraumatic, external ears without lesions, oral pharynx with moist mucus membranes  NECK: Trachea midline, skin normal  LUNGS: No increased work of breathing, good air exchange  CARDIOVASCULAR: 2+ pulses throughout and capillary Refill less than 2 seconds  ABDOMEN: soft, non-distended, non-tender and no rebound tenderness or  guarding  NEUROLOGIC: Awake, alert, oriented to name, place and time. MUSCULOSKELETAL:  Left lower Extremity:  Skin intact circumferentially  Compartments of the thigh and leg are soft and compressible  There is mild TTP about the hip  Stinchfield exam equivocal  Bone-on-bone grind appreciated at the knee, crepitus with passive range of motion  There is pain with logroll of the left hip  DF/PF/EHL +5/5  Distal sensation light touch grossly intact    Secondary Exam:   bilateralUE: Ecchymosis over bilateral upper extremities, bandage to left upper extremity for skin tear. -TTP to fingers, hand, wrist, forearm, elbow, humerus, shoulder or clavicle. -- Patient able to flex/extend fingers, wrist, elbow and shoulder with active and passive ROM without pain, +2/4 Radial pulse, cap refill <3sec, +AIN/PIN/Radial/Ulnar/Median N, distal sensation grossly intact to C4-T1 dermatomes, compartments soft and compressible. rightLE: No obvious signs of trauma. -TTP to foot, ankle, leg, knee, thigh, hip.-- Patient able to flex/extend toes, ankle, knee and hip with active and passive ROM without pain,+2/4 DP & PT pulses, cap refill <3sec, +5/5 PF/DF/EHL, distal sensation grossly intact to L4-S1 dermatomes, compartments soft and compressible. DATA:    CBC:   Lab Results   Component Value Date/Time    WBC 3.8 03/09/2023 04:29 AM    RBC 2.81 03/09/2023 04:29 AM    HGB 9.8 03/09/2023 04:29 AM    HCT 28.9 03/09/2023 04:29 AM    .8 03/09/2023 04:29 AM    MCH 34.9 03/09/2023 04:29 AM    MCHC 33.9 03/09/2023 04:29 AM    RDW 12.8 03/09/2023 04:29 AM     03/09/2023 04:29 AM    MPV 9.3 03/09/2023 04:29 AM     PT/INR:  No results found for: PROTIME, INR    Radiology Review:  2 views of the left hip reviewed. Severe osteoarthritic changes on both the femoral and acetabular sides of the joint. Joint space narrowing with subchondral cystic changes in the head and acetabulum.   The head is flattened and aspherical, and the acetabulum is dished out with superior lateral migration of the femoral head. No acute fractures noted on plain films    CT scan of the left hip reviewed. No definitive fracture seen. He does have severe osteoarthritic changes as described above. On a few cuts there is a questionable nondisplaced lucency running in the basicervical region of the femoral neck, I do question whether this is a nondisplaced fracture, however it is not clear. Also poorly visualized on axial views. IMPRESSION:  Left hip pain  Severe left hip osteoarthritis    PLAN:  Do not see any definitive fracture in the left hip. Patient is still unable to ambulate which is concerning for occult fracture. Would attempt PT/OT for ambulation, if patient is still having significant pain we will consider additional imaging such as MRI to evaluate for occult hip fracture  Pain control per primary  PT/OT  We will follow along  Discuss with attending    Milli Camargo DO , PGY-2  3/9/23    Orthopaedic Attending    I have seen and evaluated the patient with the resident and agree with the above assessments on today's visit. I have performed the key components of the history and physical examination and concur completely with the findings and plans as documented above, exceptions of above documented below. Pleasant 41-year-old male who is alert and oriented complaining of left-sided hip pain. He is accompanied by his daughter. Patient a limited ambulator prior to recent increase in his pain. He does have known left hip osteoarthritis advanced in nature as well as lumbar degenerative disc disease and history of compression fracture. States he has had epidural injections in the past as well as left hip CSI injections all of which gave him some relief. Had recent fall down approximately 7 steps and this seemed to aggravate his pain.   He describes his pain starting in the low back and buttock region radiating down the lateral aspect of the thigh past the knee down the leg and into the foot. States this is the severe pain he gets and this is making it hard for him to ambulate. His daughter states that he has been a fall risk and has had multiple falls more recently. Clinically patient does have atrophy and globalized weakness to the bilateral lower extremities. He has pain with straight leg raise. He has very limited passive ROM of the hip although logroll does not elicit groin pain nor does heel strike. Compartments are soft and compressible, calves are supple, foot is warm and perfused. Discussed patient's diagnoses in detail with him including his chronic low back pain, degenerative disc disease, left lower extremity radiculopathy, left hip advanced osteoarthritis. Discussed imaging with patient family, discussed there is no obvious fracture although on one CT scan cut could make out potential occult incomplete fracture. Clinically patient does not examine like a hip fracture. He examines more like chronic low back pain with nerve compression radiculopathy which seems to be the most aggravating of his symptoms. We discussed we would recommend neurosurgery consult for evaluation of his low back and his symptoms, and patient would benefit from updated MRI. We also discussed treatment options in detail with him with specifics of the hip, patient family agreeable to conservative treatments at this time due to his age and underlying cardiac history, they agree he is not a good candidate for total hip arthroplasty. Would recommend continued PT after neurosurgery consult has been obtained and advanced imaging of his back is been obtained which patient family are agreeable to.   Discussed with patient and family we do not feel that he is quite yet ready for discharge to rehab yet as his work-up has not yet been completed and patient is no better now than he was when he presented to the hospital.    Electronically signed by   Fidelia Argueta DO    NOTE: This report was transcribed using voice recognition software.  Every effort was made to ensure accuracy; however, inadvertent computerized transcription errors may be present

## 2023-03-10 NOTE — PLAN OF CARE
Problem: Discharge Planning  Goal: Discharge to home or other facility with appropriate resources  3/10/2023 0222 by Tierney Pringle RN  Outcome: Progressing     Problem: Pain  Goal: Verbalizes/displays adequate comfort level or baseline comfort level  3/10/2023 0222 by Tierney Pringle RN  Outcome: Progressing

## 2023-03-10 NOTE — PROGRESS NOTES
Hospitalist Progress Note      Synopsis:   Briefly, patient with PMH significant for CAD, HPL, HPT presented to ED and was admitted due to falling at home off of steps. Patient has concerns of living without assistance and feels he would benefit from placement. Imaging shows no acute injury, however severe osteoarthritis involving left hip was appreciated. Upon interview patient states he is unable to bear any weight on this leg. Orthopedic surgery has been consulted, plan is for patient to have MRI of hip with possible medical management. Hospital day 0     Subjective:  Spoke with patient and daughter at length, patient and daughter do not want to be discharged today until the etiology of hip pain is more clear to them. Stable overnight. No issues reported. Patient seen and examined  Records reviewed. Temp (24hrs), Av.9 °F (36.6 °C), Min:97.9 °F (36.6 °C), Max:97.9 °F (36.6 °C)    DIET: ADULT DIET; Regular  CODE: Full Code    Intake/Output Summary (Last 24 hours) at 3/10/2023 1120  Last data filed at 3/10/2023 1029  Gross per 24 hour   Intake 420 ml   Output 450 ml   Net -30 ml           Objective:    BP (!) 180/81   Pulse 73   Temp 97.9 °F (36.6 °C) (Temporal)   Resp 16   Ht 5' 3\" (1.6 m)   Wt 170 lb (77.1 kg)   SpO2 93%   BMI 30.11 kg/m²     General appearance: No apparent distress, appears stated age and cooperative. HEENT: Conjunctivae/corneas clear. Mucous membranes moist.  Neck: Supple. No JVD. Respiratory:  Clear to auscultation bilaterally. Normal respiratory effort. Cardiovascular:  RRR. S1, S2 without MRG. PV: Pulses palpable. No edema. Abdomen: Soft, non-tender, non-distended. +BS  Musculoskeletal: No obvious deformities. Skin: Normal skin color. No rashes or lesions. Good turgor. Neurologic:  Grossly non-focal. Awake, alert, following commands.    Psychiatric: Alert and oriented, thought content appropriate, normal insight and judgement    Medications: REVIEWED DAILY    Infusion Medications    sodium chloride       Scheduled Medications    amLODIPine  10 mg Oral Nightly    aspirin  81 mg Oral Daily    carvedilol  12.5 mg Oral BID WC    escitalopram  10 mg Oral Daily    finasteride  5 mg Oral QPM    folic acid  1 mg Oral Daily    furosemide  40 mg Oral Every Other Day    hydrOXYzine pamoate  25 mg Oral Nightly    levothyroxine  50 mcg Oral Daily    melatonin  10 mg Oral Nightly    tamsulosin  0.4 mg Oral QPM    sodium chloride flush  10 mL IntraVENous 2 times per day    enoxaparin  40 mg SubCUTAneous Daily     PRN Meds: sodium chloride flush, sodium chloride, promethazine **OR** ondansetron, polyethylene glycol, acetaminophen **OR** acetaminophen    Labs:     Recent Labs     03/08/23 1955 03/09/23 0429   WBC 4.4* 3.8*   HGB 9.5* 9.8*   HCT 28.8* 28.9*   * 131       Recent Labs     03/08/23 1955 03/09/23 0429    137   K 4.3 3.8    101   CO2 28 25   BUN 25* 26*   CREATININE 1.4* 1.4*   CALCIUM 9.2 8.5*       No results for input(s): PROT, ALB, ALKPHOS, ALT, AST, BILITOT, AMYLASE, LIPASE in the last 72 hours. No results for input(s): INR in the last 72 hours. No results for input(s): June Height in the last 72 hours.     Chronic labs:    Lab Results   Component Value Date    CHOL 195 10/11/2021    TRIG 200 (H) 10/11/2021    HDL 38 10/11/2021    LDLCALC 117 (H) 10/11/2021    TSH 1.900 10/11/2021    PSA 0.76 12/07/2015    LABA1C 5.9 (H) 04/26/2021       Radiology: REVIEWED DAILY    Assessment:  Ambulatory dysfunction with subsequent fall at home  Coronary artery disease  Severe osteoarthritis of left hip generalized on CT  Hypertension  Hyperlipidemia  Hypothyroidism  Pancytopenia  Chronic kidney disease, baseline creatinine seems to be 1.3-1.4  Plan:  Reviewed CT of left hip revealing severe osteoarthritis, also reviewed ED notes, previous hospitalization notes, in light of findings and patient complaint we consulted orthopedic surgery for evaluation of left hip. Reviewed orthopedic surgical note, current plan is to get MRI of left hip for further evaluation and possible pathologic fracture. Agree with plan. PT OT consulted  Case management social work consulted for placement  Continue appropriate home medications  Pancytopenia, reviewed CBC, WBC 3.8, hemoglobin 9.8, hematocrit 28.9, seems to be patient's baseline, will continue to monitor  Monitor labs replete as indicated      DVT Prophylaxis [x] Lovenox  []  Heparin [] DOAC [] PCDs [] Ambulation    GI Prophylaxis [] PPI  [] H2 Blocker   [] Carafate  [x] Diet/Tube Feeds   Level of care [x] Med/Surg  [] Intermediate  []  ICU   Diet ADULT DIET; Regular    Family contact [x]  N/A    [] At bedside  [] Phone call   Disposition Patient requires continued admission patient can discharge pending results of MRI of hip. MDM [] Low    [x] Moderate  []   High       Discharge Plan: Discharged to [facility] pending results MRI of hip    +++++++++++++++++++++++++++++++++++++++++++++++++  QUANG Rose/ [place] [address]  +++++++++++++++++++++++++++++++++++++++++++++++++  NOTE: This report was transcribed using voice recognition software. Every effort was made to ensure accuracy; however, inadvertent computerized transcription errors may be present.

## 2023-03-10 NOTE — PROGRESS NOTES
Department of Orthopedic Surgery  Resident Progress Note    Patient seen and examined. Pain radiating down left leg, some described as acute, some described as chronic. Patient describes pain as radiating from the buttock down to the foot. No groin pain per patient. No new complaints. VITALS:  BP (!) 180/81   Pulse 73   Temp 97.9 °F (36.6 °C) (Temporal)   Resp 16   Ht 5' 3\" (1.6 m)   Wt 170 lb (77.1 kg)   SpO2 93%   BMI 30.11 kg/m²     General: well-developed and well nourished, in no acute distress, and alert    MUSCULOSKELETAL:   left lower extremity:  Patient with skin intact circumferentially. There is crepitance and pain with log roll and internal external at 90 degrees. With straight leg raise, lateral leg pain into the foot. 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 Radicular symptoms in the setting of lumbar back pain and arthritis. PLAN      Continue physical therapy and protocol: WBAT - LLE  Recommend evaluation from a lower back perspective as his symptoms are described and related to radiculopathy. He does have chronic right hip pain, surgical interventions for the left hip pain will not address this symptolology. CT findings show severe arthritis, however this does not correspond to his physical examination. He has minimal groin pain. Deep venous thrombosis prophylaxis - per primary team, early mobilization  MRI lumbar spine, recommend neurosurgical consultation for pain relief modalities.     PT/OT  Pain Control: per primary team.  No acute surgical interventions planned. Patient and family were updated on our concerns from an orthopedic perspective. Total hip arthroplasty is a viable option, but normally for a much younger patient. They understand this would not alleviate his radicular symptoms. Seen with Dr. Rosy Goldmann. Follow up outpatient with Dr. Rosy Goldmann. Orthopaedic Attending    I have seen and evaluated the patient with the resident and agree with the above assessments on today's visit. I have performed the key components of the history and physical examination and concur completely with the findings and plans as documented above.     Electronically signed by   Lev Magdaleno DO  3/10/2023

## 2023-03-10 NOTE — CARE COORDINATION
3/10. [facility] has accepted the pt. Will need insurance precert. For MRi to determine etiology of hip pain.  Rudy Bautista RN

## 2023-03-11 PROBLEM — M16.31 OSTEOARTHRITIS OF RIGHT HIP JOINT DUE TO DYSPLASIA: Status: ACTIVE | Noted: 2023-03-11

## 2023-03-11 LAB
ALBUMIN SERPL-MCNC: 3.7 G/DL (ref 3.5–5.2)
ALP BLD-CCNC: 52 U/L (ref 40–129)
ALT SERPL-CCNC: 5 U/L (ref 0–40)
ANION GAP SERPL CALCULATED.3IONS-SCNC: 10 MMOL/L (ref 7–16)
ANISOCYTOSIS: ABNORMAL
AST SERPL-CCNC: 8 U/L (ref 0–39)
BASOPHILS ABSOLUTE: 0.02 E9/L (ref 0–0.2)
BASOPHILS RELATIVE PERCENT: 0.4 % (ref 0–2)
BILIRUB SERPL-MCNC: 0.4 MG/DL (ref 0–1.2)
BUN BLDV-MCNC: 32 MG/DL (ref 6–23)
CALCIUM SERPL-MCNC: 8.9 MG/DL (ref 8.6–10.2)
CHLORIDE BLD-SCNC: 102 MMOL/L (ref 98–107)
CO2: 27 MMOL/L (ref 22–29)
CREAT SERPL-MCNC: 1.5 MG/DL (ref 0.7–1.2)
EOSINOPHILS ABSOLUTE: 0.14 E9/L (ref 0.05–0.5)
EOSINOPHILS RELATIVE PERCENT: 2.9 % (ref 0–6)
GFR SERPL CREATININE-BSD FRML MDRD: 43 ML/MIN/1.73
GLUCOSE BLD-MCNC: 163 MG/DL (ref 74–99)
HCT VFR BLD CALC: 29.2 % (ref 37–54)
HEMOGLOBIN: 9.7 G/DL (ref 12.5–16.5)
IMMATURE GRANULOCYTES #: 0.02 E9/L
IMMATURE GRANULOCYTES %: 0.4 % (ref 0–5)
LYMPHOCYTES ABSOLUTE: 0.58 E9/L (ref 1.5–4)
LYMPHOCYTES RELATIVE PERCENT: 11.9 % (ref 20–42)
MCH RBC QN AUTO: 34.5 PG (ref 26–35)
MCHC RBC AUTO-ENTMCNC: 33.2 % (ref 32–34.5)
MCV RBC AUTO: 103.9 FL (ref 80–99.9)
MONOCYTES ABSOLUTE: 0.34 E9/L (ref 0.1–0.95)
MONOCYTES RELATIVE PERCENT: 7 % (ref 2–12)
NEUTROPHILS ABSOLUTE: 3.77 E9/L (ref 1.8–7.3)
NEUTROPHILS RELATIVE PERCENT: 77.4 % (ref 43–80)
PDW BLD-RTO: 12.7 FL (ref 11.5–15)
PLATELET # BLD: 118 E9/L (ref 130–450)
PMV BLD AUTO: 8.7 FL (ref 7–12)
POIKILOCYTES: ABNORMAL
POTASSIUM SERPL-SCNC: 3.7 MMOL/L (ref 3.5–5)
RBC # BLD: 2.81 E12/L (ref 3.8–5.8)
SODIUM BLD-SCNC: 139 MMOL/L (ref 132–146)
STOMATOCYTES: ABNORMAL
TOTAL PROTEIN: 6.3 G/DL (ref 6.4–8.3)
WBC # BLD: 4.9 E9/L (ref 4.5–11.5)

## 2023-03-11 PROCEDURE — 80053 COMPREHEN METABOLIC PANEL: CPT

## 2023-03-11 PROCEDURE — 6370000000 HC RX 637 (ALT 250 FOR IP): Performed by: FAMILY MEDICINE

## 2023-03-11 PROCEDURE — 36415 COLL VENOUS BLD VENIPUNCTURE: CPT

## 2023-03-11 PROCEDURE — 6360000002 HC RX W HCPCS: Performed by: FAMILY MEDICINE

## 2023-03-11 PROCEDURE — 6370000000 HC RX 637 (ALT 250 FOR IP): Performed by: NEUROLOGICAL SURGERY

## 2023-03-11 PROCEDURE — 1200000000 HC SEMI PRIVATE

## 2023-03-11 PROCEDURE — 85025 COMPLETE CBC W/AUTO DIFF WBC: CPT

## 2023-03-11 PROCEDURE — 2580000003 HC RX 258: Performed by: FAMILY MEDICINE

## 2023-03-11 RX ORDER — TRAMADOL HYDROCHLORIDE 50 MG/1
50 TABLET ORAL EVERY 6 HOURS PRN
Status: DISCONTINUED | OUTPATIENT
Start: 2023-03-11 | End: 2023-03-14 | Stop reason: HOSPADM

## 2023-03-11 RX ADMIN — ASPIRIN 81 MG CHEWABLE TABLET 81 MG: 81 TABLET CHEWABLE at 09:26

## 2023-03-11 RX ADMIN — TAMSULOSIN HYDROCHLORIDE 0.4 MG: 0.4 CAPSULE ORAL at 17:32

## 2023-03-11 RX ADMIN — AMLODIPINE BESYLATE 10 MG: 10 TABLET ORAL at 20:14

## 2023-03-11 RX ADMIN — LEVOTHYROXINE SODIUM 50 MCG: 0.05 TABLET ORAL at 06:29

## 2023-03-11 RX ADMIN — Medication 10 ML: at 20:15

## 2023-03-11 RX ADMIN — Medication 10 MG: at 20:14

## 2023-03-11 RX ADMIN — CARVEDILOL 12.5 MG: 6.25 TABLET, FILM COATED ORAL at 17:32

## 2023-03-11 RX ADMIN — HYDROXYZINE PAMOATE 25 MG: 25 CAPSULE ORAL at 20:14

## 2023-03-11 RX ADMIN — Medication 10 ML: at 09:25

## 2023-03-11 RX ADMIN — FOLIC ACID 1 MG: 1 TABLET ORAL at 09:26

## 2023-03-11 RX ADMIN — ENOXAPARIN SODIUM 40 MG: 100 INJECTION SUBCUTANEOUS at 10:09

## 2023-03-11 RX ADMIN — TRAMADOL HYDROCHLORIDE 50 MG: 50 TABLET ORAL at 16:16

## 2023-03-11 RX ADMIN — ESCITALOPRAM OXALATE 10 MG: 10 TABLET, FILM COATED ORAL at 09:26

## 2023-03-11 RX ADMIN — FINASTERIDE 5 MG: 5 TABLET, FILM COATED ORAL at 17:32

## 2023-03-11 RX ADMIN — TRAMADOL HYDROCHLORIDE 50 MG: 50 TABLET ORAL at 10:08

## 2023-03-11 RX ADMIN — CARVEDILOL 12.5 MG: 6.25 TABLET, FILM COATED ORAL at 09:26

## 2023-03-11 RX ADMIN — FUROSEMIDE 40 MG: 20 TABLET ORAL at 09:26

## 2023-03-11 ASSESSMENT — PAIN DESCRIPTION - ORIENTATION
ORIENTATION: LEFT
ORIENTATION: LEFT

## 2023-03-11 ASSESSMENT — PAIN DESCRIPTION - FREQUENCY
FREQUENCY: CONTINUOUS
FREQUENCY: CONTINUOUS

## 2023-03-11 ASSESSMENT — PAIN DESCRIPTION - PAIN TYPE
TYPE: ACUTE PAIN;CHRONIC PAIN
TYPE: ACUTE PAIN;CHRONIC PAIN

## 2023-03-11 ASSESSMENT — PAIN SCALES - GENERAL
PAINLEVEL_OUTOF10: 4
PAINLEVEL_OUTOF10: 5
PAINLEVEL_OUTOF10: 7
PAINLEVEL_OUTOF10: 7

## 2023-03-11 ASSESSMENT — PAIN DESCRIPTION - ONSET
ONSET: ON-GOING
ONSET: ON-GOING

## 2023-03-11 ASSESSMENT — PAIN - FUNCTIONAL ASSESSMENT
PAIN_FUNCTIONAL_ASSESSMENT: PREVENTS OR INTERFERES SOME ACTIVE ACTIVITIES AND ADLS
PAIN_FUNCTIONAL_ASSESSMENT: PREVENTS OR INTERFERES SOME ACTIVE ACTIVITIES AND ADLS

## 2023-03-11 ASSESSMENT — PAIN DESCRIPTION - LOCATION
LOCATION: HIP
LOCATION: HIP

## 2023-03-11 ASSESSMENT — PAIN DESCRIPTION - DESCRIPTORS
DESCRIPTORS: ACHING;DISCOMFORT;DULL
DESCRIPTORS: ACHING;DISCOMFORT;DULL

## 2023-03-11 NOTE — CONSULTS
Neurosurgery Consult Note      CHIEF COMPLAINT: Left hip and leg pain    HPI:Patient presented to the emergency department after falling at home. Fell down backwards on 6-7 steps. Denies loss of consciousness. Patient is not sure if he slipped or missed a step. Terry Jolley Has a laceration to the back of his head. Vital signs are within normal limits and stable. Imaging of the left leg/hip does not show any acute findings. Patient believes he is unable to safely live at home anymore and would like placement. Medicine consulted for admission. He was evaluated in his hospital bed on the fifth floor awake alert complaining of left hip and leg pain he has had it for many years he did denied numbness of his leg and he denied motor weakness of the leg no bowel or bladder incontinence    Past Medical History:   Diagnosis Date    CAD (coronary artery disease)     Hyperlipidemia     Hypertension      Past Surgical History:   Procedure Laterality Date    CORONARY ARTERY BYPASS GRAFT      HIP SURGERY      right     Zocor [simvastatin]  Prior to Admission medications    Medication Sig Start Date End Date Taking?  Authorizing Provider   hydrOXYzine (VISTARIL) 25 MG capsule Take 25 mg by mouth nightly     Historical Provider, MD   polyethylene glycol (GLYCOLAX) 17 g packet Take 17 g by mouth daily as needed for Constipation    Historical Provider, MD   carvedilol (COREG) 12.5 MG tablet Take 1 tablet by mouth 2 times daily (with meals) 4/27/21   Thais Canada MD   furosemide (LASIX) 40 MG tablet Take 1 tablet by mouth daily  Patient taking differently: Take 40 mg by mouth every other day Alternating 20mg and 40mg 4/28/21   Thais Canada MD   amLODIPine (NORVASC) 10 MG tablet Take 10 mg by mouth nightly    Historical Provider, MD   tamsulosin (FLOMAX) 0.4 MG capsule Take 0.4 mg by mouth every evening    Historical Provider, MD   finasteride (PROSCAR) 5 MG tablet Take 5 mg by mouth every evening    Historical Provider, MD   Melatonin 10 MG TABS Take 10 mg by mouth nightly     Historical Provider, MD   acetaminophen (TYLENOL) 325 MG tablet Take 650 mg by mouth 2 times daily    Historical Provider, MD   levothyroxine (SYNTHROID) 50 MCG tablet Take 50 mcg by mouth Daily    Historical Provider, MD   escitalopram (LEXAPRO) 10 MG tablet Take 10 mg by mouth daily    Historical Provider, MD   B Complex Vitamins (B-COMPLEX/B-12 PO) Take 2,500 mg by mouth  Patient not taking: Reported on 3/8/2023    Historical Provider, MD   Cholecalciferol (VITAMIN D3) 3000 UNITS TABS Take 5,000 Units by mouth    Historical Provider, MD   aspirin 81 MG chewable tablet Take 81 mg by mouth daily    Historical Provider, MD   folic acid (FOLVITE) 391 MCG tablet Take 800 mcg by mouth daily     Historical Provider, MD     No outpatient medications have been marked as taking for the 3/8/23 encounter [facility] Encounter). Social History     Socioeconomic History    Marital status:      Spouse name: Not on file    Number of children: Not on file    Years of education: Not on file    Highest education level: Not on file   Occupational History    Not on file   Tobacco Use    Smoking status: Never    Smokeless tobacco: Never   Vaping Use    Vaping Use: Never used   Substance and Sexual Activity    Alcohol use: Yes     Alcohol/week: 7.0 standard drinks     Types: 7 Cans of beer per week     Comment: 1 beer a day     Drug use: Never    Sexual activity: Not on file   Other Topics Concern    Not on file   Social History Narrative    Not on file     Social Determinants of Health     Financial Resource Strain: Not on file   Food Insecurity: Not on file   Transportation Needs: Not on file   Physical Activity: Not on file   Stress: Not on file   Social Connections: Not on file   Intimate Partner Violence: Not on file   Housing Stability: Not on file     No family history on file.     ROS: Complete 10 point ROS was obtained with positives in HPI, otherwise:  Pt denies fevers, denies chills. Pt denies chest pain, denies SOB, denies nausea, denies vomiting, denies headache. VITALS/DRAINS:   VITALS:  BP (!) 134/57   Pulse 78   Temp 98.2 °F (36.8 °C) (Temporal)   Resp 16   Ht 5' 3\" (1.6 m)   Wt 170 lb (77.1 kg)   SpO2 95%   BMI 30.11 kg/m²   24HR INTAKE/OUTPUT:    Intake/Output Summary (Last 24 hours) at 3/11/2023 1034  Last data filed at 3/10/2023 2156  Gross per 24 hour   Intake 240 ml   Output 200 ml   Net 40 ml       EXAMINATION: Patient is awake and alert good historian follows commands  Cranial Nerves: Grossly intact pupils equal round reactive EXTR full facial expression are symmetric no facial sensory deficits hearing smell intact  Motor: Normal bulk and tone throughout 5 out of 5  Sensory: Grossly intact light touch position sense  Cerebellar:  Gait:  DTRs: +1 equal bilaterally    IMAGING STUDIES:  XR HIP LEFT (2-3 VIEWS)    Result Date: 3/8/2023  EXAMINATION: XRAY VIEWS OF THE LEFT FEMUR; TWO XRAY VIEWS OF THE LEFT HIP 3/8/2023 1:06 pm COMPARISON: July 5, 2017 HISTORY: ORDERING SYSTEM PROVIDED HISTORY: fall, r/o fx TECHNOLOGIST PROVIDED HISTORY: Reason for exam:->fall, r/o fx What reading provider will be dictating this exam?->CRC FINDINGS: No fracture or dislocation of the left hip. Severe osteoarthritic changes are associated with left hip with flattening of left femoral head, narrowing of superior aspect of the joint space, and subchondral sclerosis. No fracture involving mid or distal left femur. Vascular calcifications are present. 1. Severe osteoarthritis involving left hip with flattening of left femoral head and mild superior subluxation. 2. No fracture or dislocation of left hip or femur.      XR FEMUR LEFT (MIN 2 VIEWS)    Result Date: 3/8/2023  EXAMINATION: XRAY VIEWS OF THE LEFT FEMUR; TWO XRAY VIEWS OF THE LEFT HIP 3/8/2023 1:06 pm COMPARISON: July 5, 2017 HISTORY: ORDERING SYSTEM PROVIDED HISTORY: fall, r/o fx TECHNOLOGIST PROVIDED HISTORY: Reason for exam:->fall, r/o fx What reading provider will be dictating this exam?->CRC FINDINGS: No fracture or dislocation of the left hip. Severe osteoarthritic changes are associated with left hip with flattening of left femoral head, narrowing of superior aspect of the joint space, and subchondral sclerosis. No fracture involving mid or distal left femur. Vascular calcifications are present. 1. Severe osteoarthritis involving left hip with flattening of left femoral head and mild superior subluxation. 2. No fracture or dislocation of left hip or femur. XR KNEE LEFT (MIN 4 VIEWS)    Result Date: 3/8/2023  EXAMINATION: FOUR XRAY VIEWS OF THE LEFT KNEE 3/8/2023 1:06 pm COMPARISON: None. HISTORY: ORDERING SYSTEM PROVIDED HISTORY: trauma TECHNOLOGIST PROVIDED HISTORY: Reason for exam:->trauma What reading provider will be dictating this exam?->CRC FINDINGS: No fracture or dislocation of left knee. Patella is in satisfactory position. No synovial effusion. Vascular calcifications are present. No acute osseous abnormality involving the left knee. CT HEAD WO CONTRAST    Result Date: 3/8/2023  EXAMINATION: CT OF THE HEAD WITHOUT CONTRAST  3/8/2023 12:20 pm TECHNIQUE: CT of the head was performed without the administration of intravenous contrast. Automated exposure control, iterative reconstruction, and/or weight based adjustment of the mA/kV was utilized to reduce the radiation dose to as low as reasonably achievable. COMPARISON: CT head dated 04/24/2021 HISTORY: ORDERING SYSTEM PROVIDED HISTORY: trauma TECHNOLOGIST PROVIDED HISTORY: Has a \"code stroke\" or \"stroke alert\" been called? ->No Reason for exam:->trauma Decision Support Exception - unselect if not a suspected or confirmed emergency medical condition->Emergency Medical Condition (MA) What reading provider will be dictating this exam?->CRC FINDINGS: BRAIN/VENTRICLES: There is no acute intracranial hemorrhage, mass effect or midline shift.   No abnormal extra-axial fluid collection. The gray-white differentiation is maintained without evidence of an acute infarct. There is no evidence of hydrocephalus. Moderate amount of low attenuation in the periventricular deep white matter suggesting chronic small vessel ischemic disease with moderate prominence of sulci towards the vertex of moderate generalized atrophy. ORBITS: The visualized portion of the orbits demonstrate no acute abnormality. SINUSES: The visualized paranasal sinuses and mastoid air cells demonstrate no acute abnormality. SOFT TISSUES/SKULL:  No acute abnormality of the visualized skull or soft tissues. No acute intracranial abnormality. CT CERVICAL SPINE WO CONTRAST    Result Date: 3/8/2023  EXAMINATION: CT OF THE CERVICAL SPINE WITHOUT CONTRAST 3/8/2023 12:20 pm TECHNIQUE: CT of the cervical spine was performed without the administration of intravenous contrast. Multiplanar reformatted images are provided for review. Automated exposure control, iterative reconstruction, and/or weight based adjustment of the mA/kV was utilized to reduce the radiation dose to as low as reasonably achievable. COMPARISON: CT C-spine 12/17/2017 HISTORY: ORDERING SYSTEM PROVIDED HISTORY: trauma TECHNOLOGIST PROVIDED HISTORY: Reason for exam:->trauma Decision Support Exception - unselect if not a suspected or confirmed emergency medical condition->Emergency Medical Condition (MA) What reading provider will be dictating this exam?->CRC FINDINGS: BONES/ALIGNMENT: There is no acute fracture or traumatic malalignment. DEGENERATIVE CHANGES: Moderate multilevel degenerative changes. SOFT TISSUES: There is no prevertebral soft tissue swelling. No acute abnormality of the cervical spine.      CT LUMBAR SPINE WO CONTRAST    Result Date: 3/8/2023  EXAMINATION: CT OF THE LUMBAR SPINE WITHOUT CONTRAST  3/8/2023 TECHNIQUE: CT of the lumbar spine was performed without the administration of intravenous contrast. Multiplanar reformatted images are provided for review. Adjustment of mA and/or kV according to patient size was utilized. Automated exposure control, iterative reconstruction, and/or weight based adjustment of the mA/kV was utilized to reduce the radiation dose to as low as reasonably achievable. COMPARISON: Correlation made with MRI from August 15, 2017 and radiographs from June 24, 2020 HISTORY: ORDERING SYSTEM PROVIDED HISTORY: trauma TECHNOLOGIST PROVIDED HISTORY: Reason for exam:->trauma Decision Support Exception - unselect if not a suspected or confirmed emergency medical condition->Emergency Medical Condition (MA) What reading provider will be dictating this exam?->CRC FINDINGS: Redemonstration of compression fracture involving T12 of approximately 80% with retropulsion and moderate central canal stenosis. This finding appears similar when compared with prior MRI from August 15, 2017. No new fracture or malalignment of the lumbar spine. Disc herniation present at L4/L5 resulting in moderate central canal stenosis. 1. Redemonstration of chronic compression fracture of T12 of approximately 80% with retropulsion and central canal stenosis. 2. No new lumbar spine compression fracture or malalignment. XR CHEST PORTABLE    Result Date: 3/8/2023  EXAMINATION: ONE XRAY VIEW OF THE CHEST 3/8/2023 1:06 pm COMPARISON: None. HISTORY: ORDERING SYSTEM PROVIDED HISTORY: Shortness of breath TECHNOLOGIST PROVIDED HISTORY: Reason for exam:->Shortness of breath What reading provider will be dictating this exam?->CRC FINDINGS: The cardiac silhouette is at upper limits of normal in size. Postop sternotomy changes are noted There is no right or left lung infiltrate. There is a linear scarring scar seen within the left lung base. There is chronic blunting the left costophrenic angle. There is no pneumothorax There is no gross rib fracture. 1. There are no findings of failure or pneumonia 2.  Chronic blunting the left costophrenic angle 3. Chronic linear scarring seen within the left lung base. CT HIP LEFT WO CONTRAST    Result Date: 3/8/2023  EXAMINATION: CT OF THE LEFT HIP WITHOUT CONTRAST 3/8/2023 4:33 pm TECHNIQUE: CT of the left hip was performed without the administration of intravenous contrast.  Multiplanar reformatted images are provided for review. Automated exposure control, iterative reconstruction, and/or weight based adjustment of the mA/kV was utilized to reduce the radiation dose to as low as reasonably achievable. COMPARISON: Correlation made with radiographs performed today. HISTORY ORDERING SYSTEM PROVIDED [facility] HISTORY: Reason for exam:->truama Decision Support Exception - unselect if not a suspected or confirmed emergency medical condition->Emergency Medical Condition ([place]) What reading provider will be dictating this exam?->CRC FINDINGS: Severe osteoarthritis involving the left hip with multiple degenerative subchondral cysts, narrowing involving superior aspect of the joint space, subchondral sclerosis, and marginal osteophytosis involving superior aspect of left acetabulum and femoral head. There is flattening of femoral head. No significant synovial effusion. There is no fracture or dislocation involving the left hip. No pelvis fracture. No free fluid collections in the pelvis. There is a right hip arthroplasty. Multiple diverticula are associated with the sigmoid colon. 1. Severe osteoarthritis involving left hip. 2. No fracture or dislocation.        LABS:  CBC:   Lab Results   Component Value Date/Time    WBC 3.8 03/09/2023 04:29 AM    RBC 2.81 03/09/2023 04:29 AM    HGB 9.8 03/09/2023 04:29 AM    HCT 28.9 03/09/2023 04:29 AM    .8 03/09/2023 04:29 AM    MCH 34.9 03/09/2023 04:29 AM    MCHC 33.9 03/09/2023 04:29 AM    RDW 12.8 03/09/2023 04:29 AM     03/09/2023 04:29 AM    MPV 9.3 03/09/2023 04:29 AM     BMP:    Lab Results   Component Value Date/Time     03/09/2023 04:29 AM    K 3.8 03/09/2023 04:29 AM     03/09/2023 04:29 AM    CO2 25 03/09/2023 04:29 AM    BUN 26 03/09/2023 04:29 AM    LABALBU 4.1 10/11/2021 10:45 AM    LABALBU 4.1 11/29/2011 07:22 AM    CREATININE 1.4 03/09/2023 04:29 AM    CALCIUM 8.5 03/09/2023 04:29 AM    GFRAA >60 10/11/2021 10:45 AM    LABGLOM 47 03/09/2023 04:29 AM    GLUCOSE 90 03/09/2023 04:29 AM    GLUCOSE 139 11/29/2011 07:22 AM       IMPRESSION: Left hip and leg pain    RECOMMENDATIONS: Counseled the patient and his daughter who is an RN at the bedside extensively all questions were answered films of the hip and MRI were reviewed with them he clearly has advanced degenerative changes of the left hip and minimal movement of the hip and winces in pain when I did a Earl's test however the MRI does show a moderate spinal stenosis at L4-5 as well as left L4-5 moderate foraminal stenosis he has had epidural blocks in the past without any relief of the pain he could certainly have a component of lumbar radiculopathy as well as pain coming from the degenerative changes in his left hip I do not recommend any surgical procedures on his spine will follow I did order some tramadol for him up to 4 times a day as needed pain    Thank you again for this consultation.       Tessa Vazquez MD  3/11/2023

## 2023-03-11 NOTE — PROGRESS NOTES
Hospitalist Progress Note      Synopsis:   Briefly, patient with PMH significant for CAD, HPL, HPT presented to ED and was admitted due to falling at home off of steps. Patient has concerns of living without assistance and feels he would benefit from placement. Imaging shows no acute injury, however severe osteoarthritis involving left hip was appreciated. Upon interview patient states he is unable to bear any weight on this leg. Orthopedic surgery has been consulted, MRI of left hip revealed diffuse osteopenia, severe degenerative joint disease, bursitis. Ortho consulted neurosurgery, MRI of spine showed stenosis at L4-5 and L5-S1. No neurosurgical intervention recommended. Hospital day 0     Subjective:  Spoke with patient and daughter at length. Patient is insistent that pain is from hip and he is willing to take any risk to have surgery as his quality of life has suffered extensively over past few years. Stable overnight. No issues reported. Patient seen and examined  Records reviewed. Temp (24hrs), Av.2 °F (36.8 °C), Min:98.2 °F (36.8 °C), Max:98.2 °F (36.8 °C)    DIET: ADULT DIET; Regular  CODE: Full Code    Intake/Output Summary (Last 24 hours) at 3/11/2023 1202  Last data filed at 3/10/2023 2156  Gross per 24 hour   Intake 240 ml   Output 200 ml   Net 40 ml         Objective:    BP (!) 134/57   Pulse 78   Temp 98.2 °F (36.8 °C) (Temporal)   Resp 16   Ht 5' 3\" (1.6 m)   Wt 170 lb (77.1 kg)   SpO2 95%   BMI 30.11 kg/m²     General appearance: No apparent distress, appears stated age and cooperative. HEENT: Conjunctivae/corneas clear. Mucous membranes moist.  Neck: Supple. No JVD. Respiratory:  Clear to auscultation bilaterally. Normal respiratory effort. Cardiovascular:  RRR. S1, S2 without MRG. PV: Pulses palpable. No edema. Abdomen: Soft, non-tender, non-distended. +BS  Musculoskeletal: No obvious deformities. Skin: Normal skin color. No rashes or lesions.  Good bertr. Neurologic:  Grossly non-focal. Awake, alert, following commands. Psychiatric: Alert and oriented, thought content appropriate, normal insight and judgement    Medications:  REVIEWED DAILY    Infusion Medications    sodium chloride       Scheduled Medications    amLODIPine  10 mg Oral Nightly    aspirin  81 mg Oral Daily    carvedilol  12.5 mg Oral BID WC    escitalopram  10 mg Oral Daily    finasteride  5 mg Oral QPM    folic acid  1 mg Oral Daily    furosemide  40 mg Oral Every Other Day    hydrOXYzine pamoate  25 mg Oral Nightly    levothyroxine  50 mcg Oral Daily    melatonin  10 mg Oral Nightly    tamsulosin  0.4 mg Oral QPM    sodium chloride flush  10 mL IntraVENous 2 times per day    enoxaparin  40 mg SubCUTAneous Daily     PRN Meds: traMADol, sodium chloride flush, sodium chloride, promethazine **OR** ondansetron, polyethylene glycol, acetaminophen **OR** acetaminophen    Labs:     Recent Labs     03/08/23 1955 03/09/23 0429 03/11/23  1129   WBC 4.4* 3.8* 4.9   HGB 9.5* 9.8* 9.7*   HCT 28.8* 28.9* 29.2*   * 131 118*       Recent Labs     03/08/23 1955 03/09/23 0429 03/11/23  1129    137 139   K 4.3 3.8 3.7    101 102   CO2 28 25 27   BUN 25* 26* 32*   CREATININE 1.4* 1.4* 1.5*   CALCIUM 9.2 8.5* 8.9       Recent Labs     03/11/23  1129   PROT 6.3*   ALKPHOS 52   ALT 5   AST 8   BILITOT 0.4       No results for input(s): INR in the last 72 hours. No results for input(s): Karishma Horn in the last 72 hours.     Chronic labs:    Lab Results   Component Value Date    CHOL 195 10/11/2021    TRIG 200 (H) 10/11/2021    HDL 38 10/11/2021    LDLCALC 117 (H) 10/11/2021    TSH 1.900 10/11/2021    PSA 0.76 12/07/2015    LABA1C 5.9 (H) 04/26/2021       Radiology: REVIEWED DAILY    Assessment:  Ambulatory dysfunction with subsequent fall at home  Coronary artery disease  Severe osteoarthritis of left hip generalized on CT  Hypertension  Hyperlipidemia  Hypothyroidism  Pancytopenia  Chronic kidney disease, baseline creatinine seems to be 1.3-1.4  Plan:  Reviewed CT of left hip revealing severe osteoarthritis, also reviewed ED notes, previous hospitalization notes, in light of findings and patient complaint we consulted orthopedic surgery for evaluation of left hip, who at this time are not recommending surgical intervention. Neurosurgery consult was placed. MRI ordered  MRI of lumbar spine reviewed, revealed L4-S1 stenosis. ,  Neurosurgery note reviewed, surgery not recommended and believes patient may have radiculopathy from back, however complaints of pain is most likely from hip. PT OT consulted  Case management social work consulted for placement  Continue appropriate home medications  Pancytopenia, reviewed CBC, WBC 3.8, hemoglobin 9.8, hematocrit 28.9, seems to be patient's baseline, will continue to monitor  Monitor labs replete as indicated      DVT Prophylaxis [x] Lovenox  []  Heparin [] DOAC [] PCDs [] Ambulation    GI Prophylaxis [] PPI  [] H2 Blocker   [] Carafate  [x] Diet/Tube Feeds   Level of care [x] Med/Surg  [] Intermediate  []  ICU   Diet ADULT DIET; Regular    Family contact []  N/A    [x] At bedside  [] Phone call   Disposition Patient requires continued admission further evaluation of hip pain   MDM [] Low    [x] Moderate  []   High       Discharge Plan: Further evaluation of hip pain    +++++++++++++++++++++++++++++++++++++++++++++++++  [place] [id number], [place]  +++++++++++++++++++++++++++++++++++++++++++++++++  NOTE: This report was transcribed using voice recognition software. Every effort was made to ensure accuracy; however, inadvertent computerized transcription errors may be present.

## 2023-03-12 VITALS
TEMPERATURE: 97.5 F | BODY MASS INDEX: 30.12 KG/M2 | RESPIRATION RATE: 17 BRPM | WEIGHT: 170 LBS | DIASTOLIC BLOOD PRESSURE: 65 MMHG | HEART RATE: 68 BPM | HEIGHT: 63 IN | OXYGEN SATURATION: 93 % | SYSTOLIC BLOOD PRESSURE: 150 MMHG

## 2023-03-12 LAB
ALBUMIN SERPL-MCNC: 3.3 G/DL (ref 3.5–5.2)
ALP BLD-CCNC: 51 U/L (ref 40–129)
ALT SERPL-CCNC: 6 U/L (ref 0–40)
ANION GAP SERPL CALCULATED.3IONS-SCNC: 10 MMOL/L (ref 7–16)
AST SERPL-CCNC: 9 U/L (ref 0–39)
BASOPHILS ABSOLUTE: 0.01 E9/L (ref 0–0.2)
BASOPHILS RELATIVE PERCENT: 0.2 % (ref 0–2)
BILIRUB SERPL-MCNC: 0.4 MG/DL (ref 0–1.2)
BUN BLDV-MCNC: 32 MG/DL (ref 6–23)
CALCIUM SERPL-MCNC: 8.5 MG/DL (ref 8.6–10.2)
CHLORIDE BLD-SCNC: 103 MMOL/L (ref 98–107)
CO2: 27 MMOL/L (ref 22–29)
CREAT SERPL-MCNC: 1.4 MG/DL (ref 0.7–1.2)
EOSINOPHILS ABSOLUTE: 0.21 E9/L (ref 0.05–0.5)
EOSINOPHILS RELATIVE PERCENT: 4.1 % (ref 0–6)
GFR SERPL CREATININE-BSD FRML MDRD: 47 ML/MIN/1.73
GLUCOSE BLD-MCNC: 132 MG/DL (ref 74–99)
HCT VFR BLD CALC: 29.3 % (ref 37–54)
HEMOGLOBIN: 9.3 G/DL (ref 12.5–16.5)
IMMATURE GRANULOCYTES #: 0.01 E9/L
IMMATURE GRANULOCYTES %: 0.2 % (ref 0–5)
LYMPHOCYTES ABSOLUTE: 0.75 E9/L (ref 1.5–4)
LYMPHOCYTES RELATIVE PERCENT: 14.5 % (ref 20–42)
MCH RBC QN AUTO: 33.7 PG (ref 26–35)
MCHC RBC AUTO-ENTMCNC: 31.7 % (ref 32–34.5)
MCV RBC AUTO: 106.2 FL (ref 80–99.9)
MONOCYTES ABSOLUTE: 0.33 E9/L (ref 0.1–0.95)
MONOCYTES RELATIVE PERCENT: 6.4 % (ref 2–12)
NEUTROPHILS ABSOLUTE: 3.85 E9/L (ref 1.8–7.3)
NEUTROPHILS RELATIVE PERCENT: 74.6 % (ref 43–80)
PDW BLD-RTO: 12.8 FL (ref 11.5–15)
PLATELET # BLD: 119 E9/L (ref 130–450)
PMV BLD AUTO: 9.1 FL (ref 7–12)
POTASSIUM SERPL-SCNC: 4.1 MMOL/L (ref 3.5–5)
RBC # BLD: 2.76 E12/L (ref 3.8–5.8)
SODIUM BLD-SCNC: 140 MMOL/L (ref 132–146)
TOTAL PROTEIN: 6 G/DL (ref 6.4–8.3)
WBC # BLD: 5.2 E9/L (ref 4.5–11.5)

## 2023-03-12 PROCEDURE — 6370000000 HC RX 637 (ALT 250 FOR IP): Performed by: NEUROLOGICAL SURGERY

## 2023-03-12 PROCEDURE — 2580000003 HC RX 258: Performed by: FAMILY MEDICINE

## 2023-03-12 PROCEDURE — 85025 COMPLETE CBC W/AUTO DIFF WBC: CPT

## 2023-03-12 PROCEDURE — 80053 COMPREHEN METABOLIC PANEL: CPT

## 2023-03-12 PROCEDURE — 36415 COLL VENOUS BLD VENIPUNCTURE: CPT

## 2023-03-12 PROCEDURE — 6360000002 HC RX W HCPCS: Performed by: FAMILY MEDICINE

## 2023-03-12 PROCEDURE — 1200000000 HC SEMI PRIVATE

## 2023-03-12 PROCEDURE — 6370000000 HC RX 637 (ALT 250 FOR IP): Performed by: FAMILY MEDICINE

## 2023-03-12 RX ADMIN — Medication 10 MG: at 22:43

## 2023-03-12 RX ADMIN — TRAMADOL HYDROCHLORIDE 50 MG: 50 TABLET ORAL at 14:47

## 2023-03-12 RX ADMIN — Medication 10 ML: at 08:35

## 2023-03-12 RX ADMIN — TRAMADOL HYDROCHLORIDE 50 MG: 50 TABLET ORAL at 08:35

## 2023-03-12 RX ADMIN — ASPIRIN 81 MG CHEWABLE TABLET 81 MG: 81 TABLET CHEWABLE at 08:35

## 2023-03-12 RX ADMIN — TRAMADOL HYDROCHLORIDE 50 MG: 50 TABLET ORAL at 22:38

## 2023-03-12 RX ADMIN — HYDROXYZINE PAMOATE 25 MG: 25 CAPSULE ORAL at 22:40

## 2023-03-12 RX ADMIN — LEVOTHYROXINE SODIUM 50 MCG: 0.05 TABLET ORAL at 06:50

## 2023-03-12 RX ADMIN — TAMSULOSIN HYDROCHLORIDE 0.4 MG: 0.4 CAPSULE ORAL at 17:19

## 2023-03-12 RX ADMIN — AMLODIPINE BESYLATE 10 MG: 10 TABLET ORAL at 22:39

## 2023-03-12 RX ADMIN — CARVEDILOL 12.5 MG: 6.25 TABLET, FILM COATED ORAL at 08:35

## 2023-03-12 RX ADMIN — FINASTERIDE 5 MG: 5 TABLET, FILM COATED ORAL at 17:19

## 2023-03-12 RX ADMIN — FOLIC ACID 1 MG: 1 TABLET ORAL at 08:35

## 2023-03-12 RX ADMIN — ESCITALOPRAM OXALATE 10 MG: 10 TABLET, FILM COATED ORAL at 08:36

## 2023-03-12 RX ADMIN — CARVEDILOL 12.5 MG: 6.25 TABLET, FILM COATED ORAL at 17:19

## 2023-03-12 RX ADMIN — ENOXAPARIN SODIUM 40 MG: 100 INJECTION SUBCUTANEOUS at 08:35

## 2023-03-12 RX ADMIN — POLYETHYLENE GLYCOL 3350 17 G: 17 POWDER, FOR SOLUTION ORAL at 13:32

## 2023-03-12 ASSESSMENT — PAIN DESCRIPTION - PAIN TYPE
TYPE: CHRONIC PAIN
TYPE: ACUTE PAIN
TYPE: CHRONIC PAIN

## 2023-03-12 ASSESSMENT — PAIN DESCRIPTION - LOCATION
LOCATION: ANKLE;HIP
LOCATION: HIP;ANKLE
LOCATION: HIP;ANKLE

## 2023-03-12 ASSESSMENT — PAIN DESCRIPTION - FREQUENCY
FREQUENCY: CONTINUOUS

## 2023-03-12 ASSESSMENT — PAIN DESCRIPTION - DESCRIPTORS
DESCRIPTORS: ACHING;DISCOMFORT;DULL
DESCRIPTORS: ACHING;DISCOMFORT;SORE
DESCRIPTORS: ACHING;DISCOMFORT;DULL

## 2023-03-12 ASSESSMENT — PAIN DESCRIPTION - ONSET
ONSET: ON-GOING
ONSET: ON-GOING
ONSET: GRADUAL

## 2023-03-12 ASSESSMENT — PAIN DESCRIPTION - ORIENTATION
ORIENTATION: LEFT

## 2023-03-12 ASSESSMENT — PAIN - FUNCTIONAL ASSESSMENT
PAIN_FUNCTIONAL_ASSESSMENT: PREVENTS OR INTERFERES SOME ACTIVE ACTIVITIES AND ADLS
PAIN_FUNCTIONAL_ASSESSMENT: PREVENTS OR INTERFERES SOME ACTIVE ACTIVITIES AND ADLS
PAIN_FUNCTIONAL_ASSESSMENT: PREVENTS OR INTERFERES WITH MANY ACTIVE NOT PASSIVE ACTIVITIES

## 2023-03-12 ASSESSMENT — PAIN SCALES - GENERAL
PAINLEVEL_OUTOF10: 4
PAINLEVEL_OUTOF10: 7
PAINLEVEL_OUTOF10: 4
PAINLEVEL_OUTOF10: 3

## 2023-03-12 NOTE — PROGRESS NOTES
Neurosurg progress note  VITALS:  BP (!) 121/52   Pulse 72   Temp 97.5 °F (36.4 °C) (Temporal)   Resp 17   Ht 5' 3\" (1.6 m)   Wt 170 lb (77.1 kg)   SpO2 93%   BMI 30.11 kg/m²   24HR INTAKE/OUTPUT:    Intake/Output Summary (Last 24 hours) at 3/12/2023 1300  Last data filed at 3/12/2023 0830  Gross per 24 hour   Intake 180 ml   Output 400 ml   Net -220 ml     XR HIP LEFT (2-3 VIEWS)    Result Date: 3/8/2023  EXAMINATION: XRAY VIEWS OF THE LEFT FEMUR; TWO XRAY VIEWS OF THE LEFT HIP 3/8/2023 1:06 pm COMPARISON: July 5, 2017 HISTORY: ORDERING SYSTEM PROVIDED HISTORY: fall, r/o fx TECHNOLOGIST PROVIDED HISTORY: Reason for exam:->fall, r/o fx What reading provider will be dictating this exam?->CRC FINDINGS: No fracture or dislocation of the left hip. Severe osteoarthritic changes are associated with left hip with flattening of left femoral head, narrowing of superior aspect of the joint space, and subchondral sclerosis. No fracture involving mid or distal left femur. Vascular calcifications are present. 1. Severe osteoarthritis involving left hip with flattening of left femoral head and mild superior subluxation. 2. No fracture or dislocation of left hip or femur. XR FEMUR LEFT (MIN 2 VIEWS)    Result Date: 3/8/2023  EXAMINATION: XRAY VIEWS OF THE LEFT FEMUR; TWO XRAY VIEWS OF THE LEFT HIP 3/8/2023 1:06 pm COMPARISON: July 5, 2017 HISTORY: ORDERING SYSTEM PROVIDED HISTORY: fall, r/o fx TECHNOLOGIST PROVIDED HISTORY: Reason for exam:->fall, r/o fx What reading provider will be dictating this exam?->CRC FINDINGS: No fracture or dislocation of the left hip. Severe osteoarthritic changes are associated with left hip with flattening of left femoral head, narrowing of superior aspect of the joint space, and subchondral sclerosis. No fracture involving mid or distal left femur. Vascular calcifications are present.      1. Severe osteoarthritis involving left hip with flattening of left femoral head and mild superior subluxation. 2. No fracture or dislocation of left hip or femur. XR KNEE LEFT (MIN 4 VIEWS)    Result Date: 3/8/2023  EXAMINATION: FOUR XRAY VIEWS OF THE LEFT KNEE 3/8/2023 1:06 pm COMPARISON: None. HISTORY: ORDERING SYSTEM PROVIDED HISTORY: trauma TECHNOLOGIST PROVIDED HISTORY: Reason for exam:->trauma What reading provider will be dictating this exam?->CRC FINDINGS: No fracture or dislocation of left knee. Patella is in satisfactory position. No synovial effusion. Vascular calcifications are present. No acute osseous abnormality involving the left knee. CT HEAD WO CONTRAST    Result Date: 3/8/2023  EXAMINATION: CT OF THE HEAD WITHOUT CONTRAST  3/8/2023 12:20 pm TECHNIQUE: CT of the head was performed without the administration of intravenous contrast. Automated exposure control, iterative reconstruction, and/or weight based adjustment of the mA/kV was utilized to reduce the radiation dose to as low as reasonably achievable. COMPARISON: CT head dated 04/24/2021 HISTORY: ORDERING SYSTEM PROVIDED HISTORY: trauma TECHNOLOGIST PROVIDED HISTORY: Has a \"code stroke\" or \"stroke alert\" been called? ->No Reason for exam:->trauma Decision Support Exception - unselect if not a suspected or confirmed emergency medical condition->Emergency Medical Condition ([place]) What reading provider will be dictating this exam?->CRC FINDINGS: BRAIN/VENTRICLES: There is no acute intracranial hemorrhage, mass effect or midline shift. No abnormal extra-axial fluid collection. The gray-white differentiation is maintained without evidence of an acute infarct. There is no evidence of hydrocephalus. Moderate amount of low attenuation in the periventricular deep white matter suggesting chronic small vessel ischemic disease with moderate prominence of sulci towards the vertex of moderate generalized atrophy. ORBITS: The visualized portion of the orbits demonstrate no acute abnormality.  SINUSES: The visualized paranasal sinuses and mastoid air cells demonstrate no acute abnormality. SOFT TISSUES/SKULL:  No acute abnormality of the visualized skull or soft tissues. No acute intracranial abnormality. CT CERVICAL SPINE WO CONTRAST    Result Date: 3/8/2023  EXAMINATION: CT OF THE CERVICAL SPINE WITHOUT CONTRAST 3/8/2023 12:20 pm TECHNIQUE: CT of the cervical spine was performed without the administration of intravenous contrast. Multiplanar reformatted images are provided for review. Automated exposure control, iterative reconstruction, and/or weight based adjustment of the mA/kV was utilized to reduce the radiation dose to as low as reasonably achievable. COMPARISON: CT C-spine 12/17/2017 HISTORY: ORDERING SYSTEM PROVIDED HISTORY: trauma TECHNOLOGIST PROVIDED HISTORY: Reason for exam:->trauma Decision Support Exception - unselect if not a suspected or confirmed emergency medical condition->Emergency Medical Condition ([place]) What reading provider will be dictating this exam?->CRC FINDINGS: BONES/ALIGNMENT: There is no acute fracture or traumatic malalignment. DEGENERATIVE CHANGES: Moderate multilevel degenerative changes. SOFT TISSUES: There is no prevertebral soft tissue swelling. No acute abnormality of the cervical spine. CT LUMBAR SPINE WO CONTRAST    Result Date: 3/8/2023  EXAMINATION: CT OF THE LUMBAR SPINE WITHOUT CONTRAST  3/8/2023 TECHNIQUE: CT of the lumbar spine was performed without the administration of intravenous contrast. Multiplanar reformatted images are provided for review. Adjustment of mA and/or kV according to patient size was utilized. Automated exposure control, iterative reconstruction, and/or weight based adjustment of the mA/kV was utilized to reduce the radiation dose to as low as reasonably achievable.  COMPARISON: Correlation made with MRI from August 15, 2017 and radiographs from June 24, 2020 HISTORY: ORDERING SYSTEM PROVIDED HISTORY: trauma TECHNOLOGIST PROVIDED HISTORY: Reason for exam:->trauma Decision Support Exception - unselect if not a suspected or confirmed emergency medical condition->Emergency Medical Condition ([place]) What reading provider will be dictating this exam?->CRC FINDINGS: Redemonstration of compression fracture involving T12 of approximately 80% with retropulsion and moderate central canal stenosis. This finding appears similar when compared with prior MRI from August 15, 2017. No new fracture or malalignment of the lumbar spine. Disc herniation present at L4/L5 resulting in moderate central canal stenosis. 1. Redemonstration of chronic compression fracture of T12 of approximately 80% with retropulsion and central canal stenosis. 2. No new lumbar spine compression fracture or malalignment. XR CHEST PORTABLE    Result Date: 3/8/2023  EXAMINATION: ONE XRAY VIEW OF THE CHEST 3/8/2023 1:06 pm COMPARISON: None. HISTORY: ORDERING SYSTEM PROVIDED HISTORY: Shortness of breath TECHNOLOGIST PROVIDED HISTORY: Reason for exam:->Shortness of breath What reading provider will be dictating this exam?->CRC FINDINGS: The cardiac silhouette is at upper limits of normal in size. Postop sternotomy changes are noted There is no right or left lung infiltrate. There is a linear scarring scar seen within the left lung base. There is chronic blunting the left costophrenic angle. There is no pneumothorax There is no gross rib fracture. 1. There are no findings of failure or pneumonia 2. Chronic blunting the left costophrenic angle 3. Chronic linear scarring seen within the left lung base. CT HIP LEFT WO CONTRAST    Result Date: 3/8/2023  EXAMINATION: CT OF THE LEFT HIP WITHOUT CONTRAST 3/8/2023 4:33 pm TECHNIQUE: CT of the left hip was performed without the administration of intravenous contrast.  Multiplanar reformatted images are provided for review.  Automated exposure control, iterative reconstruction, and/or weight based adjustment of the mA/kV was utilized to reduce the radiation dose to as low as reasonably achievable. COMPARISON: Correlation made with radiographs performed today. HISTORY ORDERING SYSTEM PROVIDED HISTORY: truama TECHNOLOGIST PROVIDED HISTORY: Reason for exam:->truama Decision Support Exception - unselect if not a suspected or confirmed emergency medical condition->Emergency Medical Condition ([place]) What reading provider will be dictating this exam?->CRC FINDINGS: Severe osteoarthritis involving the left hip with multiple degenerative subchondral cysts, narrowing involving superior aspect of the joint space, subchondral sclerosis, and marginal osteophytosis involving superior aspect of left acetabulum and femoral head. There is flattening of femoral head. No significant synovial effusion. There is no fracture or dislocation involving the left hip. No pelvis fracture. No free fluid collections in the pelvis. There is a right hip arthroplasty. Multiple diverticula are associated with the sigmoid colon. 1. Severe osteoarthritis involving left hip. 2. No fracture or dislocation.      CBC:   Lab Results   Component Value Date/Time    WBC 5.2 03/12/2023 05:24 AM    RBC 2.76 03/12/2023 05:24 AM    HGB 9.3 03/12/2023 05:24 AM    HCT 29.3 03/12/2023 05:24 AM    .2 03/12/2023 05:24 AM    MCH 33.7 03/12/2023 05:24 AM    MCHC 31.7 03/12/2023 05:24 AM    RDW 12.8 03/12/2023 05:24 AM     03/12/2023 05:24 AM    MPV 9.1 03/12/2023 05:24 AM     BMP:    Lab Results   Component Value Date/Time     03/12/2023 05:24 AM    K 4.1 03/12/2023 05:24 AM    K 3.8 03/09/2023 04:29 AM     03/12/2023 05:24 AM    CO2 27 03/12/2023 05:24 AM    BUN 32 03/12/2023 05:24 AM    LABALBU 3.3 03/12/2023 05:24 AM    LABALBU 4.1 11/29/2011 07:22 AM    CREATININE 1.4 03/12/2023 05:24 AM    CALCIUM 8.5 03/12/2023 05:24 AM    GFRAA >60 10/11/2021 10:45 AM    LABGLOM 47 03/12/2023 05:24 AM    GLUCOSE 132 03/12/2023 05:24 AM    GLUCOSE 139 11/29/2011 07:22 AM      amLODIPine  10 mg Oral Nightly aspirin  81 mg Oral Daily    carvedilol  12.5 mg Oral BID WC    escitalopram  10 mg Oral Daily    finasteride  5 mg Oral QPM    folic acid  1 mg Oral Daily    furosemide  40 mg Oral Every Other Day    hydrOXYzine pamoate  25 mg Oral Nightly    levothyroxine  50 mcg Oral Daily    melatonin  10 mg Oral Nightly    tamsulosin  0.4 mg Oral QPM    sodium chloride flush  10 mL IntraVENous 2 times per day    enoxaparin  40 mg SubCUTAneous Daily     Remains awake and alert following commands neuro intact family at the bedside  Assessment:  Patient Active Problem List   Diagnosis    CHF with unknown LVEF ([facility])    Aortic valve stenosis    Sick sinus syndrome ([facility])    Pacemaker    Systolic ejection murmur    Hypertension    CHF (congestive heart failure), NYHA class I, acute on chronic, combined ([facility])    Type 2 diabetes mellitus without complication ([facility])    Acute renal insufficiency    Compression fracture of T12 vertebra ([facility])    Decompensated heart failure ([facility])    Hypoxia    Ambulatory dysfunction    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

## 2023-03-12 NOTE — PROGRESS NOTES
Hospitalist Progress Note      Synopsis:  Briefly, patient with PMH significant for CAD, HPL, HPT presented to ED and was admitted due to falling at home off of steps. Patient has concerns of living without assistance and feels he would benefit from placement. Imaging shows no acute injury, however severe osteoarthritis involving left hip was appreciated. Upon interview patient states he is unable to bear any weight on this leg. Orthopedic surgery has been consulted, MRI of left hip revealed diffuse osteopenia, severe degenerative joint disease, bursitis. Ortho consulted neurosurgery, MRI of spine showed stenosis at L4-5 and L5-S1. No neurosurgical intervention recommended. Ortho currently with no plan for surgical intervention due to patient being high risk, interventional radiology consulted for possible steroid injection to hip. Hospital day 4     Subjective:  Spoke with patient and daughter at length about multiple pain issues. Pain from hip and back both likely neither being a surgical option at this point. Explained that we would put in an order for interventional radiology for possible steroid injection to hip. Patient and daughter agreeable. Stable overnight. No issues reported. Patient seen and examined  Records reviewed. Temp (24hrs), Av.4 °F (36.3 °C), Min:97.2 °F (36.2 °C), Max:97.5 °F (36.4 °C)    DIET: ADULT DIET; Regular  CODE: Full Code    Intake/Output Summary (Last 24 hours) at 3/12/2023 1150  Last data filed at 3/12/2023 0830  Gross per 24 hour   Intake 180 ml   Output 400 ml   Net -220 ml           Objective:    BP (!) 121/52   Pulse 72   Temp 97.5 °F (36.4 °C) (Temporal)   Resp 17   Ht 5' 3\" (1.6 m)   Wt 170 lb (77.1 kg)   SpO2 93%   BMI 30.11 kg/m²     General appearance: No apparent distress, appears stated age and cooperative. HEENT: Conjunctivae/corneas clear. Mucous membranes moist.  Neck: Supple. No JVD.   Respiratory:  Clear to auscultation bilaterally. Normal respiratory effort. Cardiovascular:  RRR. S1, S2 without MRG. PV: Pulses palpable. No edema. Abdomen: Soft, non-tender, non-distended. +BS  Musculoskeletal: No obvious deformities. Skin: Normal skin color. No rashes or lesions. Good turgor. Neurologic:  Grossly non-focal. Awake, alert, following commands. Psychiatric: Alert and oriented, thought content appropriate, normal insight and judgement    Medications:  REVIEWED DAILY    Infusion Medications    sodium chloride       Scheduled Medications    amLODIPine  10 mg Oral Nightly    aspirin  81 mg Oral Daily    carvedilol  12.5 mg Oral BID WC    escitalopram  10 mg Oral Daily    finasteride  5 mg Oral QPM    folic acid  1 mg Oral Daily    furosemide  40 mg Oral Every Other Day    hydrOXYzine pamoate  25 mg Oral Nightly    levothyroxine  50 mcg Oral Daily    melatonin  10 mg Oral Nightly    tamsulosin  0.4 mg Oral QPM    sodium chloride flush  10 mL IntraVENous 2 times per day    enoxaparin  40 mg SubCUTAneous Daily     PRN Meds: traMADol, sodium chloride flush, sodium chloride, promethazine **OR** ondansetron, polyethylene glycol, acetaminophen **OR** acetaminophen    Labs:     Recent Labs     03/11/23 1129 03/12/23  0524   WBC 4.9 5.2   HGB 9.7* 9.3*   HCT 29.2* 29.3*   * 119*       Recent Labs     03/11/23 1129 03/12/23  0524    140   K 3.7 4.1    103   CO2 27 27   BUN 32* 32*   CREATININE 1.5* 1.4*   CALCIUM 8.9 8.5*       Recent Labs     03/11/23 1129 03/12/23  0524   PROT 6.3* 6.0*   ALKPHOS 52 51   ALT 5 6   AST 8 9   BILITOT 0.4 0.4       No results for input(s): INR in the last 72 hours. No results for input(s): Prentis Revels in the last 72 hours.     Chronic labs:    Lab Results   Component Value Date    CHOL 195 10/11/2021    TRIG 200 (H) 10/11/2021    HDL 38 10/11/2021    LDLCALC 117 (H) 10/11/2021    TSH 1.900 10/11/2021    PSA 0.76 12/07/2015    LABA1C 5.9 (H) 04/26/2021       Radiology: REVIEWED DAILY    Assessment:  Ambulatory dysfunction with subsequent fall at home  Coronary artery disease  Severe osteoarthritis of left hip generalized on CT  Hypertension  Hyperlipidemia  Hypothyroidism  Pancytopenia  Chronic kidney disease, baseline creatinine seems to be 1.3-1.4  Plan:  Reviewed CT of left hip revealing severe osteoarthritis, also reviewed ED notes, previous hospitalization notes, in light of findings and patient complaint we consulted orthopedic surgery for evaluation of left hip, who at this time are not recommending surgical intervention. Consult placed for interventional radiology for possible left hip injection. Plan for PT OT evaluation pending outpatient feels post injection. MRI of lumbar spine reviewed, revealed L4-S1 stenosis. ,  Neurosurgery note reviewed, surgery not recommended and believes patient may have radiculopathy from back, however complaints of pain is most likely from hip. PT OT consulted  Case management social work consulted for placement  Continue appropriate home medications  Pancytopenia, reviewed CBC, WBC 3.8, hemoglobin 9.8, hematocrit 28.9, seems to be patient's baseline, will continue to monitor  Monitor labs replete as indicated      DVT Prophylaxis [x] Lovenox  []  Heparin [] DOAC [] PCDs [] Ambulation    GI Prophylaxis [] PPI  [] H2 Blocker   [] Carafate  [x] Diet/Tube Feeds   Level of care [x] Med/Surg  [] Intermediate  []  ICU   Diet ADULT DIET; Regular    Family contact [x]  N/A    [] At bedside  [] Phone call further evaluation of hip pain, consult placed for interventional radiology for possible left hip injection   Disposition Patient requires continued admission further evaluation of hip pain, consult placed for interventional radiology for possible left hip injection   MDM [x] Low    [] Moderate  []   High       Discharge Plan: further evaluation of hip pain, consult placed for interventional radiology for possible left hip injection.   Patient is excepted to [facility], pre-CERT currently in the works.    +++++++++++++++++++++++++++++++++++++++++++++++++  QUANG Hartman/ [place] [address]  +++++++++++++++++++++++++++++++++++++++++++++++++  NOTE: This report was transcribed using voice recognition software. Every effort was made to ensure accuracy; however, inadvertent computerized transcription errors may be present.

## 2023-03-13 ENCOUNTER — APPOINTMENT (OUTPATIENT)
Dept: INTERVENTIONAL RADIOLOGY/VASCULAR | Age: 88
DRG: 554 | End: 2023-03-13
Payer: MEDICARE

## 2023-03-13 LAB
ALBUMIN SERPL-MCNC: 3.5 G/DL (ref 3.5–5.2)
ALP BLD-CCNC: 56 U/L (ref 40–129)
ALT SERPL-CCNC: 9 U/L (ref 0–40)
ANION GAP SERPL CALCULATED.3IONS-SCNC: 9 MMOL/L (ref 7–16)
AST SERPL-CCNC: 11 U/L (ref 0–39)
BASOPHILS ABSOLUTE: 0.01 E9/L (ref 0–0.2)
BASOPHILS RELATIVE PERCENT: 0.2 % (ref 0–2)
BILIRUB SERPL-MCNC: 0.4 MG/DL (ref 0–1.2)
BUN BLDV-MCNC: 33 MG/DL (ref 6–23)
CALCIUM SERPL-MCNC: 8.6 MG/DL (ref 8.6–10.2)
CHLORIDE BLD-SCNC: 102 MMOL/L (ref 98–107)
CO2: 28 MMOL/L (ref 22–29)
CREAT SERPL-MCNC: 1.3 MG/DL (ref 0.7–1.2)
EOSINOPHILS ABSOLUTE: 0.16 E9/L (ref 0.05–0.5)
EOSINOPHILS RELATIVE PERCENT: 3 % (ref 0–6)
GFR SERPL CREATININE-BSD FRML MDRD: 51 ML/MIN/1.73
GLUCOSE BLD-MCNC: 133 MG/DL (ref 74–99)
HCT VFR BLD CALC: 29.4 % (ref 37–54)
HEMOGLOBIN: 9.4 G/DL (ref 12.5–16.5)
IMMATURE GRANULOCYTES #: 0.04 E9/L
IMMATURE GRANULOCYTES %: 0.7 % (ref 0–5)
INR BLD: 1.1
LYMPHOCYTES ABSOLUTE: 0.62 E9/L (ref 1.5–4)
LYMPHOCYTES RELATIVE PERCENT: 11.6 % (ref 20–42)
MCH RBC QN AUTO: 33.7 PG (ref 26–35)
MCHC RBC AUTO-ENTMCNC: 32 % (ref 32–34.5)
MCV RBC AUTO: 105.4 FL (ref 80–99.9)
MONOCYTES ABSOLUTE: 0.4 E9/L (ref 0.1–0.95)
MONOCYTES RELATIVE PERCENT: 7.5 % (ref 2–12)
NEUTROPHILS ABSOLUTE: 4.13 E9/L (ref 1.8–7.3)
NEUTROPHILS RELATIVE PERCENT: 77 % (ref 43–80)
PDW BLD-RTO: 12.5 FL (ref 11.5–15)
PLATELET # BLD: 117 E9/L (ref 130–450)
PMV BLD AUTO: 9.3 FL (ref 7–12)
POTASSIUM SERPL-SCNC: 4.2 MMOL/L (ref 3.5–5)
PROTHROMBIN TIME: 12.4 SEC (ref 9.3–12.4)
RBC # BLD: 2.79 E12/L (ref 3.8–5.8)
SODIUM BLD-SCNC: 139 MMOL/L (ref 132–146)
TOTAL PROTEIN: 6.2 G/DL (ref 6.4–8.3)
WBC # BLD: 5.4 E9/L (ref 4.5–11.5)

## 2023-03-13 PROCEDURE — 6370000000 HC RX 637 (ALT 250 FOR IP): Performed by: FAMILY MEDICINE

## 2023-03-13 PROCEDURE — 3E0U3BZ INTRODUCTION OF ANESTHETIC AGENT INTO JOINTS, PERCUTANEOUS APPROACH: ICD-10-PCS | Performed by: RADIOLOGY

## 2023-03-13 PROCEDURE — 20610 DRAIN/INJ JOINT/BURSA W/O US: CPT

## 2023-03-13 PROCEDURE — 1200000000 HC SEMI PRIVATE

## 2023-03-13 PROCEDURE — 2500000003 HC RX 250 WO HCPCS: Performed by: RADIOLOGY

## 2023-03-13 PROCEDURE — 77002 NEEDLE LOCALIZATION BY XRAY: CPT

## 2023-03-13 PROCEDURE — 80053 COMPREHEN METABOLIC PANEL: CPT

## 2023-03-13 PROCEDURE — 0S9B3ZZ DRAINAGE OF LEFT HIP JOINT, PERCUTANEOUS APPROACH: ICD-10-PCS | Performed by: RADIOLOGY

## 2023-03-13 PROCEDURE — 6370000000 HC RX 637 (ALT 250 FOR IP)

## 2023-03-13 PROCEDURE — 2580000003 HC RX 258: Performed by: FAMILY MEDICINE

## 2023-03-13 PROCEDURE — 85025 COMPLETE CBC W/AUTO DIFF WBC: CPT

## 2023-03-13 PROCEDURE — 6370000000 HC RX 637 (ALT 250 FOR IP): Performed by: NEUROLOGICAL SURGERY

## 2023-03-13 PROCEDURE — 36415 COLL VENOUS BLD VENIPUNCTURE: CPT

## 2023-03-13 PROCEDURE — 85610 PROTHROMBIN TIME: CPT

## 2023-03-13 PROCEDURE — 3E0U33Z INTRODUCTION OF ANTI-INFLAMMATORY INTO JOINTS, PERCUTANEOUS APPROACH: ICD-10-PCS | Performed by: RADIOLOGY

## 2023-03-13 PROCEDURE — 2709999900 IR ARTHR/ASP/INJ MAJOR JT/BURSA LEFT WO US

## 2023-03-13 PROCEDURE — 6360000002 HC RX W HCPCS: Performed by: RADIOLOGY

## 2023-03-13 RX ORDER — LIDOCAINE HYDROCHLORIDE 10 MG/ML
INJECTION, SOLUTION INFILTRATION; PERINEURAL
Status: COMPLETED | OUTPATIENT
Start: 2023-03-13 | End: 2023-03-13

## 2023-03-13 RX ORDER — FENTANYL CITRATE 50 UG/ML
25 INJECTION, SOLUTION INTRAMUSCULAR; INTRAVENOUS
Status: DISCONTINUED | OUTPATIENT
Start: 2023-03-13 | End: 2023-03-13

## 2023-03-13 RX ORDER — BUPIVACAINE HYDROCHLORIDE 2.5 MG/ML
INJECTION, SOLUTION EPIDURAL; INFILTRATION; INTRACAUDAL
Status: COMPLETED | OUTPATIENT
Start: 2023-03-13 | End: 2023-03-13

## 2023-03-13 RX ORDER — TRIAMCINOLONE ACETONIDE 40 MG/ML
INJECTION, SUSPENSION INTRA-ARTICULAR; INTRAMUSCULAR
Status: COMPLETED | OUTPATIENT
Start: 2023-03-13 | End: 2023-03-13

## 2023-03-13 RX ORDER — LIDOCAINE HYDROCHLORIDE 20 MG/ML
INJECTION, SOLUTION INFILTRATION; PERINEURAL
Status: COMPLETED | OUTPATIENT
Start: 2023-03-13 | End: 2023-03-13

## 2023-03-13 RX ORDER — LORAZEPAM 0.5 MG/1
0.5 TABLET ORAL
Status: COMPLETED | OUTPATIENT
Start: 2023-03-13 | End: 2023-03-13

## 2023-03-13 RX ADMIN — TRIAMCINOLONE ACETONIDE 40 MG: 40 INJECTION, SUSPENSION INTRA-ARTICULAR; INTRAMUSCULAR at 15:28

## 2023-03-13 RX ADMIN — FUROSEMIDE 40 MG: 20 TABLET ORAL at 08:50

## 2023-03-13 RX ADMIN — ESCITALOPRAM OXALATE 10 MG: 10 TABLET, FILM COATED ORAL at 08:50

## 2023-03-13 RX ADMIN — BUPIVACAINE HYDROCHLORIDE 1 ML: 2.5 INJECTION, SOLUTION EPIDURAL; INFILTRATION; INTRACAUDAL; PERINEURAL at 15:28

## 2023-03-13 RX ADMIN — AMLODIPINE BESYLATE 10 MG: 10 TABLET ORAL at 21:06

## 2023-03-13 RX ADMIN — CARVEDILOL 12.5 MG: 6.25 TABLET, FILM COATED ORAL at 08:50

## 2023-03-13 RX ADMIN — FOLIC ACID 1 MG: 1 TABLET ORAL at 08:50

## 2023-03-13 RX ADMIN — HYDROXYZINE PAMOATE 25 MG: 25 CAPSULE ORAL at 21:06

## 2023-03-13 RX ADMIN — LORAZEPAM 0.5 MG: 0.5 TABLET ORAL at 14:38

## 2023-03-13 RX ADMIN — LIDOCAINE HYDROCHLORIDE 7 ML: 20 INJECTION, SOLUTION INFILTRATION; PERINEURAL at 15:25

## 2023-03-13 RX ADMIN — CARVEDILOL 12.5 MG: 6.25 TABLET, FILM COATED ORAL at 17:05

## 2023-03-13 RX ADMIN — FINASTERIDE 5 MG: 5 TABLET, FILM COATED ORAL at 17:05

## 2023-03-13 RX ADMIN — TRAMADOL HYDROCHLORIDE 50 MG: 50 TABLET ORAL at 05:27

## 2023-03-13 RX ADMIN — LEVOTHYROXINE SODIUM 50 MCG: 0.05 TABLET ORAL at 05:24

## 2023-03-13 RX ADMIN — LIDOCAINE HYDROCHLORIDE 1 ML: 10 INJECTION, SOLUTION INFILTRATION; PERINEURAL at 15:28

## 2023-03-13 RX ADMIN — Medication 10 MG: at 21:06

## 2023-03-13 RX ADMIN — Medication 10 ML: at 21:08

## 2023-03-13 RX ADMIN — TAMSULOSIN HYDROCHLORIDE 0.4 MG: 0.4 CAPSULE ORAL at 17:05

## 2023-03-13 ASSESSMENT — PAIN DESCRIPTION - LOCATION: LOCATION: HIP

## 2023-03-13 ASSESSMENT — PAIN SCALES - GENERAL
PAINLEVEL_OUTOF10: 7
PAINLEVEL_OUTOF10: 0

## 2023-03-13 ASSESSMENT — PAIN DESCRIPTION - PAIN TYPE: TYPE: CHRONIC PAIN

## 2023-03-13 ASSESSMENT — PAIN DESCRIPTION - FREQUENCY: FREQUENCY: CONTINUOUS

## 2023-03-13 ASSESSMENT — PAIN - FUNCTIONAL ASSESSMENT: PAIN_FUNCTIONAL_ASSESSMENT: PREVENTS OR INTERFERES SOME ACTIVE ACTIVITIES AND ADLS

## 2023-03-13 ASSESSMENT — PAIN DESCRIPTION - ONSET: ONSET: ON-GOING

## 2023-03-13 ASSESSMENT — PAIN DESCRIPTION - ORIENTATION: ORIENTATION: LEFT

## 2023-03-13 ASSESSMENT — PAIN DESCRIPTION - DESCRIPTORS: DESCRIPTORS: ACHING;DISCOMFORT;DULL

## 2023-03-13 NOTE — PLAN OF CARE
Problem: Chronic Conditions and Co-morbidities  Goal: Patient's chronic conditions and co-morbidity symptoms are monitored and maintained or improved  3/13/2023 0007 by Jose Martin Keys RN  Outcome: Progressing     Problem: Discharge Planning  Goal: Discharge to home or other facility with appropriate resources  3/13/2023 1051 by Carmela Pack RN  Outcome: Progressing  3/12/2023 2324 by Gemini Menendez RN  Outcome: Progressing     Problem: Pain  Goal: Verbalizes/displays adequate comfort level or baseline comfort level  3/13/2023 1051 by Carmela Pack RN  Outcome: Progressing  3/12/2023 2324 by Gemini Menendez RN  Outcome: Progressing     Problem: Safety - Adult  Goal: Free from fall injury  3/13/2023 1051 by Carmela Pack RN  Outcome: Progressing  3/12/2023 2324 by Gemini Menendez RN  Outcome: Progressing     Problem: ABCDS Injury Assessment  Goal: Absence of physical injury  3/12/2023 2324 by Gemini Menendez RN  Outcome: Progressing

## 2023-03-13 NOTE — PROGRESS NOTES
Spoke with RN regarding patient's ordered R Hip injection. Ordering provider will need to order new INR, keep patient NPO, and hold all thinners for procedure. Patient is able to sign consent.

## 2023-03-13 NOTE — ACP (ADVANCE CARE PLANNING)
Advance Care Planning     General Advance Care Planning (ACP) Conversation    Date of Conversation: 3/8/2023  Conducted with: Patient with Decision Making Capacity    Healthcare Decision Maker:  No healthcare decision makers have been documented.   Click here to complete [id number] including selection of the Healthcare Decision Maker Relationship (ie \"Primary\")      Content/Action Overview:    Reviewed DNR/DNI and patient elects Full Code (Attempt Resuscitation)      Length of Voluntary ACP Conversation in minutes:  <16 minutes (Non-Billable)    Alejandro Moreno RN

## 2023-03-13 NOTE — PROGRESS NOTES
Hospitalist Progress Note      Synopsis:   Briefly, patient with PMH significant for CAD, HPL, HPT presented to ED and was admitted due to falling at home off of steps. Patient has concerns of living without assistance and feels he would benefit from placement. Imaging shows no acute injury, however severe osteoarthritis involving left hip was appreciated. Upon interview patient states he is unable to bear any weight on this leg. Orthopedic surgery has been consulted, MRI of left hip revealed diffuse osteopenia, severe degenerative joint disease, bursitis. Ortho consulted neurosurgery, MRI of spine showed stenosis at L4-5 and L5-S1. No neurosurgical intervention recommended. Ortho currently with no plan for surgical intervention due to patient being high risk, interventional radiology consulted for steroid injection to hip, PT OT eval to follow, with hopes of patient discharging to [place] [id number]. Hospital day 5     Subjective:  Patient seen and examined at bedside with daughter present. Currently concerned over discomfort from IR procedure. Explained to patient that we would order something for anxiety prior to procedure and something for pain postprocedure. Patient and family agreeable with plan. Stable overnight. No issues reported. Patient seen and examined  Records reviewed. Temp (24hrs), Av.5 °F (36.4 °C), Min:97.4 °F (36.3 °C), Max:97.6 °F (36.4 °C)    DIET: Diet NPO  CODE: Full Code    Intake/Output Summary (Last 24 hours) at 3/13/2023 1207  Last data filed at 3/13/2023 0530  Gross per 24 hour   Intake 420 ml   Output 350 ml   Net 70 ml         Objective:    BP (!) 142/52   Pulse 72   Temp 97.4 °F (36.3 °C) (Temporal)   Resp 16   Ht 5' 3\" (1.6 m)   Wt 170 lb (77.1 kg)   SpO2 92%   BMI 30.11 kg/m²     General appearance: No apparent distress, appears stated age and cooperative. HEENT: Conjunctivae/corneas clear. Mucous membranes moist.  Neck: Supple.  No JVD.  Respiratory:  Clear to auscultation bilaterally. Normal respiratory effort. Cardiovascular:  RRR. S1, S2 without MRG. PV: Pulses palpable. No edema. Abdomen: Soft, non-tender, non-distended. +BS  Musculoskeletal: No obvious deformities. Skin: Normal skin color. No rashes or lesions. Good turgor. Neurologic:  Grossly non-focal. Awake, alert, following commands. Psychiatric: Alert and oriented, thought content appropriate, normal insight and judgement    Medications:  REVIEWED DAILY    Infusion Medications    sodium chloride       Scheduled Medications    amLODIPine  10 mg Oral Nightly    aspirin  81 mg Oral Daily    carvedilol  12.5 mg Oral BID WC    escitalopram  10 mg Oral Daily    finasteride  5 mg Oral QPM    folic acid  1 mg Oral Daily    furosemide  40 mg Oral Every Other Day    hydrOXYzine pamoate  25 mg Oral Nightly    levothyroxine  50 mcg Oral Daily    melatonin  10 mg Oral Nightly    tamsulosin  0.4 mg Oral QPM    sodium chloride flush  10 mL IntraVENous 2 times per day    enoxaparin  40 mg SubCUTAneous Daily     PRN Meds: LORazepam, fentanNYL, traMADol, sodium chloride flush, sodium chloride, promethazine **OR** ondansetron, polyethylene glycol, acetaminophen **OR** acetaminophen    Labs:     Recent Labs     03/11/23 1129 03/12/23  0524 03/13/23  0613   WBC 4.9 5.2 5.4   HGB 9.7* 9.3* 9.4*   HCT 29.2* 29.3* 29.4*   * 119* 117*       Recent Labs     03/11/23  1129 03/12/23  0524 03/13/23  0613    140 139   K 3.7 4.1 4.2    103 102   CO2 27 27 28   BUN 32* 32* 33*   CREATININE 1.5* 1.4* 1.3*   CALCIUM 8.9 8.5* 8.6       Recent Labs     03/11/23  1129 03/12/23  0524 03/13/23  0613   PROT 6.3* 6.0* 6.2*   ALKPHOS 52 51 56   ALT 5 6 9   AST 8 9 11   BILITOT 0.4 0.4 0.4       Recent Labs     03/13/23  1015   INR 1.1       No results for input(s): CKTOTAL, TROPONINI in the last 72 hours.     Chronic labs:    Lab Results   Component Value Date    CHOL 195 10/11/2021    TRIG 200 (H) 10/11/2021    HDL 38 10/11/2021    LDLCALC 117 (H) 10/11/2021    TSH 1.900 10/11/2021    PSA 0.76 12/07/2015    INR 1.1 03/13/2023    LABA1C 5.9 (H) 04/26/2021       Radiology: REVIEWED DAILY    Assessment:  Ambulatory dysfunction with subsequent fall at home, likely secondary to significant osteoarthritis of hip. Coronary artery disease  Severe osteoarthritis of left hip generalized on CT  Hypertension  Hyperlipidemia  Hypothyroidism  Pancytopenia  Chronic kidney disease, baseline creatinine seems to be 1.3-1.4  Plan:  Reviewed CT of left hip revealing severe osteoarthritis, also reviewed ED notes, previous hospitalization notes, in light of findings and patient complaint we consulted orthopedic surgery for evaluation of left hip, who at this time are not recommending surgical intervention. Consult placed for interventional radiology for left hip injection today. Plan for PT OT evaluation pending how patient feels post injection. MRI of lumbar spine reviewed, revealed L4-S1 stenosis. ,  Neurosurgery note reviewed, surgery not recommended and believes patient may have radiculopathy from back, however complaints of pain is most likely from hip. PT OT consulted, will likely see patient postprocedure. Case management social work consulted for placement, plan for patient to discharge to [address] [place] [address] pending PT OT eval's post injection.   Continue appropriate home medications  Pancytopenia, reviewed CBC, WBC 3.8, hemoglobin 9.8, hematocrit 28.9, seems to be patient's baseline, will continue to monitor  Monitor labs replete as indicated      DVT Prophylaxis [x] Lovenox  []  Heparin [] DOAC [] PCDs [] Ambulation    GI Prophylaxis [] PPI  [] H2 Blocker   [] Carafate  [x] Diet/Tube Feeds   Level of care [] Med/Surg  [] Intermediate  [x]  ICU   Diet Diet NPO    Family contact [x]  N/A    [] At bedside  [] Phone call   Disposition Patient requires continued admission patient for hip injection with IR today, [facility] pending PT OT evaluation   MDM [] Low    [x] Moderate  []   High       Discharge Plan: patient for hip injection with IR today, [facility] evaluation    +++++++++++++++++++++++++++++++++++++++++++++++++  [facility], [place]  +++++++++++++++++++++++++++++++++++++++++++++++++  NOTE: This report was transcribed using voice recognition software. Every effort was made to ensure accuracy; however, inadvertent computerized transcription errors may be present.

## 2023-03-13 NOTE — PROGRESS NOTES
Neurosurg progress note  VITALS:  BP (!) 142/52   Pulse 72   Temp 97.4 °F (36.3 °C) (Temporal)   Resp 16   Ht 5' 3\" (1.6 m)   Wt 170 lb (77.1 kg)   SpO2 92%   BMI 30.11 kg/m²   24HR INTAKE/OUTPUT:    Intake/Output Summary (Last 24 hours) at 3/13/2023 1330  Last data filed at 3/13/2023 1316  Gross per 24 hour   Intake 420 ml   Output 450 ml   Net -30 ml     XR HIP LEFT (2-3 VIEWS)    Result Date: 3/8/2023  EXAMINATION: XRAY VIEWS OF THE LEFT FEMUR; TWO XRAY VIEWS OF THE LEFT HIP 3/8/2023 1:06 pm COMPARISON: July 5, 2017 HISTORY: ORDERING SYSTEM PROVIDED HISTORY: fall, r/o fx TECHNOLOGIST PROVIDED HISTORY: Reason for exam:->fall, r/o fx What reading provider will be dictating this exam?->CRC FINDINGS: No fracture or dislocation of the left hip. Severe osteoarthritic changes are associated with left hip with flattening of left femoral head, narrowing of superior aspect of the joint space, and subchondral sclerosis. No fracture involving mid or distal left femur. Vascular calcifications are present. 1. Severe osteoarthritis involving left hip with flattening of left femoral head and mild superior subluxation. 2. No fracture or dislocation of left hip or femur. XR FEMUR LEFT (MIN 2 VIEWS)    Result Date: 3/8/2023  EXAMINATION: XRAY VIEWS OF THE LEFT FEMUR; TWO XRAY VIEWS OF THE LEFT HIP 3/8/2023 1:06 pm COMPARISON: July 5, 2017 HISTORY: ORDERING SYSTEM PROVIDED HISTORY: fall, r/o fx TECHNOLOGIST PROVIDED HISTORY: Reason for exam:->fall, r/o fx What reading provider will be dictating this exam?->CRC FINDINGS: No fracture or dislocation of the left hip. Severe osteoarthritic changes are associated with left hip with flattening of left femoral head, narrowing of superior aspect of the joint space, and subchondral sclerosis. No fracture involving mid or distal left femur. Vascular calcifications are present.      1. Severe osteoarthritis involving left hip with flattening of left femoral head and mild superior subluxation. 2. No fracture or dislocation of left hip or femur. XR KNEE LEFT (MIN 4 VIEWS)    Result Date: 3/8/2023  EXAMINATION: FOUR XRAY VIEWS OF THE LEFT KNEE 3/8/2023 1:06 pm COMPARISON: None. HISTORY: ORDERING SYSTEM PROVIDED HISTORY: trauma TECHNOLOGIST PROVIDED HISTORY: Reason for exam:->trauma What reading provider will be dictating this exam?->CRC FINDINGS: No fracture or dislocation of left knee. Patella is in satisfactory position. No synovial effusion. Vascular calcifications are present. No acute osseous abnormality involving the left knee. CT HEAD WO CONTRAST    Result Date: 3/8/2023  EXAMINATION: CT OF THE HEAD WITHOUT CONTRAST  3/8/2023 12:20 pm TECHNIQUE: CT of the head was performed without the administration of intravenous contrast. Automated exposure control, iterative reconstruction, and/or weight based adjustment of the mA/kV was utilized to reduce the radiation dose to as low as reasonably achievable. COMPARISON: CT head dated 04/24/2021 HISTORY: ORDERING SYSTEM PROVIDED HISTORY: trauma TECHNOLOGIST PROVIDED HISTORY: Has a \"code stroke\" or \"stroke alert\" been called? ->No Reason for exam:->trauma Decision Support Exception - unselect if not a suspected or confirmed emergency medical condition->Emergency Medical Condition (MA) What reading provider will be dictating this exam?->CRC FINDINGS: BRAIN/VENTRICLES: There is no acute intracranial hemorrhage, mass effect or midline shift. No abnormal extra-axial fluid collection. The gray-white differentiation is maintained without evidence of an acute infarct. There is no evidence of hydrocephalus. Moderate amount of low attenuation in the periventricular deep white matter suggesting chronic small vessel ischemic disease with moderate prominence of sulci towards the vertex of moderate generalized atrophy. ORBITS: The visualized portion of the orbits demonstrate no acute abnormality.  SINUSES: The visualized paranasal sinuses and mastoid air cells demonstrate no acute abnormality. SOFT TISSUES/SKULL:  No acute abnormality of the visualized skull or soft tissues. No acute intracranial abnormality. CT CERVICAL SPINE WO CONTRAST    Result Date: 3/8/2023  EXAMINATION: CT OF THE CERVICAL SPINE WITHOUT CONTRAST 3/8/2023 12:20 pm TECHNIQUE: CT of the cervical spine was performed without the administration of intravenous contrast. Multiplanar reformatted images are provided for review. Automated exposure control, iterative reconstruction, and/or weight based adjustment of the mA/kV was utilized to reduce the radiation dose to as low as reasonably achievable. COMPARISON: CT C-spine 12/17/2017 HISTORY: ORDERING SYSTEM PROVIDED HISTORY: trauma TECHNOLOGIST PROVIDED HISTORY: Reason for exam:->trauma Decision Support Exception - unselect if not a suspected or confirmed emergency medical condition->Emergency Medical Condition (MA) What reading provider will be dictating this exam?->CRC FINDINGS: BONES/ALIGNMENT: There is no acute fracture or traumatic malalignment. DEGENERATIVE CHANGES: Moderate multilevel degenerative changes. SOFT TISSUES: There is no prevertebral soft tissue swelling. No acute abnormality of the cervical spine. CT LUMBAR SPINE WO CONTRAST    Result Date: 3/8/2023  EXAMINATION: CT OF THE LUMBAR SPINE WITHOUT CONTRAST  3/8/2023 TECHNIQUE: CT of the lumbar spine was performed without the administration of intravenous contrast. Multiplanar reformatted images are provided for review. Adjustment of mA and/or kV according to patient size was utilized. Automated exposure control, iterative reconstruction, and/or weight based adjustment of the mA/kV was utilized to reduce the radiation dose to as low as reasonably achievable.  COMPARISON: Correlation made with MRI from August 15, 2017 and radiographs from June 24, 2020 HISTORY: ORDERING SYSTEM PROVIDED HISTORY: trauma TECHNOLOGIST PROVIDED HISTORY: Reason for exam:->trauma Decision Support Exception - unselect if not a suspected or confirmed emergency medical condition->Emergency Medical Condition (MA) What reading provider will be dictating this exam?->CRC FINDINGS: Redemonstration of compression fracture involving T12 of approximately 80% with retropulsion and moderate central canal stenosis. This finding appears similar when compared with prior MRI from August 15, 2017. No new fracture or malalignment of the lumbar spine. Disc herniation present at L4/L5 resulting in moderate central canal stenosis. 1. Redemonstration of chronic compression fracture of T12 of approximately 80% with retropulsion and central canal stenosis. 2. No new lumbar spine compression fracture or malalignment. XR CHEST PORTABLE    Result Date: 3/8/2023  EXAMINATION: ONE XRAY VIEW OF THE CHEST 3/8/2023 1:06 pm COMPARISON: None. HISTORY: ORDERING SYSTEM PROVIDED HISTORY: Shortness of breath TECHNOLOGIST PROVIDED HISTORY: Reason for exam:->Shortness of breath What reading provider will be dictating this exam?->CRC FINDINGS: The cardiac silhouette is at upper limits of normal in size. Postop sternotomy changes are noted There is no right or left lung infiltrate. There is a linear scarring scar seen within the left lung base. There is chronic blunting the left costophrenic angle. There is no pneumothorax There is no gross rib fracture. 1. There are no findings of failure or pneumonia 2. Chronic blunting the left costophrenic angle 3. Chronic linear scarring seen within the left lung base. CT HIP LEFT WO CONTRAST    Result Date: 3/8/2023  EXAMINATION: CT OF THE LEFT HIP WITHOUT CONTRAST 3/8/2023 4:33 pm TECHNIQUE: CT of the left hip was performed without the administration of intravenous contrast.  Multiplanar reformatted images are provided for review.  Automated exposure control, iterative reconstruction, and/or weight based adjustment of the mA/kV was utilized to reduce the radiation dose to as low as reasonably achievable. COMPARISON: Correlation made with radiographs performed today. HISTORY ORDERING SYSTEM PROVIDED HISTORY: truama TECHNOLOGIST PROVIDED HISTORY: Reason for exam:->truama Decision Support Exception - unselect if not a suspected or confirmed emergency medical condition->Emergency Medical Condition (MA) What reading provider will be dictating this exam?->CRC FINDINGS: Severe osteoarthritis involving the left hip with multiple degenerative subchondral cysts, narrowing involving superior aspect of the joint space, subchondral sclerosis, and marginal osteophytosis involving superior aspect of left acetabulum and femoral head. There is flattening of femoral head. No significant synovial effusion. There is no fracture or dislocation involving the left hip. No pelvis fracture. No free fluid collections in the pelvis. There is a right hip arthroplasty. Multiple diverticula are associated with the sigmoid colon. 1. Severe osteoarthritis involving left hip. 2. No fracture or dislocation.      CBC:   Lab Results   Component Value Date/Time    WBC 5.4 03/13/2023 06:13 AM    RBC 2.79 03/13/2023 06:13 AM    HGB 9.4 03/13/2023 06:13 AM    HCT 29.4 03/13/2023 06:13 AM    .4 03/13/2023 06:13 AM    MCH 33.7 03/13/2023 06:13 AM    MCHC 32.0 03/13/2023 06:13 AM    RDW 12.5 03/13/2023 06:13 AM     03/13/2023 06:13 AM    MPV 9.3 03/13/2023 06:13 AM     BMP:    Lab Results   Component Value Date/Time     03/13/2023 06:13 AM    K 4.2 03/13/2023 06:13 AM    K 3.8 03/09/2023 04:29 AM     03/13/2023 06:13 AM    CO2 28 03/13/2023 06:13 AM    BUN 33 03/13/2023 06:13 AM    LABALBU 3.5 03/13/2023 06:13 AM    LABALBU 4.1 11/29/2011 07:22 AM    CREATININE 1.3 03/13/2023 06:13 AM    CALCIUM 8.6 03/13/2023 06:13 AM    GFRAA >60 10/11/2021 10:45 AM    LABGLOM 51 03/13/2023 06:13 AM    GLUCOSE 133 03/13/2023 06:13 AM    GLUCOSE 139 11/29/2011 07:22 AM      amLODIPine  10 mg Oral Nightly aspirin  81 mg Oral Daily    carvedilol  12.5 mg Oral BID WC    escitalopram  10 mg Oral Daily    finasteride  5 mg Oral QPM    folic acid  1 mg Oral Daily    furosemide  40 mg Oral Every Other Day    hydrOXYzine pamoate  25 mg Oral Nightly    levothyroxine  50 mcg Oral Daily    melatonin  10 mg Oral Nightly    tamsulosin  0.4 mg Oral QPM    sodium chloride flush  10 mL IntraVENous 2 times per day    enoxaparin  40 mg SubCUTAneous Daily     Remains awake and alert no acute distress good to try to get his left hip injected follows commands pupils equal round reactive2  Assessment:  Patient Active Problem List   Diagnosis    CHF with unknown LVEF (HCC)    Aortic valve stenosis    Sick sinus syndrome ([facility])    Pacemaker    Systolic ejection murmur    Hypertension    CHF (congestive heart failure), NYHA class I, acute on chronic, combined ([facility])    Type 2 diabetes mellitus without complication ([facility])    Acute renal insufficiency    Compression fracture of T12 vertebra (HCC)    Decompensated heart failure ([facility])    Hypoxia    Ambulatory dysfunction    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

## 2023-03-13 NOTE — PROGRESS NOTES
This rn sent message to Dr Kyle Brooks serve sent me to him instead of Marylin Queen) via perfect serve,    \"Do you want the normal medication for arianne haynes hip injection? Markane, Kenalog and lido 1%? Please either message me back or call back, we are bringing him in for injection as we speak. Thank you for your time. \"    Dr Damian Pride replied, \"That is fine, we didn't order it but I think the normal cocktail is appropriate. \"

## 2023-03-13 NOTE — PROGRESS NOTES
Occupational Therapy  OT BEDSIDE TREATMENT NOTE   [address] [facility] [address] [address]        Date:3/13/2023  Patient Name: Vanita Tanner  MRN: [id number]  : 1930  Room: [address] [place] [address]-A     Attempted to see pt this PM, with pt currently off unit. Will attempt at a later date/time.         [address] MCKEE/L [id number]

## 2023-03-13 NOTE — PLAN OF CARE
Speaking of his opinion that he did poorly w pt/ot this am.   Bs alen and walker brought to room for next effort on this, likely in am.    At change of shift ( now ) Jv Wood is flatly declining a new iv placement. D/w him how we would need it in event of heart stoppage. Discussed what is involved w  cpr / full code procedures. Stated he prefers to no longer be listed on full code staus ;    rather to be on dnr status and no new iv to be placed.

## 2023-03-14 VITALS
TEMPERATURE: 97 F | RESPIRATION RATE: 16 BRPM | WEIGHT: 170 LBS | HEART RATE: 81 BPM | BODY MASS INDEX: 30.12 KG/M2 | HEIGHT: 63 IN | DIASTOLIC BLOOD PRESSURE: 65 MMHG | SYSTOLIC BLOOD PRESSURE: 150 MMHG | OXYGEN SATURATION: 94 %

## 2023-03-14 PROCEDURE — 6360000002 HC RX W HCPCS: Performed by: FAMILY MEDICINE

## 2023-03-14 PROCEDURE — 97530 THERAPEUTIC ACTIVITIES: CPT

## 2023-03-14 PROCEDURE — 97535 SELF CARE MNGMENT TRAINING: CPT

## 2023-03-14 PROCEDURE — 2580000003 HC RX 258: Performed by: FAMILY MEDICINE

## 2023-03-14 PROCEDURE — 6370000000 HC RX 637 (ALT 250 FOR IP): Performed by: FAMILY MEDICINE

## 2023-03-14 RX ORDER — TRAMADOL HYDROCHLORIDE 50 MG/1
50 TABLET ORAL EVERY 6 HOURS PRN
Qty: 12 TABLET | Refills: 0 | Status: SHIPPED | OUTPATIENT
Start: 2023-03-14 | End: 2023-03-17

## 2023-03-14 RX ORDER — FUROSEMIDE 40 MG/1
40 TABLET ORAL EVERY OTHER DAY
Qty: 60 TABLET | Refills: 3 | Status: SHIPPED | OUTPATIENT
Start: 2023-03-15

## 2023-03-14 RX ADMIN — CARVEDILOL 12.5 MG: 6.25 TABLET, FILM COATED ORAL at 08:05

## 2023-03-14 RX ADMIN — ENOXAPARIN SODIUM 40 MG: 100 INJECTION SUBCUTANEOUS at 08:05

## 2023-03-14 RX ADMIN — ASPIRIN 81 MG CHEWABLE TABLET 81 MG: 81 TABLET CHEWABLE at 08:05

## 2023-03-14 RX ADMIN — ACETAMINOPHEN 650 MG: 325 TABLET ORAL at 08:04

## 2023-03-14 RX ADMIN — ESCITALOPRAM OXALATE 10 MG: 10 TABLET, FILM COATED ORAL at 08:05

## 2023-03-14 RX ADMIN — FOLIC ACID 1 MG: 1 TABLET ORAL at 08:05

## 2023-03-14 RX ADMIN — POLYETHYLENE GLYCOL 3350 17 G: 17 POWDER, FOR SOLUTION ORAL at 08:04

## 2023-03-14 RX ADMIN — Medication 10 ML: at 08:06

## 2023-03-14 RX ADMIN — LEVOTHYROXINE SODIUM 50 MCG: 0.05 TABLET ORAL at 06:29

## 2023-03-14 ASSESSMENT — PAIN SCALES - GENERAL
PAINLEVEL_OUTOF10: 5
PAINLEVEL_OUTOF10: 0

## 2023-03-14 ASSESSMENT — PAIN DESCRIPTION - ORIENTATION: ORIENTATION: LEFT

## 2023-03-14 ASSESSMENT — PAIN DESCRIPTION - FREQUENCY: FREQUENCY: CONTINUOUS

## 2023-03-14 ASSESSMENT — PAIN DESCRIPTION - DESCRIPTORS: DESCRIPTORS: ACHING;SORE;DISCOMFORT

## 2023-03-14 ASSESSMENT — PAIN DESCRIPTION - LOCATION: LOCATION: LEG

## 2023-03-14 ASSESSMENT — PAIN DESCRIPTION - ONSET: ONSET: ON-GOING

## 2023-03-14 ASSESSMENT — PAIN DESCRIPTION - PAIN TYPE: TYPE: CHRONIC PAIN

## 2023-03-14 NOTE — PROGRESS NOTES
Hospitalist Discharge Summary    Patient ID: Antoine Canales   Patient : [id number]  Patient's PCP: MIHIR Sewell CNP    Admit Date: 3/8/2023   Admitting Physician: Fátima Jay MD    Discharge Date:  3/14/2023   Discharge Physician: MIHIR Turcios CNP   Discharge Condition: Stable  Discharge Disposition: [facility] course in brief:  (Please refer to daily progress notes for a comprehensive review of the hospitalization by requesting medical records)  Briefly, patient with PMH significant for CAD, HPL, HPT presented to ED and was admitted due to falling at home off of steps. Patient has concerns of living without assistance and feels he would benefit from placement. Imaging shows no acute injury, however severe osteoarthritis involving left hip was appreciated. Upon interview patient states he is unable to bear any weight on this leg. Orthopedic surgery has been consulted, MRI of left hip revealed diffuse osteopenia, severe degenerative joint disease, bursitis. Ortho consulted neurosurgery, MRI of spine showed stenosis at L4-5 and L5-S1. No neurosurgical intervention recommended. Ortho currently with no plan for surgical intervention due to patient being high risk, interventional radiology consulted for steroid injection to hip. Patient states hip does feel better after injection. Patient is to follow-up outpatient with Ortho surgery. Patient stable from medical perspective for discharge. Consults:   IP CONSULT TO HOSPITALIST  IP CONSULT TO ORTHOPEDIC SURGERY  IP CONSULT TO NEUROSURGERY    Discharge Diagnoses:  Ambulatory dysfunction with subsequent fall at home, likely secondary to significant osteoarthritis of hip. Coronary artery disease  Severe osteoarthritis of left hip generalized on CT  Hypertension  Hyperlipidemia  Hypothyroidism  Pancytopenia  Chronic kidney disease, baseline creatinine seems to be 1.3-1.4      Discharge Instructions / Follow up:     No future appointments.    The patient's condition is stable.  At this time the patient is without objective evidence of an acute process requiring continuing hospitalization or inpatient management.  They are stable for discharge with outpatient follow-up.     I have spoken with the patient and discussed the results of the current hospitalization, in addition to providing specific details for the plan of care and counseling regarding the diagnosis and prognosis.  The plan has been discussed in detail and they are aware of the specific conditions for emergent return, as well as the importance of follow-up.  Their questions are answered at this time and they are agreeable with the plan for discharge to [facility].    Continued appropriate risk factor modification of blood pressure, diabetes and serum lipids will remain essential to reducing risk of future atherosclerotic development    Activity: activity as tolerated    Physical exam:  General appearance: No apparent distress, appears stated age and cooperative.  HEENT: Normal cephalic, atraumatic without obvious deformity. Pupils equal, round, and reactive to light.  Extra ocular muscles intact. Conjunctivae/corneas clear.  Neck: Supple, with full range of motion. No jugular venous distention. Trachea midline.  Respiratory:  Clear to auscultation bilaterally.  No apparent distress.  Cardiovascular:  Regular rate and rhythm. S1, S2 without murmurs, rubs, or gallops.   PV: Brisk capillary refill.  +2 pedal and radial pulses bilaterally. No clubbing, cyanosis, edema of bilateral lower extremities.   Abdomen: Soft, non-tender, non-distended. +BS  Musculoskeletal: No obvious deformities or erythematous or edematous joints.   Skin: Normal skin color.  No rashes or lesions.  Neurologic:  Neurovascularly intact without any focal sensory/motor deficits. Cranial nerves: II-XII intact, grossly non-focal.  Psychiatric: Alert and oriented, thought content appropriate,  normal insight    Significant labs:  CBC:   Recent Labs     03/11/23  1129 03/12/23  0524 03/13/23  0613   WBC 4.9 5.2 5.4   RBC 2.81* 2.76* 2.79*   HGB 9.7* 9.3* 9.4*   HCT 29.2* 29.3* 29.4*   .9* 106.2* 105.4*   RDW 12.7 12.8 12.5   * 119* 117*     BMP:   Recent Labs     03/11/23 1129 03/12/23  0524 03/13/23  0613    140 139   K 3.7 4.1 4.2    103 102   CO2 27 27 28   BUN 32* 32* 33*   CREATININE 1.5* 1.4* 1.3*     LFT:  Recent Labs     03/11/23 1129 03/12/23 0524 03/13/23  0613   PROT 6.3* 6.0* 6.2*   ALKPHOS 52 51 56   ALT 5 6 9   AST 8 9 11   BILITOT 0.4 0.4 0.4     PT/INR:   Recent Labs     03/13/23  1015   INR 1.1     BNP: No results for input(s): BNP in the last 72 hours. Hgb A1C:   Lab Results   Component Value Date    LABA1C 5.9 (H) 04/26/2021     Folate and B12:   Lab Results   Component Value Date    [address] (H) 04/28/2021   ,   Lab Results   Component Value Date    FOLATE >20.0 12/19/2017     Thyroid Studies:   Lab Results   Component Value Date    TSH 1.900 10/11/2021    T8WHHOG 6.5 04/28/2021       Urinalysis:    Lab Results   Component Value Date/Time    NITRU Negative 04/24/2021 09:23 AM    WBCUA NONE 04/24/2021 09:23 AM    BACTERIA NONE SEEN 04/24/2021 09:23 AM    RBCUA 10-20 04/24/2021 09:23 AM    BLOODU LARGE 04/24/2021 09:23 AM    SPECGRAV 1.025 04/24/2021 09:23 AM    GLUCOSEU Negative 04/24/2021 09:23 AM       Imaging:  XR HIP LEFT (2-3 VIEWS)    Result Date: 3/8/2023  EXAMINATION: XRAY VIEWS OF THE LEFT FEMUR; TWO XRAY VIEWS OF THE LEFT HIP 3/8/2023 1:06 pm COMPARISON: July 5, 2017 HISTORY: ORDERING SYSTEM PROVIDED HISTORY: fall, r/o fx TECHNOLOGIST PROVIDED HISTORY: Reason for exam:->fall, r/o fx What reading provider will be dictating this exam?->CRC FINDINGS: No fracture or dislocation of the left hip.   Severe osteoarthritic changes are associated with left hip with flattening of left femoral head, narrowing of superior aspect of the joint space, and subchondral sclerosis. No fracture involving mid or distal left femur. Vascular calcifications are present. 1. Severe osteoarthritis involving left hip with flattening of left femoral head and mild superior subluxation. 2. No fracture or dislocation of left hip or femur. XR FEMUR LEFT (MIN 2 VIEWS)    Result Date: 3/8/2023  EXAMINATION: XRAY VIEWS OF THE LEFT FEMUR; TWO XRAY VIEWS OF THE LEFT HIP 3/8/2023 1:06 pm COMPARISON: July 5, 2017 HISTORY: ORDERING SYSTEM PROVIDED HISTORY: fall, r/o fx TECHNOLOGIST PROVIDED HISTORY: Reason for exam:->fall, r/o fx What reading provider will be dictating this exam?->CRC FINDINGS: No fracture or dislocation of the left hip. Severe osteoarthritic changes are associated with left hip with flattening of left femoral head, narrowing of superior aspect of the joint space, and subchondral sclerosis. No fracture involving mid or distal left femur. Vascular calcifications are present. 1. Severe osteoarthritis involving left hip with flattening of left femoral head and mild superior subluxation. 2. No fracture or dislocation of left hip or femur. XR KNEE LEFT (MIN 4 VIEWS)    Result Date: 3/8/2023  EXAMINATION: FOUR XRAY VIEWS OF THE LEFT KNEE 3/8/2023 1:06 pm COMPARISON: None. HISTORY: ORDERING SYSTEM PROVIDED HISTORY: trauma TECHNOLOGIST PROVIDED HISTORY: Reason for exam:->trauma What reading provider will be dictating this exam?->CRC FINDINGS: No fracture or dislocation of left knee. Patella is in satisfactory position. No synovial effusion. Vascular calcifications are present. No acute osseous abnormality involving the left knee.      CT HEAD WO CONTRAST    Result Date: 3/8/2023  EXAMINATION: CT OF THE HEAD WITHOUT CONTRAST  3/8/2023 12:20 pm TECHNIQUE: CT of the head was performed without the administration of intravenous contrast. Automated exposure control, iterative reconstruction, and/or weight based adjustment of the mA/kV was utilized to reduce the radiation dose to as low as reasonably achievable. COMPARISON: CT head dated 04/24/2021 HISTORY: ORDERING SYSTEM PROVIDED HISTORY: trauma TECHNOLOGIST PROVIDED HISTORY: Has a \"code stroke\" or \"stroke alert\" been called? ->No Reason for exam:->trauma Decision Support Exception - unselect if not a suspected or confirmed emergency medical condition->Emergency Medical Condition (MA) What reading provider will be dictating this exam?->CRC FINDINGS: BRAIN/VENTRICLES: There is no acute intracranial hemorrhage, mass effect or midline shift. No abnormal extra-axial fluid collection. The gray-white differentiation is maintained without evidence of an acute infarct. There is no evidence of hydrocephalus. Moderate amount of low attenuation in the periventricular deep white matter suggesting chronic small vessel ischemic disease with moderate prominence of sulci towards the vertex of moderate generalized atrophy. ORBITS: The visualized portion of the orbits demonstrate no acute abnormality. SINUSES: The visualized paranasal sinuses and mastoid air cells demonstrate no acute abnormality. SOFT TISSUES/SKULL:  No acute abnormality of the visualized skull or soft tissues. No acute intracranial abnormality. CT CERVICAL SPINE WO CONTRAST    Result Date: 3/8/2023  EXAMINATION: CT OF THE CERVICAL SPINE WITHOUT CONTRAST 3/8/2023 12:20 pm TECHNIQUE: CT of the cervical spine was performed without the administration of intravenous contrast. Multiplanar reformatted images are provided for review. Automated exposure control, iterative reconstruction, and/or weight based adjustment of the mA/kV was utilized to reduce the radiation dose to as low as reasonably achievable.  COMPARISON: CT C-spine 12/17/2017 HISTORY: ORDERING SYSTEM PROVIDED HISTORY: trauma TECHNOLOGIST PROVIDED HISTORY: Reason for exam:->trauma Decision Support Exception - unselect if not a suspected or confirmed emergency medical condition->Emergency Medical Condition (MA) What reading provider will be dictating this exam?->CRC FINDINGS: BONES/ALIGNMENT: There is no acute fracture or traumatic malalignment. DEGENERATIVE CHANGES: Moderate multilevel degenerative changes. SOFT TISSUES: There is no prevertebral soft tissue swelling. No acute abnormality of the cervical spine. CT LUMBAR SPINE WO CONTRAST    Result Date: 3/8/2023  EXAMINATION: CT OF THE LUMBAR SPINE WITHOUT CONTRAST  3/8/2023 TECHNIQUE: CT of the lumbar spine was performed without the administration of intravenous contrast. Multiplanar reformatted images are provided for review. Adjustment of mA and/or kV according to patient size was utilized. Automated exposure control, iterative reconstruction, and/or weight based adjustment of the mA/kV was utilized to reduce the radiation dose to as low as reasonably achievable. COMPARISON: Correlation made with MRI from August 15, 2017 and radiographs from June 24, 2020 HISTORY: ORDERING SYSTEM PROVIDED HISTORY: trauma TECHNOLOGIST PROVIDED HISTORY: Reason for exam:->trauma Decision Support Exception - unselect if not a suspected or confirmed emergency medical condition->Emergency Medical Condition (MA) What reading provider will be dictating this exam?->CRC FINDINGS: Redemonstration of compression fracture involving T12 of approximately 80% with retropulsion and moderate central canal stenosis. This finding appears similar when compared with prior MRI from August 15, 2017. No new fracture or malalignment of the lumbar spine. Disc herniation present at L4/L5 resulting in moderate central canal stenosis. 1. Redemonstration of chronic compression fracture of T12 of approximately 80% with retropulsion and central canal stenosis. 2. No new lumbar spine compression fracture or malalignment.      MRI LUMBAR SPINE WO CONTRAST    Result Date: 3/10/2023  EXAMINATION: MRI OF THE LUMBAR SPINE WITHOUT CONTRAST, 3/10/2023 10:19 pm TECHNIQUE: Multiplanar multisequence MRI of the lumbar spine was performed without the administration of intravenous contrast. COMPARISON: Noncontrast CTs of lumbar spine and left hip dated 03/08/2023. HISTORY: ORDERING SYSTEM PROVIDED HISTORY: lumbar radiculopathy TECHNOLOGIST PROVIDED HISTORY: Reason for exam:->lumbar radiculopathy What reading provider will be dictating this exam?->CRC FINDINGS: BONES/ALIGNMENT: Lumbar spine alignment is normal.  There is hyper kyphosis at the thoracolumbar junction due to chronic marked T12 burst fracture with moderate retropulsion of bone and complete loss of height anteriorly. Lumbar vertebral body heights are normal.  No concerning marrow signal abnormality is present. A bone cyst is noted in the S3 body. No acute fracture evident. SPINAL CORD: The conus terminates normally. There is mild central canal stenosis at the mid T12 level with deformity of the cord but no cord signal abnormality. SOFT TISSUES: No paraspinal mass identified. Bilateral simple renal cysts noted not requiring follow-up. L1-L2: Mild facet hypertrophy. No stenosis. L2-L3: Mild disc space narrowing, posterior disc bulge and facet hypertrophy. Mild central canal and right foraminal stenosis. L3-L4: Mild posterior disc bulge, facet hypertrophy and ligamentous thickening. Bilateral foraminal stenosis mild on the right and mild/moderate on the left. L4-L5: Moderate disc space narrowing, posterior disc bulge and ligamentous thickening. Mild facet hypertrophy. Moderate/severe central canal stenosis and narrowing of the lateral recesses. Bilateral foraminal stenosis moderate on the right and severe on the left. L5-S1: Mild disc space narrowing with moderate posterior disc bulge and mild facet hypertrophy. Bilateral foraminal stenosis severe on the right and mild/moderate on the left. No acute abnormality evident.  Diffuse lumbar spine degenerative changes with multilevel foraminal, central canal and foraminal stenosis as detailed above most pronounced at L4-5 and L5-S1. Old T12-2 column burst fracture with moderate retropulsion of bone and resulting mild central canal stenosis. There is cord deformity but no cord signal abnormality. IR FLUORO GUIDED NEEDLE PLACEMENT    Result Date: 3/13/2023  EXAMINATION: FLUOROSCOPIC GUIDED INJECTION OF THE LEFT HIP JOINT 3/13/2023 3:43 pm HISTORY: ORDERING SYSTEM PROVIDED HISTORY: Steroid injection for left hip per Ortho TECHNOLOGIST PROVIDED HISTORY: Reason for exam:->Steroid injection for left hip per Ortho FLUOROSCOPY DOSE AND TYPE: Fluoroscopy time 1.3 minutes Air Kerma 500 uGym2 PROCEDURE: :  Epi Bradley MD Informed consent was obtained and universal protocol was observed. Sterile gowns, masks, hats and gloves utilized for maximal sterile barrier. Under standard sterile condition, local anesthesia with subcutaneous 2% lidocaine was administered. A skin entry site was selected with fluoroscopy. A 20 gauge spinal needle was inserted into the left hip joint under fluoroscopic guidance. Approximately 1 ml of iodinated contrast was injected into the joint to confirm location. Subsequently, 3 ml of a mixture of 40 mg of Kenalog and 1 ml of 1% lidocaine and 1 mL of 0.25% Marcaine was injected into the joint. The needle was removed and a sterile bandage was placed. Status post fluoroscopic-guided left hip injection. XR CHEST PORTABLE    Result Date: 3/8/2023  EXAMINATION: ONE XRAY VIEW OF THE CHEST 3/8/2023 1:06 pm COMPARISON: None. HISTORY: ORDERING SYSTEM PROVIDED HISTORY: Shortness of breath TECHNOLOGIST PROVIDED HISTORY: Reason for exam:->Shortness of breath What reading provider will be dictating this exam?->CRC FINDINGS: The cardiac silhouette is at upper limits of normal in size. Postop sternotomy changes are noted There is no right or left lung infiltrate. There is a linear scarring scar seen within the left lung base. There is chronic blunting the left costophrenic angle.  There is no pneumothorax There is no gross rib fracture. 1. There are no findings of failure or pneumonia 2. Chronic blunting the left costophrenic angle 3. Chronic linear scarring seen within the left lung base. CT HIP LEFT WO CONTRAST    Result Date: 3/8/2023  EXAMINATION: CT OF THE LEFT HIP WITHOUT CONTRAST 3/8/2023 4:33 pm TECHNIQUE: CT of the left hip was performed without the administration of intravenous contrast.  Multiplanar reformatted images are provided for review. Automated exposure control, iterative reconstruction, and/or weight based adjustment of the mA/kV was utilized to reduce the radiation dose to as low as reasonably achievable. COMPARISON: Correlation made with radiographs performed today. HISTORY ORDERING SYSTEM PROVIDED HISTORY: truama TECHNOLOGIST PROVIDED HISTORY: Reason for exam:->truama Decision Support Exception - unselect if not a suspected or confirmed emergency medical condition->Emergency Medical Condition (MA) What reading provider will be dictating this exam?->CRC FINDINGS: Severe osteoarthritis involving the left hip with multiple degenerative subchondral cysts, narrowing involving superior aspect of the joint space, subchondral sclerosis, and marginal osteophytosis involving superior aspect of left acetabulum and femoral head. There is flattening of femoral head. No significant synovial effusion. There is no fracture or dislocation involving the left hip. No pelvis fracture. No free fluid collections in the pelvis. There is a right hip arthroplasty. Multiple diverticula are associated with the sigmoid colon. 1. Severe osteoarthritis involving left hip. 2. No fracture or dislocation.      IR ARTHR/ASP/INJ MAJOR JT/BURSA LEFT WO US    Result Date: 3/13/2023  EXAMINATION: FLUOROSCOPIC GUIDED INJECTION OF THE LEFT HIP JOINT 3/13/2023 3:43 pm HISTORY: ORDERING SYSTEM PROVIDED HISTORY: Steroid injection for left hip per Ortho TECHNOLOGIST PROVIDED HISTORY: Reason for exam:->Steroid injection for left hip per Ortho FLUOROSCOPY DOSE AND TYPE: Fluoroscopy time 1.3 minutes Air Kerma 500 uGym2 PROCEDURE: :  Breezy Preciado MD Informed consent was obtained and universal protocol was observed. Sterile gowns, masks, hats and gloves utilized for maximal sterile barrier. Under standard sterile condition, local anesthesia with subcutaneous 2% lidocaine was administered.  A skin entry site was selected with fluoroscopy. A 20 gauge spinal needle was inserted into the left hip joint under fluoroscopic guidance.  Approximately 1 ml of iodinated contrast was injected into the joint to confirm location. Subsequently, 3 ml of a mixture of 40 mg of Kenalog and 1 ml of 1% lidocaine and 1 mL of 0.25% Marcaine was injected into the joint.  The needle was removed and a sterile bandage was placed.     Status post fluoroscopic-guided left hip injection.       Discharge Medications:      Medication List        START taking these medications      traMADol 50 MG tablet  Commonly known as: ULTRAM  Take 1 tablet by mouth every 6 hours as needed for Pain for up to 3 days. Max Daily Amount: 200 mg            CHANGE how you take these medications      furosemide 40 MG tablet  Commonly known as: LASIX  Take 1 tablet by mouth every other day  Start taking on: March 15, 2023  What changed: when to take this            CONTINUE taking these medications      acetaminophen 325 MG tablet  Commonly known as: TYLENOL     amLODIPine 10 MG tablet  Commonly known as: NORVASC     aspirin 81 MG chewable tablet     carvedilol 12.5 MG tablet  Commonly known as: COREG  Take 1 tablet by mouth 2 times daily (with meals)     escitalopram 10 MG tablet  Commonly known as: LEXAPRO     finasteride 5 MG tablet  Commonly known as: PROSCAR     folic acid 800 MCG tablet  Commonly known as: FOLVITE     hydrOXYzine pamoate 25 MG capsule  Commonly known as: VISTARIL     levothyroxine 50 MCG tablet  Commonly known as: SYNTHROID    Melatonin 10 MG Tabs     polyethylene glycol 17 g packet  Commonly known as: GLYCOLAX     tamsulosin 0.4 MG capsule  Commonly known as: FLOMAX     Vitamin D3 75 MCG (3000 UT) Tabs            STOP taking these medications      B-COMPLEX/B-12 PO               Where to Get Your Medications        These medications were sent to [address], [address] [address] [address] [place] [address]      Phone: [phone]   furosemide 40 MG tablet       You can get these medications from any pharmacy    Bring a paper prescription for each of these medications  traMADol 50 MG tablet         Time Spent on discharge is more than 45 minutes in the examination, evaluation, counseling and review of medications and discharge plan.    +++++++++++++++++++++++++++++++++++++++++++++++++  MIHIR Frias [address]  +++++++++++++++++++++++++++++++++++++++++++++++++  NOTE: This report was transcribed using voice recognition software. Every effort was made to ensure accuracy; however, inadvertent computerized transcription errors may be present.

## 2023-03-14 NOTE — PROGRESS NOTES
Neurosurg progress note  VITALS:  BP (!) 150/65   Pulse 81   Temp 97 °F (36.1 °C) (Temporal)   Resp 16   Ht 5' 3\" (1.6 m)   Wt 170 lb (77.1 kg)   SpO2 94%   BMI 30.11 kg/m²   24HR INTAKE/OUTPUT:    Intake/Output Summary (Last 24 hours) at 3/14/2023 1308  Last data filed at 3/14/2023 1035  Gross per 24 hour   Intake 120 ml   Output 100 ml   Net 20 ml     XR HIP LEFT (2-3 VIEWS)    Result Date: 3/8/2023  EXAMINATION: XRAY VIEWS OF THE LEFT FEMUR; TWO XRAY VIEWS OF THE LEFT HIP 3/8/2023 1:06 pm COMPARISON: July 5, 2017 HISTORY: ORDERING SYSTEM PROVIDED HISTORY: fall, r/o fx TECHNOLOGIST PROVIDED HISTORY: Reason for exam:->fall, r/o fx What reading provider will be dictating this exam?->CRC FINDINGS: No fracture or dislocation of the left hip. Severe osteoarthritic changes are associated with left hip with flattening of left femoral head, narrowing of superior aspect of the joint space, and subchondral sclerosis. No fracture involving mid or distal left femur. Vascular calcifications are present. 1. Severe osteoarthritis involving left hip with flattening of left femoral head and mild superior subluxation. 2. No fracture or dislocation of left hip or femur. XR FEMUR LEFT (MIN 2 VIEWS)    Result Date: 3/8/2023  EXAMINATION: XRAY VIEWS OF THE LEFT FEMUR; TWO XRAY VIEWS OF THE LEFT HIP 3/8/2023 1:06 pm COMPARISON: July 5, 2017 HISTORY: ORDERING SYSTEM PROVIDED HISTORY: fall, r/o fx TECHNOLOGIST PROVIDED HISTORY: Reason for exam:->fall, r/o fx What reading provider will be dictating this exam?->CRC FINDINGS: No fracture or dislocation of the left hip. Severe osteoarthritic changes are associated with left hip with flattening of left femoral head, narrowing of superior aspect of the joint space, and subchondral sclerosis. No fracture involving mid or distal left femur. Vascular calcifications are present.      1. Severe osteoarthritis involving left hip with flattening of left femoral head and mild superior subluxation. 2. No fracture or dislocation of left hip or femur. XR KNEE LEFT (MIN 4 VIEWS)    Result Date: 3/8/2023  EXAMINATION: FOUR XRAY VIEWS OF THE LEFT KNEE 3/8/2023 1:06 pm COMPARISON: None. HISTORY: ORDERING SYSTEM PROVIDED HISTORY: trauma TECHNOLOGIST PROVIDED HISTORY: Reason for exam:->trauma What reading provider will be dictating this exam?->CRC FINDINGS: No fracture or dislocation of left knee. Patella is in satisfactory position. No synovial effusion. Vascular calcifications are present. No acute osseous abnormality involving the left knee. CT HEAD WO CONTRAST    Result Date: 3/8/2023  EXAMINATION: CT OF THE HEAD WITHOUT CONTRAST  3/8/2023 12:20 pm TECHNIQUE: CT of the head was performed without the administration of intravenous contrast. Automated exposure control, iterative reconstruction, and/or weight based adjustment of the mA/kV was utilized to reduce the radiation dose to as low as reasonably achievable. COMPARISON: CT head dated 04/24/2021 HISTORY: ORDERING SYSTEM PROVIDED HISTORY: trauma TECHNOLOGIST PROVIDED HISTORY: Has a \"code stroke\" or \"stroke alert\" been called? ->No Reason for exam:->trauma Decision Support Exception - unselect if not a suspected or confirmed emergency medical condition->Emergency Medical Condition (MA) What reading provider will be dictating this exam?->CRC FINDINGS: BRAIN/VENTRICLES: There is no acute intracranial hemorrhage, mass effect or midline shift. No abnormal extra-axial fluid collection. The gray-white differentiation is maintained without evidence of an acute infarct. There is no evidence of hydrocephalus. Moderate amount of low attenuation in the periventricular deep white matter suggesting chronic small vessel ischemic disease with moderate prominence of sulci towards the vertex of moderate generalized atrophy. ORBITS: The visualized portion of the orbits demonstrate no acute abnormality.  SINUSES: The visualized paranasal sinuses and mastoid air cells demonstrate no acute abnormality. SOFT TISSUES/SKULL:  No acute abnormality of the visualized skull or soft tissues. No acute intracranial abnormality. CT CERVICAL SPINE WO CONTRAST    Result Date: 3/8/2023  EXAMINATION: CT OF THE CERVICAL SPINE WITHOUT CONTRAST 3/8/2023 12:20 pm TECHNIQUE: CT of the cervical spine was performed without the administration of intravenous contrast. Multiplanar reformatted images are provided for review. Automated exposure control, iterative reconstruction, and/or weight based adjustment of the mA/kV was utilized to reduce the radiation dose to as low as reasonably achievable. COMPARISON: CT C-spine 12/17/2017 HISTORY: ORDERING SYSTEM PROVIDED HISTORY: trauma TECHNOLOGIST PROVIDED HISTORY: Reason for exam:->trauma Decision Support Exception - unselect if not a suspected or confirmed emergency medical condition->Emergency Medical Condition (MA) What reading provider will be dictating this exam?->CRC FINDINGS: BONES/ALIGNMENT: There is no acute fracture or traumatic malalignment. DEGENERATIVE CHANGES: Moderate multilevel degenerative changes. SOFT TISSUES: There is no prevertebral soft tissue swelling. No acute abnormality of the cervical spine. CT LUMBAR SPINE WO CONTRAST    Result Date: 3/8/2023  EXAMINATION: CT OF THE LUMBAR SPINE WITHOUT CONTRAST  3/8/2023 TECHNIQUE: CT of the lumbar spine was performed without the administration of intravenous contrast. Multiplanar reformatted images are provided for review. Adjustment of mA and/or kV according to patient size was utilized. Automated exposure control, iterative reconstruction, and/or weight based adjustment of the mA/kV was utilized to reduce the radiation dose to as low as reasonably achievable.  COMPARISON: Correlation made with MRI from August 15, 2017 and radiographs from June 24, 2020 HISTORY: ORDERING SYSTEM PROVIDED HISTORY: trauma TECHNOLOGIST PROVIDED HISTORY: Reason for exam:->trauma Decision Support Exception - unselect if not a suspected or confirmed emergency medical condition->Emergency Medical Condition (MA) What reading provider will be dictating this exam?->CRC FINDINGS: Redemonstration of compression fracture involving T12 of approximately 80% with retropulsion and moderate central canal stenosis. This finding appears similar when compared with prior MRI from August 15, 2017. No new fracture or malalignment of the lumbar spine. Disc herniation present at L4/L5 resulting in moderate central canal stenosis. 1. Redemonstration of chronic compression fracture of T12 of approximately 80% with retropulsion and central canal stenosis. 2. No new lumbar spine compression fracture or malalignment. XR CHEST PORTABLE    Result Date: 3/8/2023  EXAMINATION: ONE XRAY VIEW OF THE CHEST 3/8/2023 1:06 pm COMPARISON: None. HISTORY: ORDERING SYSTEM PROVIDED HISTORY: Shortness of breath TECHNOLOGIST PROVIDED HISTORY: Reason for exam:->Shortness of breath What reading provider will be dictating this exam?->CRC FINDINGS: The cardiac silhouette is at upper limits of normal in size. Postop sternotomy changes are noted There is no right or left lung infiltrate. There is a linear scarring scar seen within the left lung base. There is chronic blunting the left costophrenic angle. There is no pneumothorax There is no gross rib fracture. 1. There are no findings of failure or pneumonia 2. Chronic blunting the left costophrenic angle 3. Chronic linear scarring seen within the left lung base. CT HIP LEFT WO CONTRAST    Result Date: 3/8/2023  EXAMINATION: CT OF THE LEFT HIP WITHOUT CONTRAST 3/8/2023 4:33 pm TECHNIQUE: CT of the left hip was performed without the administration of intravenous contrast.  Multiplanar reformatted images are provided for review.  Automated exposure control, iterative reconstruction, and/or weight based adjustment of the mA/kV was utilized to reduce the radiation dose to as low as reasonably achievable. COMPARISON: Correlation made with radiographs performed today. HISTORY ORDERING SYSTEM PROVIDED HISTORY: truama TECHNOLOGIST PROVIDED HISTORY: Reason for exam:->truama Decision Support Exception - unselect if not a suspected or confirmed emergency medical condition->Emergency Medical Condition (MA) What reading provider will be dictating this exam?->CRC FINDINGS: Severe osteoarthritis involving the left hip with multiple degenerative subchondral cysts, narrowing involving superior aspect of the joint space, subchondral sclerosis, and marginal osteophytosis involving superior aspect of left acetabulum and femoral head. There is flattening of femoral head. No significant synovial effusion. There is no fracture or dislocation involving the left hip. No pelvis fracture. No free fluid collections in the pelvis. There is a right hip arthroplasty. Multiple diverticula are associated with the sigmoid colon. 1. Severe osteoarthritis involving left hip. 2. No fracture or dislocation.      CBC:   Lab Results   Component Value Date/Time    WBC 5.4 03/13/2023 06:13 AM    RBC 2.79 03/13/2023 06:13 AM    HGB 9.4 03/13/2023 06:13 AM    HCT 29.4 03/13/2023 06:13 AM    .4 03/13/2023 06:13 AM    MCH 33.7 03/13/2023 06:13 AM    MCHC 32.0 03/13/2023 06:13 AM    RDW 12.5 03/13/2023 06:13 AM     03/13/2023 06:13 AM    MPV 9.3 03/13/2023 06:13 AM     BMP:    Lab Results   Component Value Date/Time     03/13/2023 06:13 AM    K 4.2 03/13/2023 06:13 AM    K 3.8 03/09/2023 04:29 AM     03/13/2023 06:13 AM    CO2 28 03/13/2023 06:13 AM    BUN 33 03/13/2023 06:13 AM    LABALBU 3.5 03/13/2023 06:13 AM    LABALBU 4.1 11/29/2011 07:22 AM    CREATININE 1.3 03/13/2023 06:13 AM    CALCIUM 8.6 03/13/2023 06:13 AM    GFRAA >60 10/11/2021 10:45 AM    LABGLOM 51 03/13/2023 06:13 AM    GLUCOSE 133 03/13/2023 06:13 AM    GLUCOSE 139 11/29/2011 07:22 AM      amLODIPine  10 mg Oral Nightly aspirin  81 mg Oral Daily    carvedilol  12.5 mg Oral BID WC    escitalopram  10 mg Oral Daily    finasteride  5 mg Oral QPM    folic acid  1 mg Oral Daily    furosemide  40 mg Oral Every Other Day    hydrOXYzine pamoate  25 mg Oral Nightly    levothyroxine  50 mcg Oral Daily    melatonin  10 mg Oral Nightly    tamsulosin  0.4 mg Oral QPM    sodium chloride flush  10 mL IntraVENous 2 times per day    enoxaparin  40 mg SubCUTAneous Daily     Opens eyes to voice oriented x3 follows commands feels better after his left hip injection  Assessment:  Patient Active Problem List   Diagnosis    CHF with unknown LVEF (HCC)    Aortic valve stenosis    Sick sinus syndrome (HCC)    Pacemaker    Systolic ejection murmur    Hypertension    CHF (congestive heart failure), NYHA class I, acute on chronic, combined (HCC)    Type 2 diabetes mellitus without complication (HCC)    Acute renal insufficiency    Compression fracture of T12 vertebra (HCC)    Decompensated heart failure (HCC)    Hypoxia    Ambulatory dysfunction    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

## 2023-03-14 NOTE — PROGRESS NOTES
OCCUPATIONAL THERAPY TREATMENT NOTE    [facility]      OT BEDSIDE TREATMENT NOTE      Date:3/14/2023  Patient Name: Eb Sevilla  MRN: [id number]  : 1930  Room: [address] [place] [address]     Evaluating OT:Diamante Almanzar OTR/L   License #  [id number]        Referring Provider: John Hubbard DO    Specific Provider Orders/Date: OT evaluation & treatment        Diagnosis: Ambulatory dysfunction [R26.2]      Pertinent Medical History:  has a past medical history of CAD (coronary artery disease), Hyperlipidemia, and Hypertension.    Surgery: none this admit  3-13-23: L hip injection    Past Surgical History:  has a past surgical history that includes Coronary artery bypass graft and hip surgery.       Precautions:  Fall Risk, bed alarm, L hip OA/ AVN, WBAT L LE      Assessment of current deficits    [x] Functional mobility            [x]ADLs           [x] Strength                  [x]Cognition    [x] Functional transfers          [x] IADLs         [x] Safety Awareness   [x]Endurance    [x] Fine Coordination                         [x] Balance      [] Vision/perception   [x]Sensation      []Gross Motor Coordination             [] ROM           [] Delirium                   [] Motor Control      OT PLAN OF CARE   OT POC based on physician orders, patient diagnosis and results of clinical assessment     Frequency/Duration: 2-4 days/wk for 2 weeks PRN   Specific OT Treatment Interventions to include:   Instruction/training on adapted ADL techniques and AE recommendations to increase functional independence within precautions  Training on energy conservation strategies, correct breathing pattern and techniques to improve independence/tolerance for self-care routine  Functional transfer/mobility training/DME recommendations for increased independence, safety, and fall prevention  Patient/Family education to increase follow through with safety techniques and functional independence  Recommendation of  environmental modifications for increased safety with functional transfers/mobility and ADLs  Cognitive retraining/development of therapeutic activities to improve problem solving, judgement, memory, and attention for increased safety/participation in ADL/IADL tasks  Therapeutic exercise to improve motor endurance, ROM, and functional strength for ADLs/functional transfers  Therapeutic activities to facilitate/challenge dynamic balance, stand tolerance for increased safety and independence with ADLs  Therapeutic activities to facilitate gross/fine motor skills for increased independence with ADLs  Positioning to improve skin integrity, interaction with environment and functional independence     Recommended Adaptive Equipment:  TBD      Home Living: Pt lives with wife in a split level with 5-6 steps to enter with B HR. B&B on lower level. 7-8 split level  Bathroom setup: walk in shower on lower level & std. commode   Equipment owned: ww on each level of home, shower seat     Prior Level of Function: assist from wife with ADLs such as bathing & dressing, assist with IADLs; ambulated ww. Driving: not active, children  Occupation: retired, [employer]      Pain Level: minimal at rest; remains minimal L hip in standing. Pt. C/o fatigue  Cognition: A&O: x3; Follows 2 step directions              Memory:  F              Sequencing:  F              Problem solving:  F              Judgement/safety:  F                Functional Assessment:  AM-PAC Daily Activity Raw Score: 15/24    Initial Eval Status  Date: 3-9-23 Treatment Status  Date: 3-14-23 STGs = LTGs  Time frame: 10-14 days   Feeding Set up  set up with lunch tray Mod I/ Ind   Grooming SBA  SBA seated Modified [place]    UB Dressing SBA with gown  SBA at EOB Modified [place]    LB Dressing Max A with socks at EOB  Max A to narayan pants seated EOB Stand by Assist    Bathing Mod A with sim.  task  Mod A per last session Stand by Assist    Toileting Max A with bedpan placement  NT Stand by Assist    Bed Mobility  Supine to sit: Mod A  Sit to supine: Mod A  Logroll: Min A  Sup > Sit: Mod A  Sit > Sup: Mod A Supine to sit: Modified [place]   Sit to supine: Modified [place]    Functional Transfers Mod A with sit <> stand with ww  Mod A with sit <> stand with ww. Multiple sit <> stand at EOB Supervision    Functional Mobility Mod A with ww side steps towards [facility], limited d/t pain & increased fear of falling when up. Mod A with ww few steps towards [facility] Supervision    Balance Sitting:     Static:  SBA    Dynamic:CGA  Standing: Min/Mod  A Sitting:     Static:  SBA/Sup    Dynamic: SBA  Standing: Min/Mod  A      Activity Tolerance F- with lt. Ax.  F F+    Visual/  Perceptual Glasses: yes          Vitals spO2 on RA & HR WFL  spO2 & HR WFL WFL      Comments: Upon arrival pt supine in bed, agreeable to OT session, cleared by RN, NS & Ortho. Therapist facilitated bed mobility/ADLs/functional transfer training/mobility with focus technique, precautions, safety. Pt. Instructed RE: safe transfers/mobility, ADLs, role of OT, treatment plan, recs. , prec. At end of session pt. Returned to supine in bed in optimal position, all needs met, RN notified, all lines and tubes intact, call light within reach. Pt has made F+ progress towards set goals. Continue with current plan of care      Treatment Time In:12:45            Treatment Time Out: 13:15                Treatment Charges: Mins Units   Ther Ex  [id number]     Manual Therapy 53107     Thera Activities 83035 15 1   ADL/Home Mgt 08806 15 1   Neuro Re-ed 14596     Group Therapy      Orthotic manage/training  53518     Non-Billable Time     Total Timed Treatment 30 2     Diamante Almanzar, OTR/L   License #  [id number]

## 2023-03-14 NOTE — CARE COORDINATION
3/14. For [facility]. Updated PT/OT requested. Transportation arranged with the facility [facility] for 4:00. Daughter, Angel Emile, nursing aware.  Roxy Lehman RN

## 2023-03-14 NOTE — PROGRESS NOTES
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 has a past surgical history that includes Coronary artery bypass graft and hip surgery. Procedure/Surgery:  none  3/13/23 L hip injection. Precautions:  Falls, FWB (full weight bearing), Hx DJD, anxious. Equipment Needs: To be determined,    SUBJECTIVE:    Patient lives with wife   in a tri level home  with 4 steps to enter without Rail  Bed and bathroom on level patient typically stays. Patient ambulated independently, with wheeled walker  PTA. Equipment owned: [employer],  each level. OBJECTIVE:   Initial Evaluation  Date: 3/9/23 Treatment  3/14/23 Short Term/ Long Term   Goals   AM-PAC 6 Clicks  43/43    Was pt agreeable to Eval/treatment? yes yes    Does pt have pain? Yes with activity. Yes with activity, better since injection. Bed Mobility  Rolling: Mod   Supine to sit:   Mod    Sit to supine: Mod    Scooting: Mod   Rolling: Min   Supine to sit:   Mod    Sit to supine: Mod    Scooting: Mod   Rolling: Ind  Supine to sit: Ind  Sit to supine: Ind  Scooting: Ind   Transfers Sit to stand: Mod  to fww  Stand to sit: Mod   Stand pivot: Mod  with fww Sit to stand: Mod  to fww  Multiple STS completed. Stand to sit: Mod   Stand pivot: NT Sit to stand: Mod Ind  to fww  Stand to sit: Mod Ind    Stand pivot: Mod Ind  with fww   Ambulation    2 side steps only. Anxious regarding falling. Used fww Mod A 3side steps only. Anxious regarding falling.  Used fww Mod A 20 feet with Mod Ind  fww   Stair negotiation: ascended and descended  NT  4 steps with Min 2 rail      Strength/ROM:     RLE grossly 3+/5  LLE grossly 3+/5  RLE AROM decreased  LLE AROM decreased    Balance:     Static Sitting: SBA  Dynamic Sitting: SBA  Static Standing: Mod  with fww  Dynamic Standing: Mod  with fww      Patient is Alert & Oriented x person, place, time, and situation and follows directions   Sensation:  Pt denies numbness and tingling to extremities  Edema:  none  Therapeutic Exercises:  Functional activity as ststaed above. Anxious regarding falling. Patient education  Pt educated on role of Physical Therapy, risks of immobility, safety and plan of care,  importance of mobility while in hospital , safety , and weight bearing status  and use of call light for safety. Patient response to education:   Pt verbalized understanding Pt demonstrated skill Pt requires further education in this area   yes yes Reminders     ASSESSMENT:    Conditions Requiring Skilled Therapeutic Intervention:    [x]Decreased strength     [x]Decreased ROM  [x]Decreased functional mobility  [x]Decreased balance   [x]Decreased endurance   [x]Decreased posture  []Decreased sensation  []Decreased coordination   []Decreased vision  [x]Decreased safety awareness   []Increased pain       Comments:    RN cleared patient for participation in therapy session. Patient was seen this date for PT treatment. Patient was agreeable to intervention. Results of the functional assessment are noted above. Upon entering the room patient was found supine in bed. Pleasant states he feels injection decreased pain symptoms. Assisted to EOB with increased assist due to continual discomfort. Sat EOB x 5 minutes to increase dynamic sitting balance and activity tolerance. STS attempted x 3 reps. Improved tolerance to standing however continues to have difficulty ambulating. Anxious regarding falling when up. Increased fall risk.  Can benefit from rehab to improve functional mobility. At end of session, patient in bed with [facility] elevated call light and phone within reach,  all lines and tubes intact, nursing notified. This patient can benefit from the continuation of skilled PT possibly in a rehab setting to maximize functional level and return to PLOF. Treatment:  Patient completed and was instructed in the following treatment:      Bed mobility training - pt given verbal and tactile cues to facilitate proper sequencing and safety during rolling and supine>sit as well as provided with physical assistance to complete task    Assistive device training - pt educated on using [employer] during gait, [employer] approximation/negotiation, and hand placement during sit<>stand to [employer]  STS and transfer training - educated on hand/foot placement, safety, and sequencing during STS and pivot transfers using assistive device  Gait training -NT side steps only. Education regarding use of call light for safety. Skillful positioning in bed to protect skin/joint integrity. Vitals and symptoms were closely monitored throughout session. Pt's/ family goals      Participate in Rehab process    Prognosis is good  for reaching above PT goals.     Patient and or family understand(s) diagnosis, prognosis, and plan of care.  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 [M16.31]  Specific instructions for next treatment:  Sit EOB, postural exercises, Transfer to bedside chair, and Increase ambulation distance  Current Treatment Recommendations:     [x] Strengthening to improve independence with functional mobility   [x] ROM to improve independence with functional mobility   [x] Balance Training to improve static/dynamic balance and to reduce fall risk  [x] Endurance Training to improve activity tolerance during functional mobility   [x] Transfer Training to improve safety and independence with all functional transfers   [x] Gait Training to improve gait mechanics, endurance and asses need for appropriate assistive device  [x] Stair Training in preparation for safe discharge home and/or into the community when appropriate  [] Positioning to prevent skin breakdown and contractures  [x] Safety and Education Training   [] Patient/Caregiver Education   [] HEP  [] Gait Team to be added to POC  [] Other     PT long term treatment goals are located in above grid    Frequency of treatments: 2-5x/week x 1-2 weeks. Time in  1245  Time out  1310    Total Treatment Time  25 minutes     Evaluation Time includes thorough review of current medical information, gathering information on past medical history/social history and prior level of function, completion of standardized testing/informal observation of tasks, assessment of data and education on plan of care and goals.     CPT codes:  [] Low Complexity PT evaluation 96512  [] Moderate Complexity PT evaluation 74576  [] High Complexity PT evaluation 79888  [] PT Re-evaluation 82202  [] Gait training 15969 - minutes  [] Manual therapy 81195  minutes  [x] Therapeutic activities 86859 -25 minutes  [] Therapeutic exercises 14156 - minutes  [] Neuromuscular reeducation [address] minutes     Joyce Hazel, [id number] [facility]

## 2023-03-14 NOTE — PROGRESS NOTES
Nurse to Nurse called to [address]  Report given to [facility]

## 2023-03-14 NOTE — DISCHARGE SUMMARY
Hospitalist Discharge Summary    Patient ID: Luis Fernando Traylor   Patient : 1930  Patient's PCP: MIHIR Blum CNP    Admit Date: 3/8/2023   Admitting Physician: Tami Núñez MD    Discharge Date:  3/14/2023   Discharge Physician: MIHIR Joiner CNP   Discharge Condition: Stable  Discharge Disposition: [facility] course in brief:  (Please refer to daily progress notes for a comprehensive review of the hospitalization by requesting medical records)  Briefly, patient with PMH significant for CAD, HPL, HPT presented to ED and was admitted due to falling at home off of steps. Patient has concerns of living without assistance and feels he would benefit from placement. Imaging shows no acute injury, however severe osteoarthritis involving left hip was appreciated. Upon interview patient states he is unable to bear any weight on this leg. Orthopedic surgery has been consulted, MRI of left hip revealed diffuse osteopenia, severe degenerative joint disease, bursitis. Ortho consulted neurosurgery, MRI of spine showed stenosis at L4-5 and L5-S1. No neurosurgical intervention recommended. Ortho currently with no plan for surgical intervention due to patient being high risk, interventional radiology consulted for steroid injection to hip. Patient states hip does feel better after injection. Patient is to follow-up outpatient with Ortho surgery. Patient stable from medical perspective for discharge. Consults:   IP CONSULT TO HOSPITALIST  IP CONSULT TO ORTHOPEDIC SURGERY  IP CONSULT TO NEUROSURGERY    Discharge Diagnoses:        Discharge Instructions / Follow up:    No future appointments. The patient's condition is stable. At this time the patient is without objective evidence of an acute process requiring continuing hospitalization or inpatient management. They are stable for discharge with outpatient follow-up.      I have spoken with the patient and discussed the results of the current hospitalization, in addition to providing specific details for the plan of care and counseling regarding the diagnosis and prognosis. The plan has been discussed in detail and they are aware of the specific conditions for emergent return, as well as the importance of follow-up. Their questions are answered at this time and they are agreeable with the plan for discharge to [facility]. Continued appropriate risk factor modification of blood pressure, diabetes and serum lipids will remain essential to reducing risk of future atherosclerotic development    Activity: activity as tolerated    Physical exam:  General appearance: No apparent distress, appears stated age and cooperative. HEENT: Normal cephalic, atraumatic without obvious deformity. Pupils equal, round, and reactive to light. Extra ocular muscles intact. Conjunctivae/corneas clear. Neck: Supple, with full range of motion. No jugular venous distention. Trachea midline. Respiratory:  Clear to auscultation bilaterally. No apparent distress. Cardiovascular:  Regular rate and rhythm. S1, S2 without murmurs, rubs, or gallops. PV: Brisk capillary refill. +2 pedal and radial pulses bilaterally. No clubbing, cyanosis, edema of bilateral lower extremities. Abdomen: Soft, non-tender, non-distended. +BS  Musculoskeletal: No obvious deformities or erythematous or edematous joints. Skin: Normal skin color. No rashes or lesions. Neurologic:  Neurovascularly intact without any focal sensory/motor deficits.  Cranial nerves: II-XII intact, grossly non-focal.  Psychiatric: Alert and oriented, thought content appropriate, normal insight    Significant labs:  CBC:   Recent Labs     03/12/23 0524 03/13/23 0613   WBC 5.2 5.4   RBC 2.76* 2.79*   HGB 9.3* 9.4*   HCT 29.3* 29.4*   .2* 105.4*   RDW 12.8 12.5   * 117*     BMP:   Recent Labs     03/12/23 0524 03/13/23 0613    139   K 4.1 4.2    102 CO2 27 28   BUN 32* 33*   CREATININE 1.4* 1.3*     LFT:  Recent Labs     03/12/23  0524 03/13/23  0613   PROT 6.0* 6.2*   ALKPHOS 51 56   ALT 6 9   AST 9 11   BILITOT 0.4 0.4     PT/INR:   Recent Labs     03/13/23  1015   INR 1.1     BNP: No results for input(s): BNP in the last 72 hours. Hgb A1C:   Lab Results   Component Value Date    LABA1C 5.9 (H) 04/26/2021     Folate and B12:   Lab Results   Component Value Date    [address] (H) 04/28/2021   ,   Lab Results   Component Value Date    FOLATE >20.0 12/19/2017     Thyroid Studies:   Lab Results   Component Value Date    TSH 1.900 10/11/2021    B6CFOFS 6.5 04/28/2021       Urinalysis:    Lab Results   Component Value Date/Time    NITRU Negative 04/24/2021 09:23 AM    WBCUA NONE 04/24/2021 09:23 AM    BACTERIA NONE SEEN 04/24/2021 09:23 AM    RBCUA 10-20 04/24/2021 09:23 AM    BLOODU LARGE 04/24/2021 09:23 AM    SPECGRAV 1.025 04/24/2021 09:23 AM    GLUCOSEU Negative 04/24/2021 09:23 AM       Imaging:  XR HIP LEFT (2-3 VIEWS)    Result Date: 3/8/2023  EXAMINATION: XRAY VIEWS OF THE LEFT FEMUR; TWO XRAY VIEWS OF THE LEFT HIP 3/8/2023 1:06 pm COMPARISON: July 5, 2017 HISTORY: ORDERING SYSTEM PROVIDED HISTORY: fall, r/o fx TECHNOLOGIST PROVIDED HISTORY: Reason for exam:->fall, r/o fx What reading provider will be dictating this exam?->CRC FINDINGS: No fracture or dislocation of the left hip. Severe osteoarthritic changes are associated with left hip with flattening of left femoral head, narrowing of superior aspect of the joint space, and subchondral sclerosis. No fracture involving mid or distal left femur. Vascular calcifications are present. 1. Severe osteoarthritis involving left hip with flattening of left femoral head and mild superior subluxation. 2. No fracture or dislocation of left hip or femur.      XR FEMUR LEFT (MIN 2 VIEWS)    Result Date: 3/8/2023  EXAMINATION: XRAY VIEWS OF THE LEFT FEMUR; TWO XRAY VIEWS OF THE LEFT HIP 3/8/2023 1:06 pm COMPARISON: July 5, 2017 HISTORY: ORDERING SYSTEM PROVIDED HISTORY: fall, r/o fx TECHNOLOGIST PROVIDED HISTORY: Reason for exam:->fall, r/o fx What reading provider will be dictating this exam?->CRC FINDINGS: No fracture or dislocation of the left hip. Severe osteoarthritic changes are associated with left hip with flattening of left femoral head, narrowing of superior aspect of the joint space, and subchondral sclerosis. No fracture involving mid or distal left femur. Vascular calcifications are present. 1. Severe osteoarthritis involving left hip with flattening of left femoral head and mild superior subluxation. 2. No fracture or dislocation of left hip or femur. XR KNEE LEFT (MIN 4 VIEWS)    Result Date: 3/8/2023  EXAMINATION: FOUR XRAY VIEWS OF THE LEFT KNEE 3/8/2023 1:06 pm COMPARISON: None. HISTORY: ORDERING SYSTEM PROVIDED HISTORY: trauma TECHNOLOGIST PROVIDED HISTORY: Reason for exam:->trauma What reading provider will be dictating this exam?->CRC FINDINGS: No fracture or dislocation of left knee. Patella is in satisfactory position. No synovial effusion. Vascular calcifications are present. No acute osseous abnormality involving the left knee. CT HEAD WO CONTRAST    Result Date: 3/8/2023  EXAMINATION: CT OF THE HEAD WITHOUT CONTRAST  3/8/2023 12:20 pm TECHNIQUE: CT of the head was performed without the administration of intravenous contrast. Automated exposure control, iterative reconstruction, and/or weight based adjustment of the mA/kV was utilized to reduce the radiation dose to as low as reasonably achievable. COMPARISON: CT head dated 04/24/2021 HISTORY: ORDERING SYSTEM PROVIDED HISTORY: trauma TECHNOLOGIST PROVIDED HISTORY: Has a \"code stroke\" or \"stroke alert\" been called? ->No Reason for exam:->trauma Decision Support Exception - unselect if not a suspected or confirmed emergency medical condition->Emergency Medical Condition (MA) What reading provider will be dictating this exam?->CRC FINDINGS: BRAIN/VENTRICLES: There is no acute intracranial hemorrhage, mass effect or midline shift. No abnormal extra-axial fluid collection. The gray-white differentiation is maintained without evidence of an acute infarct. There is no evidence of hydrocephalus. Moderate amount of low attenuation in the periventricular deep white matter suggesting chronic small vessel ischemic disease with moderate prominence of sulci towards the vertex of moderate generalized atrophy. ORBITS: The visualized portion of the orbits demonstrate no acute abnormality. SINUSES: The visualized paranasal sinuses and mastoid air cells demonstrate no acute abnormality. SOFT TISSUES/SKULL:  No acute abnormality of the visualized skull or soft tissues. No acute intracranial abnormality. CT CERVICAL SPINE WO CONTRAST    Result Date: 3/8/2023  EXAMINATION: CT OF THE CERVICAL SPINE WITHOUT CONTRAST 3/8/2023 12:20 pm TECHNIQUE: CT of the cervical spine was performed without the administration of intravenous contrast. Multiplanar reformatted images are provided for review. Automated exposure control, iterative reconstruction, and/or weight based adjustment of the mA/kV was utilized to reduce the radiation dose to as low as reasonably achievable. COMPARISON: CT C-spine 12/17/2017 HISTORY: ORDERING SYSTEM PROVIDED HISTORY: trauma TECHNOLOGIST PROVIDED HISTORY: Reason for exam:->trauma Decision Support Exception - unselect if not a suspected or confirmed emergency medical condition->Emergency Medical Condition (MA) What reading provider will be dictating this exam?->CRC FINDINGS: BONES/ALIGNMENT: There is no acute fracture or traumatic malalignment. DEGENERATIVE CHANGES: Moderate multilevel degenerative changes. SOFT TISSUES: There is no prevertebral soft tissue swelling. No acute abnormality of the cervical spine.      CT LUMBAR SPINE WO CONTRAST    Result Date: 3/8/2023  EXAMINATION: CT OF THE LUMBAR SPINE WITHOUT CONTRAST 3/8/2023 TECHNIQUE: CT of the lumbar spine was performed without the administration of intravenous contrast. Multiplanar reformatted images are provided for review. Adjustment of mA and/or kV according to patient size was utilized. Automated exposure control, iterative reconstruction, and/or weight based adjustment of the mA/kV was utilized to reduce the radiation dose to as low as reasonably achievable. COMPARISON: Correlation made with MRI from August 15, 2017 and radiographs from June 24, 2020 HISTORY: ORDERING SYSTEM PROVIDED HISTORY: trauma TECHNOLOGIST PROVIDED HISTORY: Reason for exam:->trauma Decision Support Exception - unselect if not a suspected or confirmed emergency medical condition->Emergency Medical Condition (MA) What reading provider will be dictating this exam?->CRC FINDINGS: Redemonstration of compression fracture involving T12 of approximately 80% with retropulsion and moderate central canal stenosis. This finding appears similar when compared with prior MRI from August 15, 2017. No new fracture or malalignment of the lumbar spine. Disc herniation present at L4/L5 resulting in moderate central canal stenosis. 1. Redemonstration of chronic compression fracture of T12 of approximately 80% with retropulsion and central canal stenosis. 2. No new lumbar spine compression fracture or malalignment. MRI LUMBAR SPINE WO CONTRAST    Result Date: 3/10/2023  EXAMINATION: MRI OF THE LUMBAR SPINE WITHOUT CONTRAST, 3/10/2023 10:19 pm TECHNIQUE: Multiplanar multisequence MRI of the lumbar spine was performed without the administration of intravenous contrast. COMPARISON: Noncontrast CTs of lumbar spine and left hip dated 03/08/2023.  HISTORY: ORDERING SYSTEM PROVIDED HISTORY: lumbar radiculopathy TECHNOLOGIST PROVIDED HISTORY: Reason for exam:->lumbar radiculopathy What reading provider will be dictating this exam?->CRC FINDINGS: BONES/ALIGNMENT: Lumbar spine alignment is normal.  There is hyper kyphosis at the thoracolumbar junction due to chronic marked T12 burst fracture with moderate retropulsion of bone and complete loss of height anteriorly. Lumbar vertebral body heights are normal.  No concerning marrow signal abnormality is present. A bone cyst is noted in the S3 body. No acute fracture evident. SPINAL CORD: The conus terminates normally. There is mild central canal stenosis at the mid T12 level with deformity of the cord but no cord signal abnormality. SOFT TISSUES: No paraspinal mass identified. Bilateral simple renal cysts noted not requiring follow-up. L1-L2: Mild facet hypertrophy. No stenosis. L2-L3: Mild disc space narrowing, posterior disc bulge and facet hypertrophy. Mild central canal and right foraminal stenosis. L3-L4: Mild posterior disc bulge, facet hypertrophy and ligamentous thickening. Bilateral foraminal stenosis mild on the right and mild/moderate on the left. L4-L5: Moderate disc space narrowing, posterior disc bulge and ligamentous thickening. Mild facet hypertrophy. Moderate/severe central canal stenosis and narrowing of the lateral recesses. Bilateral foraminal stenosis moderate on the right and severe on the left. L5-S1: Mild disc space narrowing with moderate posterior disc bulge and mild facet hypertrophy. Bilateral foraminal stenosis severe on the right and mild/moderate on the left. No acute abnormality evident. Diffuse lumbar spine degenerative changes with multilevel foraminal, central canal and foraminal stenosis as detailed above most pronounced at L4-5 and L5-S1. Old T12-2 column burst fracture with moderate retropulsion of bone and resulting mild central canal stenosis. There is cord deformity but no cord signal abnormality.      IR FLUORO GUIDED NEEDLE PLACEMENT    Result Date: 3/13/2023  EXAMINATION: FLUOROSCOPIC GUIDED INJECTION OF THE LEFT HIP JOINT 3/13/2023 3:43 pm HISTORY: ORDERING SYSTEM PROVIDED HISTORY: Steroid injection for left hip per Ortho TECHNOLOGIST PROVIDED HISTORY: Reason for exam:->Steroid injection for left hip per Ortho FLUOROSCOPY DOSE AND TYPE: Fluoroscopy time 1.3 minutes Air Kerma 500 uGym2 PROCEDURE: :  Roxi Phan MD Informed consent was obtained and universal protocol was observed. Sterile gowns, masks, hats and gloves utilized for maximal sterile barrier. Under standard sterile condition, local anesthesia with subcutaneous 2% lidocaine was administered. A skin entry site was selected with fluoroscopy. A 20 gauge spinal needle was inserted into the left hip joint under fluoroscopic guidance. Approximately 1 ml of iodinated contrast was injected into the joint to confirm location. Subsequently, 3 ml of a mixture of 40 mg of Kenalog and 1 ml of 1% lidocaine and 1 mL of 0.25% Marcaine was injected into the joint. The needle was removed and a sterile bandage was placed. Status post fluoroscopic-guided left hip injection. XR CHEST PORTABLE    Result Date: 3/8/2023  EXAMINATION: ONE XRAY VIEW OF THE CHEST 3/8/2023 1:06 pm COMPARISON: None. HISTORY: ORDERING SYSTEM PROVIDED HISTORY: Shortness of breath TECHNOLOGIST PROVIDED HISTORY: Reason for exam:->Shortness of breath What reading provider will be dictating this exam?->CRC FINDINGS: The cardiac silhouette is at upper limits of normal in size. Postop sternotomy changes are noted There is no right or left lung infiltrate. There is a linear scarring scar seen within the left lung base. There is chronic blunting the left costophrenic angle. There is no pneumothorax There is no gross rib fracture. 1. There are no findings of failure or pneumonia 2. Chronic blunting the left costophrenic angle 3. Chronic linear scarring seen within the left lung base.      CT HIP LEFT WO CONTRAST    Result Date: 3/8/2023  EXAMINATION: CT OF THE LEFT HIP WITHOUT CONTRAST 3/8/2023 4:33 pm TECHNIQUE: CT of the left hip was performed without the administration of intravenous contrast.  Multiplanar reformatted images are provided for review. Automated exposure control, iterative reconstruction, and/or weight based adjustment of the mA/kV was utilized to reduce the radiation dose to as low as reasonably achievable. COMPARISON: Correlation made with radiographs performed today. HISTORY ORDERING SYSTEM PROVIDED HISTORY: truama TECHNOLOGIST PROVIDED HISTORY: Reason for exam:->truama Decision Support Exception - unselect if not a suspected or confirmed emergency medical condition->Emergency Medical Condition (MA) What reading provider will be dictating this exam?->CRC FINDINGS: Severe osteoarthritis involving the left hip with multiple degenerative subchondral cysts, narrowing involving superior aspect of the joint space, subchondral sclerosis, and marginal osteophytosis involving superior aspect of left acetabulum and femoral head.  There is flattening of femoral head. No significant synovial effusion.  There is no fracture or dislocation involving the left hip.  No pelvis fracture.  No free fluid collections in the pelvis.  There is a right hip arthroplasty.  Multiple diverticula are associated with the sigmoid colon.     1. Severe osteoarthritis involving left hip. 2. No fracture or dislocation.     IR ARTHR/ASP/INJ MAJOR JT/BURSA LEFT WO US    Result Date: 3/13/2023  EXAMINATION: FLUOROSCOPIC GUIDED INJECTION OF THE LEFT HIP JOINT 3/13/2023 3:43 pm HISTORY: ORDERING SYSTEM PROVIDED HISTORY: Steroid injection for left hip per Ortho TECHNOLOGIST PROVIDED HISTORY: Reason for exam:->Steroid injection for left hip per Ortho FLUOROSCOPY DOSE AND TYPE: Fluoroscopy time 1.3 minutes Air Kerma 500 uGym2 PROCEDURE: :  Breezy Preciado MD Informed consent was obtained and universal protocol was observed. Sterile gowns, masks, hats and gloves utilized for maximal sterile barrier. Under standard sterile condition, local anesthesia with subcutaneous 2% lidocaine was administered.  A skin entry  site was selected with fluoroscopy. A 20 gauge spinal needle was inserted into the left hip joint under fluoroscopic guidance. Approximately 1 ml of iodinated contrast was injected into the joint to confirm location. Subsequently, 3 ml of a mixture of 40 mg of Kenalog and 1 ml of 1% lidocaine and 1 mL of 0.25% Marcaine was injected into the joint. The needle was removed and a sterile bandage was placed. Status post fluoroscopic-guided left hip injection. Discharge Medications:      Medication List        START taking these medications      diclofenac sodium 1 % Gel  Commonly known as: VOLTAREN  Apply 4 g topically 2 times daily as needed for Pain     traMADol 50 MG tablet  Commonly known as: ULTRAM  Take 1 tablet by mouth every 6 hours as needed for Pain for up to 3 days.  Max Daily Amount: 200 mg            CHANGE how you take these medications      furosemide 40 MG tablet  Commonly known as: LASIX  Take 1 tablet by mouth every other day  Start taking on: March 15, 2023  What changed: when to take this            CONTINUE taking these medications      acetaminophen 325 MG tablet  Commonly known as: TYLENOL     amLODIPine 10 MG tablet  Commonly known as: NORVASC     aspirin 81 MG chewable tablet     carvedilol 12.5 MG tablet  Commonly known as: COREG  Take 1 tablet by mouth 2 times daily (with meals)     escitalopram 10 MG tablet  Commonly known as: LEXAPRO     finasteride 5 MG tablet  Commonly known as: PROSCAR     folic acid 880 MCG tablet  Commonly known as: FOLVITE     hydrOXYzine pamoate 25 MG capsule  Commonly known as: VISTARIL     levothyroxine 50 MCG tablet  Commonly known as: SYNTHROID     Melatonin 10 MG Tabs     polyethylene glycol 17 g packet  Commonly known as: GLYCOLAX     tamsulosin 0.4 MG capsule  Commonly known as: FLOMAX     Vitamin D3 75 MCG (3000 UT) Tabs            STOP taking these medications      B-COMPLEX/B-12 PO               Where to Get Your Medications        These medications were sent to [address]  [address], [address] [facility]      Phone: [phone]   furosemide 40 MG tablet       These medications were sent to [facility], [address] [address]      Phone: [phone]   diclofenac sodium 1 % Gel       You can get these medications from any pharmacy    Bring a paper prescription for each of these medications  traMADol 50 MG tablet         Time Spent on discharge is more than 45 minutes in the examination, evaluation, counseling and review of medications and discharge plan.    +++++++++++++++++++++++++++++++++++++++++++++++++  Corry Kumar, [address]  +++++++++++++++++++++++++++++++++++++++++++++++++  NOTE: This report was transcribed using voice recognition software. Every effort was made to ensure accuracy; however, inadvertent computerized transcription errors may be present.

## 2023-03-16 LAB
ALBUMIN SERPL-MCNC: 3.6 G/DL (ref 3.5–5.2)
ALP SERPL-CCNC: 54 U/L (ref 40–129)
ALT SERPL-CCNC: 23 U/L (ref 0–40)
ANION GAP SERPL CALCULATED.3IONS-SCNC: 12 MMOL/L (ref 7–16)
AST SERPL-CCNC: 24 U/L (ref 0–39)
BASOPHILS # BLD: 0 E9/L (ref 0–0.2)
BASOPHILS NFR BLD: 0 % (ref 0–2)
BILIRUB SERPL-MCNC: 0.3 MG/DL (ref 0–1.2)
BUN SERPL-MCNC: 59 MG/DL (ref 6–23)
CALCIUM SERPL-MCNC: 8.8 MG/DL (ref 8.6–10.2)
CHLORIDE SERPL-SCNC: 103 MMOL/L (ref 98–107)
CHOLESTEROL, TOTAL: 189 MG/DL (ref 0–199)
CO2 SERPL-SCNC: 25 MMOL/L (ref 22–29)
CREAT SERPL-MCNC: 1.3 MG/DL (ref 0.7–1.2)
EOSINOPHIL # BLD: 0 E9/L (ref 0.05–0.5)
EOSINOPHIL NFR BLD: 0 % (ref 0–6)
ERYTHROCYTE [DISTWIDTH] IN BLOOD BY AUTOMATED COUNT: 12.1 FL (ref 11.5–15)
FOLATE SERPL-MCNC: >20 NG/ML (ref 4.8–24.2)
GLUCOSE SERPL-MCNC: 254 MG/DL (ref 74–99)
HCT VFR BLD AUTO: 28 % (ref 37–54)
HDLC SERPL-MCNC: 38 MG/DL
HGB BLD-MCNC: 9.4 G/DL (ref 12.5–16.5)
HYPOCHROMIA: ABNORMAL
LDLC SERPL CALC-MCNC: 124 MG/DL (ref 0–99)
LYMPHOCYTES # BLD: 0.27 E9/L (ref 1.5–4)
LYMPHOCYTES NFR BLD: 6.1 % (ref 20–42)
MCH RBC QN AUTO: 34.1 PG (ref 26–35)
MCHC RBC AUTO-ENTMCNC: 33.6 % (ref 32–34.5)
MCV RBC AUTO: 101.4 FL (ref 80–99.9)
MONOCYTES # BLD: 0.09 E9/L (ref 0.1–0.95)
MONOCYTES NFR BLD: 1.7 % (ref 2–12)
MYELOCYTES NFR BLD MANUAL: 0.9 % (ref 0–0)
NEUTROPHILS # BLD: 4.14 E9/L (ref 1.8–7.3)
NEUTS SEG NFR BLD: 91.3 % (ref 43–80)
PLATELET # BLD AUTO: 158 E9/L (ref 130–450)
PMV BLD AUTO: 9.5 FL (ref 7–12)
POLYCHROMASIA: ABNORMAL
POTASSIUM SERPL-SCNC: 5.2 MMOL/L (ref 3.5–5)
PROT SERPL-MCNC: 6.2 G/DL (ref 6.4–8.3)
RBC # BLD AUTO: 2.76 E12/L (ref 3.8–5.8)
SODIUM SERPL-SCNC: 140 MMOL/L (ref 132–146)
T4 FREE SERPL-MCNC: 1.5 NG/DL (ref 0.93–1.7)
TRIGL SERPL-MCNC: 133 MG/DL (ref 0–149)
TSH SERPL-MCNC: 1.41 UIU/ML (ref 0.27–4.2)
VIT B12 SERPL-MCNC: 518 PG/ML (ref 211–946)
VITAMIN D 25-HYDROXY: 47 NG/ML (ref 30–100)
VLDLC SERPL CALC-MCNC: 27 MG/DL
WBC # BLD: 4.5 E9/L (ref 4.5–11.5)

## 2023-03-17 LAB — SARS-COV-2 N GENE RESP QL NAA+PROBE: NOT DETECTED

## 2023-03-20 LAB — SARS-COV-2 N GENE RESP QL NAA+PROBE: DETECTED

## 2023-04-08 LAB
BACTERIA UR CULT: ABNORMAL
ORGANISM: ABNORMAL

## 2023-05-05 ENCOUNTER — OFFICE VISIT (OUTPATIENT)
Dept: CARDIOLOGY CLINIC | Age: 88
End: 2023-05-05
Payer: MEDICARE

## 2023-05-05 VITALS
HEART RATE: 61 BPM | BODY MASS INDEX: 28.88 KG/M2 | HEIGHT: 63 IN | WEIGHT: 163 LBS | SYSTOLIC BLOOD PRESSURE: 118 MMHG | DIASTOLIC BLOOD PRESSURE: 58 MMHG

## 2023-05-05 DIAGNOSIS — I50.43 CHF (CONGESTIVE HEART FAILURE), NYHA CLASS I, ACUTE ON CHRONIC, COMBINED (HCC): Primary | ICD-10-CM

## 2023-05-05 DIAGNOSIS — I35.0 NONRHEUMATIC AORTIC VALVE STENOSIS: ICD-10-CM

## 2023-05-05 PROCEDURE — 93000 ELECTROCARDIOGRAM COMPLETE: CPT | Performed by: INTERNAL MEDICINE

## 2023-05-05 PROCEDURE — 99214 OFFICE O/P EST MOD 30 MIN: CPT | Performed by: INTERNAL MEDICINE

## 2023-05-05 PROCEDURE — 1123F ACP DISCUSS/DSCN MKR DOCD: CPT | Performed by: INTERNAL MEDICINE

## 2023-05-05 RX ORDER — BISACODYL 10 MG
10 SUPPOSITORY, RECTAL RECTAL DAILY
COMMUNITY

## 2023-05-05 NOTE — PROGRESS NOTES
OUTPATIENT CARDIOLOGY FOLLOW-UP    Name: Savita Phan    Age: 80 y.o. Primary Care Physician: Awilda Rosenberg, MIHIR - CNP    Date of Service: 5/6/2023    Chief Complaint:   Chief Complaint   Patient presents with    Congestive Heart Failure    Cardiac Clearance       Interim History:   Mr. Hunter Pappas is a 44-year-old  male with history of CAD status post CABG underwent 2006 with a LIMA to LAD, SVG to D1, SVG to OM1, SVG to RCA and SVG to RPDA, mild obesity, hypertension, hyperlipidemia, type 2 diabetes diet controlled presented to the hospital with severe dizziness difficulty balancing and progressive increasing dyspnea, leg edema without any chest pain. Patient also had orthopnea and chronic PND and severe dizziness. He was seen as a consult on 4/24/2021 after he was admitted to the hospital on 4/24/2021 and was diagnosed with acute on chronic heart failure with preserved ejection fraction and chronic dizziness secondary to clonidine and metoprolol combination. He was initiated on IV diuretic therapy with the Lasix and clonidine was discontinued. He was initiated on Norvascfor hypertension. His dizziness significantly improved after stopping clonidine. Patient became euvolemic and was discharged home on 4/27/2021. He is compliant with medications, as well as salt and fluid intake. He does not take any over-the-counter arthritis medications. He is complaining of lot of left hip pain and has difficulty ambulating and uses walker for walking. He received multiple steroid injections to his left hip and also lower back without any significant improvement. Now, he is scheduled to go for surgery. Patient was seen in the office on 2/22/2022, since last visit, he was hospitalized in March 2023 following a fall without any loss of consciousness. He has severe pain in his left hip and ambulated short distances  with a walker. He was able to walk at least 50 feet.   He did receive

## 2023-05-08 ENCOUNTER — TELEPHONE (OUTPATIENT)
Dept: CARDIOLOGY CLINIC | Age: 88
End: 2023-05-08

## 2023-05-08 NOTE — TELEPHONE ENCOUNTER
It all depends on the echo results, if echo shows no severe aortic stenosis then he can have at [facility].

## 2023-05-08 NOTE — TELEPHONE ENCOUNTER
Patient's daughter Humberto Castro) called with concerns about hip surgery being rescheduled to [facility] from [address]. Patient is being scheduled for an echo for risk assessment. Daughter would like Dr. Johanna Cramer opinion on whether surgery should be done in a hospital setting and not at [facility].

## 2023-05-10 ENCOUNTER — TELEPHONE (OUTPATIENT)
Dept: ADMINISTRATIVE | Age: 88
End: 2023-05-10

## 2023-05-10 ENCOUNTER — TELEPHONE (OUTPATIENT)
Dept: CARDIOLOGY CLINIC | Age: 88
End: 2023-05-10

## 2023-06-14 RX ORDER — DOCUSATE SODIUM 100 MG/1
100 CAPSULE, LIQUID FILLED ORAL DAILY
COMMUNITY

## 2023-06-14 NOTE — PROGRESS NOTES
Antiseptic wipes with instructions and [facility]

## 2023-06-16 ENCOUNTER — TELEPHONE (OUTPATIENT)
Dept: CARDIOLOGY CLINIC | Age: 88
End: 2023-06-16

## 2023-06-19 NOTE — TELEPHONE ENCOUNTER
Patient's revised cardiac risk index is elevated (2), class III risk, 6.6% risk of major perioperative cardiac events. Currently, patient does not have any active cardiac symptoms including acute chest pain, decompensated heart failure, uncontrolled arrhythmias or severe obstructive valve disease. Recent echo showed moderate aortic stenosis with normal LV function and mild pulmonary hypertension. Patient has good functional capacity and exercises for 20 minutes on a stationary bike without chest pain or dyspnea. No further cardiac testing is needed and proceed with left hip surgery as scheduled. Continue beta-blocker therapy with Coreg perioperatively.

## 2023-06-20 NOTE — TELEPHONE ENCOUNTER
Patient notified of Dr. Rosas's risk assessment/recommendation for surgery. Note faxed to Dr. Pato Gross ([phone].

## 2023-06-21 ENCOUNTER — PREP FOR PROCEDURE (OUTPATIENT)
Dept: ORTHOPEDIC SURGERY | Age: 88
End: 2023-06-21

## 2023-06-21 DIAGNOSIS — Z01.818 PRE-OP TESTING: Primary | ICD-10-CM

## 2023-06-21 LAB — MRSA SPEC QL CULT: NORMAL

## 2023-06-21 RX ORDER — SODIUM CHLORIDE 9 MG/ML
INJECTION, SOLUTION INTRAVENOUS PRN
Status: CANCELLED | OUTPATIENT
Start: 2023-06-21

## 2023-06-21 RX ORDER — SODIUM CHLORIDE, SODIUM LACTATE, POTASSIUM CHLORIDE, CALCIUM CHLORIDE 600; 310; 30; 20 MG/100ML; MG/100ML; MG/100ML; MG/100ML
INJECTION, SOLUTION INTRAVENOUS CONTINUOUS
Status: CANCELLED | OUTPATIENT
Start: 2023-06-21

## 2023-06-21 RX ORDER — ACETAMINOPHEN 325 MG/1
1000 TABLET ORAL ONCE
Status: CANCELLED | OUTPATIENT
Start: 2023-06-21 | End: 2023-06-21

## 2023-06-21 RX ORDER — SODIUM CHLORIDE 0.9 % (FLUSH) 0.9 %
5-40 SYRINGE (ML) INJECTION PRN
Status: CANCELLED | OUTPATIENT
Start: 2023-06-21

## 2023-06-21 RX ORDER — SODIUM CHLORIDE 0.9 % (FLUSH) 0.9 %
5-40 SYRINGE (ML) INJECTION EVERY 12 HOURS SCHEDULED
Status: CANCELLED | OUTPATIENT
Start: 2023-06-21

## 2023-06-21 NOTE — H&P
HISTORY AND PHYSICAL             Date: 6/21/2023        Patient Name: Andra Adkins     YOB: 1930      Age:  80 y.o. Chief Complaint   Left hip pain          History Obtained From   patient    History of Present Illness   Patient returns for surgical scheduling. Last here in September 2021. Ended up not proceeding with hip replacement due to COVID concerns. Had been functioning at home able to ambulate household distances with a walker. He had a fall down his steps, 8 steps in March. Was in the hospital for several days and has been in [address] [place] [address] for rehab. Continues to have severe pain and crepitus in his arthritic left hip. Takes Tylenol. No narcotics. Previous open heart surgery 2006. Follows with Dr. Cody Herrera. No recent urologic issues. He had 1 loose front tooth the last time I saw him but this has been removed. Severe pain and hip instability with all weightbearing activities. Remains a fall risk. No previous left hip surgery. Ready to proceed with implant arthroplasty. Past Medical History     Past Medical History:   Diagnosis Date    Anxiety and depression     Arthritis     osteoarthritis left hip    CAD (coronary artery disease)     CHF (congestive heart failure) ([facility])     systolic and diastolic    Hyperlipidemia     Hypertension     Muscle wasting and atrophy, not elsewhere classified, unspecified site     Nonrheumatic tricuspid (valve) insufficiency         Past Surgical History     Past Surgical History:   Procedure Laterality Date    CORONARY ARTERY BYPASS GRAFT      HIP SURGERY      right        Medications Prior to Admission     Prior to Admission medications    Medication Sig Start Date End Date Taking?  Authorizing Provider   docusate sodium (COLACE) 100 MG capsule Take 1 capsule by mouth Daily constipation    Historical Provider, MD   diclofenac sodium (VOLTAREN) 1 % GEL Apply topically every 12 hours as needed for Pain Apply to affected area topically    Historical Provider,

## 2023-06-22 NOTE — PROGRESS NOTES
PAT instructions faxed to [facility] for review for DOS  6/29/23. Pt resides at [facility], Alert, oriented, signs for himself. [facility] , [facility] document on chart. No wounds, drains, tubes, oxygen or isolation. Able to use call light independently. Ambulatory with assistance with walker, pivots with assistance. the patient spouse will accompany him to hospital DOS.   Transportation provided by [facility] [place], [phone] ext [phone]

## 2023-06-22 NOTE — PROGRESS NOTES
[address] [employer] PRE-ADMISSION TESTING INSTRUCTIONS    The Preadmission Testing patient is instructed accordingly using the following criteria (check applicable):    ARRIVAL INSTRUCTIONS:  [x] Parking the day of Surgery is located in the Main Entrance lot. Upon entering the door, make an immediate right to the surgery reception desk    [x] Bring photo ID and insurance card    [x] Bring in a copy of Living will or Durable Power of  papers. [x] Please be sure to arrange for responsible adult to provide transportation to and from the hospital    [x] Please arrange for responsible adult to be with you for the 24 hour period post procedure due to having anesthesia    [x] If you awake am of surgery not feeling well or have temperature >100 please call [phone]    GENERAL INSTRUCTIONS:    [x] Follow instructions for hydration that have been provided to you at your Pre-Admission Visit. Solid food until midnight then clear liquids. No gum, candy or mints. [x] You may brush your teeth, but do not swallow any water    [x] Take medications as instructed with 1-2 oz of water    [x] Stop herbal supplements and vitamins 5 days prior to procedure    [x] Follow preop dosing of blood thinners per physician instructions               No tobacco products within 24 hours of surgery     [x] No alcohol or illegal drug use within 24 hours of surgery.     [x] Jewelry, body piercing's, eyeglasses, contact lenses and dentures are not permitted into surgery (bring cases)      [x] You can expect a call the business day prior to procedure to notify you if your arrival time changes    [x] If you receive a survey after surgery we would greatly appreciate your comments        [x] A caregiver or family member must remain with the patient during their stay if they are mentally handicapped, have dementia, disoriented or unable to use a call light or would be a safety concern if left unattended    [x] Please

## 2023-06-27 NOTE — PROGRESS NOTES
Pt daughter Junie Ayala notified of OR time, and pt arrival time for surgery 6/29/23.   She will be arriving around 0700 and pt wife will also be coming DOS

## 2023-06-27 NOTE — PROGRESS NOTES
Pt  physican at [facility] Dr. Kiersten Lance as per request sent a form with pt assessment noting pt is having left hip surgery . There is no notation of medical clearance on this form . Also, something caught Nurses attention upon reviewing form it states pt has SSS with a pacemaker. .. Nurse called the Nurse at [address] [place] [address] Yoni Reynoso) inquiring if pt has pacemaker because the documents they sent over does not mention a pacemaker nor does the cardiology note from Dr. Perico Terrell. Yoni Reynoso, the facility Nurse went and checked and asked pt if he has a pacemaker or ever did. .. The pt denied pacemaker ever! ... Yoni Reynoso, the facility Nurse attempted to reach out to Kiersten Lance to infom him that pt does not have a pacemaker and he has not reached back. Spoke with Krystina the director at [address] [place] [address] and faxed her the forms we received form Dr. Yonny Soliman for her review. Mame Torrez at Dr. Marie Silence office updated on all above information and she will inform Dr Marietta Lopes .

## 2023-06-27 NOTE — PROGRESS NOTES
Spoke with Dr. Shauna Marrero office whom is pts cardiologist whom  confirmed pt does not have a pacemaker and Dr. Shauna Marrero has cleared pt for surgery

## 2023-06-28 ENCOUNTER — ANESTHESIA EVENT (OUTPATIENT)
Dept: OPERATING ROOM | Age: 88
End: 2023-06-28
Payer: MEDICARE

## 2023-06-29 ENCOUNTER — ANESTHESIA (OUTPATIENT)
Dept: OPERATING ROOM | Age: 88
End: 2023-06-29
Payer: MEDICARE

## 2023-06-29 ENCOUNTER — APPOINTMENT (OUTPATIENT)
Dept: GENERAL RADIOLOGY | Age: 88
DRG: 469 | End: 2023-06-29
Attending: ORTHOPAEDIC SURGERY
Payer: MEDICARE

## 2023-06-29 ENCOUNTER — HOSPITAL ENCOUNTER (INPATIENT)
Age: 88
LOS: 2 days | Discharge: NURSING, REHAB OR LONG-TERM CARE | DRG: 469 | End: 2023-07-07
Attending: ORTHOPAEDIC SURGERY | Admitting: ORTHOPAEDIC SURGERY
Payer: MEDICARE

## 2023-06-29 DIAGNOSIS — M16.12 PRIMARY OSTEOARTHRITIS OF LEFT HIP: ICD-10-CM

## 2023-06-29 DIAGNOSIS — G89.18 POST-OPERATIVE PAIN: Primary | ICD-10-CM

## 2023-06-29 LAB
ANION GAP SERPL CALCULATED.3IONS-SCNC: 8 MMOL/L (ref 7–16)
BNP BLD-MCNC: 2619 PG/ML (ref 0–450)
BUN SERPL-MCNC: 31 MG/DL (ref 6–23)
CALCIUM SERPL-MCNC: 8.3 MG/DL (ref 8.6–10.2)
CHLORIDE SERPL-SCNC: 104 MMOL/L (ref 98–107)
CO2 SERPL-SCNC: 24 MMOL/L (ref 22–29)
CREAT SERPL-MCNC: 1.6 MG/DL (ref 0.7–1.2)
ERYTHROCYTE [DISTWIDTH] IN BLOOD BY AUTOMATED COUNT: 12.9 FL (ref 11.5–15)
GLUCOSE SERPL-MCNC: 166 MG/DL (ref 74–99)
HCT VFR BLD AUTO: 25.9 % (ref 37–54)
HGB BLD-MCNC: 8.4 G/DL (ref 12.5–16.5)
MAGNESIUM SERPL-MCNC: 2.1 MG/DL (ref 1.6–2.6)
MCH RBC QN AUTO: 33.5 PG (ref 26–35)
MCHC RBC AUTO-ENTMCNC: 32.4 % (ref 32–34.5)
MCV RBC AUTO: 103.2 FL (ref 80–99.9)
METER GLUCOSE: 126 MG/DL (ref 74–99)
METER GLUCOSE: 218 MG/DL (ref 74–99)
METER GLUCOSE: 223 MG/DL (ref 74–99)
PHOSPHATE SERPL-MCNC: 5 MG/DL (ref 2.5–4.5)
PLATELET # BLD AUTO: 124 E9/L (ref 130–450)
PMV BLD AUTO: 9.2 FL (ref 7–12)
POTASSIUM SERPL-SCNC: 5.1 MMOL/L (ref 3.5–5)
POTASSIUM SERPL-SCNC: 6.3 MMOL/L (ref 3.5–5)
RBC # BLD AUTO: 2.51 E12/L (ref 3.8–5.8)
SODIUM SERPL-SCNC: 136 MMOL/L (ref 132–146)
WBC # BLD: 8.9 E9/L (ref 4.5–11.5)

## 2023-06-29 PROCEDURE — 83880 ASSAY OF NATRIURETIC PEPTIDE: CPT

## 2023-06-29 PROCEDURE — 6370000000 HC RX 637 (ALT 250 FOR IP): Performed by: ORTHOPAEDIC SURGERY

## 2023-06-29 PROCEDURE — 85027 COMPLETE CBC AUTOMATED: CPT

## 2023-06-29 PROCEDURE — 71045 X-RAY EXAM CHEST 1 VIEW: CPT

## 2023-06-29 PROCEDURE — 2580000003 HC RX 258: Performed by: ORTHOPAEDIC SURGERY

## 2023-06-29 PROCEDURE — 6360000002 HC RX W HCPCS: Performed by: ANESTHESIOLOGY

## 2023-06-29 PROCEDURE — 2580000003 HC RX 258: Performed by: PHYSICIAN ASSISTANT

## 2023-06-29 PROCEDURE — 6360000002 HC RX W HCPCS

## 2023-06-29 PROCEDURE — 84100 ASSAY OF PHOSPHORUS: CPT

## 2023-06-29 PROCEDURE — 3700000001 HC ADD 15 MINUTES (ANESTHESIA): Performed by: ORTHOPAEDIC SURGERY

## 2023-06-29 PROCEDURE — 6370000000 HC RX 637 (ALT 250 FOR IP): Performed by: PHYSICIAN ASSISTANT

## 2023-06-29 PROCEDURE — 83735 ASSAY OF MAGNESIUM: CPT

## 2023-06-29 PROCEDURE — 6360000002 HC RX W HCPCS: Performed by: PHYSICIAN ASSISTANT

## 2023-06-29 PROCEDURE — 93005 ELECTROCARDIOGRAM TRACING: CPT | Performed by: ORTHOPAEDIC SURGERY

## 2023-06-29 PROCEDURE — 73501 X-RAY EXAM HIP UNI 1 VIEW: CPT

## 2023-06-29 PROCEDURE — 6370000000 HC RX 637 (ALT 250 FOR IP)

## 2023-06-29 PROCEDURE — C1776 JOINT DEVICE (IMPLANTABLE): HCPCS | Performed by: ORTHOPAEDIC SURGERY

## 2023-06-29 PROCEDURE — 36415 COLL VENOUS BLD VENIPUNCTURE: CPT

## 2023-06-29 PROCEDURE — 2709999900 HC NON-CHARGEABLE SUPPLY: Performed by: ORTHOPAEDIC SURGERY

## 2023-06-29 PROCEDURE — 80048 BASIC METABOLIC PNL TOTAL CA: CPT

## 2023-06-29 PROCEDURE — 93005 ELECTROCARDIOGRAM TRACING: CPT

## 2023-06-29 PROCEDURE — 2500000003 HC RX 250 WO HCPCS: Performed by: ORTHOPAEDIC SURGERY

## 2023-06-29 PROCEDURE — G0378 HOSPITAL OBSERVATION PER HR: HCPCS

## 2023-06-29 PROCEDURE — 7100000001 HC PACU RECOVERY - ADDTL 15 MIN: Performed by: ORTHOPAEDIC SURGERY

## 2023-06-29 PROCEDURE — 3700000000 HC ANESTHESIA ATTENDED CARE: Performed by: ORTHOPAEDIC SURGERY

## 2023-06-29 PROCEDURE — 84132 ASSAY OF SERUM POTASSIUM: CPT

## 2023-06-29 PROCEDURE — 0SRB04Z REPLACEMENT OF LEFT HIP JOINT WITH CERAMIC ON POLYETHYLENE SYNTHETIC SUBSTITUTE, OPEN APPROACH: ICD-10-PCS | Performed by: ORTHOPAEDIC SURGERY

## 2023-06-29 PROCEDURE — 2580000003 HC RX 258

## 2023-06-29 PROCEDURE — 3600000015 HC SURGERY LEVEL 5 ADDTL 15MIN: Performed by: ORTHOPAEDIC SURGERY

## 2023-06-29 PROCEDURE — 82962 GLUCOSE BLOOD TEST: CPT

## 2023-06-29 PROCEDURE — 3600000005 HC SURGERY LEVEL 5 BASE: Performed by: ORTHOPAEDIC SURGERY

## 2023-06-29 PROCEDURE — 88311 DECALCIFY TISSUE: CPT

## 2023-06-29 PROCEDURE — 2500000003 HC RX 250 WO HCPCS

## 2023-06-29 PROCEDURE — 6360000002 HC RX W HCPCS: Performed by: ORTHOPAEDIC SURGERY

## 2023-06-29 PROCEDURE — 88304 TISSUE EXAM BY PATHOLOGIST: CPT

## 2023-06-29 PROCEDURE — 99223 1ST HOSP IP/OBS HIGH 75: CPT | Performed by: INTERNAL MEDICINE

## 2023-06-29 PROCEDURE — 7100000000 HC PACU RECOVERY - FIRST 15 MIN: Performed by: ORTHOPAEDIC SURGERY

## 2023-06-29 PROCEDURE — A4217 STERILE WATER/SALINE, 500 ML: HCPCS | Performed by: ORTHOPAEDIC SURGERY

## 2023-06-29 DEVICE — IMPLANTABLE DEVICE
Type: IMPLANTABLE DEVICE | Site: HIP | Status: FUNCTIONAL
Brand: G7® VIVACIT-E®

## 2023-06-29 DEVICE — SCREW BNE L25MM DIA6.5MM TI ALLY DOME 2 MOBILITY LO PROF G7: Type: IMPLANTABLE DEVICE | Site: HIP | Status: FUNCTIONAL

## 2023-06-29 DEVICE — IMPLANTABLE DEVICE
Type: IMPLANTABLE DEVICE | Site: HIP | Status: FUNCTIONAL
Brand: ECHO® BI-METRIC® HIP SYSTEM

## 2023-06-29 DEVICE — IMPLANTABLE DEVICE
Type: IMPLANTABLE DEVICE | Site: HIP | Status: FUNCTIONAL
Brand: G7 ACETABULAR SCREW

## 2023-06-29 DEVICE — IMPLANTABLE DEVICE
Type: IMPLANTABLE DEVICE | Site: HIP | Status: FUNCTIONAL
Brand: G7® ACETABULAR SYSTEM

## 2023-06-29 DEVICE — HEAD FEM DIA40MM HIP BIOLOX DELT OPT FOR G7 ACET SYS: Type: IMPLANTABLE DEVICE | Site: HIP | Status: FUNCTIONAL

## 2023-06-29 DEVICE — SLEEVE FEM -3MM OFFSET HIP TYP 1 TAPR FOR CERAMIC BIOLOX: Type: IMPLANTABLE DEVICE | Site: HIP | Status: FUNCTIONAL

## 2023-06-29 RX ORDER — SODIUM CHLORIDE 0.9 % (FLUSH) 0.9 %
5-40 SYRINGE (ML) INJECTION PRN
Status: DISCONTINUED | OUTPATIENT
Start: 2023-06-29 | End: 2023-07-07 | Stop reason: HOSPADM

## 2023-06-29 RX ORDER — SODIUM CHLORIDE 9 MG/ML
INJECTION, SOLUTION INTRAVENOUS CONTINUOUS PRN
Status: DISCONTINUED | OUTPATIENT
Start: 2023-06-29 | End: 2023-06-29 | Stop reason: SDUPTHER

## 2023-06-29 RX ORDER — HYDROXYZINE PAMOATE 25 MG/1
25 CAPSULE ORAL NIGHTLY
Status: DISCONTINUED | OUTPATIENT
Start: 2023-06-29 | End: 2023-07-07 | Stop reason: HOSPADM

## 2023-06-29 RX ORDER — FOLIC ACID 1 MG/1
1000 TABLET ORAL DAILY
Status: DISCONTINUED | OUTPATIENT
Start: 2023-06-29 | End: 2023-07-07 | Stop reason: HOSPADM

## 2023-06-29 RX ORDER — CALCIUM GLUCONATE 94 MG/ML
1000 INJECTION, SOLUTION INTRAVENOUS ONCE
Status: COMPLETED | OUTPATIENT
Start: 2023-06-29 | End: 2023-06-29

## 2023-06-29 RX ORDER — ONDANSETRON 2 MG/ML
4 INJECTION INTRAMUSCULAR; INTRAVENOUS
Status: DISCONTINUED | OUTPATIENT
Start: 2023-06-29 | End: 2023-06-29 | Stop reason: HOSPADM

## 2023-06-29 RX ORDER — VANCOMYCIN HYDROCHLORIDE 1 G/20ML
INJECTION, POWDER, LYOPHILIZED, FOR SOLUTION INTRAVENOUS PRN
Status: DISCONTINUED | OUTPATIENT
Start: 2023-06-29 | End: 2023-06-29 | Stop reason: ALTCHOICE

## 2023-06-29 RX ORDER — ESCITALOPRAM OXALATE 10 MG/1
10 TABLET ORAL DAILY
Status: DISCONTINUED | OUTPATIENT
Start: 2023-06-30 | End: 2023-07-07 | Stop reason: HOSPADM

## 2023-06-29 RX ORDER — BISACODYL 10 MG
10 SUPPOSITORY, RECTAL RECTAL DAILY
Status: DISCONTINUED | OUTPATIENT
Start: 2023-06-29 | End: 2023-06-30

## 2023-06-29 RX ORDER — SODIUM CHLORIDE, SODIUM LACTATE, POTASSIUM CHLORIDE, CALCIUM CHLORIDE 600; 310; 30; 20 MG/100ML; MG/100ML; MG/100ML; MG/100ML
INJECTION, SOLUTION INTRAVENOUS CONTINUOUS
Status: DISCONTINUED | OUTPATIENT
Start: 2023-06-29 | End: 2023-06-29

## 2023-06-29 RX ORDER — DEXAMETHASONE SODIUM PHOSPHATE 4 MG/ML
INJECTION, SOLUTION INTRA-ARTICULAR; INTRALESIONAL; INTRAMUSCULAR; INTRAVENOUS; SOFT TISSUE PRN
Status: DISCONTINUED | OUTPATIENT
Start: 2023-06-29 | End: 2023-06-29 | Stop reason: SDUPTHER

## 2023-06-29 RX ORDER — FENTANYL CITRATE 50 UG/ML
INJECTION, SOLUTION INTRAMUSCULAR; INTRAVENOUS PRN
Status: DISCONTINUED | OUTPATIENT
Start: 2023-06-29 | End: 2023-06-29 | Stop reason: SDUPTHER

## 2023-06-29 RX ORDER — ROCURONIUM BROMIDE 10 MG/ML
INJECTION, SOLUTION INTRAVENOUS PRN
Status: DISCONTINUED | OUTPATIENT
Start: 2023-06-29 | End: 2023-06-29 | Stop reason: SDUPTHER

## 2023-06-29 RX ORDER — DEXAMETHASONE SODIUM PHOSPHATE 10 MG/ML
8 INJECTION, SOLUTION INTRAMUSCULAR; INTRAVENOUS ONCE
Status: DISCONTINUED | OUTPATIENT
Start: 2023-06-29 | End: 2023-06-29 | Stop reason: HOSPADM

## 2023-06-29 RX ORDER — ACETAMINOPHEN 500 MG
1000 TABLET ORAL ONCE
Status: COMPLETED | OUTPATIENT
Start: 2023-06-29 | End: 2023-06-29

## 2023-06-29 RX ORDER — AMLODIPINE BESYLATE 10 MG/1
10 TABLET ORAL NIGHTLY
Status: DISCONTINUED | OUTPATIENT
Start: 2023-06-29 | End: 2023-07-07 | Stop reason: HOSPADM

## 2023-06-29 RX ORDER — DEXTROSE MONOHYDRATE 100 MG/ML
INJECTION, SOLUTION INTRAVENOUS CONTINUOUS PRN
Status: DISCONTINUED | OUTPATIENT
Start: 2023-06-29 | End: 2023-07-03 | Stop reason: SDUPTHER

## 2023-06-29 RX ORDER — SODIUM CHLORIDE 0.9 % (FLUSH) 0.9 %
5-40 SYRINGE (ML) INJECTION EVERY 12 HOURS SCHEDULED
Status: DISCONTINUED | OUTPATIENT
Start: 2023-06-29 | End: 2023-06-29 | Stop reason: HOSPADM

## 2023-06-29 RX ORDER — PROPOFOL 10 MG/ML
INJECTION, EMULSION INTRAVENOUS PRN
Status: DISCONTINUED | OUTPATIENT
Start: 2023-06-29 | End: 2023-06-29 | Stop reason: SDUPTHER

## 2023-06-29 RX ORDER — MORPHINE SULFATE 2 MG/ML
1 INJECTION, SOLUTION INTRAMUSCULAR; INTRAVENOUS EVERY 4 HOURS PRN
Status: DISCONTINUED | OUTPATIENT
Start: 2023-06-29 | End: 2023-07-07 | Stop reason: HOSPADM

## 2023-06-29 RX ORDER — SODIUM CHLORIDE 9 MG/ML
INJECTION, SOLUTION INTRAVENOUS PRN
Status: DISCONTINUED | OUTPATIENT
Start: 2023-06-29 | End: 2023-06-29 | Stop reason: HOSPADM

## 2023-06-29 RX ORDER — SODIUM CHLORIDE 0.9 % (FLUSH) 0.9 %
5-40 SYRINGE (ML) INJECTION PRN
Status: DISCONTINUED | OUTPATIENT
Start: 2023-06-29 | End: 2023-06-29 | Stop reason: HOSPADM

## 2023-06-29 RX ORDER — LABETALOL HYDROCHLORIDE 5 MG/ML
INJECTION, SOLUTION INTRAVENOUS PRN
Status: DISCONTINUED | OUTPATIENT
Start: 2023-06-29 | End: 2023-06-29 | Stop reason: SDUPTHER

## 2023-06-29 RX ORDER — SODIUM CHLORIDE 9 MG/ML
INJECTION, SOLUTION INTRAVENOUS CONTINUOUS
Status: DISCONTINUED | OUTPATIENT
Start: 2023-06-29 | End: 2023-07-02

## 2023-06-29 RX ORDER — ASPIRIN 81 MG/1
81 TABLET ORAL 2 TIMES DAILY
Status: DISCONTINUED | OUTPATIENT
Start: 2023-06-29 | End: 2023-07-07 | Stop reason: HOSPADM

## 2023-06-29 RX ORDER — FUROSEMIDE 40 MG/1
40 TABLET ORAL EVERY OTHER DAY
Status: DISCONTINUED | OUTPATIENT
Start: 2023-06-29 | End: 2023-07-03

## 2023-06-29 RX ORDER — SENNA AND DOCUSATE SODIUM 50; 8.6 MG/1; MG/1
1 TABLET, FILM COATED ORAL 2 TIMES DAILY
Status: DISCONTINUED | OUTPATIENT
Start: 2023-06-29 | End: 2023-07-07 | Stop reason: HOSPADM

## 2023-06-29 RX ORDER — LIDOCAINE HYDROCHLORIDE 20 MG/ML
INJECTION, SOLUTION EPIDURAL; INFILTRATION; INTRACAUDAL; PERINEURAL PRN
Status: DISCONTINUED | OUTPATIENT
Start: 2023-06-29 | End: 2023-06-29 | Stop reason: SDUPTHER

## 2023-06-29 RX ORDER — ONDANSETRON 2 MG/ML
4 INJECTION INTRAMUSCULAR; INTRAVENOUS EVERY 6 HOURS PRN
Status: DISCONTINUED | OUTPATIENT
Start: 2023-06-29 | End: 2023-07-07 | Stop reason: HOSPADM

## 2023-06-29 RX ORDER — ONDANSETRON 4 MG/1
4 TABLET, ORALLY DISINTEGRATING ORAL EVERY 8 HOURS PRN
Status: DISCONTINUED | OUTPATIENT
Start: 2023-06-29 | End: 2023-07-07 | Stop reason: HOSPADM

## 2023-06-29 RX ORDER — ONDANSETRON 2 MG/ML
INJECTION INTRAMUSCULAR; INTRAVENOUS PRN
Status: DISCONTINUED | OUTPATIENT
Start: 2023-06-29 | End: 2023-06-29 | Stop reason: SDUPTHER

## 2023-06-29 RX ORDER — CARVEDILOL 6.25 MG/1
12.5 TABLET ORAL 2 TIMES DAILY WITH MEALS
Status: DISCONTINUED | OUTPATIENT
Start: 2023-06-29 | End: 2023-07-07 | Stop reason: HOSPADM

## 2023-06-29 RX ORDER — SODIUM CHLORIDE 9 MG/ML
INJECTION, SOLUTION INTRAVENOUS PRN
Status: DISCONTINUED | OUTPATIENT
Start: 2023-06-29 | End: 2023-07-07 | Stop reason: HOSPADM

## 2023-06-29 RX ORDER — TRAMADOL HYDROCHLORIDE 50 MG/1
50 TABLET ORAL EVERY 6 HOURS PRN
Status: DISCONTINUED | OUTPATIENT
Start: 2023-06-29 | End: 2023-07-07 | Stop reason: HOSPADM

## 2023-06-29 RX ORDER — SODIUM CHLORIDE 0.9 % (FLUSH) 0.9 %
5-40 SYRINGE (ML) INJECTION EVERY 12 HOURS SCHEDULED
Status: DISCONTINUED | OUTPATIENT
Start: 2023-06-29 | End: 2023-07-07 | Stop reason: HOSPADM

## 2023-06-29 RX ORDER — FINASTERIDE 5 MG/1
5 TABLET, FILM COATED ORAL EVERY EVENING
Status: DISCONTINUED | OUTPATIENT
Start: 2023-06-29 | End: 2023-07-03

## 2023-06-29 RX ORDER — GLYCOPYRROLATE 0.2 MG/ML
INJECTION INTRAMUSCULAR; INTRAVENOUS PRN
Status: DISCONTINUED | OUTPATIENT
Start: 2023-06-29 | End: 2023-06-29 | Stop reason: SDUPTHER

## 2023-06-29 RX ORDER — TAMSULOSIN HYDROCHLORIDE 0.4 MG/1
0.4 CAPSULE ORAL EVERY EVENING
Status: DISCONTINUED | OUTPATIENT
Start: 2023-06-29 | End: 2023-07-07 | Stop reason: HOSPADM

## 2023-06-29 RX ORDER — ACETAMINOPHEN 325 MG/1
650 TABLET ORAL EVERY 6 HOURS
Status: DISCONTINUED | OUTPATIENT
Start: 2023-06-29 | End: 2023-07-07 | Stop reason: HOSPADM

## 2023-06-29 RX ORDER — DOCUSATE SODIUM 100 MG/1
100 CAPSULE, LIQUID FILLED ORAL DAILY
Status: DISCONTINUED | OUTPATIENT
Start: 2023-06-29 | End: 2023-07-07 | Stop reason: HOSPADM

## 2023-06-29 RX ORDER — FAMOTIDINE 20 MG/1
20 TABLET, FILM COATED ORAL DAILY
Status: DISCONTINUED | OUTPATIENT
Start: 2023-06-29 | End: 2023-07-03

## 2023-06-29 RX ORDER — LEVOTHYROXINE SODIUM 0.05 MG/1
50 TABLET ORAL DAILY
Status: DISCONTINUED | OUTPATIENT
Start: 2023-06-29 | End: 2023-07-07 | Stop reason: HOSPADM

## 2023-06-29 RX ADMIN — DEXAMETHASONE SODIUM PHOSPHATE 10 MG: 4 INJECTION, SOLUTION INTRA-ARTICULAR; INTRALESIONAL; INTRAMUSCULAR; INTRAVENOUS; SOFT TISSUE at 09:14

## 2023-06-29 RX ADMIN — HYDROXYZINE PAMOATE 25 MG: 25 CAPSULE ORAL at 22:01

## 2023-06-29 RX ADMIN — FENTANYL CITRATE 50 MCG: 50 INJECTION, SOLUTION INTRAMUSCULAR; INTRAVENOUS at 10:35

## 2023-06-29 RX ADMIN — ACETAMINOPHEN 650 MG: 325 TABLET ORAL at 18:00

## 2023-06-29 RX ADMIN — TAMSULOSIN HYDROCHLORIDE 0.4 MG: 0.4 CAPSULE ORAL at 22:01

## 2023-06-29 RX ADMIN — TRANEXAMIC ACID 500 MG: 100 INJECTION, SOLUTION INTRAVENOUS at 12:27

## 2023-06-29 RX ADMIN — SODIUM CHLORIDE, POTASSIUM CHLORIDE, SODIUM LACTATE AND CALCIUM CHLORIDE: 600; 310; 30; 20 INJECTION, SOLUTION INTRAVENOUS at 15:01

## 2023-06-29 RX ADMIN — SUGAMMADEX 292 MG: 100 INJECTION, SOLUTION INTRAVENOUS at 11:17

## 2023-06-29 RX ADMIN — CALCIUM GLUCONATE 1000 MG: 98 INJECTION, SOLUTION INTRAVENOUS at 22:06

## 2023-06-29 RX ADMIN — ASPIRIN 81 MG: 81 TABLET, COATED ORAL at 22:01

## 2023-06-29 RX ADMIN — HYDROMORPHONE HYDROCHLORIDE 0.25 MG: 0.5 INJECTION, SOLUTION INTRAMUSCULAR; INTRAVENOUS; SUBCUTANEOUS at 12:20

## 2023-06-29 RX ADMIN — SODIUM CHLORIDE: 9 INJECTION, SOLUTION INTRAVENOUS at 09:01

## 2023-06-29 RX ADMIN — HYDROMORPHONE HYDROCHLORIDE 0.25 MG: 0.5 INJECTION, SOLUTION INTRAMUSCULAR; INTRAVENOUS; SUBCUTANEOUS at 12:04

## 2023-06-29 RX ADMIN — TRANEXAMIC ACID 500 MG: 100 INJECTION, SOLUTION INTRAVENOUS at 09:25

## 2023-06-29 RX ADMIN — DOCUSATE SODIUM 50 MG AND SENNOSIDES 8.6 MG 1 TABLET: 8.6; 5 TABLET, FILM COATED ORAL at 22:01

## 2023-06-29 RX ADMIN — ACETAMINOPHEN 650 MG: 325 TABLET ORAL at 22:01

## 2023-06-29 RX ADMIN — ONDANSETRON 4 MG: 2 INJECTION INTRAMUSCULAR; INTRAVENOUS at 10:53

## 2023-06-29 RX ADMIN — INSULIN HUMAN 10 UNITS: 100 INJECTION, SOLUTION PARENTERAL at 22:13

## 2023-06-29 RX ADMIN — SODIUM CHLORIDE: 9 INJECTION, SOLUTION INTRAVENOUS at 22:02

## 2023-06-29 RX ADMIN — SODIUM ZIRCONIUM CYCLOSILICATE 10 G: 10 POWDER, FOR SUSPENSION ORAL at 22:23

## 2023-06-29 RX ADMIN — FENTANYL CITRATE 50 MCG: 50 INJECTION, SOLUTION INTRAMUSCULAR; INTRAVENOUS at 09:42

## 2023-06-29 RX ADMIN — LIDOCAINE HYDROCHLORIDE 100 MG: 20 INJECTION, SOLUTION EPIDURAL; INFILTRATION; INTRACAUDAL; PERINEURAL at 09:14

## 2023-06-29 RX ADMIN — ACETAMINOPHEN 1000 MG: 500 TABLET ORAL at 07:46

## 2023-06-29 RX ADMIN — ROCURONIUM BROMIDE 40 MG: 10 INJECTION, SOLUTION INTRAVENOUS at 09:14

## 2023-06-29 RX ADMIN — LABETALOL HYDROCHLORIDE 5 MG: 5 INJECTION INTRAVENOUS at 09:44

## 2023-06-29 RX ADMIN — FENTANYL CITRATE 50 MCG: 50 INJECTION, SOLUTION INTRAMUSCULAR; INTRAVENOUS at 11:22

## 2023-06-29 RX ADMIN — PROPOFOL 70 MG: 10 INJECTION, EMULSION INTRAVENOUS at 09:14

## 2023-06-29 RX ADMIN — DEXTROSE MONOHYDRATE 250 ML: 100 INJECTION, SOLUTION INTRAVENOUS at 22:16

## 2023-06-29 RX ADMIN — WATER 2000 MG: 1 INJECTION INTRAMUSCULAR; INTRAVENOUS; SUBCUTANEOUS at 09:17

## 2023-06-29 RX ADMIN — FENTANYL CITRATE 50 MCG: 50 INJECTION, SOLUTION INTRAMUSCULAR; INTRAVENOUS at 09:14

## 2023-06-29 RX ADMIN — GLYCOPYRROLATE 0.2 MG: 0.2 INJECTION INTRAMUSCULAR; INTRAVENOUS at 10:25

## 2023-06-29 RX ADMIN — FENTANYL CITRATE 50 MCG: 50 INJECTION, SOLUTION INTRAMUSCULAR; INTRAVENOUS at 11:30

## 2023-06-29 RX ADMIN — WATER 2000 MG: 1 INJECTION INTRAMUSCULAR; INTRAVENOUS; SUBCUTANEOUS at 18:00

## 2023-06-29 ASSESSMENT — PAIN SCALES - GENERAL
PAINLEVEL_OUTOF10: 0
PAINLEVEL_OUTOF10: 0
PAINLEVEL_OUTOF10: 7
PAINLEVEL_OUTOF10: 7
PAINLEVEL_OUTOF10: 8
PAINLEVEL_OUTOF10: 0
PAINLEVEL_OUTOF10: 8

## 2023-06-29 ASSESSMENT — PAIN DESCRIPTION - DESCRIPTORS
DESCRIPTORS: ACHING
DESCRIPTORS: DISCOMFORT
DESCRIPTORS: ACHING
DESCRIPTORS: ACHING;DULL
DESCRIPTORS: DISCOMFORT

## 2023-06-29 ASSESSMENT — PAIN DESCRIPTION - ORIENTATION
ORIENTATION: LEFT

## 2023-06-29 ASSESSMENT — PAIN DESCRIPTION - LOCATION
LOCATION: HIP

## 2023-06-29 ASSESSMENT — PAIN DESCRIPTION - PAIN TYPE
TYPE: SURGICAL PAIN
TYPE: SURGICAL PAIN

## 2023-06-29 ASSESSMENT — PAIN DESCRIPTION - FREQUENCY: FREQUENCY: CONTINUOUS

## 2023-06-29 ASSESSMENT — PAIN - FUNCTIONAL ASSESSMENT: PAIN_FUNCTIONAL_ASSESSMENT: 0-10

## 2023-06-29 ASSESSMENT — PAIN SCALES - WONG BAKER: WONGBAKER_NUMERICALRESPONSE: 0

## 2023-06-29 NOTE — CONSULTS
[facility]      Reason for Consult:  Medical management    History of Present Illness: This is a 80-year-old male with past medical history of CAD status post CABG, hypertension, hyperlipidemia, hypothyroidism, and diet-controlled diabetes mellitus type 2. Patient presented to the hospital for left hip pain. He does have diagnosis of primary osteoarthritis of the hip. He was seen by orthopedic surgery and had a left hip total arthroplasty on June 29, 2023. Patient denies any fevers, chills, nausea, vomiting, chest pain, shortness of breath, palpitations. Patient states that he does not use oxygen at home.e    Informant(s) for H&P: patient, emr    REVIEW OF SYSTEMS:  Complete ROS performed with patient, pertinent positives and negatives are listed in the HPI. PMH:  Past Medical History:   Diagnosis Date    Anxiety and depression     Aortic stenosis     Arthritis     osteoarthritis left hip    CAD (coronary artery disease)     CHF (congestive heart failure) ([facility])     systolic and diastolic    Hyperlipidemia     Hypertension     Muscle wasting and atrophy, not elsewhere classified, unspecified site     Nonrheumatic tricuspid (valve) insufficiency        Surgical History:  Past Surgical History:   Procedure Laterality Date    CORONARY ARTERY BYPASS GRAFT      HIP SURGERY      right    TOTAL HIP ARTHROPLASTY Left 6/29/2023    LEFT HIP TOTAL ARTHROPLASTY performed by Alejandro Jung MD at [facility] OR       Medications Prior to Admission:    Prior to Admission medications    Medication Sig Start Date End Date Taking?  Authorizing Provider   docusate sodium (COLACE) 100 MG capsule Take 1 capsule by mouth Daily constipation   Yes Historical Provider, MD   diclofenac sodium (VOLTAREN) 1 % GEL Apply topically every 12 hours as needed for Pain Apply to affected area topically   Yes Historical Provider, MD   magnesium hydroxide (MILK OF MAGNESIA)

## 2023-06-29 NOTE — H&P
I have examined the patient. I have reviewed the history and physical and find no  relevant changes. I have reviewed with the patient and/or family the risks, benefits, and alternatives to the procedure.

## 2023-06-29 NOTE — BRIEF OP NOTE
Brief Postoperative Note      Patient: Darron Kelly  YOB: 1930  MRN: [id number]    Date of Procedure: 6/29/2023    Pre-Op Diagnosis Codes:     * Primary osteoarthritis of left hip [M16.12]    Post-Op Diagnosis: Same       Procedure(s):  LEFT HIP TOTAL ARTHROPLASTY    Surgeon(s):  Mark Rodriguez MD    Assistant:  Surgical Assistant: Adwoa Cavazos RN  Physician Assistant: Cesar White PA-C    Anesthesia: GETA    Estimated Blood Loss (mL): 572    Complications: None    Specimens:   ID Type Source Tests Collected by Time Destination   A : left hip bone Bone Bone SURGICAL PATHOLOGY Mark Rodriguez MD 6/29/2023 0940        Implants:  Implant Name Type Inv.  Item Serial No.  Lot No. LRB No. Used Action   SHELL ACET SZ F [id number] 4 H OSSEOTI LIMIT H 2 MOBILITY G7 - [id number]  SHELL ACET SZ F [id number] 4 H OSSEOTI LIMIT H 2 MOBILITY G7  [employer]- [id number] Left 1 Implanted   LINER ACET NEUT F 40 MM VIVACIT-E G7 - [id number]  LINER ACET NEUT F 40 MM VIVACIT-E G7  [employer]- [id number] Left 1 Implanted   SCREW BNE L25MM DIA6.5MM TI ALLY DOME 2 MOBILITY LO PROF G7 - [id number]  SCREW BNE L25MM DIA6.5MM TI ALLY DOME 2 MOBILITY LO PROF G7  [employer]- [id number] Left 1 Implanted   SCREW BNE L25MM DIA6.5MM TI ALLY DOME 2 MOBILITY LO PROF G7 - [id number]  SCREW BNE L25MM DIA6.5MM TI ALLY DOME 2 MOBILITY LO PROF G7  [employer]-  [id number] Left 1 Implanted   SCREW BNE L30MM DIA6.5MM TI ALLY DOME 2 MOBILITY LO PROF G7 - [id number]  SCREW BNE L30MM DIA6.5MM TI ALLY DOME 2 MOBILITY LO PROF G7  [employer]- [id number] Left 1 Implanted   STEM FEM L135MM [id number] 135DEG TI POR PLSM SPR HIP STD - [id number]  STEM FEM L135MM [id number] 135DEG TI POR PLSM SPR HIP STD  [employer]-WD [id number] Left 1 Implanted   SLEEVE FEM -3MM OFFSET HIP TYP 1 TAPR FOR CERAMIC BIOLOX - [id number]  SLEEVE FEM -3MM OFFSET HIP TYP 1 TAPR FOR CERAMIC Daniel Hernandez  [employer]-WD [id number] Left 1 Implanted   HEAD FEM [id number] HIP BIOLOX DELT OPT FOR G7 ACET SYS - [id number]  HEAD FEM [id number] HIP BIOLOX DELT OPT FOR G7 ACET SYS  [employer]WD [id number] Left 1 Implanted         Drains:   Urinary Catheter 06/29/23 [place] (Active)       Findings: 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

## 2023-06-29 NOTE — PROGRESS NOTES
P Quality Flow/Interdisciplinary Rounds Progress Note        Quality Flow Rounds held on June 29, 2023    Disciplines Attending:  Bedside Nurse, , , and Nursing Unit Leadership    Zulay Tang was admitted on 6/29/2023  6:24 AM    Anticipated Discharge Date:       Disposition:    Jonas Score:  Jonas Scale Score: 17    Readmission Risk              Risk of Unplanned Readmission:  0           Discussed patient goal for the day, patient clinical progression, and barriers to discharge.   The following Goal(s) of the Day/Commitment(s) have been identified:   Discharge Maury Shepherd RN  June 29, 2023

## 2023-06-29 NOTE — PROGRESS NOTES
Call out to [facility] to verify preoperative meds. States they only gave him coreg, tylenol, and lexapro this am prior to surgery.

## 2023-06-29 NOTE — PROGRESS NOTES
Dr Chowdary Bright here and informed of BP  of  93/45.  Ok to send patient to floor is SBP remians in the 90's

## 2023-06-29 NOTE — PROGRESS NOTES
4 Eyes Skin Assessment     NAME:  Delgado Prado OF BIRTH:  4/23/1930  MEDICAL RECORD NUMBER:  [id number]    The patient is being assessed for  Admission    I agree that at least one RN has performed a thorough Head to Toe Skin Assessment on the patient. ALL assessment sites listed below have been assessed. Areas assessed by both nurses:    Head, Face, Ears, Shoulders, Back, Chest, Arms, Elbows, Hands, Sacrum. Buttock, Coccyx, Ischium, Legs. Feet and Heels, and Under Medical Devices         Does the Patient have a Wound?  No noted wound(s)    Lt hip incision  Generalized ecchymosis  Redness (blanchable) buttocks  Abrasion nose       Jonas Prevention initiated by RN: Yes  Wound Care Orders initiated by RN: No    Pressure Injury (Stage 3,4, Unstageable, DTI, NWPT, and Complex wounds) if present, place Wound referral order by RN under : No    New Ostomies, if present place, Ostomy referral order under : No     Nurse 1 eSignature: Electronically signed by Volodymyr Lucas RN on 6/29/23 at 5:38 PM EDT    **SHARE this note so that the co-signing nurse can place an eSignature**    Nurse 2 eSignature: Electronically signed by Chaitanya Casillas RN on 6/29/23 at 6:29 PM EDT

## 2023-06-29 NOTE — PROGRESS NOTES
Dr Denise Almanza notified of Low BP readings. 86/44, 82/43. To give 100 cc fluid bolus and Dr Denise Almanza will be out to see patient.

## 2023-06-29 NOTE — PROGRESS NOTES
Occupational Therapy  Date:6/29/2023  Patient Name: Bk Manrique  Room: 9569/1949-Z     Occupational Therapy (OT) order received, patient's medical record reviewed, and OT evaluation attempted this afternoon; OT evaluation held, per RN. OT evaluation to be re-attempted at later date, as able/appropriate. Neeru Caballero, OTR/L  License Number: [id number]

## 2023-06-30 LAB
ANION GAP SERPL CALCULATED.3IONS-SCNC: 7 MMOL/L (ref 7–16)
BUN SERPL-MCNC: 31 MG/DL (ref 6–23)
CALCIUM SERPL-MCNC: 7.5 MG/DL (ref 8.6–10.2)
CHLORIDE SERPL-SCNC: 108 MMOL/L (ref 98–107)
CO2 SERPL-SCNC: 22 MMOL/L (ref 22–29)
CREAT SERPL-MCNC: 1.6 MG/DL (ref 0.7–1.2)
EKG ATRIAL RATE: 52 BPM
EKG ATRIAL RATE: 69 BPM
EKG P-R INTERVAL: 146 MS
EKG Q-T INTERVAL: 386 MS
EKG Q-T INTERVAL: 392 MS
EKG QRS DURATION: 84 MS
EKG QRS DURATION: 84 MS
EKG QTC CALCULATION (BAZETT): 407 MS
EKG QTC CALCULATION (BAZETT): 420 MS
EKG R AXIS: -42 DEGREES
EKG R AXIS: -43 DEGREES
EKG T AXIS: 32 DEGREES
EKG T AXIS: 44 DEGREES
EKG VENTRICULAR RATE: 67 BPM
EKG VENTRICULAR RATE: 69 BPM
ERYTHROCYTE [DISTWIDTH] IN BLOOD BY AUTOMATED COUNT: 12.9 FL (ref 11.5–15)
GLUCOSE SERPL-MCNC: 119 MG/DL (ref 74–99)
HCT VFR BLD AUTO: 22.2 % (ref 37–54)
HGB BLD-MCNC: 7.3 G/DL (ref 12.5–16.5)
MCH RBC QN AUTO: 33.8 PG (ref 26–35)
MCHC RBC AUTO-ENTMCNC: 32.9 % (ref 32–34.5)
MCV RBC AUTO: 102.8 FL (ref 80–99.9)
METER GLUCOSE: 180 MG/DL (ref 74–99)
METER GLUCOSE: 197 MG/DL (ref 74–99)
METER GLUCOSE: 227 MG/DL (ref 74–99)
PLATELET # BLD AUTO: 116 E9/L (ref 130–450)
PMV BLD AUTO: 9.5 FL (ref 7–12)
POTASSIUM SERPL-SCNC: 4.8 MMOL/L (ref 3.5–5)
POTASSIUM SERPL-SCNC: 4.8 MMOL/L (ref 3.5–5)
RBC # BLD AUTO: 2.16 E12/L (ref 3.8–5.8)
SODIUM SERPL-SCNC: 137 MMOL/L (ref 132–146)
WBC # BLD: 5.9 E9/L (ref 4.5–11.5)

## 2023-06-30 PROCEDURE — 6370000000 HC RX 637 (ALT 250 FOR IP): Performed by: ORTHOPAEDIC SURGERY

## 2023-06-30 PROCEDURE — G0378 HOSPITAL OBSERVATION PER HR: HCPCS

## 2023-06-30 PROCEDURE — 93010 ELECTROCARDIOGRAM REPORT: CPT | Performed by: INTERNAL MEDICINE

## 2023-06-30 PROCEDURE — 99233 SBSQ HOSP IP/OBS HIGH 50: CPT | Performed by: INTERNAL MEDICINE

## 2023-06-30 PROCEDURE — 6360000002 HC RX W HCPCS: Performed by: ORTHOPAEDIC SURGERY

## 2023-06-30 PROCEDURE — 84132 ASSAY OF SERUM POTASSIUM: CPT

## 2023-06-30 PROCEDURE — 96361 HYDRATE IV INFUSION ADD-ON: CPT

## 2023-06-30 PROCEDURE — 36415 COLL VENOUS BLD VENIPUNCTURE: CPT

## 2023-06-30 PROCEDURE — 2580000003 HC RX 258: Performed by: ORTHOPAEDIC SURGERY

## 2023-06-30 PROCEDURE — 97165 OT EVAL LOW COMPLEX 30 MIN: CPT

## 2023-06-30 PROCEDURE — 97530 THERAPEUTIC ACTIVITIES: CPT

## 2023-06-30 PROCEDURE — 85027 COMPLETE CBC AUTOMATED: CPT

## 2023-06-30 PROCEDURE — 97161 PT EVAL LOW COMPLEX 20 MIN: CPT

## 2023-06-30 PROCEDURE — 80048 BASIC METABOLIC PNL TOTAL CA: CPT

## 2023-06-30 PROCEDURE — 82962 GLUCOSE BLOOD TEST: CPT

## 2023-06-30 PROCEDURE — 96375 TX/PRO/DX INJ NEW DRUG ADDON: CPT

## 2023-06-30 PROCEDURE — 97110 THERAPEUTIC EXERCISES: CPT

## 2023-06-30 PROCEDURE — 2580000003 HC RX 258

## 2023-06-30 RX ORDER — BISACODYL 5 MG/1
10 TABLET, DELAYED RELEASE ORAL DAILY
Status: DISCONTINUED | OUTPATIENT
Start: 2023-06-30 | End: 2023-07-07 | Stop reason: HOSPADM

## 2023-06-30 RX ORDER — SENNA AND DOCUSATE SODIUM 50; 8.6 MG/1; MG/1
1 TABLET, FILM COATED ORAL 2 TIMES DAILY
Qty: 60 TABLET | Refills: 0 | Status: SHIPPED | OUTPATIENT
Start: 2023-06-30

## 2023-06-30 RX ORDER — TRAMADOL HYDROCHLORIDE 50 MG/1
50 TABLET ORAL EVERY 6 HOURS PRN
Qty: 28 TABLET | Refills: 0 | Status: SHIPPED | OUTPATIENT
Start: 2023-06-30 | End: 2023-07-07

## 2023-06-30 RX ORDER — ASPIRIN 81 MG/1
81 TABLET ORAL 2 TIMES DAILY
Qty: 60 TABLET | Refills: 0 | Status: SHIPPED | OUTPATIENT
Start: 2023-06-30

## 2023-06-30 RX ADMIN — DOCUSATE SODIUM 100 MG: 100 CAPSULE, LIQUID FILLED ORAL at 05:00

## 2023-06-30 RX ADMIN — LEVOTHYROXINE SODIUM 50 MCG: 0.05 TABLET ORAL at 05:00

## 2023-06-30 RX ADMIN — MORPHINE SULFATE 1 MG: 2 INJECTION, SOLUTION INTRAMUSCULAR; INTRAVENOUS at 23:45

## 2023-06-30 RX ADMIN — TRAMADOL HYDROCHLORIDE 50 MG: 50 TABLET ORAL at 13:04

## 2023-06-30 RX ADMIN — ASPIRIN 81 MG: 81 TABLET, COATED ORAL at 21:17

## 2023-06-30 RX ADMIN — DOCUSATE SODIUM 50 MG AND SENNOSIDES 8.6 MG 1 TABLET: 8.6; 5 TABLET, FILM COATED ORAL at 09:00

## 2023-06-30 RX ADMIN — WATER 2000 MG: 1 INJECTION INTRAMUSCULAR; INTRAVENOUS; SUBCUTANEOUS at 00:38

## 2023-06-30 RX ADMIN — DOCUSATE SODIUM 50 MG AND SENNOSIDES 8.6 MG 1 TABLET: 8.6; 5 TABLET, FILM COATED ORAL at 21:17

## 2023-06-30 RX ADMIN — CARVEDILOL 12.5 MG: 6.25 TABLET, FILM COATED ORAL at 16:49

## 2023-06-30 RX ADMIN — ACETAMINOPHEN 650 MG: 325 TABLET ORAL at 04:59

## 2023-06-30 RX ADMIN — ACETAMINOPHEN 650 MG: 325 TABLET ORAL at 16:49

## 2023-06-30 RX ADMIN — FINASTERIDE 5 MG: 5 TABLET, FILM COATED ORAL at 16:52

## 2023-06-30 RX ADMIN — CARVEDILOL 12.5 MG: 6.25 TABLET, FILM COATED ORAL at 09:00

## 2023-06-30 RX ADMIN — BISACODYL 10 MG: 5 TABLET, COATED ORAL at 09:07

## 2023-06-30 RX ADMIN — ASPIRIN 81 MG: 81 TABLET, COATED ORAL at 09:00

## 2023-06-30 RX ADMIN — SODIUM CHLORIDE: 9 INJECTION, SOLUTION INTRAVENOUS at 09:29

## 2023-06-30 RX ADMIN — ESCITALOPRAM OXALATE 10 MG: 10 TABLET ORAL at 09:00

## 2023-06-30 RX ADMIN — ACETAMINOPHEN 650 MG: 325 TABLET ORAL at 21:17

## 2023-06-30 RX ADMIN — TRAMADOL HYDROCHLORIDE 50 MG: 50 TABLET ORAL at 06:25

## 2023-06-30 RX ADMIN — FOLIC ACID 1000 MCG: 1 TABLET ORAL at 09:00

## 2023-06-30 RX ADMIN — TAMSULOSIN HYDROCHLORIDE 0.4 MG: 0.4 CAPSULE ORAL at 16:52

## 2023-06-30 RX ADMIN — AMLODIPINE BESYLATE 10 MG: 10 TABLET ORAL at 21:17

## 2023-06-30 RX ADMIN — HYDROXYZINE PAMOATE 25 MG: 25 CAPSULE ORAL at 21:17

## 2023-06-30 RX ADMIN — FAMOTIDINE 20 MG: 20 TABLET ORAL at 09:00

## 2023-06-30 RX ADMIN — Medication 10 ML: at 09:04

## 2023-06-30 RX ADMIN — ACETAMINOPHEN 650 MG: 325 TABLET ORAL at 09:00

## 2023-06-30 RX ADMIN — TRAMADOL HYDROCHLORIDE 50 MG: 50 TABLET ORAL at 22:32

## 2023-06-30 ASSESSMENT — PAIN SCALES - GENERAL
PAINLEVEL_OUTOF10: 9
PAINLEVEL_OUTOF10: 7
PAINLEVEL_OUTOF10: 5
PAINLEVEL_OUTOF10: 6
PAINLEVEL_OUTOF10: 5

## 2023-06-30 ASSESSMENT — PAIN DESCRIPTION - DESCRIPTORS
DESCRIPTORS: ACHING;DISCOMFORT;SORE
DESCRIPTORS: ACHING;DISCOMFORT;TENDER

## 2023-06-30 ASSESSMENT — PAIN DESCRIPTION - ONSET: ONSET: ON-GOING

## 2023-06-30 ASSESSMENT — PAIN DESCRIPTION - PAIN TYPE: TYPE: SURGICAL PAIN

## 2023-06-30 ASSESSMENT — PAIN DESCRIPTION - ORIENTATION
ORIENTATION: LEFT
ORIENTATION: LEFT

## 2023-06-30 ASSESSMENT — PAIN DESCRIPTION - LOCATION
LOCATION: LEG;HIP
LOCATION: HIP

## 2023-06-30 ASSESSMENT — PAIN DESCRIPTION - FREQUENCY: FREQUENCY: CONTINUOUS

## 2023-06-30 NOTE — PLAN OF CARE
Problem: Chronic Conditions and Co-morbidities  Goal: Patient's chronic conditions and co-morbidity symptoms are monitored and maintained or improved  6/30/2023 0529 by Fausto Martin RN  Outcome: Progressing  Flowsheets (Taken 6/29/2023 2200)  Care Plan - Patient's Chronic Conditions and Co-Morbidity Symptoms are Monitored and Maintained or Improved: Monitor and assess patient's chronic conditions and comorbid symptoms for stability, deterioration, or improvement     Problem: Discharge Planning  Goal: Discharge to home or other facility with appropriate resources  6/30/2023 0529 by Fausto Martin RN  Outcome: Progressing  Flowsheets (Taken 6/29/2023 2200)  Discharge to home or other facility with appropriate resources: Identify barriers to discharge with patient and caregiver     Problem: Pain  Goal: Verbalizes/displays adequate comfort level or baseline comfort level  6/30/2023 0529 by Fausto Martin RN  Outcome: Progressing     Problem: Skin/Tissue Integrity  Goal: Absence of new skin breakdown  Description: 1. Monitor for areas of redness and/or skin breakdown  2. Assess vascular access sites hourly  3. Every 4-6 hours minimum:  Change oxygen saturation probe site  4. Every 4-6 hours:  If on nasal continuous positive airway pressure, respiratory therapy assess nares and determine need for appliance change or resting period.   6/30/2023 0529 by Fausto Martin RN  Outcome: Progressing     Problem: ABCDS Injury Assessment  Goal: Absence of physical injury  6/30/2023 0529 by Fausto Martin RN  Outcome: Progressing     Problem: Safety - Adult  Goal: Free from fall injury  6/30/2023 0529 by Fausto Martin RN  Outcome: Progressing  6/29/2023 1659 by Mark Heller RN  Outcome: Progressing

## 2023-06-30 NOTE — PLAN OF CARE
Problem: Chronic Conditions and Co-morbidities  Goal: Patient's chronic conditions and co-morbidity symptoms are monitored and maintained or improved  6/30/2023 1156 by Diaz Thacker RN  Outcome: Progressing     Problem: Discharge Planning  Goal: Discharge to home or other facility with appropriate resources  6/30/2023 1156 by Diaz Thacker RN  Outcome: Progressing     Problem: Pain  Goal: Verbalizes/displays adequate comfort level or baseline comfort level  6/30/2023 1156 by Diaz Thacker RN  Outcome: Progressing     Problem: Skin/Tissue Integrity  Goal: Absence of new skin breakdown  Description: 1. Monitor for areas of redness and/or skin breakdown  2. Assess vascular access sites hourly  3. Every 4-6 hours minimum:  Change oxygen saturation probe site  4. Every 4-6 hours:  If on nasal continuous positive airway pressure, respiratory therapy assess nares and determine need for appliance change or resting period.   6/30/2023 1156 by Diaz Thacker RN  Outcome: Progressing     Problem: ABCDS Injury Assessment  Goal: Absence of physical injury  6/30/2023 1156 by Diaz Thacker RN  Outcome: Progressing     Problem: Safety - Adult  Goal: Free from fall injury  6/30/2023 1156 by Diaz Thacker RN  Outcome: Progressing

## 2023-06-30 NOTE — PROGRESS NOTES
Spoke to [facility] NP regarding BP 90s/70s and made aware of low UOP since surgery. No new orders at this time, will continue to monitor.

## 2023-06-30 NOTE — PROGRESS NOTES
P Quality Flow/Interdisciplinary Rounds Progress Note        Quality Flow Rounds held on June 30, 2023    Disciplines Attending:  Bedside Nurse, , , and Nursing Unit Raisa Ureña was admitted on 6/29/2023  6:24 AM    Anticipated Discharge Date:       Disposition:    Jonas Score:  Jonas Scale Score: 17    Readmission Risk              Risk of Unplanned Readmission:  0           Discussed patient goal for the day, patient clinical progression, and barriers to discharge. The following Goal(s) of the Day/Commitment(s) have been identified:   Discharge planning.       Michale Salinas RN  June 30, 2023

## 2023-06-30 NOTE — PROGRESS NOTES
Dr. Shona Amanda notified of the low urinary output since surgery, no new orders will proceed with discharge tomorrow as per Dr. Jass Geller order.

## 2023-06-30 NOTE — PROGRESS NOTES
Occupational Therapy    OCCUPATIONAL THERAPY INITIAL EVALUATION    [facility]   [address], [place]         Date:2023                                                  Patient Name: Ariana Fabian    MRN: [id number]    : 1930    Room: [address] [place] [address]      Evaluating OT: Linda Arellano OTR/L   [id number]      Referring Nico Still MD    Specific Provider Orders/Date:OT eval and treat 2023      Diagnosis:  Primary osteoarthritis of left hip [M16.12]  Osteoarthritis of left hip, unspecified osteoarthritis type [M16.12]    Surgery: L MENDY     Pertinent Medical History:  CHF, CABG,      Precautions:  Fall Risk, WBAT L LE, posterolateral  hip precautions      Assessment of current deficits    [x] Functional mobility  [x]ADLs  [x] Strength               []Cognition    [x] Functional transfers   [x] IADLs         [x] Safety Awareness   [x]Endurance    [] Fine Coordination              [x] Balance      [] Vision/perception   []Sensation     []Gross Motor Coordination  [] ROM  [] Delirium                   [] Motor Control     OT PLAN OF CARE   OT POC based on physician orders, patient diagnosis and results of clinical assessment    Frequency/Duration  2-4 days/wk for 2 weeks PRN   Specific OT Treatment Interventions to include:   ADL retraining/adapted techniques and AE recommendations to increase functional independence within precautions                    Energy conservation techniques to improve tolerance for selfcare routine   Functional transfer/mobility training/DME recommendations for increased independence, safety and fall prevention         Patient/family education to increase safety and functional independence             Environmental modifications for safe mobility and completion of ADLs                             Therapeutic activity to improve functional performance during ADLs. Therapeutic exercise to improve tolerance and functional strength for ADLs    Balance retraining/tolerance tasks for facilitation of postural control with dynamic challenges during ADLs . Positioning to improve functional independence      Recommended Adaptive Equipment: TBD     SOCIAL:  admitted from [facility]  primarily using wheelchair   Prior Home Living: Pt lives with wife and son ,split level. 5-6 steps/rail to enter. 7-8 steps/rails between level.     Bathroom setup: walkin shower    Equipment owned: walker,  wheelchair     Prior Level of Function: assist  with ADLs , assist w with IADLs; ambulated walker (has one on each floor at home)      Pain Level: L mild hip pain ;   Cognition: A&O: pleasant, following commands, conversing    Memory:  fair +    Sequencing:  fair+   Problem solving:  fair+   Judgement/safety:  fair+     Functional Assessment:  AM-PAC Daily Activity Raw Score: 14/24   Initial Eval Status  Date: 6/30/2023 Treatment Status  Date: STGs = LTGs  Time frame: 10-14 days   Feeding Independent     Grooming SBA/set-up ,seated   Mod I    UB Dressing Set-up   Mod I    LB Dressing Max a   Min A    Bathing Mod A   Min A    Toileting Mod A   SBA    Bed Mobility  Mod A  Supine to sit   SBA    Functional Transfers Min A  Sit - stand from bed   SBA/supervision    Functional Mobility Mod A, w/walker   Steps next to bed   SPT to chair   SBA with good tolerance    Balance Sitting:     Static:  SBA - EOB - patient having some dizziness - subsided     Dynamic:Min A   Standing: Min A   Supervision    Activity Tolerance No SOB   Good  with ADL activity    Visual/  Perceptual Glasses: none by bedside                 Hand Dominance right   AROM (PROM) Strength Additional Info:    RUE  WFL WFL good  and wfl FMC/dexterity noted during ADL tasks       LUE WFL WFL good  and wfl FMC/dexterity noted during ADL tasks       Hearing: [place]  Sensation:  No c/o numbness or tingling   Tone: WFL   Edema: none observed

## 2023-06-30 NOTE — DISCHARGE INSTR - COC
Continuity of Care Form    Patient Name: Vaishnavi Britton   :  1930  MRN:  [id number]    Admit date:  2023  Discharge date:  ***    Code Status Order: Full Code   Advance Directives:   Advance Care Flowsheet Documentation       Date/Time Healthcare Directive Type of Healthcare Directive Copy in [address] Agent's Name Healthcare Agent's Phone Number    [phone] Yes, patient has an advance directive for healthcare treatment Durable power of  for health care;Living will -- -- -- --            Admitting Physician:  Frances Doe MD  PCP: MIHIR Hines CNP    Discharging Nurse: [facility] Unit/Room#: 7686/2143-T  Discharging Unit Phone Number: ***    Emergency Contact:   Extended Emergency Contact Information  Primary Emergency Contact: Mary Jane Sevilla  Address: [address] [place] [address] [place] Phone: [phone]  Relation: Spouse  Secondary Emergency Contact: Dolores Porter  Address: [address] [place] [address] [place] [address] [place] Phone: [phone]  Mobile Phone: [phone]  Relation: Child    Past Surgical History:  Past Surgical History:   Procedure Laterality Date    CORONARY ARTERY BYPASS GRAFT      HIP SURGERY      right    TOTAL HIP ARTHROPLASTY Left 2023    LEFT HIP TOTAL ARTHROPLASTY performed by Frances Doe MD at [facility] OR       Immunization History:   Immunization History   Administered Date(s) Administered    COVID-19, PFIZER Bivalent, DO NOT Dilute, (age 12y+), IM, 27 mcg/0.3 mL 10/07/2022       Active Problems:  Patient Active Problem List   Diagnosis Code    CHF with unknown LVEF ([address]) I50.9    Aortic valve stenosis I35.0    Sick sinus syndrome ([address]) I49.5    Pacemaker R61.8    Systolic ejection murmur F14.7    Hypertension I10    CHF (congestive heart failure), NYHA class I, acute on chronic, combined ([address]) I50.43    Type 2 diabetes Kelly-incision Assessment Other (Comment) 23 0900   Number of days: 1        Elimination:  Continence: Bowel: {YES / UP:08325}  Bladder: {YES / AP:63849}  Urinary Catheter: {Urinary Catheter:108526854}   Colostomy/Ileostomy/Ileal Conduit: {YES / EF:39443}       Date of Last BM: ***    Intake/Output Summary (Last 24 hours) at 2023 1014  Last data filed at 2023 0911  Gross per 24 hour   Intake 3066 ml   Output 475 ml   Net 2591 ml     I/O last 3 completed shifts: In: 3166 [P.O.:240;  I.V.:2826; IV Piggyback:100]  Out: 350 [Urine:100; Blood:250]    Safety Concerns:     [address] [place] [address] Safety Concerns:376748565}    Impairments/Disabilities:      [address] [place] [address] Impairments/Disabilities:318741569}    Nutrition Therapy:  Current Nutrition Therapy:   [address] [place] [address] Diet List:486778994}    Routes of Feeding: {P DME Other Feedings:321661943}  Liquids: {Slp liquid thickness:07556}  Daily Fluid Restriction: {CHP DME Yes amt example:735907885}  Last Modified Barium Swallow with Video (Video Swallowing Test): {Done Not Done YKJS:631488251}    Treatments at the Time of Hospital Discharge:   Respiratory Treatments: ***  Oxygen Therapy:  {Therapy; copd oxygen:04850}  Ventilator:    [address] [place] [address] Vent JQQD:273072534}    Rehab Therapies: Physical Therapy and Occupational Therapy  Weight Bearing Status/Restrictions: [address] [place] [address] Weight Bearin}  Other Medical Equipment (for information only, NOT a DME order):  {EQUIPMENT:885831944}  Other Treatments: ***    Patient's personal belongings (please select all that are sent with patient):  {P DME Belongings:456578303}    RN SIGNATURE:  {Esignature:499347542}    CASE MANAGEMENT/SOCIAL WORK SECTION    Inpatient Status Date: ***    Readmission Risk Assessment Score:  Readmission Risk              Risk of Unplanned Readmission:  0           Discharging to Facility/ Agency   Name: Keri Almendarez   Address   [address]  [address]

## 2023-06-30 NOTE — PROGRESS NOTES
Physical Therapy  Facility/Department: [facility] MED SURG  Physical Therapy Initial Assessment    Name: Jhoan San  : 1930  MRN: [id number]  Date of Service: 2023      Attending Provider:  Cristopher Bearden MD    Evaluating PT:  Mary Ashraf. Sy Israel, P.T. 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 Procedure/Surgery:  L MENDY 23  Precautions:  WBAT LLE, L hip posterolateral precautions    SUBJECTIVE:    Pt lives with wife and son in a tri-level home with 5 stairs and 1 rail to enter. There are 7 steps and 2 rails to the main floor and 7 steps with 2 rails to 2nd floor bed and bath. Pt ambulated with ww and assist, but came in from SNF and has been using WC most of the time for mobility. At home he has a ww on each floor. OBJECTIVE:   Initial Evaluation  Date: 23 Treatment Short Term/ Long Term   Goals   Was pt agreeable to Eval/treatment? yes     Does pt have pain? C/o mild L hip pain     Bed Mobility  Rolling: MOD A  Supine to sit: MOD A  Sit to supine: NA  Scooting: MOD A  SBA   Transfers Sit to stand: MIN A  Stand to sit: MIN A  Stand pivot: MOD A with ww  SBA   Ambulation   3 feet with ww MOD A  100 feet with ww SBA/CGA   Stair negotiation: ascended and descended NA  8 steps with 2 rails CGA/MIN A   AM-PAC 6 Clicks 24/35       BLE ROM is WFL and L hip is WFL allowed by precautions. RLE strength is grossly 3-/5 to 4-/5 and LLE is 2/5 to 4-/5. Balance: sitting is MIN A/MOD A and standing with ww is MIN A/MOD A  Endurance: fair-    Therapeutic Exercises:  Pt was instructed in/performed supine exercises with the LLE: ankle PF/DF AAROM 15 reps, heel slides and quad sets AAROM 10 reps, hip ABD/ADD AAROM 10 reps.     Patient education  Pt educated on above exs as HEP, L hip

## 2023-06-30 NOTE — DISCHARGE INSTRUCTIONS
Discharge Instructions for Hip Replacement - Dr. Jennifer Lopez with walker  Posterior Hip Precautions sit in straight-backed chair,-okay to lay on nonoperative side with pillow between knees. Aspirin for postoperative DVT prophylaxis x 30 days  May shower daily  Remove hip bandage on postoperative day #7  ASAEL hose x 2 weeks, off at night  Ice packs to hip for 30 minutes every 3-4 hours  Pillows between knees while in bed and for sleeping  home health care, PT and nursing  Follow up office visit in 2 wks - [phone] ext [phone]         Place the patients miguel to leg bag on discharge from the hospital.  Please give the patient a night bag (large bag) and miguel instructions upon discharge. Please have the patient contact the office for miguel removal instructions. The catheter may go red (hematuria), may go clear, and may go red. This is a normal process, as long as the catheter is draining. Call the office if any concerns should arise for further instructions, [phone]. Call upon discharge to schedule a follow-up visit with George Noriega/Jefry/Conor ([facility]) at [phone] About Indwelling Urinary Catheter Care to Prevent Infection  Overview     A urinary catheter is a flexible plastic tube that's used to drain urine from your bladder when you can't urinate on your own. The catheter allows urine to drain from the bladder into a bag. Two types of drainage bags may be used with a urinary catheter. A bedside bag is a large bag that you can hang on the side of your bed or on a chair. You can use it overnight or anytime you will be sitting or lying down for a long time. A leg bag is a small bag that you can use during the day. It is usually attached to your thigh or calf and hidden under your clothes. Having a urinary catheter increases your risk of getting a urinary tract infection. Germs may get on the catheter and cause an infection in your bladder or kidneys.  The longer you Likely 2* med noncompliance due to MH/kristina issues and acute infection  rec'd oral meds and IV hydralazine 5mg in ed w/o significant improvement in bp  Iv hydralazine 15mg once stat  If no improvement labetalol IV 10mg if HR permits    Low threshold for cardene gtt to avoid end organ damage

## 2023-06-30 NOTE — CARE COORDINATION
Met with patient about diagnosis and discharge plan of care. Called and spoke with spouse, Daphne Olivo. Pt from [employer] private pay. She wants pt to return. Spoke with Sarah, liaison and pt can return. POD#1 left MENDY. Therapies pending and pre-cert to be initiated by [employer]. Jackson cath, iv fluids. Pt follows with Geremias Garibay NP. 7000 and transport form done. BERRY initiated-mjo    ADDEND: Auth received for [employer]. Pt can discharge to facility tomorrow 7/1. [facility] ambulance arranged for  at 11 am on 7/1. Staff, family and facility aware. 7000 does not need to be done.  Ambulance form on chart-mjo

## 2023-06-30 NOTE — PROGRESS NOTES
Shortly after hyperkalemia protocol medication given, patient in Afib with rate controlled in 70s. Yue NP updated. Will continue to monitor. Repeat K pending.

## 2023-06-30 NOTE — PROGRESS NOTES
Physical Therapy  Facility/Department: [facility] MED SURG  Physical Therapy Treatment Note    Name: Andra Adkins  : 1930  MRN: [id number]  Date of Service: 2023      Attending Provider:  Jesus Montejo MD    Evaluating PT:  Herminia Gordon. Jaylin White P.T. 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 Procedure/Surgery:  L MENDY 23  Precautions:  WBAT LLE, L hip posterolateral precautions    SUBJECTIVE:    Pt lives with wife and son in a tri-level home with 5 stairs and 1 rail to enter. There are 7 steps and 2 rails to the main floor and 7 steps with 2 rails to 2nd floor bed and bath. Pt ambulated with ww and assist, but came in from SNF and has been using WC most of the time for mobility. At home he has a ww on each floor. OBJECTIVE:   Initial Evaluation  Date: 23 Treatment  2023 PM Short Term/ Long Term   Goals   Was pt agreeable to Eval/treatment? yes yes    Does pt have pain? C/o mild L hip pain L quad pain with exercise    Bed Mobility  Rolling: MOD A  Supine to sit: MOD A  Sit to supine: NA  Scooting: MOD A NT SBA   Transfers Sit to stand: MIN A  Stand to sit: MIN A  Stand pivot: MOD A with ww Sit <> stand: Min A SBA   Ambulation   3 feet with ww MOD A 15 feet x 2 with [employer] Min/Mod A WBAT L  feet with ww SBA/CGA   Stair negotiation: ascended and descended NA NT 8 steps with 2 rails CGA/MIN A   AM-PAC 6 Clicks 61/71 37/96        Pt is alert, able to follow repeated instruction, initially confused to reason for PT in this setting  Balance: poor dynamic with [employer]    Pt performed therapeutic exercise of the following: seated B ankle pumps, glut sets x 20 AROM; L LE LAQ x 10; L LE gradual heel slide motions within hip precautions x 7 AAROM.  Pain increased with L hip/knee exercise    Patient education/treatment  Pt was educated on therapeutic exercise for circulation and strengthening, UE usage to assist with transfers, gait mechanics for sequence/posture/staying within [facility] base of support    Patient response to education:   Pt verbalized understanding Pt demonstrated skill Pt requires further education in this area   yes With repeated instruction yes     ASSESSMENT:   Comments: Courtney slow and inconsistent, step to pattern used throughout, antalgic gait evident. Pt unsteady, constant hands on assist for balance and safety, is unsafe to gait or transfer alone presently. Pt was left in a bedside chair as found with call light in reach    Time in 1417   Time out 1442   Total Treatment Time 25 minutes   CPT codes:     Therapeutic activities 91862 13 minutes   Therapeutic exercises 28044 12 minutes       Pt is making good progress toward established Physical Therapy goals. Continue with physical therapy current plan of care.     Maritza Ramirez PTA   License Number: [id number]

## 2023-06-30 NOTE — OP NOTE
[address]                  [address]                                OPERATIVE REPORT    PATIENT NAME: Hansel Acosta                    :        1930  MED REC NO:   [id number]                            ROOM:       9306  ACCOUNT NO:   [de-identified]                           ADMIT DATE: 2023  PROVIDER:     Peggy Law MD    DATE OF PROCEDURE:  2023    PREOPERATIVE DIAGNOSIS:  Severe left hip osteoarthritis with hip  subluxation. POSTOPERATIVE DIAGNOSIS:  Severe left hip osteoarthritis with hip  subluxation. PROCEDURE PERFORMED:  Left total hip arthroplasty. SURGEON:  Peggy Law M.D.    ASSISTANT:  Vikram Shah PA-C. No qualified surgical resident  available. ANESTHESIA:  General.    ESTIMATED BLOOD LOSS:  250. SPECIMEN:  Bone, left femoral head and neck. COMPLICATIONS:  None. CONDITION:  Stable to recovery room. IMPLANTS:  Biomet OsseoTi four-hole acetabular shell 56 mm with three  6.5-mm acetabular bone screws, size 40-mm neutral cross-linked  polyethylene liner, size 11 standard offset Echo femoral stem FPP, with  a 40-mm Biolox ceramic femoral head, -3 mm neck. INDICATION FOR PROCEDURE:  The patient is a 80-year-old gentleman with  advanced degenerative changes in his left hip including severe pain and  limited mobility/hip function unresponsive to conservative treatment. He presents today for elective total hip replacement following  explanation of the risks, benefits, alternatives, and limitations. Informed consent obtained. DESCRIPTION OF PROCEDURE:  The patient met in the preoperative holding  area. The left hip was marked as the correct operative site. He was  taken to the operating room, where general anesthesia was administered. Jackson catheter was placed. Intravenous antibiotics and TXA were given  per protocol.   He was positioned on his right side and secured on the standard offset neck segments  produced a stable hip in all planes with no evidence of bony or  component impingement. Leg-lengths were restored. Trials were removed. The definitive femoral stem was seated. Final trial reduction confirmed  the -3 neck. The ceramic head was then impacted over a clean and dry  trunnion. The hip was reduced with stability confirmed in all planes. I then lavaged the hip with dilute one liter of Betadine followed by  saline. One gram of vancomycin powder placed in the joint followed by  closure of the capsule with #1 Ethibond. The pericapsular injection was  given. Fascial and adipose layers were closed with a running #0 V-Loc. Skin was closed in layers followed by application of a sterile dressing  and hip abduction pillow. The patient tolerated the procedure well  without intraoperative complications. At the conclusion of the case,  all sponge and needle counts were correct. He was transferred from the  operating room table onto his hospital bed, where he was awakened and  extubated and transported to the recovery room in stable condition. Of note, physician's assistant was present throughout the entirety of  the case. Her presence was necessary to aid with patient positioning,  draping, limb positioning, wound closure, application of surgical  dressing, and patient transport from the operating room table. No  qualified surgical resident available.         Abdullahi Gregory MD    D: 06/29/2023 17:16:13       T: 06/29/2023 17:38:49     [email or web address]  Job#: [id number]     Doc#: [id number]    CC:

## 2023-07-01 ENCOUNTER — APPOINTMENT (OUTPATIENT)
Dept: ULTRASOUND IMAGING | Age: 88
DRG: 469 | End: 2023-07-01
Attending: ORTHOPAEDIC SURGERY
Payer: MEDICARE

## 2023-07-01 LAB
ABO + RH BLD: NORMAL
ANION GAP SERPL CALCULATED.3IONS-SCNC: 10 MMOL/L (ref 7–16)
BLD GP AB SCN SERPL QL: NORMAL
BUN SERPL-MCNC: 40 MG/DL (ref 6–23)
CALCIUM SERPL-MCNC: 8 MG/DL (ref 8.6–10.2)
CHLORIDE SERPL-SCNC: 106 MMOL/L (ref 98–107)
CO2 SERPL-SCNC: 21 MMOL/L (ref 22–29)
CREAT SERPL-MCNC: 1.9 MG/DL (ref 0.7–1.2)
ERYTHROCYTE [DISTWIDTH] IN BLOOD BY AUTOMATED COUNT: 13.2 FL (ref 11.5–15)
GLUCOSE SERPL-MCNC: 132 MG/DL (ref 74–99)
HCT VFR BLD AUTO: 19.7 % (ref 37–54)
HCT VFR BLD AUTO: 24.2 % (ref 37–54)
HGB BLD-MCNC: 6.5 G/DL (ref 12.5–16.5)
HGB BLD-MCNC: 8 G/DL (ref 12.5–16.5)
MCH RBC QN AUTO: 33.5 PG (ref 26–35)
MCHC RBC AUTO-ENTMCNC: 33 % (ref 32–34.5)
MCV RBC AUTO: 101.5 FL (ref 80–99.9)
METER GLUCOSE: 205 MG/DL (ref 74–99)
PLATELET # BLD AUTO: 98 E9/L (ref 130–450)
PLATELET CONFIRMATION: NORMAL
PMV BLD AUTO: 9.6 FL (ref 7–12)
POTASSIUM SERPL-SCNC: 4.9 MMOL/L (ref 3.5–5)
RBC # BLD AUTO: 1.94 E12/L (ref 3.8–5.8)
SODIUM SERPL-SCNC: 137 MMOL/L (ref 132–146)
WBC # BLD: 6.1 E9/L (ref 4.5–11.5)

## 2023-07-01 PROCEDURE — 6370000000 HC RX 637 (ALT 250 FOR IP): Performed by: NURSE PRACTITIONER

## 2023-07-01 PROCEDURE — 2580000003 HC RX 258

## 2023-07-01 PROCEDURE — 86850 RBC ANTIBODY SCREEN: CPT

## 2023-07-01 PROCEDURE — 85027 COMPLETE CBC AUTOMATED: CPT

## 2023-07-01 PROCEDURE — 97530 THERAPEUTIC ACTIVITIES: CPT

## 2023-07-01 PROCEDURE — 2580000003 HC RX 258: Performed by: ORTHOPAEDIC SURGERY

## 2023-07-01 PROCEDURE — G0378 HOSPITAL OBSERVATION PER HR: HCPCS

## 2023-07-01 PROCEDURE — 51700 IRRIGATION OF BLADDER: CPT

## 2023-07-01 PROCEDURE — 86901 BLOOD TYPING SEROLOGIC RH(D): CPT

## 2023-07-01 PROCEDURE — 76770 US EXAM ABDO BACK WALL COMP: CPT

## 2023-07-01 PROCEDURE — 96361 HYDRATE IV INFUSION ADD-ON: CPT

## 2023-07-01 PROCEDURE — 82962 GLUCOSE BLOOD TEST: CPT

## 2023-07-01 PROCEDURE — 36415 COLL VENOUS BLD VENIPUNCTURE: CPT

## 2023-07-01 PROCEDURE — 86900 BLOOD TYPING SEROLOGIC ABO: CPT

## 2023-07-01 PROCEDURE — P9016 RBC LEUKOCYTES REDUCED: HCPCS

## 2023-07-01 PROCEDURE — 85014 HEMATOCRIT: CPT

## 2023-07-01 PROCEDURE — 97116 GAIT TRAINING THERAPY: CPT

## 2023-07-01 PROCEDURE — 6370000000 HC RX 637 (ALT 250 FOR IP): Performed by: INTERNAL MEDICINE

## 2023-07-01 PROCEDURE — 30233N1 TRANSFUSION OF NONAUTOLOGOUS RED BLOOD CELLS INTO PERIPHERAL VEIN, PERCUTANEOUS APPROACH: ICD-10-PCS | Performed by: INTERNAL MEDICINE

## 2023-07-01 PROCEDURE — 36430 TRANSFUSION BLD/BLD COMPNT: CPT

## 2023-07-01 PROCEDURE — 51702 INSERT TEMP BLADDER CATH: CPT

## 2023-07-01 PROCEDURE — 6370000000 HC RX 637 (ALT 250 FOR IP): Performed by: ORTHOPAEDIC SURGERY

## 2023-07-01 PROCEDURE — 86923 COMPATIBILITY TEST ELECTRIC: CPT

## 2023-07-01 PROCEDURE — 85018 HEMOGLOBIN: CPT

## 2023-07-01 PROCEDURE — 80048 BASIC METABOLIC PNL TOTAL CA: CPT

## 2023-07-01 RX ORDER — LIDOCAINE HYDROCHLORIDE 20 MG/ML
JELLY TOPICAL
Status: COMPLETED | OUTPATIENT
Start: 2023-07-01 | End: 2023-07-01

## 2023-07-01 RX ORDER — SODIUM CHLORIDE 9 MG/ML
INJECTION, SOLUTION INTRAVENOUS PRN
Status: DISCONTINUED | OUTPATIENT
Start: 2023-07-01 | End: 2023-07-07 | Stop reason: HOSPADM

## 2023-07-01 RX ADMIN — FOLIC ACID 1000 MCG: 1 TABLET ORAL at 08:52

## 2023-07-01 RX ADMIN — SODIUM CHLORIDE, PRESERVATIVE FREE 10 ML: 5 INJECTION INTRAVENOUS at 09:21

## 2023-07-01 RX ADMIN — DOCUSATE SODIUM 100 MG: 100 CAPSULE, LIQUID FILLED ORAL at 05:57

## 2023-07-01 RX ADMIN — ASPIRIN 81 MG: 81 TABLET, COATED ORAL at 08:52

## 2023-07-01 RX ADMIN — TRAMADOL HYDROCHLORIDE 50 MG: 50 TABLET ORAL at 08:58

## 2023-07-01 RX ADMIN — SODIUM CHLORIDE, PRESERVATIVE FREE 10 ML: 5 INJECTION INTRAVENOUS at 09:22

## 2023-07-01 RX ADMIN — LIDOCAINE HYDROCHLORIDE: 20 JELLY TOPICAL at 11:30

## 2023-07-01 RX ADMIN — FINASTERIDE 5 MG: 5 TABLET, FILM COATED ORAL at 17:47

## 2023-07-01 RX ADMIN — CARVEDILOL 12.5 MG: 6.25 TABLET, FILM COATED ORAL at 17:47

## 2023-07-01 RX ADMIN — Medication 10 ML: at 21:51

## 2023-07-01 RX ADMIN — ASPIRIN 81 MG: 81 TABLET, COATED ORAL at 21:51

## 2023-07-01 RX ADMIN — SODIUM CHLORIDE: 9 INJECTION, SOLUTION INTRAVENOUS at 21:57

## 2023-07-01 RX ADMIN — TRAMADOL HYDROCHLORIDE 50 MG: 50 TABLET ORAL at 23:32

## 2023-07-01 RX ADMIN — LEVOTHYROXINE SODIUM 50 MCG: 0.05 TABLET ORAL at 05:57

## 2023-07-01 RX ADMIN — DOCUSATE SODIUM 50 MG AND SENNOSIDES 8.6 MG 1 TABLET: 8.6; 5 TABLET, FILM COATED ORAL at 21:51

## 2023-07-01 RX ADMIN — ACETAMINOPHEN 650 MG: 325 TABLET ORAL at 05:57

## 2023-07-01 RX ADMIN — CARVEDILOL 12.5 MG: 6.25 TABLET, FILM COATED ORAL at 08:52

## 2023-07-01 RX ADMIN — FUROSEMIDE 40 MG: 40 TABLET ORAL at 08:52

## 2023-07-01 RX ADMIN — AMLODIPINE BESYLATE 10 MG: 10 TABLET ORAL at 21:50

## 2023-07-01 RX ADMIN — TAMSULOSIN HYDROCHLORIDE 0.4 MG: 0.4 CAPSULE ORAL at 17:46

## 2023-07-01 RX ADMIN — DOCUSATE SODIUM 50 MG AND SENNOSIDES 8.6 MG 1 TABLET: 8.6; 5 TABLET, FILM COATED ORAL at 08:52

## 2023-07-01 RX ADMIN — ACETAMINOPHEN 650 MG: 325 TABLET ORAL at 21:50

## 2023-07-01 RX ADMIN — BISACODYL 10 MG: 5 TABLET, COATED ORAL at 08:52

## 2023-07-01 RX ADMIN — ACETAMINOPHEN 650 MG: 325 TABLET ORAL at 11:34

## 2023-07-01 RX ADMIN — ACETAMINOPHEN 650 MG: 325 TABLET ORAL at 17:46

## 2023-07-01 RX ADMIN — FAMOTIDINE 20 MG: 20 TABLET ORAL at 08:52

## 2023-07-01 RX ADMIN — ESCITALOPRAM OXALATE 10 MG: 10 TABLET ORAL at 08:52

## 2023-07-01 RX ADMIN — TRAMADOL HYDROCHLORIDE 50 MG: 50 TABLET ORAL at 15:12

## 2023-07-01 RX ADMIN — HYDROXYZINE PAMOATE 25 MG: 25 CAPSULE ORAL at 21:51

## 2023-07-01 ASSESSMENT — PAIN DESCRIPTION - LOCATION
LOCATION: HIP

## 2023-07-01 ASSESSMENT — PAIN - FUNCTIONAL ASSESSMENT
PAIN_FUNCTIONAL_ASSESSMENT: PREVENTS OR INTERFERES WITH MANY ACTIVE NOT PASSIVE ACTIVITIES
PAIN_FUNCTIONAL_ASSESSMENT: PREVENTS OR INTERFERES SOME ACTIVE ACTIVITIES AND ADLS
PAIN_FUNCTIONAL_ASSESSMENT: PREVENTS OR INTERFERES SOME ACTIVE ACTIVITIES AND ADLS

## 2023-07-01 ASSESSMENT — PAIN DESCRIPTION - DESCRIPTORS
DESCRIPTORS: ACHING;PRESSURE;SHOOTING
DESCRIPTORS: ACHING;DISCOMFORT;NAGGING
DESCRIPTORS: ACHING;DISCOMFORT

## 2023-07-01 ASSESSMENT — PAIN DESCRIPTION - ONSET
ONSET: GRADUAL
ONSET: GRADUAL
ONSET: ON-GOING

## 2023-07-01 ASSESSMENT — PAIN DESCRIPTION - PAIN TYPE
TYPE: ACUTE PAIN;SURGICAL PAIN

## 2023-07-01 ASSESSMENT — PAIN SCALES - GENERAL
PAINLEVEL_OUTOF10: 4
PAINLEVEL_OUTOF10: 4
PAINLEVEL_OUTOF10: 6
PAINLEVEL_OUTOF10: 3
PAINLEVEL_OUTOF10: 1
PAINLEVEL_OUTOF10: 6
PAINLEVEL_OUTOF10: 6
PAINLEVEL_OUTOF10: 3

## 2023-07-01 ASSESSMENT — PAIN DESCRIPTION - ORIENTATION
ORIENTATION: LEFT

## 2023-07-01 ASSESSMENT — PAIN DESCRIPTION - FREQUENCY
FREQUENCY: INTERMITTENT
FREQUENCY: CONTINUOUS
FREQUENCY: INTERMITTENT

## 2023-07-01 ASSESSMENT — PAIN SCALES - WONG BAKER: WONGBAKER_NUMERICALRESPONSE: 0

## 2023-07-01 NOTE — PROGRESS NOTES
Pt DTV since miguel removal this afternoon, unable to go and has no urge to void. Bladder scanned at this time, 0cc found. Continuous IVF still running. Updated Yue Smith NP, states to just monitor at this time.

## 2023-07-01 NOTE — CONSULTS
7/1/2023  12:05 PM  Service: Urology  Group: [facility] (Hari/Jefry/Conor)    Ingrid Parekh  [id number]     Chief Complaint:    No voiding     History of Present Illness: The patient is a 80 y.o. male patient who presents for left hip arthroplasty. This was done 6/29/23 by Dr. Roma Tillman. Pt had his miguel catheter removed 6/29/23. Pt has not voided since this time. He has no urge to void. Bladder scan was reported as 0  Pt states he has not had much to drink. He has a creatinine of 1.9, this has been slowly increasing  Pt is a poor historian but denies any previous kidney disease  He has nocturia q 2 hours. He denies incontinence but he has a depends on. He did see Dr. Magaly Motley in the hospital 4/21 for hematuria, this was felt to be from a traumatic catheterization  Pt states they have a difficult time putting a catheter in. As far as I can tell he has not followed with a urologist.  He is on Flomax ad Proscar routinely. He is currently getting a blood transfusion.          Past Medical History:   Diagnosis Date    Anxiety and depression     Aortic stenosis     Arthritis     osteoarthritis left hip    CAD (coronary artery disease)     CHF (congestive heart failure) ([facility])     systolic and diastolic    Hyperlipidemia     Hypertension     Muscle wasting and atrophy, not elsewhere classified, unspecified site     Nonrheumatic tricuspid (valve) insufficiency          Past Surgical History:   Procedure Laterality Date    CORONARY ARTERY BYPASS GRAFT      HIP SURGERY      right    TOTAL HIP ARTHROPLASTY Left 6/29/2023    LEFT HIP TOTAL ARTHROPLASTY performed by Patti Phelps MD at [facility] OR       Medications Prior to Admission:    Medications Prior to Admission: docusate sodium (COLACE) 100 MG capsule, Take 1 capsule by mouth Daily constipation  diclofenac sodium (VOLTAREN) 1 % GEL, Apply topically every 12 hours as needed for Pain Apply to affected area topically  magnesium hydroxide

## 2023-07-01 NOTE — PROGRESS NOTES
Daily Treat    Evaluating PT:  Karen Jeffers, P.T. 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 Procedure/Surgery:  L MENDY 6/29/23  Precautions:  WBAT LLE, L hip posterolateral precautions     SUBJECTIVE:     Pt lives with wife and son in a tri-level home with 5 stairs and 1 rail to enter. There are 7 steps and 2 rails to the main floor and 7 steps with 2 rails to 2nd floor bed and bath. Pt ambulated with ww and assist, but came in from SNF and has been using WC most of the time for mobility. At home he has a ww on each floor. OBJECTIVE:    Initial Evaluation  Date: 6/30/23 Treatment  7/1/23 BID Short Term/ Long Term   Goals   Was pt agreeable to Eval/treatment? yes Yes. Really wants OOB into chair      Does pt have pain?  C/o mild L hip pain LT hip      Bed Mobility  Rolling: MOD A  Supine to sit: MOD A  Sit to supine: NA  Scooting: MOD A   NA-see AM notes,  In cruz chair on arrival-see comments below SBA   Transfers Sit to stand: MIN A  Stand to sit: MIN A  Stand pivot: MOD A with ww  Cruz-stand: Min/Mod x 2-1   Stand-sit hip chair: CGA/Min  SBA   Ambulation   3 feet with ww MOD A  25' WW close CGA in room  100 feet with ww SBA/CGA   Stair negotiation: ascended and descended NA  NA 8 steps with 2 rails CGA/MIN A   AM-PAC 6 Clicks 95/89  84/65       Pt is alert and oriented x4  Balance: High risk falls with [employer] and w/o assist.     Therapeutic Exercises:  LTLE    Patient education  Pt educated on WBAT, universal hip prec, abd pillow use,     Patient response to education:   Pt verbalized understanding Pt demonstrated skill Pt requires further education in this area   Y Y Yes, review universal hip daily     ASSESSMENT:    Comments PM: In cruz chair and went to room x 3 this AM to reposition into chair

## 2023-07-01 NOTE — PROGRESS NOTES
Spoke to [facility] to delay transport until this afternoon d/t low hgb. New  time scheduled for 4PM. Please call [phone] if patient unable to discharge today.

## 2023-07-01 NOTE — PROGRESS NOTES
Daily Treat    Evaluating PT:  Ricardo Patel, P.T. 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 Procedure/Surgery:  L MENDY 6/29/23  Precautions:  WBAT LLE, L hip posterolateral precautions     SUBJECTIVE:     Pt lives with wife and son in a tri-level home with 5 stairs and 1 rail to enter. There are 7 steps and 2 rails to the main floor and 7 steps with 2 rails to 2nd floor bed and bath. Pt ambulated with ww and assist, but came in from SNF and has been using WC most of the time for mobility. At home he has a ww on each floor. OBJECTIVE:    Initial Evaluation  Date: 6/30/23 Treatment  7/1/23 Short Term/ Long Term   Goals   Was pt agreeable to Eval/treatment? yes Yes. Really wants OOB into chair      Does pt have pain? C/o mild L hip pain LT hip      Bed Mobility  Rolling: MOD A  Supine to sit: MOD A  Sit to supine: NA  Scooting: MOD A  Max/dep assist with all bed mobility. Reports needing help prior to Sx, Little to no efforts from him to EOB SBA   Transfers Sit to stand: MIN A  Stand to sit: MIN A  Stand pivot: MOD A with ww  Bed-stand: Min x 2-1   Stand-sit: SBA/CGA SBA   Ambulation   3 feet with ww MOD A  25' [employer] close CGA  100 feet with ww SBA/CGA   Stair negotiation: ascended and descended NA  NA 8 steps with 2 rails CGA/MIN A   AM-PAC 6 Clicks 51/69  54/97       Pt is alert and oriented x4  Balance: High risk falls with [employer] and w/o assist.     Therapeutic Exercises:  LTLE    Patient education  Pt educated on WBAT, universal hip prec, abd pillow use,     Patient response to education:   Pt verbalized understanding Pt demonstrated skill Pt requires further education in this area   Y Y Yes, review universal hip daily     ASSESSMENT:    Comments: In bed on arrival in no distress.   Very receptive

## 2023-07-01 NOTE — ACP (ADVANCE CARE PLANNING)
Advance Care Planning   Healthcare Decision Maker:    Primary Decision Maker: Rickey Rothman - [facility] - [phone]    Click here to complete Healthcare Decision Makers including selection of the Healthcare Decision Maker Relationship (ie \"Primary\").

## 2023-07-01 NOTE — PROGRESS NOTES
Consult called to urology due to patient due to void 2200 6/30/23, no urine output as of 7/1/23 9:20 am  Message left with answering service  Taryn Vaca RN

## 2023-07-02 LAB
ANION GAP SERPL CALCULATED.3IONS-SCNC: 9 MMOL/L (ref 7–16)
BUN SERPL-MCNC: 35 MG/DL (ref 6–23)
CALCIUM SERPL-MCNC: 7.6 MG/DL (ref 8.6–10.2)
CHLORIDE SERPL-SCNC: 107 MMOL/L (ref 98–107)
CO2 SERPL-SCNC: 21 MMOL/L (ref 22–29)
CREAT SERPL-MCNC: 1.7 MG/DL (ref 0.7–1.2)
ERYTHROCYTE [DISTWIDTH] IN BLOOD BY AUTOMATED COUNT: 14.7 FL (ref 11.5–15)
GLUCOSE SERPL-MCNC: 210 MG/DL (ref 74–99)
HCT VFR BLD AUTO: 22.1 % (ref 37–54)
HGB BLD-MCNC: 7.2 G/DL (ref 12.5–16.5)
MCH RBC QN AUTO: 33.2 PG (ref 26–35)
MCHC RBC AUTO-ENTMCNC: 32.6 % (ref 32–34.5)
MCV RBC AUTO: 101.8 FL (ref 80–99.9)
METER GLUCOSE: 153 MG/DL (ref 74–99)
METER GLUCOSE: 273 MG/DL (ref 74–99)
PLATELET # BLD AUTO: 87 E9/L (ref 130–450)
PLATELET CONFIRMATION: NORMAL
PMV BLD AUTO: 9.8 FL (ref 7–12)
POTASSIUM SERPL-SCNC: 4.3 MMOL/L (ref 3.5–5)
RBC # BLD AUTO: 2.17 E12/L (ref 3.8–5.8)
SODIUM SERPL-SCNC: 137 MMOL/L (ref 132–146)
WBC # BLD: 4.4 E9/L (ref 4.5–11.5)

## 2023-07-02 PROCEDURE — 2500000003 HC RX 250 WO HCPCS: Performed by: ORTHOPAEDIC SURGERY

## 2023-07-02 PROCEDURE — 97116 GAIT TRAINING THERAPY: CPT

## 2023-07-02 PROCEDURE — 6370000000 HC RX 637 (ALT 250 FOR IP): Performed by: ORTHOPAEDIC SURGERY

## 2023-07-02 PROCEDURE — 82962 GLUCOSE BLOOD TEST: CPT

## 2023-07-02 PROCEDURE — G0378 HOSPITAL OBSERVATION PER HR: HCPCS

## 2023-07-02 PROCEDURE — 36415 COLL VENOUS BLD VENIPUNCTURE: CPT

## 2023-07-02 PROCEDURE — 96361 HYDRATE IV INFUSION ADD-ON: CPT

## 2023-07-02 PROCEDURE — A4216 STERILE WATER/SALINE, 10 ML: HCPCS | Performed by: ORTHOPAEDIC SURGERY

## 2023-07-02 PROCEDURE — 2580000003 HC RX 258: Performed by: ORTHOPAEDIC SURGERY

## 2023-07-02 PROCEDURE — 96375 TX/PRO/DX INJ NEW DRUG ADDON: CPT

## 2023-07-02 PROCEDURE — 80048 BASIC METABOLIC PNL TOTAL CA: CPT

## 2023-07-02 PROCEDURE — 85027 COMPLETE CBC AUTOMATED: CPT

## 2023-07-02 PROCEDURE — 97110 THERAPEUTIC EXERCISES: CPT

## 2023-07-02 PROCEDURE — 99233 SBSQ HOSP IP/OBS HIGH 50: CPT | Performed by: INTERNAL MEDICINE

## 2023-07-02 RX ORDER — DEXTROSE MONOHYDRATE 100 MG/ML
INJECTION, SOLUTION INTRAVENOUS CONTINUOUS PRN
Status: DISCONTINUED | OUTPATIENT
Start: 2023-07-02 | End: 2023-07-03 | Stop reason: SDUPTHER

## 2023-07-02 RX ORDER — BISACODYL 10 MG
10 SUPPOSITORY, RECTAL RECTAL DAILY PRN
Status: DISCONTINUED | OUTPATIENT
Start: 2023-07-02 | End: 2023-07-07 | Stop reason: HOSPADM

## 2023-07-02 RX ORDER — INSULIN LISPRO 100 [IU]/ML
0-4 INJECTION, SOLUTION INTRAVENOUS; SUBCUTANEOUS NIGHTLY
Status: DISCONTINUED | OUTPATIENT
Start: 2023-07-02 | End: 2023-07-03 | Stop reason: SDUPTHER

## 2023-07-02 RX ORDER — INSULIN LISPRO 100 [IU]/ML
0-8 INJECTION, SOLUTION INTRAVENOUS; SUBCUTANEOUS
Status: DISCONTINUED | OUTPATIENT
Start: 2023-07-02 | End: 2023-07-03 | Stop reason: SDUPTHER

## 2023-07-02 RX ADMIN — Medication 10 ML: at 09:53

## 2023-07-02 RX ADMIN — LEVOTHYROXINE SODIUM 50 MCG: 0.05 TABLET ORAL at 06:53

## 2023-07-02 RX ADMIN — BISACODYL 10 MG: 10 SUPPOSITORY RECTAL at 13:33

## 2023-07-02 RX ADMIN — Medication 10 ML: at 22:05

## 2023-07-02 RX ADMIN — ACETAMINOPHEN 650 MG: 325 TABLET ORAL at 14:43

## 2023-07-02 RX ADMIN — ASPIRIN 81 MG: 81 TABLET, COATED ORAL at 09:38

## 2023-07-02 RX ADMIN — FINASTERIDE 5 MG: 5 TABLET, FILM COATED ORAL at 17:45

## 2023-07-02 RX ADMIN — TRAMADOL HYDROCHLORIDE 50 MG: 50 TABLET ORAL at 22:30

## 2023-07-02 RX ADMIN — BISACODYL 10 MG: 5 TABLET, COATED ORAL at 09:38

## 2023-07-02 RX ADMIN — ACETAMINOPHEN 650 MG: 325 TABLET ORAL at 03:41

## 2023-07-02 RX ADMIN — ACETAMINOPHEN 650 MG: 325 TABLET ORAL at 22:04

## 2023-07-02 RX ADMIN — ACETAMINOPHEN 650 MG: 325 TABLET ORAL at 09:38

## 2023-07-02 RX ADMIN — TRAMADOL HYDROCHLORIDE 50 MG: 50 TABLET ORAL at 09:37

## 2023-07-02 RX ADMIN — TAMSULOSIN HYDROCHLORIDE 0.4 MG: 0.4 CAPSULE ORAL at 17:45

## 2023-07-02 RX ADMIN — CARVEDILOL 12.5 MG: 6.25 TABLET, FILM COATED ORAL at 09:38

## 2023-07-02 RX ADMIN — FOLIC ACID 1000 MCG: 1 TABLET ORAL at 11:45

## 2023-07-02 RX ADMIN — CARVEDILOL 12.5 MG: 6.25 TABLET, FILM COATED ORAL at 17:45

## 2023-07-02 RX ADMIN — AMLODIPINE BESYLATE 10 MG: 10 TABLET ORAL at 22:04

## 2023-07-02 RX ADMIN — ESCITALOPRAM OXALATE 10 MG: 10 TABLET ORAL at 09:38

## 2023-07-02 RX ADMIN — TRAMADOL HYDROCHLORIDE 50 MG: 50 TABLET ORAL at 16:08

## 2023-07-02 RX ADMIN — FAMOTIDINE 20 MG: 10 INJECTION, SOLUTION INTRAVENOUS at 09:38

## 2023-07-02 RX ADMIN — DOCUSATE SODIUM 100 MG: 100 CAPSULE, LIQUID FILLED ORAL at 06:53

## 2023-07-02 RX ADMIN — DOCUSATE SODIUM 50 MG AND SENNOSIDES 8.6 MG 1 TABLET: 8.6; 5 TABLET, FILM COATED ORAL at 22:04

## 2023-07-02 RX ADMIN — DOCUSATE SODIUM 50 MG AND SENNOSIDES 8.6 MG 1 TABLET: 8.6; 5 TABLET, FILM COATED ORAL at 09:38

## 2023-07-02 RX ADMIN — HYDROXYZINE PAMOATE 25 MG: 25 CAPSULE ORAL at 22:05

## 2023-07-02 ASSESSMENT — PAIN - FUNCTIONAL ASSESSMENT
PAIN_FUNCTIONAL_ASSESSMENT: PREVENTS OR INTERFERES SOME ACTIVE ACTIVITIES AND ADLS
PAIN_FUNCTIONAL_ASSESSMENT: PREVENTS OR INTERFERES SOME ACTIVE ACTIVITIES AND ADLS
PAIN_FUNCTIONAL_ASSESSMENT: ACTIVITIES ARE NOT PREVENTED
PAIN_FUNCTIONAL_ASSESSMENT: ACTIVITIES ARE NOT PREVENTED

## 2023-07-02 ASSESSMENT — PAIN DESCRIPTION - DESCRIPTORS
DESCRIPTORS: ACHING;DISCOMFORT
DESCRIPTORS: ACHING;DISCOMFORT
DESCRIPTORS: ACHING
DESCRIPTORS: ACHING;DISCOMFORT;DULL
DESCRIPTORS: ACHING;DISCOMFORT

## 2023-07-02 ASSESSMENT — PAIN DESCRIPTION - ORIENTATION
ORIENTATION: LEFT

## 2023-07-02 ASSESSMENT — PAIN SCALES - GENERAL
PAINLEVEL_OUTOF10: 5
PAINLEVEL_OUTOF10: 8
PAINLEVEL_OUTOF10: 8
PAINLEVEL_OUTOF10: 2
PAINLEVEL_OUTOF10: 3
PAINLEVEL_OUTOF10: 0
PAINLEVEL_OUTOF10: 6
PAINLEVEL_OUTOF10: 4
PAINLEVEL_OUTOF10: 6

## 2023-07-02 ASSESSMENT — PAIN DESCRIPTION - FREQUENCY
FREQUENCY: CONTINUOUS
FREQUENCY: INTERMITTENT
FREQUENCY: INTERMITTENT

## 2023-07-02 ASSESSMENT — PAIN DESCRIPTION - PAIN TYPE
TYPE: SURGICAL PAIN
TYPE: ACUTE PAIN

## 2023-07-02 ASSESSMENT — PAIN DESCRIPTION - ONSET
ONSET: ON-GOING
ONSET: GRADUAL
ONSET: ON-GOING

## 2023-07-02 ASSESSMENT — PAIN DESCRIPTION - LOCATION
LOCATION: HIP
LOCATION: HIP;LEG
LOCATION: LEG
LOCATION: HIP

## 2023-07-02 NOTE — PROGRESS NOTES
Daily Treat    Evaluating PT:  Aminata Gold P.T. Room #:  4818/6026-B  Diagnosis:  Primary osteoarthritis of left hip [M16.12]  Osteoarthritis of left hip, unspecified osteoarthritis type [M16.12]  Pertinent PMHx/PSHx:  3/8/23 pt fell backward down the 6-7 stairs at home after losing balance. Procedure/Surgery:  L MENDY 6/29/23  Precautions:  WBAT LLE, L hip posterolateral precautions     SUBJECTIVE:     Pt lives with wife and son in a tri-level home with 5 stairs and 1 rail to enter. There are 7 steps and 2 rails to the main floor and 7 steps with 2 rails to 2nd floor bed and bath. Pt ambulated with ww and assist, but came in from SNF and has been using WC most of the time for mobility. At home he has a ww on each floor. OBJECTIVE:    Initial Evaluation  Date: 6/30/23 Treatment  7/2/23 AM Short Term/ Long Term   Goals   Was pt agreeable to Eval/treatment? yes Yes, refused 30' prior, did not get pain meds and agreed to get OOB in chair on 2nd attempt     Does pt have pain? C/o mild L hip pain LT hip high     Bed Mobility  Rolling: MOD A  Supine to sit: MOD A  Sit to supine: NA  Scooting: MOD A   Roll: NA   Sup-sit: max x 2   Scoot EOB: mod/max x 2-1 SBA   Transfers Sit to stand: MIN A  Stand to sit: MIN A  Stand pivot: MOD A with ww   Bed-stand: Mod x 2    Stand-chair hip Min assist to just sit and max/dep assist to scoot back in chair. See comments below SBA   Ambulation   3 feet with ww MOD A  35' [facility] close CGA outside [place], min assist last 10' with reporting \"Im not going to make it'.   100 feet with ww SBA/CGA   Stair negotiation: ascended and descended NA  NA 8 steps with 2 rails CGA/MIN A   AM-PAC 6 Clicks 02/74 36/77       Pt is alert and oriented x4  Balance: High risk falls with [facility] and w/o assist.     Therapeutic Exercises:  LTLE    Patient education  Pt educated on WBAT, universal hip prec, abd pillow use,     Patient response to education:   Pt verbalized understanding Pt demonstrated 2 2/2 pain, weakness and instability. No dizziness with transfer to stand. Amb in room 25' as RN and I walked and changed out chairs. Inc difficulty with lateral walking with WW to position in room and into/out of chairs. Trunk listing RT constant with use [employer]. Rapid fatigue with walking and distance limited 2/2 fatigue/pain. Transfer back into hip chair was Min/CGA for stand-sit; unable to position back fully into hip chair as his height (5'2\") and weakness/pain limits his indep positioning into chair; max/dep lift and assist back to hip chair with RN assist.  Footrest located and positioned with pillow topper for comfort. Reports much more comfortable in this chair. Waffle cushion and chair alarm transfer to hip chair. There-ex in chair with marked weakness in LT hip and RT hip as well. ROM LT hip 0* off seat pan in flexion to AA/A 75*. Call light and wall phone on bed to LT and easily reachable. Tray to RT side and height adjusted for hip chair. Very appreciative for care. Comments AM 7/1/23: In bed on arrival in no distress. Very receptive and wanting to get OOB into chair. Reports needed a lot of assist for bed mobility prior to [address] [place] [address]. Steps throughout home setting with HR on all steps and having 2 HR available on steps. Reviewed universal hip precautions/review daily to reinforce understanding. Transfer from supine position to EOB was with max to dep assist x 2; not much if any assistance from [facility]. Sat on EOB 5' to allow recovery and prepare for transfer. Transfer to stand from bed was with Min A x 2; smooth transfer to stand w/o any delayed or staggered mvmt behaviors. No dizziness expressed during/post positional changes. Amb with WW with close CGA in a partial step through gait cycle. Rapid fatigue limiting distance walked to 25-30'. Transfer into cruz chair @ walk; chair safe for universal hip precautions. Waffle cushion and chair alarm placed in line. Alarm activated ON by myself.

## 2023-07-02 NOTE — PLAN OF CARE
Problem: Chronic Conditions and Co-morbidities  Goal: Patient's chronic conditions and co-morbidity symptoms are monitored and maintained or improved  Outcome: Progressing  Flowsheets (Taken 7/1/2023 2145)  Care Plan - Patient's Chronic Conditions and Co-Morbidity Symptoms are Monitored and Maintained or Improved: Monitor and assess patient's chronic conditions and comorbid symptoms for stability, deterioration, or improvement     Problem: Pain  Goal: Verbalizes/displays adequate comfort level or baseline comfort level  Outcome: Progressing     Problem: Skin/Tissue Integrity  Goal: Absence of new skin breakdown  Description: 1. Monitor for areas of redness and/or skin breakdown  2. Assess vascular access sites hourly  3. Every 4-6 hours minimum:  Change oxygen saturation probe site  4. Every 4-6 hours:  If on nasal continuous positive airway pressure, respiratory therapy assess nares and determine need for appliance change or resting period.   Outcome: Progressing     Problem: ABCDS Injury Assessment  Goal: Absence of physical injury  Outcome: Progressing     Problem: Safety - Adult  Goal: Free from fall injury  Outcome: Progressing

## 2023-07-02 NOTE — PROGRESS NOTES
Educated patient on the importance of Incentive Spirometer, Patient returned demonstration of 1000, tolerated fair. RN will continue to promote hourly usage during hourly rounds.    RN promoted deep breathing and coughing to help with comorbidity of anxiety

## 2023-07-02 NOTE — PLAN OF CARE
Problem: Chronic Conditions and Co-morbidities  Goal: Patient's chronic conditions and co-morbidity symptoms are monitored and maintained or improved  7/2/2023 1147 by Eligio Garland RN  Outcome: Progressing  7/2/2023 0208 by Yolie Lacey RN  Outcome: Progressing  Flowsheets (Taken 7/1/2023 2148)  Care Plan - Patient's Chronic Conditions and Co-Morbidity Symptoms are Monitored and Maintained or Improved: Monitor and assess patient's chronic conditions and comorbid symptoms for stability, deterioration, or improvement     Problem: Discharge Planning  Goal: Discharge to home or other facility with appropriate resources  Outcome: Progressing     Problem: Pain  Goal: Verbalizes/displays adequate comfort level or baseline comfort level  7/2/2023 1147 by Eligio Garland RN  Outcome: Progressing  7/2/2023 0208 by Yolie Lacey RN  Outcome: Progressing     Problem: Skin/Tissue Integrity  Goal: Absence of new skin breakdown  Description: 1. Monitor for areas of redness and/or skin breakdown  2. Assess vascular access sites hourly  3. Every 4-6 hours minimum:  Change oxygen saturation probe site  4. Every 4-6 hours:  If on nasal continuous positive airway pressure, respiratory therapy assess nares and determine need for appliance change or resting period.   7/2/2023 1147 by Eligio Garland RN  Outcome: Progressing  7/2/2023 0208 by Yolie Lacey RN  Outcome: Progressing     Problem: ABCDS Injury Assessment  Goal: Absence of physical injury  7/2/2023 1147 by Eligio Garland RN  Outcome: Progressing  7/2/2023 0208 by Yolie Lacey RN  Outcome: Progressing     Problem: Safety - Adult  Goal: Free from fall injury  7/2/2023 1147 by Eligio Garland RN  Outcome: Progressing  7/2/2023 0208 by Yolie Lacey RN  Outcome: Progressing

## 2023-07-02 NOTE — PROGRESS NOTES
Physical therapy AM Tx initiated. Vidal Pike in bed reporting pain severe and wanting to wait for Dr to give him pain meds prior to getting OOB this AM.  Educated on imp of getting OOB and the positional change to chair may improve the pain; continues to request hold until better pain control.  Will check back this AM.

## 2023-07-03 LAB
ANION GAP SERPL CALCULATED.3IONS-SCNC: 9 MMOL/L (ref 7–16)
BNP BLD-MCNC: ABNORMAL PG/ML (ref 0–450)
BUN SERPL-MCNC: 27 MG/DL (ref 6–23)
CALCIUM SERPL-MCNC: 8.4 MG/DL (ref 8.6–10.2)
CHLORIDE SERPL-SCNC: 105 MMOL/L (ref 98–107)
CO2 SERPL-SCNC: 23 MMOL/L (ref 22–29)
CREAT SERPL-MCNC: 1.3 MG/DL (ref 0.7–1.2)
ERYTHROCYTE [DISTWIDTH] IN BLOOD BY AUTOMATED COUNT: 14.4 FL (ref 11.5–15)
FERRITIN SERPL-MCNC: 170 NG/ML
FOLATE SERPL-MCNC: >20 NG/ML (ref 4.8–24.2)
GLUCOSE SERPL-MCNC: 191 MG/DL (ref 74–99)
HBA1C MFR BLD: 5.7 % (ref 4–5.6)
HCT VFR BLD AUTO: 21.3 % (ref 37–54)
HCT VFR BLD AUTO: 21.5 % (ref 37–54)
HCT VFR BLD AUTO: 23.6 % (ref 37–54)
HGB BLD-MCNC: 7.1 G/DL (ref 12.5–16.5)
HGB BLD-MCNC: 7.2 G/DL (ref 12.5–16.5)
HGB BLD-MCNC: 7.8 G/DL (ref 12.5–16.5)
IRON SATN MFR SERPL: 10 % (ref 20–55)
IRON SERPL-MCNC: 22 MCG/DL (ref 59–158)
MCH RBC QN AUTO: 32.9 PG (ref 26–35)
MCHC RBC AUTO-ENTMCNC: 33.1 % (ref 32–34.5)
MCV RBC AUTO: 99.6 FL (ref 80–99.9)
METER GLUCOSE: 147 MG/DL (ref 74–99)
METER GLUCOSE: 151 MG/DL (ref 74–99)
METER GLUCOSE: 164 MG/DL (ref 74–99)
METER GLUCOSE: 180 MG/DL (ref 74–99)
PLATELET # BLD AUTO: 136 E9/L (ref 130–450)
PMV BLD AUTO: 9.5 FL (ref 7–12)
POTASSIUM SERPL-SCNC: 4.4 MMOL/L (ref 3.5–5)
RBC # BLD AUTO: 2.37 E12/L (ref 3.8–5.8)
SODIUM SERPL-SCNC: 137 MMOL/L (ref 132–146)
TIBC SERPL-MCNC: 229 MCG/DL (ref 250–450)
VIT B12 SERPL-MCNC: 585 PG/ML (ref 211–946)
WBC # BLD: 7.4 E9/L (ref 4.5–11.5)

## 2023-07-03 PROCEDURE — 6360000002 HC RX W HCPCS: Performed by: NURSE PRACTITIONER

## 2023-07-03 PROCEDURE — 83036 HEMOGLOBIN GLYCOSYLATED A1C: CPT

## 2023-07-03 PROCEDURE — 96375 TX/PRO/DX INJ NEW DRUG ADDON: CPT

## 2023-07-03 PROCEDURE — 85018 HEMOGLOBIN: CPT

## 2023-07-03 PROCEDURE — 97530 THERAPEUTIC ACTIVITIES: CPT

## 2023-07-03 PROCEDURE — 6360000002 HC RX W HCPCS: Performed by: INTERNAL MEDICINE

## 2023-07-03 PROCEDURE — 83550 IRON BINDING TEST: CPT

## 2023-07-03 PROCEDURE — 2580000003 HC RX 258: Performed by: NURSE PRACTITIONER

## 2023-07-03 PROCEDURE — 6370000000 HC RX 637 (ALT 250 FOR IP): Performed by: ORTHOPAEDIC SURGERY

## 2023-07-03 PROCEDURE — 6360000002 HC RX W HCPCS: Performed by: ORTHOPAEDIC SURGERY

## 2023-07-03 PROCEDURE — C9113 INJ PANTOPRAZOLE SODIUM, VIA: HCPCS | Performed by: NURSE PRACTITIONER

## 2023-07-03 PROCEDURE — 36415 COLL VENOUS BLD VENIPUNCTURE: CPT

## 2023-07-03 PROCEDURE — 2580000003 HC RX 258: Performed by: ORTHOPAEDIC SURGERY

## 2023-07-03 PROCEDURE — 96376 TX/PRO/DX INJ SAME DRUG ADON: CPT

## 2023-07-03 PROCEDURE — 83880 ASSAY OF NATRIURETIC PEPTIDE: CPT

## 2023-07-03 PROCEDURE — 82607 VITAMIN B-12: CPT

## 2023-07-03 PROCEDURE — 82962 GLUCOSE BLOOD TEST: CPT

## 2023-07-03 PROCEDURE — 85014 HEMATOCRIT: CPT

## 2023-07-03 PROCEDURE — G0378 HOSPITAL OBSERVATION PER HR: HCPCS

## 2023-07-03 PROCEDURE — 82746 ASSAY OF FOLIC ACID SERUM: CPT

## 2023-07-03 PROCEDURE — A4216 STERILE WATER/SALINE, 10 ML: HCPCS | Performed by: NURSE PRACTITIONER

## 2023-07-03 PROCEDURE — 83540 ASSAY OF IRON: CPT

## 2023-07-03 PROCEDURE — 99233 SBSQ HOSP IP/OBS HIGH 50: CPT | Performed by: INTERNAL MEDICINE

## 2023-07-03 PROCEDURE — 82728 ASSAY OF FERRITIN: CPT

## 2023-07-03 PROCEDURE — 6370000000 HC RX 637 (ALT 250 FOR IP): Performed by: NURSE PRACTITIONER

## 2023-07-03 PROCEDURE — 85027 COMPLETE CBC AUTOMATED: CPT

## 2023-07-03 PROCEDURE — 80048 BASIC METABOLIC PNL TOTAL CA: CPT

## 2023-07-03 RX ORDER — POLYETHYLENE GLYCOL 3350 17 G/17G
17 POWDER, FOR SOLUTION ORAL DAILY
Status: DISCONTINUED | OUTPATIENT
Start: 2023-07-03 | End: 2023-07-07 | Stop reason: HOSPADM

## 2023-07-03 RX ORDER — DEXTROSE MONOHYDRATE 100 MG/ML
INJECTION, SOLUTION INTRAVENOUS CONTINUOUS PRN
Status: DISCONTINUED | OUTPATIENT
Start: 2023-07-03 | End: 2023-07-07 | Stop reason: HOSPADM

## 2023-07-03 RX ORDER — INSULIN LISPRO 100 [IU]/ML
0-4 INJECTION, SOLUTION INTRAVENOUS; SUBCUTANEOUS NIGHTLY
Status: DISCONTINUED | OUTPATIENT
Start: 2023-07-03 | End: 2023-07-07 | Stop reason: HOSPADM

## 2023-07-03 RX ORDER — FUROSEMIDE 10 MG/ML
40 INJECTION INTRAMUSCULAR; INTRAVENOUS ONCE
Status: COMPLETED | OUTPATIENT
Start: 2023-07-03 | End: 2023-07-03

## 2023-07-03 RX ORDER — FUROSEMIDE 10 MG/ML
40 INJECTION INTRAMUSCULAR; INTRAVENOUS DAILY
Status: DISCONTINUED | OUTPATIENT
Start: 2023-07-03 | End: 2023-07-07 | Stop reason: HOSPADM

## 2023-07-03 RX ORDER — INSULIN LISPRO 100 [IU]/ML
0-8 INJECTION, SOLUTION INTRAVENOUS; SUBCUTANEOUS
Status: DISCONTINUED | OUTPATIENT
Start: 2023-07-03 | End: 2023-07-07 | Stop reason: HOSPADM

## 2023-07-03 RX ADMIN — TRAMADOL HYDROCHLORIDE 50 MG: 50 TABLET ORAL at 04:31

## 2023-07-03 RX ADMIN — HYDROXYZINE PAMOATE 25 MG: 25 CAPSULE ORAL at 22:07

## 2023-07-03 RX ADMIN — DOCUSATE SODIUM 50 MG AND SENNOSIDES 8.6 MG 1 TABLET: 8.6; 5 TABLET, FILM COATED ORAL at 09:57

## 2023-07-03 RX ADMIN — PANTOPRAZOLE SODIUM 80 MG: 40 INJECTION, POWDER, FOR SOLUTION INTRAVENOUS at 17:04

## 2023-07-03 RX ADMIN — DOCUSATE SODIUM 100 MG: 100 CAPSULE, LIQUID FILLED ORAL at 05:22

## 2023-07-03 RX ADMIN — ESCITALOPRAM OXALATE 10 MG: 10 TABLET ORAL at 09:57

## 2023-07-03 RX ADMIN — ACETAMINOPHEN 650 MG: 325 TABLET ORAL at 04:30

## 2023-07-03 RX ADMIN — ACETAMINOPHEN 650 MG: 325 TABLET ORAL at 09:56

## 2023-07-03 RX ADMIN — ACETAMINOPHEN 650 MG: 325 TABLET ORAL at 15:13

## 2023-07-03 RX ADMIN — LEVOTHYROXINE SODIUM 50 MCG: 0.05 TABLET ORAL at 05:22

## 2023-07-03 RX ADMIN — ACETAMINOPHEN 650 MG: 325 TABLET ORAL at 22:06

## 2023-07-03 RX ADMIN — CARVEDILOL 12.5 MG: 6.25 TABLET, FILM COATED ORAL at 17:04

## 2023-07-03 RX ADMIN — BISACODYL 10 MG: 5 TABLET, COATED ORAL at 10:24

## 2023-07-03 RX ADMIN — CARVEDILOL 12.5 MG: 6.25 TABLET, FILM COATED ORAL at 08:33

## 2023-07-03 RX ADMIN — MORPHINE SULFATE 1 MG: 2 INJECTION, SOLUTION INTRAMUSCULAR; INTRAVENOUS at 12:03

## 2023-07-03 RX ADMIN — POLYETHYLENE GLYCOL 3350 17 G: 17 POWDER, FOR SOLUTION ORAL at 17:04

## 2023-07-03 RX ADMIN — FAMOTIDINE 20 MG: 20 TABLET ORAL at 09:57

## 2023-07-03 RX ADMIN — TAMSULOSIN HYDROCHLORIDE 0.4 MG: 0.4 CAPSULE ORAL at 22:07

## 2023-07-03 RX ADMIN — Medication 10 ML: at 22:06

## 2023-07-03 RX ADMIN — TRAMADOL HYDROCHLORIDE 50 MG: 50 TABLET ORAL at 17:04

## 2023-07-03 RX ADMIN — TRAMADOL HYDROCHLORIDE 50 MG: 50 TABLET ORAL at 10:24

## 2023-07-03 RX ADMIN — FOLIC ACID 1000 MCG: 1 TABLET ORAL at 09:56

## 2023-07-03 RX ADMIN — AMLODIPINE BESYLATE 10 MG: 10 TABLET ORAL at 22:07

## 2023-07-03 RX ADMIN — FUROSEMIDE 40 MG: 10 INJECTION, SOLUTION INTRAMUSCULAR; INTRAVENOUS at 09:57

## 2023-07-03 RX ADMIN — Medication 10 ML: at 10:01

## 2023-07-03 ASSESSMENT — PAIN DESCRIPTION - LOCATION
LOCATION: HIP

## 2023-07-03 ASSESSMENT — PAIN SCALES - GENERAL
PAINLEVEL_OUTOF10: 7
PAINLEVEL_OUTOF10: 6
PAINLEVEL_OUTOF10: 10
PAINLEVEL_OUTOF10: 9
PAINLEVEL_OUTOF10: 5
PAINLEVEL_OUTOF10: 8
PAINLEVEL_OUTOF10: 10
PAINLEVEL_OUTOF10: 7
PAINLEVEL_OUTOF10: 7

## 2023-07-03 ASSESSMENT — PAIN DESCRIPTION - FREQUENCY: FREQUENCY: CONTINUOUS

## 2023-07-03 ASSESSMENT — PAIN DESCRIPTION - ORIENTATION
ORIENTATION: LEFT
ORIENTATION: LEFT

## 2023-07-03 ASSESSMENT — PAIN DESCRIPTION - PAIN TYPE: TYPE: SURGICAL PAIN

## 2023-07-03 ASSESSMENT — PAIN - FUNCTIONAL ASSESSMENT
PAIN_FUNCTIONAL_ASSESSMENT: PREVENTS OR INTERFERES SOME ACTIVE ACTIVITIES AND ADLS
PAIN_FUNCTIONAL_ASSESSMENT: ACTIVITIES ARE NOT PREVENTED

## 2023-07-03 ASSESSMENT — PAIN DESCRIPTION - ONSET: ONSET: ON-GOING

## 2023-07-03 ASSESSMENT — PAIN DESCRIPTION - DESCRIPTORS
DESCRIPTORS: ACHING;DISCOMFORT
DESCRIPTORS: ACHING;DISCOMFORT

## 2023-07-03 NOTE — CONSULTS
Name:  Marcellus Beal  :  1930  MRN:  [id number]  Room:  [address] [place] [address]  DOS:  7/3/2023    [facility]  Dr. Osbaldo Mauricio M.D. Dr. Isaias Beck M.D. Dr. Nacho Camacho D.O. Dr. Jennifer Mccarty M.D. Dr. Shelly Khalil D.O.     -NP Consult Note-    PCP:  MIHIR Richmond - CNP  Admitting Physician:  Jose Zhao MD  Consultants:  GI, IM, ortho  Chief Complaint:  No chief complaint on file. Reason for Consult: GI bleed    History of Present Illness  Marcellus Beal is a 80 y.o. male patient on consult for GI bleed. Patient initially presented to the hospital 2023. We were contacted today, 7/3/2023. Patient underwent left hip replacement 2023. Subsequently admitted to the hospital.  Patient currently complains of hip pain. He denies abdominal pain. He denies nausea or vomiting. He specifically denies hematemesis or emesis of coffee-ground material.  He had been constipated, but reports bowel movements recently resumed. Stools reported as mushy, but he is unable to describe color. He is unaware of blood or melena. At baseline, he normally moves his bowels every 1-2 days. He denies chest pain, SOB, or dizziness. He denies chest pain or SOB. PMH:  CAD, HTN, HLD, thyroid disease, history of skin cancer. PSH:  CABG, bilateral hip replacements, skin cancer resections. No prior EGD or colonoscopy. Family history:  Father with CVA. Brothers x2 with heart disease. He is unaware of any other personal or family history of G issues or malignancy. Social history:  Patient was never a smoker. He drank alcohol when much younger. He denies current or past recreational or illicit drug use. On arrival 2023, WBC 8.9, hemoglobin 8.4, hematocrit 25.9. Macrocytic with . 2. Normochromic. Platelets 693. BUN 31, creatinine 1.6, sodium 136, potassium 6.3, chloride 104, CO2 24, calcium 8.3. Blood glucose 166. Elevated proBNP 2619.   Chest x-ray was conjunctiva pink, sclera clear  Neck:  no adenopathy, bruit, JVD, tenderness, masses, or nodules; supple, symmetrical, trachea midline, thyroid not enlarged  Lung:  clear to auscultation bilaterally; no use of accessory muscles; no rhonchi, rales, or crackles  Heart:  regular rate and regular rhythm without murmur, rub, or gallop  Abdomen:  soft, nontender, nondistended; normoactive bowel sounds; no organomegaly  Extremities:  extremities normal, atraumatic, no cyanosis or edema  Musculokeletal:  recent left his surgery, decreased ROM, pain   Neurologic:  mental status A&Ox3, thought content appropriate; CN II-XII grossly intact; sensation intact, motor strength 5/5 globally; no slurred speech    Laboratory Data  Recent Results (from the past 24 hour(s))   POCT Glucose    Collection Time: 07/02/23  5:41 PM   Result Value Ref Range    Meter Glucose 273 (H) 74 - 99 mg/dL   POCT Glucose    Collection Time: 07/02/23 10:02 PM   Result Value Ref Range    Meter Glucose 153 (H) 74 - 99 mg/dL   Brain Natriuretic Peptide    Collection Time: 07/03/23  5:24 AM   Result Value Ref Range    Pro-BNP 18,008 (H) 0 - 450 pg/mL   CBC    Collection Time: 07/03/23  5:24 AM   Result Value Ref Range    WBC 7.4 4.5 - 11.5 E9/L    RBC 2.37 (L) 3.80 - 5.80 E12/L    Hemoglobin 7.8 (L) 12.5 - 16.5 g/dL    Hematocrit 23.6 (L) 37.0 - 54.0 %    MCV 99.6 80.0 - 99.9 fL    MCH 32.9 26.0 - 35.0 pg    MCHC 33.1 32.0 - 34.5 %    RDW 14.4 11.5 - 15.0 fL    Platelets 086 364 - 983 E9/L    MPV 9.5 7.0 - 12.0 fL   POCT Glucose    Collection Time: 07/03/23  6:23 AM   Result Value Ref Range    Meter Glucose 164 (H) 74 - 99 mg/dL   Basic Metabolic Panel    Collection Time: 07/03/23 10:20 AM   Result Value Ref Range    Sodium 137 132 - 146 mmol/L    Potassium 4.4 3.5 - 5.0 mmol/L    Chloride 105 98 - 107 mmol/L    CO2 23 22 - 29 mmol/L    Anion Gap 9 7 - 16 mmol/L    Glucose 191 (H) 74 - 99 mg/dL    BUN 27 (H) 6 - 23 mg/dL    Creatinine 1.3 (H) 0.7 - 1.2 mg/dL Est, Glom Filt Rate 51 >=60 mL/min/1.73    Calcium 8.4 (L) 8.6 - 10.2 mg/dL   POCT Glucose    Collection Time: 07/03/23 11:41 AM   Result Value Ref Range    Meter Glucose 180 (H) 74 - 99 mg/dL       Imaging  XR HIP LEFT (1 VIEW)    Result Date: 6/29/2023  EXAMINATION: ONE XRAY VIEW OF THE LEFT HIP 6/29/2023 11:52 am COMPARISON: None. HISTORY: ORDERING SYSTEM PROVIDED HISTORY: post op TECHNOLOGIST PROVIDED HISTORY: Of operative side while in recovery room. Reason for exam:->post op FINDINGS: Left hip hardware. No immediate complication on limited single view study. Atherosclerotic vascular calcifications. No hardware complication on limited single view study. XR CHEST PORTABLE    Result Date: 6/29/2023  EXAMINATION: ONE XRAY VIEW OF THE CHEST 6/29/2023 4:22 pm COMPARISON: 03/08/2023 HISTORY: ORDERING SYSTEM PROVIDED HISTORY: hypoxia TECHNOLOGIST PROVIDED HISTORY: Reason for exam:->hypoxia FINDINGS: The lungs are without acute focal process. There is no effusion or pneumothorax. Stable heart size. The osseous structures are without acute process. Sternotomy wires noted. No acute process. US RETROPERITONEAL COMPLETE    Result Date: 7/1/2023  EXAMINATION: RETROPERITONEAL ULTRASOUND OF THE KIDNEYS AND URINARY BLADDER 7/1/2023 COMPARISON: Renal ultrasound from November 2, 2019 HISTORY: ORDERING SYSTEM PROVIDED HISTORY: BRIGETTE TECHNOLOGIST PROVIDED HISTORY: Reason for exam:->BRIGETTE What reading provider will be dictating this exam?->CRC FINDINGS: Kidneys: The right kidney measures 8.6 cm in length and the left kidney measures 9.5 cm in length. The corticomedullary differentiation and cortical thickness appears decreased bilaterally. Anechoic thin walled nonvascular 20 mm cyst in the left mid to upper pole (no change from prior exam). No suspicious renal mass or intrarenal calcifications seen on the images provided. There is no hydronephrosis.  Bladder: A catheter is present which is decompressing the

## 2023-07-03 NOTE — PROGRESS NOTES
Physical Therapy  Facility/Department: [facility] MED SURG  Physical Therapy Treatment Note    Name: Jacqui Esqueda  : 1930  MRN: [id number]  Date of Service: 7/3/2023      Attending Provider:  Kenia Jones MD    Evaluating PT:  Daphne Din. Meryle Kindred., P.T. Room #:  9839/1104-H  Diagnosis:  Primary osteoarthritis of left hip [M16.12]  Osteoarthritis of left hip, unspecified osteoarthritis type [M16.12]  Pertinent PMHx/PSHx:  3/8/23 pt fell backward down the 6-7 stairs at home after losing balance. Procedure/Surgery:  L MENDY 23  Precautions:  WBAT LLE, L hip posterolateral precautions    SUBJECTIVE:    Pt lives with wife and son in a tri-level home with 5 stairs and 1 rail to enter. There are 7 steps and 2 rails to the main floor and 7 steps with 2 rails to 2nd floor bed and bath. Pt ambulated with ww and assist, but came in from SNF and has been using WC most of the time for mobility. At home he has a ww on each floor. OBJECTIVE:   Initial Evaluation  Date: 23 Treatment  7/3/2023  Short Term/ Long Term   Goals   Was pt agreeable to Eval/treatment? yes yes    Does pt have pain? C/o mild L hip pain L quad pain with exercise    Bed Mobility  Rolling: MOD A  Supine to sit: MOD A  Sit to supine: NA  Scooting: MOD A NT SBA   Transfers Sit to stand: MIN A  Stand to sit: MIN A  Stand pivot: MOD A with ww Sit <> stand: Min A SBA   Ambulation   3 feet with ww MOD A 15 feet x 2 with [employer] Min/CGA WBAT L  feet with ww SBA/CGA   Stair negotiation: ascended and descended NA NT 8 steps with 2 rails CGA/MIN A   AM-PAC 6 Clicks 78/59 25/51        Pt is alert, able to follow repeated instruction, states needs to go to the bathroom.   Balance: poor dynamic with [employer]    Pt performed therapeutic exercise of the following: NT    Patient education/treatment  Pt was educated on UE usage to assist with transfers, gait mechanics for sequence/posture/staying within [employer] base of support    Patient response

## 2023-07-03 NOTE — PROGRESS NOTES
[facility] Progress Note    Admitting Date and Time: 6/29/2023  6:24 AM  Admit Dx: Primary osteoarthritis of left hip [M16.12]  Osteoarthritis of left hip, unspecified osteoarthritis type [M16.12]    Subjective:  Patient is being followed for Primary osteoarthritis of left hip [M16.12]  Osteoarthritis of left hip, unspecified osteoarthritis type [M16.12]     Patient continues to deny any blood in stool although stool occult was positive. He is short of breath today. ROS: denies fever, chills, cp, sob, n/v, HA unless stated above.       furosemide  40 mg IntraVENous Once    insulin lispro  0-8 Units SubCUTAneous TID WC    insulin lispro  0-4 Units SubCUTAneous Nightly    bisacodyl  10 mg Oral Daily    amLODIPine  10 mg Oral Nightly    carvedilol  12.5 mg Oral BID WC    docusate sodium  100 mg Oral Daily    escitalopram  10 mg Oral Daily    finasteride  5 mg Oral QPM    folic acid  1,976 mcg Oral Daily    hydrOXYzine pamoate  25 mg Oral Nightly    levothyroxine  50 mcg Oral Daily    tamsulosin  0.4 mg Oral QPM    sodium chloride flush  5-40 mL IntraVENous 2 times per day    acetaminophen  650 mg Oral Q6H    sennosides-docusate sodium  1 tablet Oral BID    famotidine  20 mg Oral Daily    Or    famotidine (PEPCID) injection  20 mg IntraVENous Daily    [Held by provider] aspirin  81 mg Oral BID    furosemide  40 mg Oral Every Other Day     bisacodyl, 10 mg, Daily PRN  dextrose bolus, 125 mL, PRN   Or  dextrose bolus, 250 mL, PRN  glucagon (rDNA), 1 mg, PRN  dextrose, , Continuous PRN  Glucose, 15 g, Once PRN  sodium chloride, , PRN  sodium chloride flush, 5-40 mL, PRN  sodium chloride, , PRN  ondansetron, 4 mg, Q8H PRN   Or  ondansetron, 4 mg, Q6H PRN  traMADol, 50 mg, Q6H PRN  morphine, 1 mg, Q4H PRN  dextrose bolus, 125 mL, PRN   Or  dextrose bolus, 250 mL, PRN  glucagon (rDNA), 1 mg, PRN  dextrose, , Continuous PRN  Glucose, 15 g, PRN         Objective:    BP (!) 121/50   Pulse 86   Temp 98.2

## 2023-07-03 NOTE — PROGRESS NOTES
Dr. Atkinson [facility] notified of hemoglobin of 7.1, no new orders at this time.

## 2023-07-03 NOTE — PROGRESS NOTES
Occupational Therapy  OT BEDSIDE TREATMENT NOTE      Date:7/3/2023  Patient Name: Radha Reyes  MRN: [id number]  : 1930  Room: [address] [place], [place] [address]       Evaluating OT: Bernice Kraus OTR/L   [id number]       Referring Maxine Conn MD    Specific Provider Orders/Date:OT eval and treat 2023       Diagnosis:  Primary osteoarthritis of left hip [M16.12]  Osteoarthritis of left hip, unspecified osteoarthritis type [M16.12]    Surgery: L MENDY     Pertinent Medical History:  CHF, CABG,      Precautions:  Fall Risk, WBAT L LE, posterolateral  hip precautions       Assessment of current deficits    [x] Functional mobility            [x]ADLs           [x] Strength                  []Cognition    [x] Functional transfers          [x] IADLs         [x] Safety Awareness   [x]Endurance    [] Fine Coordination                         [x] Balance      [] Vision/perception   []Sensation      []Gross Motor Coordination             [] ROM           [] Delirium                   [] Motor Control      OT PLAN OF CARE   OT POC based on physician orders, patient diagnosis and results of clinical assessment     Frequency/Duration  2-4 days/wk for 2 weeks PRN   Specific OT Treatment Interventions to include:   ADL retraining/adapted techniques and AE recommendations to increase functional independence within precautions                    Energy conservation techniques to improve tolerance for selfcare routine   Functional transfer/mobility training/DME recommendations for increased independence, safety and fall prevention         Patient/family education to increase safety and functional independence             Environmental modifications for safe mobility and completion of ADLs                             Therapeutic activity to improve functional performance during ADLs.                                          Therapeutic exercise to improve tolerance and functional strength for ADLs    Balance retraining/tolerance tasks for facilitation of postural control with dynamic challenges during ADLs . Positioning to improve functional independence        Recommended Adaptive Equipment: TBD      SOCIAL:  admitted from [facility]  primarily using wheelchair   Prior Home Living: Pt lives with wife and son ,split level. 5-6 steps/rail to enter. 7-8 steps/rails between level. Bathroom setup: walkin shower    Equipment owned: walker,  wheelchair      Prior Level of Function: assist  with ADLs , assist w with IADLs; ambulated walker (has one on each floor at home)        Pain Level: L mild hip pain ;   Cognition: A&O: pleasant, following commands, conversing               Memory:  fair +               Sequencing:  fair+              Problem solving:  fair+              Judgement/safety:  fair+                Functional Assessment:  AM-PAC Daily Activity Raw Score: 14/24    Initial Eval Status  Date: 6/30/2023 Treatment Status  Date: 7/3/23 STGs = LTGs  Time frame: 10-14 days   Feeding Independent       Grooming SBA/set-up ,seated  Setup at bed level  Mod I    UB Dressing Set-up  Min A  Mod I    LB Dressing Max a  Max A  Min A    Bathing Mod A  Mod A at bed level sponge bath  Min A    Toileting Mod A    SBA    Bed Mobility  Mod A  Supine to sit  Supine to sit mod A  SBA    Functional Transfers Min A  Sit - stand from bed  Sit <> stand min A  SBA/supervision    Functional Mobility Mod A, w/walker   Steps next to bed   SPT to chair   SPT to chair min A with [employer] SBA with good tolerance    Balance Sitting:     Static:  SBA - EOB - patient having some dizziness - subsided     Dynamic:Min A   Standing: Min A   sitting balance SBA  Standing balance min A with [employer] Supervision    Activity Tolerance No SOB  Fair. O2 sat 89 on RA, 2L reapplied, O2 sat in 90s  Good  with ADL activity      Comments:  patient cleared with nursing and agreeable to session. Pleasant and motivated, quick to fatigue but good effort demonstrated.  Education provide don hip

## 2023-07-03 NOTE — CARE COORDINATION
Social Work:    Sarah at [employer] [facility] advised that authorization  & they are re-starting the approval process.      Electronically signed by ALEXANDRO Hart on 7/3/2023 at 4:28 PM

## 2023-07-04 LAB
ANION GAP SERPL CALCULATED.3IONS-SCNC: 8 MMOL/L (ref 7–16)
BLOOD BANK DISPENSE STATUS: NORMAL
BLOOD BANK DISPENSE STATUS: NORMAL
BLOOD BANK PRODUCT CODE: NORMAL
BLOOD BANK PRODUCT CODE: NORMAL
BPU ID: NORMAL
BPU ID: NORMAL
BUN SERPL-MCNC: 22 MG/DL (ref 6–23)
CALCIUM SERPL-MCNC: 8.1 MG/DL (ref 8.6–10.2)
CHLORIDE SERPL-SCNC: 104 MMOL/L (ref 98–107)
CO2 SERPL-SCNC: 24 MMOL/L (ref 22–29)
CREAT SERPL-MCNC: 1.2 MG/DL (ref 0.7–1.2)
DESCRIPTION BLOOD BANK: NORMAL
DESCRIPTION BLOOD BANK: NORMAL
ERYTHROCYTE [DISTWIDTH] IN BLOOD BY AUTOMATED COUNT: 13.9 FL (ref 11.5–15)
GLUCOSE SERPL-MCNC: 127 MG/DL (ref 74–99)
HCT VFR BLD AUTO: 19.4 % (ref 37–54)
HCT VFR BLD AUTO: 21 % (ref 37–54)
HCT VFR BLD AUTO: 21.6 % (ref 37–54)
HGB BLD-MCNC: 6.4 G/DL (ref 12.5–16.5)
HGB BLD-MCNC: 7.1 G/DL (ref 12.5–16.5)
HGB BLD-MCNC: 7.3 G/DL (ref 12.5–16.5)
MCH RBC QN AUTO: 33 PG (ref 26–35)
MCHC RBC AUTO-ENTMCNC: 33 % (ref 32–34.5)
MCV RBC AUTO: 100 FL (ref 80–99.9)
METER GLUCOSE: 142 MG/DL (ref 74–99)
METER GLUCOSE: 153 MG/DL (ref 74–99)
METER GLUCOSE: 179 MG/DL (ref 74–99)
METER GLUCOSE: 180 MG/DL (ref 74–99)
PLATELET # BLD AUTO: 119 E9/L (ref 130–450)
PMV BLD AUTO: 9.6 FL (ref 7–12)
POTASSIUM SERPL-SCNC: 3.8 MMOL/L (ref 3.5–5)
RBC # BLD AUTO: 1.94 E12/L (ref 3.8–5.8)
SODIUM SERPL-SCNC: 136 MMOL/L (ref 132–146)
WBC # BLD: 4.3 E9/L (ref 4.5–11.5)

## 2023-07-04 PROCEDURE — 6360000002 HC RX W HCPCS: Performed by: NURSE PRACTITIONER

## 2023-07-04 PROCEDURE — 82962 GLUCOSE BLOOD TEST: CPT

## 2023-07-04 PROCEDURE — 6360000002 HC RX W HCPCS: Performed by: HOSPITALIST

## 2023-07-04 PROCEDURE — 85018 HEMOGLOBIN: CPT

## 2023-07-04 PROCEDURE — C9113 INJ PANTOPRAZOLE SODIUM, VIA: HCPCS | Performed by: NURSE PRACTITIONER

## 2023-07-04 PROCEDURE — 2580000003 HC RX 258: Performed by: HOSPITALIST

## 2023-07-04 PROCEDURE — G0378 HOSPITAL OBSERVATION PER HR: HCPCS

## 2023-07-04 PROCEDURE — 2580000003 HC RX 258: Performed by: ORTHOPAEDIC SURGERY

## 2023-07-04 PROCEDURE — 85014 HEMATOCRIT: CPT

## 2023-07-04 PROCEDURE — 85027 COMPLETE CBC AUTOMATED: CPT

## 2023-07-04 PROCEDURE — 96366 THER/PROPH/DIAG IV INF ADDON: CPT

## 2023-07-04 PROCEDURE — 96365 THER/PROPH/DIAG IV INF INIT: CPT

## 2023-07-04 PROCEDURE — 2580000003 HC RX 258: Performed by: NURSE PRACTITIONER

## 2023-07-04 PROCEDURE — 96376 TX/PRO/DX INJ SAME DRUG ADON: CPT

## 2023-07-04 PROCEDURE — 6370000000 HC RX 637 (ALT 250 FOR IP): Performed by: ORTHOPAEDIC SURGERY

## 2023-07-04 PROCEDURE — 99233 SBSQ HOSP IP/OBS HIGH 50: CPT | Performed by: INTERNAL MEDICINE

## 2023-07-04 PROCEDURE — 6360000002 HC RX W HCPCS: Performed by: ORTHOPAEDIC SURGERY

## 2023-07-04 PROCEDURE — A4216 STERILE WATER/SALINE, 10 ML: HCPCS | Performed by: NURSE PRACTITIONER

## 2023-07-04 PROCEDURE — 6360000002 HC RX W HCPCS: Performed by: INTERNAL MEDICINE

## 2023-07-04 PROCEDURE — 80048 BASIC METABOLIC PNL TOTAL CA: CPT

## 2023-07-04 PROCEDURE — 97530 THERAPEUTIC ACTIVITIES: CPT

## 2023-07-04 PROCEDURE — 36415 COLL VENOUS BLD VENIPUNCTURE: CPT

## 2023-07-04 PROCEDURE — 36430 TRANSFUSION BLD/BLD COMPNT: CPT

## 2023-07-04 RX ORDER — SODIUM CHLORIDE 9 MG/ML
INJECTION, SOLUTION INTRAVENOUS PRN
Status: DISCONTINUED | OUTPATIENT
Start: 2023-07-04 | End: 2023-07-07 | Stop reason: HOSPADM

## 2023-07-04 RX ADMIN — PANTOPRAZOLE SODIUM 40 MG: 40 INJECTION, POWDER, FOR SOLUTION INTRAVENOUS at 21:50

## 2023-07-04 RX ADMIN — PANTOPRAZOLE SODIUM 40 MG: 40 INJECTION, POWDER, FOR SOLUTION INTRAVENOUS at 10:48

## 2023-07-04 RX ADMIN — FOLIC ACID 1000 MCG: 1 TABLET ORAL at 10:47

## 2023-07-04 RX ADMIN — CARVEDILOL 12.5 MG: 6.25 TABLET, FILM COATED ORAL at 10:47

## 2023-07-04 RX ADMIN — TAMSULOSIN HYDROCHLORIDE 0.4 MG: 0.4 CAPSULE ORAL at 18:12

## 2023-07-04 RX ADMIN — ACETAMINOPHEN 650 MG: 325 TABLET ORAL at 10:47

## 2023-07-04 RX ADMIN — Medication 10 ML: at 21:53

## 2023-07-04 RX ADMIN — FUROSEMIDE 40 MG: 10 INJECTION, SOLUTION INTRAMUSCULAR; INTRAVENOUS at 10:48

## 2023-07-04 RX ADMIN — CARVEDILOL 12.5 MG: 6.25 TABLET, FILM COATED ORAL at 18:12

## 2023-07-04 RX ADMIN — MORPHINE SULFATE 1 MG: 2 INJECTION, SOLUTION INTRAMUSCULAR; INTRAVENOUS at 16:02

## 2023-07-04 RX ADMIN — SODIUM CHLORIDE 25 MG: 9 INJECTION, SOLUTION INTRAVENOUS at 10:44

## 2023-07-04 RX ADMIN — ACETAMINOPHEN 650 MG: 325 TABLET ORAL at 16:02

## 2023-07-04 RX ADMIN — ACETAMINOPHEN 650 MG: 325 TABLET ORAL at 21:48

## 2023-07-04 RX ADMIN — TRAMADOL HYDROCHLORIDE 50 MG: 50 TABLET ORAL at 21:48

## 2023-07-04 RX ADMIN — DOCUSATE SODIUM 50 MG AND SENNOSIDES 8.6 MG 1 TABLET: 8.6; 5 TABLET, FILM COATED ORAL at 10:47

## 2023-07-04 RX ADMIN — DOCUSATE SODIUM 100 MG: 100 CAPSULE, LIQUID FILLED ORAL at 06:51

## 2023-07-04 RX ADMIN — SODIUM CHLORIDE 100 MG: 9 INJECTION, SOLUTION INTRAVENOUS at 13:07

## 2023-07-04 RX ADMIN — LEVOTHYROXINE SODIUM 50 MCG: 0.05 TABLET ORAL at 06:51

## 2023-07-04 RX ADMIN — ESCITALOPRAM OXALATE 10 MG: 10 TABLET ORAL at 10:47

## 2023-07-04 RX ADMIN — Medication 10 ML: at 10:49

## 2023-07-04 RX ADMIN — BISACODYL 10 MG: 5 TABLET, COATED ORAL at 10:47

## 2023-07-04 RX ADMIN — TRAMADOL HYDROCHLORIDE 50 MG: 50 TABLET ORAL at 11:19

## 2023-07-04 ASSESSMENT — PAIN DESCRIPTION - DESCRIPTORS
DESCRIPTORS: ACHING;THROBBING;TENDER
DESCRIPTORS: ACHING;DISCOMFORT;SORE

## 2023-07-04 ASSESSMENT — PAIN SCALES - GENERAL
PAINLEVEL_OUTOF10: 9
PAINLEVEL_OUTOF10: 9
PAINLEVEL_OUTOF10: 10

## 2023-07-04 ASSESSMENT — PAIN DESCRIPTION - ORIENTATION
ORIENTATION: LEFT
ORIENTATION: LEFT

## 2023-07-04 ASSESSMENT — PAIN DESCRIPTION - LOCATION
LOCATION: HIP
LOCATION: HIP

## 2023-07-04 ASSESSMENT — PAIN - FUNCTIONAL ASSESSMENT: PAIN_FUNCTIONAL_ASSESSMENT: PREVENTS OR INTERFERES SOME ACTIVE ACTIVITIES AND ADLS

## 2023-07-04 NOTE — PROGRESS NOTES
[facility] Progress Note    Admitting Date and Time: 6/29/2023  6:24 AM  Admit Dx: Primary osteoarthritis of left hip [M16.12]  Osteoarthritis of left hip, unspecified osteoarthritis type [M16.12]    Subjective:  Patient is being followed for Primary osteoarthritis of left hip [M16.12]  Osteoarthritis of left hip, unspecified osteoarthritis type [M16.12]     Patient states that he is ok if he needs to get EGD done. Otherwise he is asypmtomatic without any complaints    ROS: denies fever, chills, cp, sob, n/v, HA unless stated above.       ferric gluconate (FERRLECIT) test dose IVPB  25 mg IntraVENous Once    Followed by    ferric gluconate (FERRLECIT) IVPB  100 mg IntraVENous Once    Followed by    Nohemi Eldridge ON 7/5/2023] ferric gluconate (FERRLECIT) IVPB  125 mg IntraVENous Once    furosemide  40 mg IntraVENous Daily    insulin lispro  0-8 Units SubCUTAneous TID WC    insulin lispro  0-4 Units SubCUTAneous Nightly    pantoprazole (PROTONIX) 40 mg injection  40 mg IntraVENous Q12H    polyethylene glycol  17 g Oral Daily    bisacodyl  10 mg Oral Daily    amLODIPine  10 mg Oral Nightly    carvedilol  12.5 mg Oral BID WC    docusate sodium  100 mg Oral Daily    escitalopram  10 mg Oral Daily    folic acid  1,191 mcg Oral Daily    hydrOXYzine pamoate  25 mg Oral Nightly    levothyroxine  50 mcg Oral Daily    tamsulosin  0.4 mg Oral QPM    sodium chloride flush  5-40 mL IntraVENous 2 times per day    acetaminophen  650 mg Oral Q6H    sennosides-docusate sodium  1 tablet Oral BID    [Held by provider] aspirin  81 mg Oral BID     sodium chloride, , PRN  dextrose bolus, 125 mL, PRN   Or  dextrose bolus, 250 mL, PRN  glucagon (rDNA), 1 mg, PRN  dextrose, , Continuous PRN  bisacodyl, 10 mg, Daily PRN  sodium chloride, , PRN  sodium chloride flush, 5-40 mL, PRN  sodium chloride, , PRN  ondansetron, 4 mg, Q8H PRN   Or  ondansetron, 4 mg, Q6H PRN  traMADol, 50 mg, Q6H PRN  morphine, 1 mg, Q4H PRN  Glucose, 15 g,

## 2023-07-04 NOTE — PROGRESS NOTES
Physical Therapy  Facility/Department: [facility] MED SURG  Treatment Note    Name: Alize Phan  : 1930  MRN: [id number]  Date of Service: 2023      Attending Provider:  Latia Doe MD    Evaluating PT:  Jeremy Morgan. Melanie Eric, P.T. Room #:  8709/1839-W  Diagnosis:  Primary osteoarthritis of left hip [M16.12]  Osteoarthritis of left hip, unspecified osteoarthritis type [M16.12]  Pertinent PMHx/PSHx:  3/8/23 pt fell backward down the 6-7 stairs at home after losing balance. Procedure/Surgery:  L MENDY 23  Precautions:  WBAT LLE, L hip posterolateral precautions, Bed/chair alarm, O2    SUBJECTIVE:    Pt lives with wife and son in a tri-level home with 5 stairs and 1 rail to enter. There are 7 steps and 2 rails to the main floor and 7 steps with 2 rails to 2nd floor bed and bath. Pt ambulated with ww and assist, but came in from SNF and has been using WC most of the time for mobility. At home he has a ww on each floor. OBJECTIVE:   Initial Evaluation  Date: 23 Treatment Date: 23 Short Term/ Long Term   Goals   Was pt agreeable to Eval/treatment? yes Yes     Does pt have pain? C/o mild L hip pain 5/10 L hip pain    Bed Mobility  Rolling: MOD A  Supine to sit: MOD A  Sit to supine: NA  Scooting: MOD A Rolling: NT  Supine to sit: Mod A  Sit to supine: NT  Scooting: Mod A to EOB SBA   Transfers Sit to stand: MIN A  Stand to sit: MIN A  Stand pivot: MOD A with ww Sit to stand: Mod A  Stand to sit: Mod A  Stand pivot: Mod A with [employer] SBA   Ambulation   3 feet with ww MOD A 3 feet x2 with WW with Mod A 100 feet with ww SBA/CGA   Stair negotiation: ascended and descended NA NT 8 steps with 2 rails CGA/MIN A   AM-PAC 6 Clicks  58/31      Pt is A & O x: 4 to person, place, month/year, and situation. Sensation: Pt denies numbness and tingling of extremities. Edema: Unremarkable. Patient education  Pt educated on PT role in acute care setting.     Patient response to

## 2023-07-04 NOTE — PLAN OF CARE
Problem: Chronic Conditions and Co-morbidities  Goal: Patient's chronic conditions and co-morbidity symptoms are monitored and maintained or improved  Outcome: Progressing  Flowsheets (Taken 7/3/2023 2217)  Care Plan - Patient's Chronic Conditions and Co-Morbidity Symptoms are Monitored and Maintained or Improved: Monitor and assess patient's chronic conditions and comorbid symptoms for stability, deterioration, or improvement     Problem: Discharge Planning  Goal: Discharge to home or other facility with appropriate resources  Outcome: Progressing  Flowsheets (Taken 7/3/2023 2217)  Discharge to home or other facility with appropriate resources: Identify barriers to discharge with patient and caregiver     Problem: Pain  Goal: Verbalizes/displays adequate comfort level or baseline comfort level  Outcome: Progressing     Problem: Skin/Tissue Integrity  Goal: Absence of new skin breakdown  Description: 1. Monitor for areas of redness and/or skin breakdown  2. Assess vascular access sites hourly  3. Every 4-6 hours minimum:  Change oxygen saturation probe site  4. Every 4-6 hours:  If on nasal continuous positive airway pressure, respiratory therapy assess nares and determine need for appliance change or resting period.   Outcome: Progressing     Problem: ABCDS Injury Assessment  Goal: Absence of physical injury  Outcome: Progressing     Problem: Safety - Adult  Goal: Free from fall injury  Outcome: Progressing

## 2023-07-04 NOTE — FLOWSHEET NOTE
P Quality Flow/Interdisciplinary Rounds Progress Note        Quality Flow Rounds held on July 4, 2023    Disciplines Attending:  Bedside Nurse and Nursing Unit Leadership    Veronica Ramirez was admitted on 6/29/2023  6:24 AM    Anticipated Discharge Date:   07/06/2023    Disposition: Skilled Nursing Facility    Jonas Score:  Jonas Scale Score: 18    Readmission Risk              Risk of Unplanned Readmission:  0           Discussed patient goal for the day, patient clinical progression, and barriers to discharge.   The following Goal(s) of the Day/Commitment(s) have been identified:  Labs - Report Results      Alber Kimbrough RN  July 4, 2023

## 2023-07-04 NOTE — PROGRESS NOTES
Spoke with Aga Levine NP, patient is lightheaded and tachycardic, received an order transfuse 1 unit PRBC

## 2023-07-05 ENCOUNTER — APPOINTMENT (OUTPATIENT)
Dept: NUCLEAR MEDICINE | Age: 88
DRG: 469 | End: 2023-07-05
Attending: ORTHOPAEDIC SURGERY
Payer: MEDICARE

## 2023-07-05 ENCOUNTER — APPOINTMENT (OUTPATIENT)
Dept: ULTRASOUND IMAGING | Age: 88
DRG: 469 | End: 2023-07-05
Attending: ORTHOPAEDIC SURGERY
Payer: MEDICARE

## 2023-07-05 PROBLEM — M16.32 OSTEOARTHRITIS OF LEFT HIP JOINT DUE TO DYSPLASIA: Status: ACTIVE | Noted: 2023-07-05

## 2023-07-05 LAB
ANION GAP SERPL CALCULATED.3IONS-SCNC: 10 MMOL/L (ref 7–16)
BUN SERPL-MCNC: 19 MG/DL (ref 6–23)
CALCIUM SERPL-MCNC: 8.2 MG/DL (ref 8.6–10.2)
CHLORIDE SERPL-SCNC: 104 MMOL/L (ref 98–107)
CO2 SERPL-SCNC: 25 MMOL/L (ref 22–29)
CREAT SERPL-MCNC: 1.2 MG/DL (ref 0.7–1.2)
ERYTHROCYTE [DISTWIDTH] IN BLOOD BY AUTOMATED COUNT: 14.1 FL (ref 11.5–15)
GLUCOSE SERPL-MCNC: 122 MG/DL (ref 74–99)
HCT VFR BLD AUTO: 21.3 % (ref 37–54)
HCT VFR BLD AUTO: 21.7 % (ref 37–54)
HCT VFR BLD AUTO: 22 % (ref 37–54)
HCT VFR BLD AUTO: 23 % (ref 37–54)
HGB BLD-MCNC: 7.2 G/DL (ref 12.5–16.5)
HGB BLD-MCNC: 7.2 G/DL (ref 12.5–16.5)
HGB BLD-MCNC: 7.4 G/DL (ref 12.5–16.5)
HGB BLD-MCNC: 7.7 G/DL (ref 12.5–16.5)
MCH RBC QN AUTO: 32.9 PG (ref 26–35)
MCHC RBC AUTO-ENTMCNC: 33.2 % (ref 32–34.5)
MCV RBC AUTO: 99.1 FL (ref 80–99.9)
METER GLUCOSE: 131 MG/DL (ref 74–99)
METER GLUCOSE: 210 MG/DL (ref 74–99)
METER GLUCOSE: 213 MG/DL (ref 74–99)
METER GLUCOSE: 237 MG/DL (ref 74–99)
METER GLUCOSE: 98 MG/DL (ref 74–99)
PLATELET # BLD AUTO: 111 E9/L (ref 130–450)
PMV BLD AUTO: 8.8 FL (ref 7–12)
POTASSIUM SERPL-SCNC: 3.3 MMOL/L (ref 3.5–5)
RBC # BLD AUTO: 2.19 E12/L (ref 3.8–5.8)
SODIUM SERPL-SCNC: 139 MMOL/L (ref 132–146)
WBC # BLD: 4.3 E9/L (ref 4.5–11.5)

## 2023-07-05 PROCEDURE — 97530 THERAPEUTIC ACTIVITIES: CPT

## 2023-07-05 PROCEDURE — 3430000000 HC RX DIAGNOSTIC RADIOPHARMACEUTICAL: Performed by: RADIOLOGY

## 2023-07-05 PROCEDURE — 6370000000 HC RX 637 (ALT 250 FOR IP): Performed by: INTERNAL MEDICINE

## 2023-07-05 PROCEDURE — A4216 STERILE WATER/SALINE, 10 ML: HCPCS | Performed by: NURSE PRACTITIONER

## 2023-07-05 PROCEDURE — 96376 TX/PRO/DX INJ SAME DRUG ADON: CPT

## 2023-07-05 PROCEDURE — 6360000002 HC RX W HCPCS: Performed by: INTERNAL MEDICINE

## 2023-07-05 PROCEDURE — 80048 BASIC METABOLIC PNL TOTAL CA: CPT

## 2023-07-05 PROCEDURE — 36415 COLL VENOUS BLD VENIPUNCTURE: CPT

## 2023-07-05 PROCEDURE — C9113 INJ PANTOPRAZOLE SODIUM, VIA: HCPCS | Performed by: NURSE PRACTITIONER

## 2023-07-05 PROCEDURE — 2700000000 HC OXYGEN THERAPY PER DAY

## 2023-07-05 PROCEDURE — 6360000002 HC RX W HCPCS: Performed by: NURSE PRACTITIONER

## 2023-07-05 PROCEDURE — 85018 HEMOGLOBIN: CPT

## 2023-07-05 PROCEDURE — 6370000000 HC RX 637 (ALT 250 FOR IP): Performed by: ORTHOPAEDIC SURGERY

## 2023-07-05 PROCEDURE — 93971 EXTREMITY STUDY: CPT

## 2023-07-05 PROCEDURE — 99233 SBSQ HOSP IP/OBS HIGH 50: CPT | Performed by: INTERNAL MEDICINE

## 2023-07-05 PROCEDURE — 2580000003 HC RX 258: Performed by: HOSPITALIST

## 2023-07-05 PROCEDURE — 82962 GLUCOSE BLOOD TEST: CPT

## 2023-07-05 PROCEDURE — 85027 COMPLETE CBC AUTOMATED: CPT

## 2023-07-05 PROCEDURE — 2580000003 HC RX 258: Performed by: NURSE PRACTITIONER

## 2023-07-05 PROCEDURE — A9560 TC99M LABELED RBC: HCPCS | Performed by: RADIOLOGY

## 2023-07-05 PROCEDURE — 1200000000 HC SEMI PRIVATE

## 2023-07-05 PROCEDURE — 6360000002 HC RX W HCPCS: Performed by: ORTHOPAEDIC SURGERY

## 2023-07-05 PROCEDURE — 78278 ACUTE GI BLOOD LOSS IMAGING: CPT

## 2023-07-05 PROCEDURE — 6360000002 HC RX W HCPCS: Performed by: HOSPITALIST

## 2023-07-05 PROCEDURE — 2580000003 HC RX 258: Performed by: ORTHOPAEDIC SURGERY

## 2023-07-05 PROCEDURE — 85014 HEMATOCRIT: CPT

## 2023-07-05 RX ADMIN — ESCITALOPRAM OXALATE 10 MG: 10 TABLET ORAL at 08:49

## 2023-07-05 RX ADMIN — Medication 10 ML: at 21:07

## 2023-07-05 RX ADMIN — TRAMADOL HYDROCHLORIDE 50 MG: 50 TABLET ORAL at 09:55

## 2023-07-05 RX ADMIN — INSULIN LISPRO 2 UNITS: 100 INJECTION, SOLUTION INTRAVENOUS; SUBCUTANEOUS at 12:21

## 2023-07-05 RX ADMIN — Medication 20 MILLICURIE: at 14:00

## 2023-07-05 RX ADMIN — ACETAMINOPHEN 650 MG: 325 TABLET ORAL at 21:04

## 2023-07-05 RX ADMIN — MORPHINE SULFATE 1 MG: 2 INJECTION, SOLUTION INTRAMUSCULAR; INTRAVENOUS at 00:02

## 2023-07-05 RX ADMIN — MORPHINE SULFATE 1 MG: 2 INJECTION, SOLUTION INTRAMUSCULAR; INTRAVENOUS at 13:39

## 2023-07-05 RX ADMIN — SODIUM CHLORIDE 125 MG: 900 INJECTION INTRAVENOUS at 10:01

## 2023-07-05 RX ADMIN — CARVEDILOL 12.5 MG: 6.25 TABLET, FILM COATED ORAL at 08:49

## 2023-07-05 RX ADMIN — AMLODIPINE BESYLATE 10 MG: 10 TABLET ORAL at 21:04

## 2023-07-05 RX ADMIN — DOCUSATE SODIUM 50 MG AND SENNOSIDES 8.6 MG 1 TABLET: 8.6; 5 TABLET, FILM COATED ORAL at 08:49

## 2023-07-05 RX ADMIN — CARVEDILOL 12.5 MG: 6.25 TABLET, FILM COATED ORAL at 16:42

## 2023-07-05 RX ADMIN — MORPHINE SULFATE 1 MG: 2 INJECTION, SOLUTION INTRAMUSCULAR; INTRAVENOUS at 08:40

## 2023-07-05 RX ADMIN — TRAMADOL HYDROCHLORIDE 50 MG: 50 TABLET ORAL at 16:42

## 2023-07-05 RX ADMIN — LEVOTHYROXINE SODIUM 50 MCG: 0.05 TABLET ORAL at 06:18

## 2023-07-05 RX ADMIN — DOCUSATE SODIUM 50 MG AND SENNOSIDES 8.6 MG 1 TABLET: 8.6; 5 TABLET, FILM COATED ORAL at 21:04

## 2023-07-05 RX ADMIN — FUROSEMIDE 40 MG: 10 INJECTION, SOLUTION INTRAMUSCULAR; INTRAVENOUS at 08:49

## 2023-07-05 RX ADMIN — BISACODYL 10 MG: 5 TABLET, COATED ORAL at 08:49

## 2023-07-05 RX ADMIN — FOLIC ACID 1000 MCG: 1 TABLET ORAL at 08:49

## 2023-07-05 RX ADMIN — TAMSULOSIN HYDROCHLORIDE 0.4 MG: 0.4 CAPSULE ORAL at 18:44

## 2023-07-05 RX ADMIN — MORPHINE SULFATE 1 MG: 2 INJECTION, SOLUTION INTRAMUSCULAR; INTRAVENOUS at 19:15

## 2023-07-05 RX ADMIN — PANTOPRAZOLE SODIUM 40 MG: 40 INJECTION, POWDER, FOR SOLUTION INTRAVENOUS at 08:49

## 2023-07-05 RX ADMIN — Medication 10 ML: at 08:43

## 2023-07-05 RX ADMIN — PANTOPRAZOLE SODIUM 40 MG: 40 INJECTION, POWDER, FOR SOLUTION INTRAVENOUS at 21:04

## 2023-07-05 RX ADMIN — HYDROXYZINE PAMOATE 25 MG: 25 CAPSULE ORAL at 21:04

## 2023-07-05 RX ADMIN — ACETAMINOPHEN 650 MG: 325 TABLET ORAL at 08:49

## 2023-07-05 RX ADMIN — ACETAMINOPHEN 650 MG: 325 TABLET ORAL at 16:42

## 2023-07-05 ASSESSMENT — PAIN SCALES - GENERAL
PAINLEVEL_OUTOF10: 10
PAINLEVEL_OUTOF10: 10
PAINLEVEL_OUTOF10: 9
PAINLEVEL_OUTOF10: 8

## 2023-07-05 ASSESSMENT — PAIN DESCRIPTION - DESCRIPTORS
DESCRIPTORS: ACHING;DISCOMFORT;SHARP
DESCRIPTORS: ACHING;DISCOMFORT
DESCRIPTORS: ACHING;DISCOMFORT;THROBBING
DESCRIPTORS: ACHING;DISCOMFORT

## 2023-07-05 ASSESSMENT — PAIN DESCRIPTION - ORIENTATION
ORIENTATION: LEFT

## 2023-07-05 ASSESSMENT — PAIN DESCRIPTION - LOCATION
LOCATION: HIP;LEG
LOCATION: HIP;LEG
LOCATION: HIP
LOCATION: HIP;LEG

## 2023-07-05 NOTE — PROGRESS NOTES
Occupational Therapy  OT BEDSIDE TREATMENT NOTE      Date:2023  Patient Name: Marcellus Beal  MRN: [id number]  : 1930  Room: [address] [place] [address]       Evaluating OT: Arthur Vargas OTR/L   [id number]       Referring Solomon Pugh MD    Specific Provider Orders/Date:OT eval and treat 2023       Diagnosis:  Primary osteoarthritis of left hip [M16.12]  Osteoarthritis of left hip, unspecified osteoarthritis type [M16.12]    Surgery: L MENDY     Pertinent Medical History:  CHF, CABG,      Precautions:  Fall Risk, WBAT L LE, posterolateral  hip precautions       Assessment of current deficits    [x] Functional mobility            [x]ADLs           [x] Strength                  []Cognition    [x] Functional transfers          [x] IADLs         [x] Safety Awareness   [x]Endurance    [] Fine Coordination                         [x] Balance      [] Vision/perception   []Sensation      []Gross Motor Coordination             [] ROM           [] Delirium                   [] Motor Control      OT PLAN OF CARE   OT POC based on physician orders, patient diagnosis and results of clinical assessment     Frequency/Duration  2-4 days/wk for 2 weeks PRN   Specific OT Treatment Interventions to include:   ADL retraining/adapted techniques and AE recommendations to increase functional independence within precautions                    Energy conservation techniques to improve tolerance for selfcare routine   Functional transfer/mobility training/DME recommendations for increased independence, safety and fall prevention         Patient/family education to increase safety and functional independence             Environmental modifications for safe mobility and completion of ADLs                             Therapeutic activity to improve functional performance during ADLs.                                          Therapeutic exercise to improve tolerance and functional strength for ADLs    Balance retraining/tolerance tasks for facilitation of postural control with dynamic challenges during ADLs . Positioning to improve functional independence        Recommended Adaptive Equipment: TBD      SOCIAL:  admitted from [facility]  primarily using wheelchair   Prior Home Living: Pt lives with wife and son ,split level. 5-6 steps/rail to enter. 7-8 steps/rails between level. Bathroom setup: walkin shower    Equipment owned: walker,  wheelchair      Prior Level of Function: assist  with ADLs , assist w with IADLs; ambulated walker (has one on each floor at home)        Pain Level: L hip pain ;   Cognition: A&O: pleasant, following commands, conversing               Memory:  fair +               Sequencing:  fair+              Problem solving:  fair+              Judgement/safety:  fair+                Functional Assessment:  AM-PAC Daily Activity Raw Score: 14/24    Initial Eval Status  Date: 6/30/2023 Treatment Status  Date: 7/3/23 STGs = LTGs  Time frame: 10-14 days   Feeding Independent       Grooming SBA/set-up ,seated   Mod I    UB Dressing Set-up  Min A  Mod I    LB Dressing Max a  Max A Min A    Bathing Mod A   Min A    Toileting Mod A    SBA    Bed Mobility  Mod A  Supine to sit  Supine to sit max A  SBA    Functional Transfers Min A  Sit - stand from bed  Sit <> stand min A  SBA/supervision    Functional Mobility Mod A, w/walker   Steps next to bed   SPT to chair   min A with WW across toom SBA with good tolerance    Balance Sitting:     Static:  SBA - EOB - patient having some dizziness - subsided     Dynamic:Min A   Standing: Min A   sitting balance SBA  Standing balance min A with [employer] Supervision    Activity Tolerance No SOB  Fair. Limited by pain today  Good  with ADL activity      Comments:  patient cleared with nursing and agreeable to session. Pleasant and motivated, quick to fatigue but some improvement demonstrated. Further education provided on hip precautions, fair carry over.  Patient in chair with call light in reach    Education/treatment: ADL and functional transfer/activity performed to increase safety and independence during self care tasks. Education provided on safety awareness, adl reeducation, functional transfer training, hip precautions    Pt has made progress towards set goals.      Time In: 9:38  Time Out: 9:50     Min Units   Therapeutic Ex [id number]     Therapeutic Activities [id number]   ADL/Self Care 46147     Orthotic Management 13509     Neuro Re-Ed 65073     Non-Billable Time     TOTAL TIMED TREATMENT [address] [facility] Karena MCKEE/L [id number]

## 2023-07-05 NOTE — PROGRESS NOTES
Physical Therapy  [facility]  Physical Therapy Treatment Note    Name: Salome Hyde  : 1930  MRN: [id number]  Date of Service: 2023      Attending Provider:  Yovanny Garcia MD    Evaluating PT:  Nohemy Arrington P.T. 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 Procedure/Surgery:  L MENDY 23  Precautions:  WBAT LLE, L hip posterolateral precautions    SUBJECTIVE:    Pt lives with wife and son in a tri-level home with 5 stairs and 1 rail to enter. There are 7 steps and 2 rails to the main floor and 7 steps with 2 rails to 2nd floor bed and bath. Pt ambulated with ww and assist, but came in from SNF and has been using WC most of the time for mobility. At home he has a ww on each floor. OBJECTIVE:   Initial Evaluation  Date: 23 Treatment  2023  Short Term/ Long Term   Goals   Was pt agreeable to Eval/treatment? yes yes    Does pt have pain? C/o mild L hip pain L hip pain all movement    Bed Mobility  Rolling: MOD A  Supine to sit: MOD A  Sit to supine: NA  Scooting: MOD A Supine to sit: Max A  Scooting: Max A seated to EOB SBA   Transfers Sit to stand: MIN A  Stand to sit: MIN A  Stand pivot: MOD A with ww Sit <> stand: Min A SBA   Ambulation   3 feet with ww MOD A 20 feet with [employer] Min A WBAT L  feet with ww SBA/CGA   Stair negotiation: ascended and descended NA NT 8 steps with 2 rails CGA/MIN A   AM-PAC 6 Clicks 51/98 34/11        Pt is alert, able to follow repeated instruction, states L hip pain limiting his walking. Pt given morphine prior to Rx.   Balance: poor dynamic with [employer]    Pt performed therapeutic exercise of the following: NT    Patient education/treatment  Pt was

## 2023-07-05 NOTE — CARE COORDINATION
Patient off the floor during rounds. Depending on H&H dynamics and nuclear medicine bleeding scan, consider endoscopy tomorrow. Please , call with questions/concerns.   Thank you    Dashawn Martinez MD

## 2023-07-05 NOTE — CARE COORDINATION
Updated plan of care. Bleeding scan today. On 2L/NC. Manuel carrizales.  Karyn Murrell following-chapoo

## 2023-07-05 NOTE — PLAN OF CARE
Problem: Chronic Conditions and Co-morbidities  Goal: Patient's chronic conditions and co-morbidity symptoms are monitored and maintained or improved  7/4/2023 2338 by Alejandra Fraire RN  Outcome: Progressing  Flowsheets (Taken 7/4/2023 2148)  Care Plan - Patient's Chronic Conditions and Co-Morbidity Symptoms are Monitored and Maintained or Improved: Monitor and assess patient's chronic conditions and comorbid symptoms for stability, deterioration, or improvement  7/4/2023 1850 by Maritza Hart RN  Outcome: Progressing     Problem: Discharge Planning  Goal: Discharge to home or other facility with appropriate resources  7/4/2023 2338 by Alejandra Fraire RN  Outcome: Progressing  Flowsheets (Taken 7/4/2023 2148)  Discharge to home or other facility with appropriate resources: Identify barriers to discharge with patient and caregiver  7/4/2023 1850 by Maritza Hart RN  Outcome: Progressing     Problem: Pain  Goal: Verbalizes/displays adequate comfort level or baseline comfort level  7/4/2023 2338 by Alejandra Fraire RN  Outcome: Progressing  7/4/2023 1850 by Maritza Hart RN  Outcome: Progressing     Problem: Skin/Tissue Integrity  Goal: Absence of new skin breakdown  Description: 1. Monitor for areas of redness and/or skin breakdown  2. Assess vascular access sites hourly  3. Every 4-6 hours minimum:  Change oxygen saturation probe site  4. Every 4-6 hours:  If on nasal continuous positive airway pressure, respiratory therapy assess nares and determine need for appliance change or resting period.   7/4/2023 2338 by Alejandra Fraire RN  Outcome: Progressing  7/4/2023 1850 by Maritza Hart RN  Outcome: Progressing     Problem: ABCDS Injury Assessment  Goal: Absence of physical injury  7/4/2023 2338 by Alejandra Fraire RN  Outcome: Progressing  7/4/2023 1850 by Maritza Hart RN  Outcome: Progressing     Problem: Safety - Adult  Goal: Free from fall injury  7/4/2023 2338 by

## 2023-07-06 ENCOUNTER — ANESTHESIA (OUTPATIENT)
Dept: OPERATING ROOM | Age: 88
End: 2023-07-06
Payer: MEDICARE

## 2023-07-06 ENCOUNTER — ANESTHESIA EVENT (OUTPATIENT)
Dept: OPERATING ROOM | Age: 88
End: 2023-07-06
Payer: MEDICARE

## 2023-07-06 LAB
ANION GAP SERPL CALCULATED.3IONS-SCNC: 9 MMOL/L (ref 7–16)
BUN SERPL-MCNC: 18 MG/DL (ref 6–23)
CALCIUM SERPL-MCNC: 8 MG/DL (ref 8.6–10.2)
CHLORIDE SERPL-SCNC: 104 MMOL/L (ref 98–107)
CO2 SERPL-SCNC: 25 MMOL/L (ref 22–29)
CREAT SERPL-MCNC: 1.2 MG/DL (ref 0.7–1.2)
GLUCOSE SERPL-MCNC: 116 MG/DL (ref 74–99)
HCT VFR BLD AUTO: 21.5 % (ref 37–54)
HGB BLD-MCNC: 7.2 G/DL (ref 12.5–16.5)
METER GLUCOSE: 123 MG/DL (ref 74–99)
METER GLUCOSE: 134 MG/DL (ref 74–99)
METER GLUCOSE: 145 MG/DL (ref 74–99)
METER GLUCOSE: 220 MG/DL (ref 74–99)
POTASSIUM SERPL-SCNC: 3.4 MMOL/L (ref 3.5–5)
SODIUM SERPL-SCNC: 138 MMOL/L (ref 132–146)

## 2023-07-06 PROCEDURE — 85018 HEMOGLOBIN: CPT

## 2023-07-06 PROCEDURE — 0SRB04Z REPLACEMENT OF LEFT HIP JOINT WITH CERAMIC ON POLYETHYLENE SYNTHETIC SUBSTITUTE, OPEN APPROACH: ICD-10-PCS | Performed by: ORTHOPAEDIC SURGERY

## 2023-07-06 PROCEDURE — 3700000000 HC ANESTHESIA ATTENDED CARE: Performed by: INTERNAL MEDICINE

## 2023-07-06 PROCEDURE — 2700000000 HC OXYGEN THERAPY PER DAY

## 2023-07-06 PROCEDURE — 6360000002 HC RX W HCPCS: Performed by: INTERNAL MEDICINE

## 2023-07-06 PROCEDURE — 7100000011 HC PHASE II RECOVERY - ADDTL 15 MIN: Performed by: INTERNAL MEDICINE

## 2023-07-06 PROCEDURE — 85014 HEMATOCRIT: CPT

## 2023-07-06 PROCEDURE — 2709999900 HC NON-CHARGEABLE SUPPLY: Performed by: INTERNAL MEDICINE

## 2023-07-06 PROCEDURE — 6370000000 HC RX 637 (ALT 250 FOR IP): Performed by: ORTHOPAEDIC SURGERY

## 2023-07-06 PROCEDURE — 2580000003 HC RX 258: Performed by: NURSE PRACTITIONER

## 2023-07-06 PROCEDURE — 7100000010 HC PHASE II RECOVERY - FIRST 15 MIN: Performed by: INTERNAL MEDICINE

## 2023-07-06 PROCEDURE — 97530 THERAPEUTIC ACTIVITIES: CPT

## 2023-07-06 PROCEDURE — 2580000003 HC RX 258: Performed by: INTERNAL MEDICINE

## 2023-07-06 PROCEDURE — 2580000003 HC RX 258: Performed by: ORTHOPAEDIC SURGERY

## 2023-07-06 PROCEDURE — 99232 SBSQ HOSP IP/OBS MODERATE 35: CPT | Performed by: INTERNAL MEDICINE

## 2023-07-06 PROCEDURE — C9113 INJ PANTOPRAZOLE SODIUM, VIA: HCPCS | Performed by: NURSE PRACTITIONER

## 2023-07-06 PROCEDURE — 3700000001 HC ADD 15 MINUTES (ANESTHESIA): Performed by: INTERNAL MEDICINE

## 2023-07-06 PROCEDURE — 3600007511: Performed by: INTERNAL MEDICINE

## 2023-07-06 PROCEDURE — 36415 COLL VENOUS BLD VENIPUNCTURE: CPT

## 2023-07-06 PROCEDURE — 6360000002 HC RX W HCPCS

## 2023-07-06 PROCEDURE — 0DJ08ZZ INSPECTION OF UPPER INTESTINAL TRACT, VIA NATURAL OR ARTIFICIAL OPENING ENDOSCOPIC: ICD-10-PCS | Performed by: INTERNAL MEDICINE

## 2023-07-06 PROCEDURE — 6370000000 HC RX 637 (ALT 250 FOR IP): Performed by: INTERNAL MEDICINE

## 2023-07-06 PROCEDURE — 6360000002 HC RX W HCPCS: Performed by: NURSE PRACTITIONER

## 2023-07-06 PROCEDURE — 3600007501: Performed by: INTERNAL MEDICINE

## 2023-07-06 PROCEDURE — A4216 STERILE WATER/SALINE, 10 ML: HCPCS | Performed by: NURSE PRACTITIONER

## 2023-07-06 PROCEDURE — 2580000003 HC RX 258

## 2023-07-06 PROCEDURE — 80048 BASIC METABOLIC PNL TOTAL CA: CPT

## 2023-07-06 PROCEDURE — 1200000000 HC SEMI PRIVATE

## 2023-07-06 PROCEDURE — 82962 GLUCOSE BLOOD TEST: CPT

## 2023-07-06 RX ORDER — HYDRALAZINE HYDROCHLORIDE 20 MG/ML
5 INJECTION INTRAMUSCULAR; INTRAVENOUS
Status: DISCONTINUED | OUTPATIENT
Start: 2023-07-06 | End: 2023-07-06 | Stop reason: HOSPADM

## 2023-07-06 RX ORDER — PROCHLORPERAZINE EDISYLATE 5 MG/ML
5 INJECTION INTRAMUSCULAR; INTRAVENOUS
Status: DISCONTINUED | OUTPATIENT
Start: 2023-07-06 | End: 2023-07-06 | Stop reason: HOSPADM

## 2023-07-06 RX ORDER — FENTANYL CITRATE 50 UG/ML
25 INJECTION, SOLUTION INTRAMUSCULAR; INTRAVENOUS EVERY 5 MIN PRN
Status: DISCONTINUED | OUTPATIENT
Start: 2023-07-06 | End: 2023-07-06 | Stop reason: HOSPADM

## 2023-07-06 RX ORDER — MEPERIDINE HYDROCHLORIDE 25 MG/ML
12.5 INJECTION INTRAMUSCULAR; INTRAVENOUS; SUBCUTANEOUS ONCE
Status: DISCONTINUED | OUTPATIENT
Start: 2023-07-06 | End: 2023-07-06 | Stop reason: HOSPADM

## 2023-07-06 RX ORDER — SODIUM CHLORIDE 9 MG/ML
INJECTION, SOLUTION INTRAVENOUS CONTINUOUS PRN
Status: DISCONTINUED | OUTPATIENT
Start: 2023-07-06 | End: 2023-07-06 | Stop reason: SDUPTHER

## 2023-07-06 RX ORDER — DIPHENHYDRAMINE HYDROCHLORIDE 50 MG/ML
12.5 INJECTION INTRAMUSCULAR; INTRAVENOUS
Status: DISCONTINUED | OUTPATIENT
Start: 2023-07-06 | End: 2023-07-06 | Stop reason: HOSPADM

## 2023-07-06 RX ORDER — SODIUM CHLORIDE 0.9 % (FLUSH) 0.9 %
5-40 SYRINGE (ML) INJECTION PRN
Status: DISCONTINUED | OUTPATIENT
Start: 2023-07-06 | End: 2023-07-06 | Stop reason: HOSPADM

## 2023-07-06 RX ORDER — LABETALOL HYDROCHLORIDE 5 MG/ML
5 INJECTION, SOLUTION INTRAVENOUS
Status: DISCONTINUED | OUTPATIENT
Start: 2023-07-06 | End: 2023-07-06 | Stop reason: HOSPADM

## 2023-07-06 RX ORDER — SODIUM CHLORIDE 9 MG/ML
INJECTION, SOLUTION INTRAVENOUS PRN
Status: DISCONTINUED | OUTPATIENT
Start: 2023-07-06 | End: 2023-07-06 | Stop reason: HOSPADM

## 2023-07-06 RX ORDER — SODIUM CHLORIDE 0.9 % (FLUSH) 0.9 %
5-40 SYRINGE (ML) INJECTION EVERY 12 HOURS SCHEDULED
Status: DISCONTINUED | OUTPATIENT
Start: 2023-07-06 | End: 2023-07-06 | Stop reason: HOSPADM

## 2023-07-06 RX ORDER — PANTOPRAZOLE SODIUM 40 MG/1
40 TABLET, DELAYED RELEASE ORAL
Status: DISCONTINUED | OUTPATIENT
Start: 2023-07-07 | End: 2023-07-07 | Stop reason: HOSPADM

## 2023-07-06 RX ORDER — PROPOFOL 10 MG/ML
INJECTION, EMULSION INTRAVENOUS PRN
Status: DISCONTINUED | OUTPATIENT
Start: 2023-07-06 | End: 2023-07-06 | Stop reason: SDUPTHER

## 2023-07-06 RX ADMIN — ESCITALOPRAM OXALATE 10 MG: 10 TABLET ORAL at 08:38

## 2023-07-06 RX ADMIN — HYDROXYZINE PAMOATE 25 MG: 25 CAPSULE ORAL at 20:38

## 2023-07-06 RX ADMIN — DOCUSATE SODIUM 50 MG AND SENNOSIDES 8.6 MG 1 TABLET: 8.6; 5 TABLET, FILM COATED ORAL at 08:38

## 2023-07-06 RX ADMIN — ACETAMINOPHEN 650 MG: 325 TABLET ORAL at 03:43

## 2023-07-06 RX ADMIN — BISACODYL 10 MG: 5 TABLET, COATED ORAL at 08:38

## 2023-07-06 RX ADMIN — CARVEDILOL 12.5 MG: 6.25 TABLET, FILM COATED ORAL at 08:38

## 2023-07-06 RX ADMIN — TRAMADOL HYDROCHLORIDE 50 MG: 50 TABLET ORAL at 10:20

## 2023-07-06 RX ADMIN — TAMSULOSIN HYDROCHLORIDE 0.4 MG: 0.4 CAPSULE ORAL at 18:12

## 2023-07-06 RX ADMIN — LEVOTHYROXINE SODIUM 50 MCG: 0.05 TABLET ORAL at 05:56

## 2023-07-06 RX ADMIN — Medication 10 ML: at 20:38

## 2023-07-06 RX ADMIN — ASPIRIN 81 MG: 81 TABLET, COATED ORAL at 20:38

## 2023-07-06 RX ADMIN — ACETAMINOPHEN 650 MG: 325 TABLET ORAL at 18:12

## 2023-07-06 RX ADMIN — Medication 10 ML: at 08:59

## 2023-07-06 RX ADMIN — DOCUSATE SODIUM 100 MG: 100 CAPSULE, LIQUID FILLED ORAL at 05:56

## 2023-07-06 RX ADMIN — PROPOFOL 100 MG: 10 INJECTION, EMULSION INTRAVENOUS at 16:32

## 2023-07-06 RX ADMIN — ACETAMINOPHEN 650 MG: 325 TABLET ORAL at 08:38

## 2023-07-06 RX ADMIN — FOLIC ACID 1000 MCG: 1 TABLET ORAL at 08:38

## 2023-07-06 RX ADMIN — DOCUSATE SODIUM 50 MG AND SENNOSIDES 8.6 MG 1 TABLET: 8.6; 5 TABLET, FILM COATED ORAL at 20:38

## 2023-07-06 RX ADMIN — TRAMADOL HYDROCHLORIDE 50 MG: 50 TABLET ORAL at 18:11

## 2023-07-06 RX ADMIN — SODIUM CHLORIDE: 9 INJECTION, SOLUTION INTRAVENOUS at 16:28

## 2023-07-06 RX ADMIN — PANTOPRAZOLE SODIUM 40 MG: 40 INJECTION, POWDER, FOR SOLUTION INTRAVENOUS at 08:46

## 2023-07-06 RX ADMIN — CARVEDILOL 12.5 MG: 6.25 TABLET, FILM COATED ORAL at 18:12

## 2023-07-06 RX ADMIN — FUROSEMIDE 40 MG: 10 INJECTION, SOLUTION INTRAMUSCULAR; INTRAVENOUS at 08:38

## 2023-07-06 RX ADMIN — AMLODIPINE BESYLATE 10 MG: 10 TABLET ORAL at 20:38

## 2023-07-06 ASSESSMENT — PAIN DESCRIPTION - DESCRIPTORS
DESCRIPTORS: ACHING
DESCRIPTORS: ACHING

## 2023-07-06 ASSESSMENT — PAIN SCALES - GENERAL
PAINLEVEL_OUTOF10: 9
PAINLEVEL_OUTOF10: 4
PAINLEVEL_OUTOF10: 0
PAINLEVEL_OUTOF10: 0
PAINLEVEL_OUTOF10: 6
PAINLEVEL_OUTOF10: 0
PAINLEVEL_OUTOF10: 0

## 2023-07-06 ASSESSMENT — PAIN DESCRIPTION - LOCATION
LOCATION: HIP;LEG
LOCATION: HIP
LOCATION: LEG

## 2023-07-06 ASSESSMENT — PAIN DESCRIPTION - ORIENTATION
ORIENTATION: LEFT

## 2023-07-06 ASSESSMENT — LIFESTYLE VARIABLES: SMOKING_STATUS: 0

## 2023-07-06 NOTE — PROGRESS NOTES
for facilitation of postural control with dynamic challenges during ADLs . Positioning to improve functional independence        Recommended Adaptive Equipment: TBD      SOCIAL:  admitted from [facility]  primarily using wheelchair   Prior Home Living: Pt lives with wife and son ,split level. 5-6 steps/rail to enter. 7-8 steps/rails between level. Bathroom setup: walkin shower    Equipment owned: walker,  wheelchair      Prior Level of Function: assist  with ADLs , assist w with IADLs; ambulated walker (has one on each floor at home)        Pain Level: L hip pain ;   Cognition: A&O: pleasant, following commands, conversing               Memory:  fair +               Sequencing:  fair+              Problem solving:  fair+              Judgement/safety:  fair+                Functional Assessment:  AM-PAC Daily Activity Raw Score: 14/24    Initial Eval Status  Date: 6/30/2023 Treatment Status  Date: 7/6/23 STGs = LTGs  Time frame: 10-14 days   Feeding Independent       Grooming SBA/set-up ,seated   Mod I    UB Dressing Set-up  Min A  Mod I    LB Dressing Max a  Max A Min A    Bathing Mod A   Min A    Toileting Mod A    SBA    Bed Mobility  Mod A  Supine to sit  Supine to sit mod A  SBA    Functional Transfers Min A  Sit - stand from bed  Sit <> stand min A  SBA/supervision    Functional Mobility Mod A, w/walker   Steps next to bed   SPT to chair   min A with WW in room SBA with good tolerance    Balance Sitting:     Static:  SBA - EOB - patient having some dizziness - subsided     Dynamic:Min A   Standing: Min A   sitting balance SBA  Standing balance min A with [employer] Supervision    Activity Tolerance No SOB  Fair. Good  with ADL activity      Comments:  patient cleared with nursing and agreeable to session. Pleasant and motivated, quick to fatigue but improvement continues to be demonstrated. Further education provided on hip precautions,continue to reinforce.  Patient in chair with call light in

## 2023-07-06 NOTE — ANESTHESIA PRE PROCEDURE
results for input(s): POCGLU, POCNA, POCK, POCCL, POCBUN, POCHEMO, POCHCT in the last 72 hours. Coags:   Lab Results   Component Value Date/Time    PROTIME 12.4 03/13/2023 10:15 AM    INR 1.1 03/13/2023 10:15 AM       HCG (If Applicable): No results found for: PREGTESTUR, PREGSERUM, HCG, HCGQUANT     ABGs: No results found for: PHART, PO2ART, QIC7RYV, QJE6LTF, BEART, L7XTLNQT     Type & Screen (If Applicable):  No results found for: LABABO, LABRH    Drug/Infectious Status (If Applicable):  No results found for: HIV, HEPCAB    COVID-19 Screening (If Applicable):   Lab Results   Component Value Date/Time    COVID19 DETECTED 03/20/2023 07:00 AM           Anesthesia Evaluation  Patient summary reviewed and Nursing notes reviewed no history of anesthetic complications:   Airway: Mallampati: III  TM distance: >3 FB   Neck ROM: limited  Mouth opening: > = 3 FB   Dental:    (+) poor dentition      Pulmonary:   (+) decreased breath sounds: bilateral     (-) sleep apnea and not a current smoker                           Cardiovascular:  Exercise tolerance: poor (<4 METS),   (+) hypertension: moderate, valvular problems/murmurs (Mild-mod. w/ valve area 1.1 cm2): AS, pacemaker (SSS): pacemaker, CAD: obstructive, CABG/stent:, CHF: diastolic and systolic, murmur: Grade 2, pulmonary hypertension (RVSP 47 mm Hg): mild,       NYHA Classification: I  ECG reviewed  Rhythm: regular  Rate: normal  Echocardiogram reviewed         B[employer]ocker:  Dose within 24 Hrs      ROS comment: EKG:  Sinus rhythm with premature supraventricular complexes  Left axis deviation  Low voltage QRS  Abnormal ECG  When compared with ECG of 29-JUN-2023 21:24,  No significant change was found    ECHO:  Normal left ventricle size and systolic function. Ejection fraction is visually estimated at 55-60%. No regional wall motion abnormalities seen. Normal left ventricle wall thickness. Stage II diastolic dysfunction.   Normal right ventricular size and

## 2023-07-06 NOTE — ANESTHESIA POSTPROCEDURE EVALUATION
Department of Anesthesiology  Postprocedure Note    Patient: Ambika Garibay  MRN: [id number]  YOB: 1930  Date of evaluation: 7/6/2023      Procedure Summary     Date: 07/06/23 Room / Location: [facility] OR 07 / [facility]    Anesthesia Start: 1628 Anesthesia Stop: 1648    Procedure: EGD DIAGNOSTIC ONLY Diagnosis:       Gastrointestinal hemorrhage, unspecified gastrointestinal hemorrhage type      (Gastrointestinal hemorrhage, unspecified gastrointestinal hemorrhage type [K92.2])    Surgeons: Daniel Higginbotham DO Responsible Provider: Beverly Chavez DO    Anesthesia Type: MAC ASA Status: 4          Anesthesia Type: No value filed. Kin Phase I: Kin Score: 8    Kin Phase II:        Anesthesia Post Evaluation    Patient location during evaluation: PACU  Patient participation: complete - patient participated  Level of consciousness: awake  Pain score: 6 (Left hip pain.)  Airway patency: patent  Nausea & Vomiting: no nausea and no vomiting  Complications: no  Cardiovascular status: blood pressure returned to baseline and hemodynamically stable  Respiratory status: acceptable  Hydration status: euvolemic  Comments: Seen and examined. Progressing as expected. No questions.

## 2023-07-06 NOTE — PROGRESS NOTES
Physical Therapy  Facility/Department: [facility] MED SURG  Physical Therapy Treatment Note    Name: Veronica Ramirez  : 1930  MRN: [id number]  Date of Service: 2023      Attending Provider:  Sara Perez MD    Evaluating PT:  Savita Coles. 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 Procedure/Surgery:  L MENDY 23  Precautions:  WBAT LLE, L hip posterolateral precautions    SUBJECTIVE:    Pt lives with wife and son in a tri-level home with 5 stairs and 1 rail to enter. There are 7 steps and 2 rails to the main floor and 7 steps with 2 rails to 2nd floor bed and bath. Pt ambulated with ww and assist, but came in from SNF and has been using WC most of the time for mobility. At home he has a ww on each floor. OBJECTIVE:   Initial Evaluation  Date: 23 Treatment  2023  Short Term/ Long Term   Goals   Was pt agreeable to Eval/treatment? yes yes    Does pt have pain? C/o mild L hip pain L hip pain all movement    Bed Mobility  Rolling: MOD A  Supine to sit: MOD A  Sit to supine: NA  Scooting: MOD A Supine to sit: Mod A  Scooting: Mod A seated to EOB SBA   Transfers Sit to stand: MIN A  Stand to sit: MIN A  Stand pivot: MOD A with ww Sit <> stand: Min A SBA   Ambulation   3 feet with ww MOD A 40 feet with [employer] Min A WBAT L  feet with ww SBA/CGA   Stair negotiation: ascended and descended NA NT 8 steps with 2 rails CGA/MIN A   AM-PAC 6 Clicks       Pt alert, able to follow instruction, states L hip less painful today.    Balance: poor dynamic with [employer]    Pt performed therapeutic exercise of the following: NT    Patient education/treatment  Pt was educated on UE usage to assist with transfers, gait

## 2023-07-07 VITALS
OXYGEN SATURATION: 88 % | BODY MASS INDEX: 29.63 KG/M2 | WEIGHT: 161 LBS | HEART RATE: 73 BPM | TEMPERATURE: 98.2 F | HEIGHT: 62 IN | RESPIRATION RATE: 18 BRPM | SYSTOLIC BLOOD PRESSURE: 143 MMHG | DIASTOLIC BLOOD PRESSURE: 65 MMHG

## 2023-07-07 LAB
HCT VFR BLD AUTO: 22.3 % (ref 37–54)
HGB BLD-MCNC: 7.4 G/DL (ref 12.5–16.5)
METER GLUCOSE: 114 MG/DL (ref 74–99)
METER GLUCOSE: 149 MG/DL (ref 74–99)

## 2023-07-07 PROCEDURE — 82962 GLUCOSE BLOOD TEST: CPT

## 2023-07-07 PROCEDURE — 99232 SBSQ HOSP IP/OBS MODERATE 35: CPT | Performed by: INTERNAL MEDICINE

## 2023-07-07 PROCEDURE — 2580000003 HC RX 258: Performed by: INTERNAL MEDICINE

## 2023-07-07 PROCEDURE — 2700000000 HC OXYGEN THERAPY PER DAY

## 2023-07-07 PROCEDURE — 6370000000 HC RX 637 (ALT 250 FOR IP): Performed by: INTERNAL MEDICINE

## 2023-07-07 PROCEDURE — 97530 THERAPEUTIC ACTIVITIES: CPT

## 2023-07-07 PROCEDURE — 85014 HEMATOCRIT: CPT

## 2023-07-07 PROCEDURE — 36415 COLL VENOUS BLD VENIPUNCTURE: CPT

## 2023-07-07 PROCEDURE — 6360000002 HC RX W HCPCS: Performed by: INTERNAL MEDICINE

## 2023-07-07 PROCEDURE — 85018 HEMOGLOBIN: CPT

## 2023-07-07 PROCEDURE — 6370000000 HC RX 637 (ALT 250 FOR IP): Performed by: ORTHOPAEDIC SURGERY

## 2023-07-07 RX ORDER — PANTOPRAZOLE SODIUM 40 MG/1
40 TABLET, DELAYED RELEASE ORAL
Qty: 30 TABLET | Refills: 3 | DISCHARGE
Start: 2023-07-08

## 2023-07-07 RX ADMIN — ACETAMINOPHEN 650 MG: 325 TABLET ORAL at 06:39

## 2023-07-07 RX ADMIN — TRAMADOL HYDROCHLORIDE 50 MG: 50 TABLET ORAL at 09:31

## 2023-07-07 RX ADMIN — FOLIC ACID 1000 MCG: 1 TABLET ORAL at 09:05

## 2023-07-07 RX ADMIN — TRAMADOL HYDROCHLORIDE 50 MG: 50 TABLET ORAL at 03:09

## 2023-07-07 RX ADMIN — LEVOTHYROXINE SODIUM 50 MCG: 0.05 TABLET ORAL at 06:38

## 2023-07-07 RX ADMIN — CARVEDILOL 12.5 MG: 6.25 TABLET, FILM COATED ORAL at 09:05

## 2023-07-07 RX ADMIN — DOCUSATE SODIUM 100 MG: 100 CAPSULE, LIQUID FILLED ORAL at 06:37

## 2023-07-07 RX ADMIN — ESCITALOPRAM OXALATE 10 MG: 10 TABLET ORAL at 09:05

## 2023-07-07 RX ADMIN — FUROSEMIDE 40 MG: 10 INJECTION, SOLUTION INTRAMUSCULAR; INTRAVENOUS at 09:05

## 2023-07-07 RX ADMIN — PANTOPRAZOLE SODIUM 40 MG: 40 TABLET, DELAYED RELEASE ORAL at 06:37

## 2023-07-07 RX ADMIN — DOCUSATE SODIUM 50 MG AND SENNOSIDES 8.6 MG 1 TABLET: 8.6; 5 TABLET, FILM COATED ORAL at 09:05

## 2023-07-07 RX ADMIN — ASPIRIN 81 MG: 81 TABLET, COATED ORAL at 09:04

## 2023-07-07 RX ADMIN — Medication 10 ML: at 09:06

## 2023-07-07 ASSESSMENT — PAIN DESCRIPTION - PAIN TYPE
TYPE: SURGICAL PAIN
TYPE: ACUTE PAIN
TYPE: ACUTE PAIN

## 2023-07-07 ASSESSMENT — PAIN DESCRIPTION - LOCATION
LOCATION: HIP

## 2023-07-07 ASSESSMENT — PAIN - FUNCTIONAL ASSESSMENT
PAIN_FUNCTIONAL_ASSESSMENT: PREVENTS OR INTERFERES SOME ACTIVE ACTIVITIES AND ADLS
PAIN_FUNCTIONAL_ASSESSMENT: ACTIVITIES ARE NOT PREVENTED
PAIN_FUNCTIONAL_ASSESSMENT: PREVENTS OR INTERFERES SOME ACTIVE ACTIVITIES AND ADLS

## 2023-07-07 ASSESSMENT — PAIN DESCRIPTION - ONSET
ONSET: GRADUAL

## 2023-07-07 ASSESSMENT — PAIN SCALES - GENERAL
PAINLEVEL_OUTOF10: 4
PAINLEVEL_OUTOF10: 3
PAINLEVEL_OUTOF10: 3
PAINLEVEL_OUTOF10: 8
PAINLEVEL_OUTOF10: 6

## 2023-07-07 ASSESSMENT — PAIN DESCRIPTION - ORIENTATION
ORIENTATION: LEFT

## 2023-07-07 ASSESSMENT — PAIN DESCRIPTION - FREQUENCY
FREQUENCY: INTERMITTENT

## 2023-07-07 ASSESSMENT — PAIN DESCRIPTION - DESCRIPTORS
DESCRIPTORS: ACHING;DISCOMFORT;SORE
DESCRIPTORS: ACHING;DISCOMFORT
DESCRIPTORS: ACHING;DISCOMFORT;NAGGING

## 2023-07-07 NOTE — PROGRESS NOTES
Call to [facility] with urology to see if voiding trial is possibility prior to discharge to facility.  Would like patient to DC with miguel, have facility call office Monday AM to plan voiding trial.

## 2023-07-07 NOTE — PLAN OF CARE
Problem: Chronic Conditions and Co-morbidities  Goal: Patient's chronic conditions and co-morbidity symptoms are monitored and maintained or improved  Outcome: Progressing  Flowsheets (Taken 7/6/2023 2100)  Care Plan - Patient's Chronic Conditions and Co-Morbidity Symptoms are Monitored and Maintained or Improved: Monitor and assess patient's chronic conditions and comorbid symptoms for stability, deterioration, or improvement     Problem: Discharge Planning  Goal: Discharge to home or other facility with appropriate resources  Outcome: Progressing  Flowsheets (Taken 7/6/2023 2100)  Discharge to home or other facility with appropriate resources: Identify barriers to discharge with patient and caregiver     Problem: Pain  Goal: Verbalizes/displays adequate comfort level or baseline comfort level  Outcome: Progressing     Problem: Skin/Tissue Integrity  Goal: Absence of new skin breakdown  Description: 1. Monitor for areas of redness and/or skin breakdown  2. Assess vascular access sites hourly  3. Every 4-6 hours minimum:  Change oxygen saturation probe site  4. Every 4-6 hours:  If on nasal continuous positive airway pressure, respiratory therapy assess nares and determine need for appliance change or resting period.   Outcome: Progressing     Problem: ABCDS Injury Assessment  Goal: Absence of physical injury  Outcome: Progressing     Problem: Safety - Adult  Goal: Free from fall injury  Outcome: Progressing

## 2023-07-07 NOTE — PROGRESS NOTES
Physical Therapy  Facility/Department: [facility]ATE MED SURG  Treatment Note    Name: Andra Adkins  : 1930  MRN: [id number]  Date of Service: 2023    Attending Provider:  Jesus Montejo MD    Evaluating PT:  Herminia Gordon. Jaylin White P.T. 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 Procedure/Surgery:  L MENDY 23  Precautions:  WBAT LLE, L hip posterolateral precautions, Hgb was 7.4 23    SUBJECTIVE:    Pt lives with wife and son in a tri-level home with 5 stairs and 1 rail to enter. There are 7 steps and 2 rails to the main floor and 7 steps with 2 rails to 2nd floor bed and bath. Pt ambulated with ww and assist, but came in from SNF and has been using WC most of the time for mobility. At home he has a ww on each floor. OBJECTIVE:   Initial Evaluation  Date: 23 Treatment   Short Term/ Long Term   Goals   Was pt agreeable to Eval/treatment? yes yes    Does pt have pain? C/o mild L hip pain C/o L hip pain    Bed Mobility  Rolling: MOD A  Supine to sit: MOD A  Sit to supine: NA  Scooting: MOD A Rolling: MIN A  Supine to sit: NA  Sit to supine: MOD A  Scooting: MOD A SBA   Transfers Sit to stand: MIN A  Stand to sit: MIN A  Stand pivot: MOD A with ww Sit to stand: MIN A  Stand to sit: MIN A  Stand pivot: MIN A with ww SBA   Ambulation   3 feet with ww MOD A 30 feet with ww MIN A 100 feet with ww SBA/CGA   Stair negotiation: ascended and descended NA NA, pt fatigued with amb and c/o weakness and L hip pain. 8 steps with 2 rails CGA/MIN A   AM-PAC 6 Clicks  84/19      BLE ROM is WFL and L hip is WFL allowed by precautions.   RLE Strength is grossly 3-/5 to 4/5 and LLE is grossly 2/5 to 3-/5  Balance: sitting on EOB is

## 2023-07-07 NOTE — PROGRESS NOTES
Nurse to nurse called to [facility]. Pt left via wheelchair. Discharge instructions given and all questions answered.

## 2023-07-07 NOTE — CARE COORDINATION
Updated plan of care. Pre-cert re-initiated for [employer]. Await auth. Forms done. Will follow-mjo    ADDEND:  time 12 noon by [employer].  Pt, wife and staff Silvia Zhao

## 2023-08-14 NOTE — DISCHARGE SUMMARY
Physician Discharge Summary     Patient ID:  Zulay Tang  [id number]  89 y.o.  4/23/1930    Admit date: 6/29/2023    Discharge date and time: 7/7/2023 12:39 PM     Admitting Physician: Kristen Law MD     Surgeon:  Elly Valverde. Hero Denney M.D. 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 Incision c/d/i, calf - soft, NVI. Acute on chronic anemia, received PRBC. Post-op urinary retention- saw urology. GI bleed- EGD showed no acute bleeds            Consults:  PT, OT, Internal Medicine, Urology, Gastroenterology    DVT Prophylaxis:  ASA 81mg BID x 30 days, early ambulation, mechanical compression    ABX Prophylaxis:  24 hrs perioperative    Weight-Bearing Status:  WBAT with posterior hip precautions      Disposition: SNF    Patient Instructions:      Medication List        START taking these medications      aspirin 81 MG EC tablet  Take 1 tablet by mouth 2 times daily For DVT prophylaxis.  Resume normal dose after 30 days  Replaces: aspirin 81 MG chewable tablet     pantoprazole 40 MG tablet  Commonly known as: PROTONIX  Take 1 tablet by mouth every morning (before breakfast)     sennosides-docusate sodium 8.6-50 MG tablet  Commonly known as: SENOKOT-S  Take 1 tablet by mouth 2 times daily For prevention of constipation            CONTINUE taking these medications      acetaminophen 325 MG tablet  Commonly known as: TYLENOL     amLODIPine 10 MG tablet  Commonly known as: NORVASC     bisacodyl 10 MG suppository  Commonly known as: DULCOLAX     carvedilol 12.5 MG tablet  Commonly known as: COREG  Take 1 tablet by mouth 2 times daily (with meals)     diclofenac sodium 1 % Gel  Commonly known as: VOLTAREN     docusate sodium

## 2024-02-23 ENCOUNTER — HOSPITAL ENCOUNTER (INPATIENT)
Age: 89
LOS: 2 days | Discharge: HOME | DRG: 312 | End: 2024-02-27
Attending: EMERGENCY MEDICINE | Admitting: STUDENT IN AN ORGANIZED HEALTH CARE EDUCATION/TRAINING PROGRAM
Payer: MEDICARE

## 2024-02-23 ENCOUNTER — APPOINTMENT (OUTPATIENT)
Dept: CT IMAGING | Age: 89
DRG: 312 | End: 2024-02-23
Payer: MEDICARE

## 2024-02-23 ENCOUNTER — APPOINTMENT (OUTPATIENT)
Dept: GENERAL RADIOLOGY | Age: 89
DRG: 312 | End: 2024-02-23
Payer: MEDICARE

## 2024-02-23 DIAGNOSIS — U07.1 COVID: Primary | ICD-10-CM

## 2024-02-23 DIAGNOSIS — R55 NEAR SYNCOPE: ICD-10-CM

## 2024-02-23 LAB
ALBUMIN SERPL-MCNC: 3.8 G/DL (ref 3.5–5.2)
ALP SERPL-CCNC: 84 U/L (ref 40–129)
ALT SERPL-CCNC: 6 U/L (ref 0–40)
ANION GAP SERPL CALCULATED.3IONS-SCNC: 8 MMOL/L (ref 7–16)
AST SERPL-CCNC: 8 U/L (ref 0–39)
B PARAP IS1001 DNA NPH QL NAA+NON-PROBE: NOT DETECTED
B PERT DNA SPEC QL NAA+PROBE: NOT DETECTED
BASOPHILS # BLD: 0 K/UL (ref 0–0.2)
BASOPHILS NFR BLD: 0 % (ref 0–2)
BILIRUB SERPL-MCNC: 0.3 MG/DL (ref 0–1.2)
BILIRUB UR QL STRIP: NEGATIVE
BUN SERPL-MCNC: 21 MG/DL (ref 6–23)
C PNEUM DNA NPH QL NAA+NON-PROBE: NOT DETECTED
CALCIUM SERPL-MCNC: 9 MG/DL (ref 8.6–10.2)
CHLORIDE SERPL-SCNC: 101 MMOL/L (ref 98–107)
CLARITY UR: CLEAR
CO2 SERPL-SCNC: 30 MMOL/L (ref 22–29)
COLOR UR: YELLOW
CREAT SERPL-MCNC: 1.4 MG/DL (ref 0.7–1.2)
EKG ATRIAL RATE: 71 BPM
EKG P AXIS: 86 DEGREES
EKG P-R INTERVAL: 170 MS
EKG Q-T INTERVAL: 390 MS
EKG QRS DURATION: 92 MS
EKG QTC CALCULATION (BAZETT): 423 MS
EKG R AXIS: -41 DEGREES
EKG T AXIS: 50 DEGREES
EKG VENTRICULAR RATE: 71 BPM
EOSINOPHIL # BLD: 0.15 K/UL (ref 0.05–0.5)
EOSINOPHILS RELATIVE PERCENT: 4 % (ref 0–6)
ERYTHROCYTE [DISTWIDTH] IN BLOOD BY AUTOMATED COUNT: 12.6 % (ref 11.5–15)
FLUAV RNA NPH QL NAA+NON-PROBE: NOT DETECTED
FLUBV RNA NPH QL NAA+NON-PROBE: NOT DETECTED
GFR SERPL CREATININE-BSD FRML MDRD: 47 ML/MIN/1.73M2
GLUCOSE BLD-MCNC: 140 MG/DL (ref 74–99)
GLUCOSE SERPL-MCNC: 128 MG/DL (ref 74–99)
GLUCOSE UR STRIP-MCNC: NEGATIVE MG/DL
HADV DNA NPH QL NAA+NON-PROBE: NOT DETECTED
HCOV 229E RNA NPH QL NAA+NON-PROBE: NOT DETECTED
HCOV HKU1 RNA NPH QL NAA+NON-PROBE: NOT DETECTED
HCOV NL63 RNA NPH QL NAA+NON-PROBE: NOT DETECTED
HCOV OC43 RNA NPH QL NAA+NON-PROBE: NOT DETECTED
HCT VFR BLD AUTO: 28.7 % (ref 37–54)
HGB BLD-MCNC: 9.4 G/DL (ref 12.5–16.5)
HGB UR QL STRIP.AUTO: NEGATIVE
HMPV RNA NPH QL NAA+NON-PROBE: NOT DETECTED
HPIV1 RNA NPH QL NAA+NON-PROBE: NOT DETECTED
HPIV2 RNA NPH QL NAA+NON-PROBE: NOT DETECTED
HPIV3 RNA NPH QL NAA+NON-PROBE: NOT DETECTED
HPIV4 RNA NPH QL NAA+NON-PROBE: NOT DETECTED
IMM GRANULOCYTES # BLD AUTO: <0.03 K/UL (ref 0–0.58)
IMM GRANULOCYTES NFR BLD: 0 % (ref 0–5)
KETONES UR STRIP-MCNC: NEGATIVE MG/DL
LEUKOCYTE ESTERASE UR QL STRIP: ABNORMAL
LYMPHOCYTES NFR BLD: 0.64 K/UL (ref 1.5–4)
LYMPHOCYTES RELATIVE PERCENT: 17 % (ref 20–42)
M PNEUMO DNA NPH QL NAA+NON-PROBE: NOT DETECTED
MCH RBC QN AUTO: 31.5 PG (ref 26–35)
MCHC RBC AUTO-ENTMCNC: 32.8 G/DL (ref 32–34.5)
MCV RBC AUTO: 96.3 FL (ref 80–99.9)
MONOCYTES NFR BLD: 0.32 K/UL (ref 0.1–0.95)
MONOCYTES NFR BLD: 8 % (ref 2–12)
NEUTROPHILS NFR BLD: 71 % (ref 43–80)
NEUTS SEG NFR BLD: 2.68 K/UL (ref 1.8–7.3)
NITRITE UR QL STRIP: NEGATIVE
PH UR STRIP: 6 [PH] (ref 5–9)
PLATELET # BLD AUTO: 114 K/UL (ref 130–450)
PMV BLD AUTO: 9 FL (ref 7–12)
POTASSIUM SERPL-SCNC: 4.7 MMOL/L (ref 3.5–5)
PROT SERPL-MCNC: 6.6 G/DL (ref 6.4–8.3)
PROT UR STRIP-MCNC: NEGATIVE MG/DL
RBC # BLD AUTO: 2.98 M/UL (ref 3.8–5.8)
RBC #/AREA URNS HPF: NORMAL /HPF
RSV RNA NPH QL NAA+NON-PROBE: NOT DETECTED
RV+EV RNA NPH QL NAA+NON-PROBE: NOT DETECTED
SARS-COV-2 RNA NPH QL NAA+NON-PROBE: DETECTED
SODIUM SERPL-SCNC: 139 MMOL/L (ref 132–146)
SP GR UR STRIP: 1.02 (ref 1–1.03)
SPECIMEN DESCRIPTION: ABNORMAL
TROPONIN I SERPL HS-MCNC: 64 NG/L (ref 0–11)
UROBILINOGEN UR STRIP-ACNC: 0.2 EU/DL (ref 0–1)
WBC #/AREA URNS HPF: NORMAL /HPF
WBC OTHER # BLD: 3.8 K/UL (ref 4.5–11.5)

## 2024-02-23 PROCEDURE — 80053 COMPREHEN METABOLIC PANEL: CPT

## 2024-02-23 PROCEDURE — 84484 ASSAY OF TROPONIN QUANT: CPT

## 2024-02-23 PROCEDURE — 96361 HYDRATE IV INFUSION ADD-ON: CPT

## 2024-02-23 PROCEDURE — G0378 HOSPITAL OBSERVATION PER HR: HCPCS

## 2024-02-23 PROCEDURE — 70450 CT HEAD/BRAIN W/O DYE: CPT

## 2024-02-23 PROCEDURE — 99285 EMERGENCY DEPT VISIT HI MDM: CPT

## 2024-02-23 PROCEDURE — 2580000003 HC RX 258: Performed by: STUDENT IN AN ORGANIZED HEALTH CARE EDUCATION/TRAINING PROGRAM

## 2024-02-23 PROCEDURE — 6370000000 HC RX 637 (ALT 250 FOR IP): Performed by: STUDENT IN AN ORGANIZED HEALTH CARE EDUCATION/TRAINING PROGRAM

## 2024-02-23 PROCEDURE — 96360 HYDRATION IV INFUSION INIT: CPT

## 2024-02-23 PROCEDURE — 0202U NFCT DS 22 TRGT SARS-COV-2: CPT

## 2024-02-23 PROCEDURE — 82962 GLUCOSE BLOOD TEST: CPT

## 2024-02-23 PROCEDURE — 85025 COMPLETE CBC W/AUTO DIFF WBC: CPT

## 2024-02-23 PROCEDURE — 93005 ELECTROCARDIOGRAM TRACING: CPT | Performed by: EMERGENCY MEDICINE

## 2024-02-23 PROCEDURE — 71045 X-RAY EXAM CHEST 1 VIEW: CPT

## 2024-02-23 PROCEDURE — 81001 URINALYSIS AUTO W/SCOPE: CPT

## 2024-02-23 PROCEDURE — 93010 ELECTROCARDIOGRAM REPORT: CPT | Performed by: INTERNAL MEDICINE

## 2024-02-23 RX ORDER — SODIUM CHLORIDE 0.9 % (FLUSH) 0.9 %
5-40 SYRINGE (ML) INJECTION EVERY 12 HOURS SCHEDULED
Status: DISCONTINUED | OUTPATIENT
Start: 2024-02-23 | End: 2024-02-27 | Stop reason: HOSPADM

## 2024-02-23 RX ORDER — POTASSIUM CHLORIDE 7.45 MG/ML
10 INJECTION INTRAVENOUS PRN
Status: DISCONTINUED | OUTPATIENT
Start: 2024-02-23 | End: 2024-02-27 | Stop reason: HOSPADM

## 2024-02-23 RX ORDER — DOCUSATE SODIUM 100 MG/1
100 CAPSULE, LIQUID FILLED ORAL DAILY
Status: DISCONTINUED | OUTPATIENT
Start: 2024-02-23 | End: 2024-02-23

## 2024-02-23 RX ORDER — ACETAMINOPHEN 650 MG/1
650 SUPPOSITORY RECTAL EVERY 6 HOURS PRN
Status: DISCONTINUED | OUTPATIENT
Start: 2024-02-23 | End: 2024-02-27 | Stop reason: HOSPADM

## 2024-02-23 RX ORDER — ASPIRIN 81 MG/1
81 TABLET ORAL NIGHTLY
Status: ON HOLD | COMMUNITY
End: 2024-02-26 | Stop reason: HOSPADM

## 2024-02-23 RX ORDER — FINASTERIDE 5 MG/1
5 TABLET, FILM COATED ORAL EVERY EVENING
Status: DISCONTINUED | OUTPATIENT
Start: 2024-02-23 | End: 2024-02-27 | Stop reason: HOSPADM

## 2024-02-23 RX ORDER — POTASSIUM CHLORIDE 20 MEQ/1
40 TABLET, EXTENDED RELEASE ORAL PRN
Status: DISCONTINUED | OUTPATIENT
Start: 2024-02-23 | End: 2024-02-27 | Stop reason: HOSPADM

## 2024-02-23 RX ORDER — PANTOPRAZOLE SODIUM 40 MG/1
40 TABLET, DELAYED RELEASE ORAL
Status: DISCONTINUED | OUTPATIENT
Start: 2024-02-24 | End: 2024-02-27 | Stop reason: HOSPADM

## 2024-02-23 RX ORDER — ONDANSETRON 2 MG/ML
4 INJECTION INTRAMUSCULAR; INTRAVENOUS EVERY 6 HOURS PRN
Status: DISCONTINUED | OUTPATIENT
Start: 2024-02-23 | End: 2024-02-27 | Stop reason: HOSPADM

## 2024-02-23 RX ORDER — ASCORBIC ACID 500 MG
500 TABLET ORAL DAILY
Status: DISCONTINUED | OUTPATIENT
Start: 2024-02-23 | End: 2024-02-27 | Stop reason: HOSPADM

## 2024-02-23 RX ORDER — DULOXETIN HYDROCHLORIDE 60 MG/1
60 CAPSULE, DELAYED RELEASE ORAL NIGHTLY
COMMUNITY

## 2024-02-23 RX ORDER — ACETAMINOPHEN 325 MG/1
650 TABLET ORAL EVERY 6 HOURS PRN
Status: DISCONTINUED | OUTPATIENT
Start: 2024-02-23 | End: 2024-02-27 | Stop reason: HOSPADM

## 2024-02-23 RX ORDER — SODIUM CHLORIDE 9 MG/ML
INJECTION, SOLUTION INTRAVENOUS PRN
Status: DISCONTINUED | OUTPATIENT
Start: 2024-02-23 | End: 2024-02-27 | Stop reason: HOSPADM

## 2024-02-23 RX ORDER — CARVEDILOL 6.25 MG/1
12.5 TABLET ORAL 2 TIMES DAILY WITH MEALS
Status: DISCONTINUED | OUTPATIENT
Start: 2024-02-23 | End: 2024-02-27 | Stop reason: HOSPADM

## 2024-02-23 RX ORDER — ASPIRIN 81 MG/1
81 TABLET ORAL DAILY
Status: DISCONTINUED | OUTPATIENT
Start: 2024-02-23 | End: 2024-02-27 | Stop reason: HOSPADM

## 2024-02-23 RX ORDER — SODIUM CHLORIDE 9 MG/ML
INJECTION, SOLUTION INTRAVENOUS CONTINUOUS
Status: ACTIVE | OUTPATIENT
Start: 2024-02-23 | End: 2024-02-23

## 2024-02-23 RX ORDER — MAGNESIUM SULFATE IN WATER 40 MG/ML
2000 INJECTION, SOLUTION INTRAVENOUS PRN
Status: DISCONTINUED | OUTPATIENT
Start: 2024-02-23 | End: 2024-02-27 | Stop reason: HOSPADM

## 2024-02-23 RX ORDER — SODIUM CHLORIDE 9 MG/ML
INJECTION, SOLUTION INTRAVENOUS CONTINUOUS
Status: DISCONTINUED | OUTPATIENT
Start: 2024-02-23 | End: 2024-02-23

## 2024-02-23 RX ORDER — FUROSEMIDE 20 MG/1
40 TABLET ORAL EVERY OTHER DAY
Status: DISCONTINUED | OUTPATIENT
Start: 2024-02-23 | End: 2024-02-27 | Stop reason: HOSPADM

## 2024-02-23 RX ORDER — LEVOTHYROXINE SODIUM 0.05 MG/1
50 TABLET ORAL DAILY
Status: DISCONTINUED | OUTPATIENT
Start: 2024-02-23 | End: 2024-02-27 | Stop reason: HOSPADM

## 2024-02-23 RX ORDER — ONDANSETRON 4 MG/1
4 TABLET, ORALLY DISINTEGRATING ORAL EVERY 8 HOURS PRN
Status: DISCONTINUED | OUTPATIENT
Start: 2024-02-23 | End: 2024-02-27 | Stop reason: HOSPADM

## 2024-02-23 RX ORDER — SODIUM CHLORIDE 0.9 % (FLUSH) 0.9 %
5-40 SYRINGE (ML) INJECTION PRN
Status: DISCONTINUED | OUTPATIENT
Start: 2024-02-23 | End: 2024-02-27 | Stop reason: HOSPADM

## 2024-02-23 RX ORDER — SENNOSIDES 8.6 MG
650 CAPSULE ORAL NIGHTLY
Status: ON HOLD | COMMUNITY
End: 2024-02-26 | Stop reason: HOSPADM

## 2024-02-23 RX ORDER — FOLIC ACID 1 MG/1
1 TABLET ORAL DAILY
Status: DISCONTINUED | OUTPATIENT
Start: 2024-02-23 | End: 2024-02-27 | Stop reason: HOSPADM

## 2024-02-23 RX ORDER — ENOXAPARIN SODIUM 100 MG/ML
40 INJECTION SUBCUTANEOUS DAILY
Status: DISCONTINUED | OUTPATIENT
Start: 2024-02-24 | End: 2024-02-27 | Stop reason: HOSPADM

## 2024-02-23 RX ORDER — SENNA AND DOCUSATE SODIUM 50; 8.6 MG/1; MG/1
1 TABLET, FILM COATED ORAL 2 TIMES DAILY
COMMUNITY

## 2024-02-23 RX ORDER — PHENOL 1.4 %
10 AEROSOL, SPRAY (ML) MUCOUS MEMBRANE NIGHTLY
Status: DISCONTINUED | OUTPATIENT
Start: 2024-02-23 | End: 2024-02-23

## 2024-02-23 RX ORDER — ESCITALOPRAM OXALATE 10 MG/1
10 TABLET ORAL DAILY
Status: DISCONTINUED | OUTPATIENT
Start: 2024-02-23 | End: 2024-02-23

## 2024-02-23 RX ADMIN — Medication 500 MG: at 12:51

## 2024-02-23 RX ADMIN — ASPIRIN 81 MG: 81 TABLET, COATED ORAL at 12:51

## 2024-02-23 RX ADMIN — SODIUM CHLORIDE, PRESERVATIVE FREE 10 ML: 5 INJECTION INTRAVENOUS at 17:33

## 2024-02-23 RX ADMIN — FOLIC ACID 1 MG: 1 TABLET ORAL at 12:51

## 2024-02-23 RX ADMIN — CARVEDILOL 12.5 MG: 6.25 TABLET, FILM COATED ORAL at 17:33

## 2024-02-23 RX ADMIN — CARVEDILOL 12.5 MG: 6.25 TABLET, FILM COATED ORAL at 12:51

## 2024-02-23 RX ADMIN — LEVOTHYROXINE SODIUM 50 MCG: 0.05 TABLET ORAL at 12:51

## 2024-02-23 RX ADMIN — FUROSEMIDE 40 MG: 20 TABLET ORAL at 12:51

## 2024-02-23 RX ADMIN — SODIUM CHLORIDE, PRESERVATIVE FREE 10 ML: 5 INJECTION INTRAVENOUS at 20:41

## 2024-02-23 RX ADMIN — FINASTERIDE 5 MG: 5 TABLET, FILM COATED ORAL at 17:33

## 2024-02-23 RX ADMIN — SODIUM CHLORIDE: 9 INJECTION, SOLUTION INTRAVENOUS at 17:41

## 2024-02-23 ASSESSMENT — LIFESTYLE VARIABLES
HOW OFTEN DO YOU HAVE A DRINK CONTAINING ALCOHOL: NEVER
HOW MANY STANDARD DRINKS CONTAINING ALCOHOL DO YOU HAVE ON A TYPICAL DAY: PATIENT DOES NOT DRINK

## 2024-02-23 ASSESSMENT — PAIN - FUNCTIONAL ASSESSMENT: PAIN_FUNCTIONAL_ASSESSMENT: NONE - DENIES PAIN

## 2024-02-23 NOTE — H&P
2/23/2024  EXAMINATION: CT OF THE HEAD WITHOUT CONTRAST  2/23/2024 8:02 am TECHNIQUE: CT of the head was performed without the administration of intravenous contrast. Automated exposure control, iterative reconstruction, and/or weight based adjustment of the mA/kV was utilized to reduce the radiation dose to as low as reasonably achievable. COMPARISON: 04/24/2021 HISTORY: ORDERING SYSTEM PROVIDED HISTORY: dizzy TECHNOLOGIST PROVIDED HISTORY: Has a \"code stroke\" or \"stroke alert\" been called?->No Reason for exam:->dizzy Decision Support Exception - unselect if not a suspected or confirmed emergency medical condition->Emergency Medical Condition (MA) What reading provider will be dictating this exam?->CRC FINDINGS: BRAIN/VENTRICLES: There is no acute intracranial hemorrhage, mass effect or midline shift. No abnormal extra-axial fluid collection.  The gray-white differentiation is maintained without evidence of an acute infarct. There is prominence of the ventricles and sulci due to global parenchymal volume loss. There are nonspecific areas of hypoattenuation within the periventricular and subcortical white matter, which likely represent chronic microvascular ischemic change. ORBITS: The visualized portion of the orbits demonstrate no acute abnormality. SINUSES: The visualized paranasal sinuses and mastoid air cells demonstrate no acute abnormality. SOFT TISSUES/SKULL: No acute abnormality of the visualized skull or soft tissues.     No acute intracranial abnormality.     XR CHEST PORTABLE    Result Date: 2/23/2024  EXAMINATION: ONE XRAY VIEW OF THE CHEST 2/23/2024 7:38 am COMPARISON: 120 9 June 2023 HISTORY: ORDERING SYSTEM PROVIDED HISTORY: weakness, Possible Stroke TECHNOLOGIST PROVIDED HISTORY: Reason for exam:->weakness, Possible Stroke What reading provider will be dictating this exam?->CRC FINDINGS: Single AP upright portable chest demonstrates evidence of previous CABG procedure.  There is blunting of the left  costophrenic angle with mild increased markings.  There is mild cardiomegaly present.     1. Mild cardiomegaly. 2. Small left pleural effusion versus pleural thickening.       ASSESSMENT:    Principal Problem:    Near syncope  Resolved Problems:    * No resolved hospital problems. *  Near syncope  Dizziness  Asymptomatic COVID-19  Hypertension  Hyperlipidemia  Mild to moderate aortic stenosis      PLAN:  Admit under telemetry  Orthostatic vitals  Monitor for observation  TTE to check for valvular abnormality, structural heart disease  PT OT  Continue vitamin C, vitamin D    Diet: ADULT DIET; Regular  Code Status: DNR-CCA  Surrogate decision maker confirmed with patient:   Extended Emergency Contact Information  Primary Emergency Contact: Mary Jane Sevilla  Address: [address] [place] [address] [place] [address] [facility]  Home Phone: [phone]  Relation: Spouse  Secondary Emergency Contact: Dolores Porter  Address: [address] [place] [address] [place] [address] [facility]  Home Phone: [phone]  Mobile Phone: [phone]  Relation: Child      DVT Prophylaxis: [x]Lovenox []Heparin []PCD [] Warfarin/NOAC []Encouraged ambulation  Disposition: []Med/Surg [] Intermediate [] ICU/CCU  Admit status: [] Observation [] Inpatient     +++++++++++++++++++++++++++++++++++++++++++++++++  Milan Santo [place], [place]  +++++++++++++++++++++++++++++++++++++++++++++++++  NOTE: This report was transcribed using voice recognition software. Every effort was made to ensure accuracy; however, inadvertent computerized transcription errors may be present.

## 2024-02-23 NOTE — ED PROVIDER NOTES
Department of Emergency Medicine   ED  Provider Note  Admit Date/RoomTime: 2/23/2024  6:43 AM  ED Room: 24/24          History of Present Illness:  2/23/24, Time: 7:03 AM EST  Chief Complaint   Patient presents with    Dizziness     Awakened this am feeling lightheaded                Eb Sevilla is a 89 y.o. male presenting to the ED for dizziness.  Patient states he woke up this morning he is the restroom.  He felt lightheaded and dizzy.  He says the room is spinning around.  He cannot specify otherwise, he just feels lightheaded.  He thought he was going to fall down.  Did not fall.  Called EMS.  Wife was diagnosed with COVID last week.  He has not been tested.  Does have a cough with sputum.  No fevers or chills.  No antipyretics prior to arrival.  Also complains of frequent urination.  No dysuria, no hematuria, no other symptoms or complaints.    Review of Systems:   Pertinent positives and negatives are stated within HPI, all other systems reviewed and are negative.        --------------------------------------------- PAST HISTORY ---------------------------------------------  Past Medical History:  has a past medical history of Anxiety and depression, Aortic stenosis, Arthritis, CAD (coronary artery disease), CHF (congestive heart failure) (HCC), Hyperlipidemia, Hypertension, Muscle wasting and atrophy, not elsewhere classified, unspecified site, and Nonrheumatic tricuspid (valve) insufficiency.    Past Surgical History:  has a past surgical history that includes Coronary artery bypass graft; hip surgery; Total hip arthroplasty (Left, 6/29/2023); and Upper gastrointestinal endoscopy (N/A, 7/6/2023).    Social History:  reports that he has never smoked. He has never used smokeless tobacco. He reports current alcohol use of about 7.0 standard drinks of alcohol per week. He reports that he does not use drugs.    Family History: family history is not on file.. Unless otherwise noted, family history is non

## 2024-02-24 LAB
ANION GAP SERPL CALCULATED.3IONS-SCNC: 12 MMOL/L (ref 7–16)
BASOPHILS # BLD: 0.01 K/UL (ref 0–0.2)
BASOPHILS NFR BLD: 0 % (ref 0–2)
BUN SERPL-MCNC: 18 MG/DL (ref 6–23)
CALCIUM SERPL-MCNC: 8.8 MG/DL (ref 8.6–10.2)
CHLORIDE SERPL-SCNC: 99 MMOL/L (ref 98–107)
CO2 SERPL-SCNC: 28 MMOL/L (ref 22–29)
CREAT SERPL-MCNC: 1.1 MG/DL (ref 0.7–1.2)
EOSINOPHIL # BLD: 0.17 K/UL (ref 0.05–0.5)
EOSINOPHILS RELATIVE PERCENT: 5 % (ref 0–6)
ERYTHROCYTE [DISTWIDTH] IN BLOOD BY AUTOMATED COUNT: 12.5 % (ref 11.5–15)
GFR SERPL CREATININE-BSD FRML MDRD: >60 ML/MIN/1.73M2
GLUCOSE SERPL-MCNC: 125 MG/DL (ref 74–99)
HCT VFR BLD AUTO: 28.8 % (ref 37–54)
HGB BLD-MCNC: 9.4 G/DL (ref 12.5–16.5)
IMM GRANULOCYTES # BLD AUTO: <0.03 K/UL (ref 0–0.58)
IMM GRANULOCYTES NFR BLD: 0 % (ref 0–5)
LYMPHOCYTES NFR BLD: 0.62 K/UL (ref 1.5–4)
LYMPHOCYTES RELATIVE PERCENT: 16 % (ref 20–42)
MCH RBC QN AUTO: 31.4 PG (ref 26–35)
MCHC RBC AUTO-ENTMCNC: 32.6 G/DL (ref 32–34.5)
MCV RBC AUTO: 96.3 FL (ref 80–99.9)
MONOCYTES NFR BLD: 0.26 K/UL (ref 0.1–0.95)
MONOCYTES NFR BLD: 7 % (ref 2–12)
NEUTROPHILS NFR BLD: 72 % (ref 43–80)
NEUTS SEG NFR BLD: 2.73 K/UL (ref 1.8–7.3)
PLATELET # BLD AUTO: 107 K/UL (ref 130–450)
PMV BLD AUTO: 9.5 FL (ref 7–12)
POTASSIUM SERPL-SCNC: 4 MMOL/L (ref 3.5–5)
RBC # BLD AUTO: 2.99 M/UL (ref 3.8–5.8)
SODIUM SERPL-SCNC: 139 MMOL/L (ref 132–146)
WBC OTHER # BLD: 3.8 K/UL (ref 4.5–11.5)

## 2024-02-24 PROCEDURE — 6370000000 HC RX 637 (ALT 250 FOR IP): Performed by: STUDENT IN AN ORGANIZED HEALTH CARE EDUCATION/TRAINING PROGRAM

## 2024-02-24 PROCEDURE — 36415 COLL VENOUS BLD VENIPUNCTURE: CPT

## 2024-02-24 PROCEDURE — 2580000003 HC RX 258: Performed by: STUDENT IN AN ORGANIZED HEALTH CARE EDUCATION/TRAINING PROGRAM

## 2024-02-24 PROCEDURE — 85025 COMPLETE CBC W/AUTO DIFF WBC: CPT

## 2024-02-24 PROCEDURE — 96361 HYDRATE IV INFUSION ADD-ON: CPT

## 2024-02-24 PROCEDURE — 80048 BASIC METABOLIC PNL TOTAL CA: CPT

## 2024-02-24 PROCEDURE — 6360000002 HC RX W HCPCS: Performed by: STUDENT IN AN ORGANIZED HEALTH CARE EDUCATION/TRAINING PROGRAM

## 2024-02-24 PROCEDURE — 96372 THER/PROPH/DIAG INJ SC/IM: CPT

## 2024-02-24 PROCEDURE — G0378 HOSPITAL OBSERVATION PER HR: HCPCS

## 2024-02-24 RX ORDER — MECOBALAMIN 5000 MCG
10 TABLET,DISINTEGRATING ORAL NIGHTLY
Status: DISCONTINUED | OUTPATIENT
Start: 2024-02-24 | End: 2024-02-26

## 2024-02-24 RX ORDER — POLYETHYLENE GLYCOL 3350 17 G/17G
17 POWDER, FOR SOLUTION ORAL DAILY
Status: DISCONTINUED | OUTPATIENT
Start: 2024-02-24 | End: 2024-02-27 | Stop reason: HOSPADM

## 2024-02-24 RX ADMIN — ENOXAPARIN SODIUM 40 MG: 100 INJECTION SUBCUTANEOUS at 08:47

## 2024-02-24 RX ADMIN — LEVOTHYROXINE SODIUM 50 MCG: 0.05 TABLET ORAL at 06:46

## 2024-02-24 RX ADMIN — SODIUM CHLORIDE, PRESERVATIVE FREE 10 ML: 5 INJECTION INTRAVENOUS at 08:47

## 2024-02-24 RX ADMIN — Medication 10 MG: at 20:18

## 2024-02-24 RX ADMIN — ASPIRIN 81 MG: 81 TABLET, COATED ORAL at 08:47

## 2024-02-24 RX ADMIN — SODIUM CHLORIDE, PRESERVATIVE FREE 10 ML: 5 INJECTION INTRAVENOUS at 20:18

## 2024-02-24 RX ADMIN — PANTOPRAZOLE SODIUM 40 MG: 40 TABLET, DELAYED RELEASE ORAL at 06:46

## 2024-02-24 RX ADMIN — Medication 500 MG: at 08:48

## 2024-02-24 RX ADMIN — POLYETHYLENE GLYCOL 3350 17 G: 17 POWDER, FOR SOLUTION ORAL at 13:46

## 2024-02-24 RX ADMIN — FOLIC ACID 1 MG: 1 TABLET ORAL at 08:47

## 2024-02-24 RX ADMIN — CHOLECALCIFEROL TAB 125 MCG (5000 UNIT) 5000 UNITS: 125 TAB at 08:46

## 2024-02-24 RX ADMIN — CARVEDILOL 12.5 MG: 6.25 TABLET, FILM COATED ORAL at 17:14

## 2024-02-24 RX ADMIN — FINASTERIDE 5 MG: 5 TABLET, FILM COATED ORAL at 17:14

## 2024-02-24 RX ADMIN — CARVEDILOL 12.5 MG: 6.25 TABLET, FILM COATED ORAL at 08:46

## 2024-02-24 NOTE — CARE COORDINATION
2/24:  Transition of care:  Pt presented to the ER for dizziness & lightheadedness from home.  Pt is on room air at 91% & SQ Lovenox.  Cm spoke with pt via room phone & spoke with his wife as well to discuss Cm role & dc planning.  Pt's PCP is Ani Young & uses [facility] [address].  Pt lives with his live & their adult son in a house with 5 steps down & another 7 steps up via the garage to enter.  The bed/bathroom are on the 2nd floor with a chair lift.  PTA pt was independent & has a walker on every floor of their house.  Per wife she normally uses [employer] to get her  home & help into the house due to his risk on the stairs.  She called them & left a message.  Cm advise that I maybe able to set up transportation via an [facility] but there will likely be a cost.  Pt has a hx of HHC with [facility] & SNF-[facility].  Per pt his dc plan is home with help transporting home via [facility].  Will need 02 testing.  If pt needs 02 pt & his wife have no preferences & declined list.  Sw/CM will continue to follow.  Electronically signed by Ema Cruz RN on 2/24/2024 at 2:38 PM

## 2024-02-24 NOTE — ACP (ADVANCE CARE PLANNING)
Advance Care Planning   Healthcare Decision Maker:    Primary Decision Maker: Mary Jane Sevilla - [facility] - [phone]    Click here to complete Healthcare Decision Makers including selection of the Healthcare Decision Maker Relationship (ie \"Primary\").  Today we documented Decision Maker(s) consistent with Legal Next of Kin hierarchy.       Electronically signed by Ema Cruz RN on 2/24/2024 at 2:39 PM

## 2024-02-24 NOTE — PLAN OF CARE
Problem: Chronic Conditions and Co-morbidities  Goal: Patient's chronic conditions and co-morbidity symptoms are monitored and maintained or improved  2/24/2024 1102 by Hansa Garcia RN  Outcome: Progressing  2/23/2024 2301 by Ayaka Mahmood RN  Outcome: Progressing     Problem: Safety - Adult  Goal: Free from fall injury  2/24/2024 1102 by Hansa Garcia RN  Outcome: Progressing  2/23/2024 2301 by Ayaka Mahmood RN  Outcome: Progressing     Problem: ABCDS Injury Assessment  Goal: Absence of physical injury  2/24/2024 1102 by Hansa Garcia RN  Outcome: Progressing  2/23/2024 2301 by Ayaka Mahmood RN  Outcome: Progressing

## 2024-02-24 NOTE — PROGRESS NOTES
Hospitalist Progress Note      SYNOPSIS: Patient admitted on 2024 for Near syncope  Patient presented to the hospital with chief complaint of dizziness and lightheadedness started at 4 AM today while patient was lying in bed.  He tried going to the restroom but he felt lightheaded and was about to fall so he sat down in chair.  He does state that he did not fall, his wife tested positive for COVID last week, he denies any cough, runny nose, fever, chills, shortness of breath, chest pain on exertion.   At emergency department  Blood pressure 154/62, CT head was negative for any stroke, he tested positive for COVID, he was given normal saline, his dizziness improved  Orthostatic vitals was ordered which was negative      SUBJECTIVE:  Stable overnight. No other overnight issues reported.   Patient seen and examined at bedside today a.m. stated improvement in his lightheadedness, he did state that he did not get good sleep overnight  Records reviewed.         Temp (24hrs), Av.1 °F (36.7 °C), Min:97.4 °F (36.3 °C), Max:98.8 °F (37.1 °C)    DIET: ADULT DIET; Regular  CODE: DNR-CCA    Intake/Output Summary (Last 24 hours) at 2024 1023  Last data filed at 2024 0857  Gross per 24 hour   Intake --   Output 975 ml   Net -975 ml       Review of Systems  All bolded are positive; please see HPI  General:  Fever, chills, diaphoresis, fatigue, malaise, night sweats, weight loss  Psychological:  Anxiety, disorientation, hallucinations.  ENT:  Epistaxis, headaches, vertigo, visual changes.  Cardiovascular:  Chest pain, irregular heartbeats, palpitations, paroxysmal nocturnal dyspnea.  Respiratory:  Shortness of breath, coughing, sputum production, hemoptysis, wheezing, orthopnea.  Gastrointestinal:  Nausea, vomiting, diarrhea, heartburn, constipation, abdominal pain, hematemesis, hematochezia, melena, acholic stools  Genito-Urinary:  Dysuria, urgency, frequency, hematuria  Musculoskeletal:  Joint pain, joint

## 2024-02-25 PROCEDURE — 6370000000 HC RX 637 (ALT 250 FOR IP): Performed by: STUDENT IN AN ORGANIZED HEALTH CARE EDUCATION/TRAINING PROGRAM

## 2024-02-25 PROCEDURE — 2580000003 HC RX 258: Performed by: STUDENT IN AN ORGANIZED HEALTH CARE EDUCATION/TRAINING PROGRAM

## 2024-02-25 PROCEDURE — 96372 THER/PROPH/DIAG INJ SC/IM: CPT

## 2024-02-25 PROCEDURE — 6360000002 HC RX W HCPCS: Performed by: STUDENT IN AN ORGANIZED HEALTH CARE EDUCATION/TRAINING PROGRAM

## 2024-02-25 PROCEDURE — 1200000000 HC SEMI PRIVATE

## 2024-02-25 RX ADMIN — FUROSEMIDE 40 MG: 20 TABLET ORAL at 08:48

## 2024-02-25 RX ADMIN — SODIUM CHLORIDE, PRESERVATIVE FREE 10 ML: 5 INJECTION INTRAVENOUS at 08:39

## 2024-02-25 RX ADMIN — CHOLECALCIFEROL TAB 125 MCG (5000 UNIT) 5000 UNITS: 125 TAB at 08:32

## 2024-02-25 RX ADMIN — ENOXAPARIN SODIUM 40 MG: 100 INJECTION SUBCUTANEOUS at 08:32

## 2024-02-25 RX ADMIN — CARVEDILOL 12.5 MG: 6.25 TABLET, FILM COATED ORAL at 17:33

## 2024-02-25 RX ADMIN — PANTOPRAZOLE SODIUM 40 MG: 40 TABLET, DELAYED RELEASE ORAL at 05:26

## 2024-02-25 RX ADMIN — Medication 500 MG: at 08:33

## 2024-02-25 RX ADMIN — FINASTERIDE 5 MG: 5 TABLET, FILM COATED ORAL at 17:33

## 2024-02-25 RX ADMIN — FOLIC ACID 1 MG: 1 TABLET ORAL at 08:32

## 2024-02-25 RX ADMIN — LEVOTHYROXINE SODIUM 50 MCG: 0.05 TABLET ORAL at 05:27

## 2024-02-25 RX ADMIN — ASPIRIN 81 MG: 81 TABLET, COATED ORAL at 08:32

## 2024-02-25 RX ADMIN — POLYETHYLENE GLYCOL 3350 17 G: 17 POWDER, FOR SOLUTION ORAL at 08:33

## 2024-02-25 RX ADMIN — Medication 10 MG: at 20:50

## 2024-02-25 RX ADMIN — CARVEDILOL 12.5 MG: 6.25 TABLET, FILM COATED ORAL at 08:33

## 2024-02-25 RX ADMIN — SODIUM CHLORIDE, PRESERVATIVE FREE 10 ML: 5 INJECTION INTRAVENOUS at 20:48

## 2024-02-25 NOTE — PROGRESS NOTES
Hospitalist Progress Note      SYNOPSIS: Patient admitted on 2024 for Near syncope  Patient presented to the hospital with chief complaint of dizziness and lightheadedness started at 4 AM today while patient was lying in bed.  He tried going to the restroom but he felt lightheaded and was about to fall so he sat down in chair.  He does state that he did not fall, his wife tested positive for COVID last week, he denies any cough, runny nose, fever, chills, shortness of breath, chest pain on exertion.   At emergency department  Blood pressure 154/62, CT head was negative for any stroke, he tested positive for COVID, he was given normal saline, his dizziness improved  Orthostatic vitals was ordered which was negative    SUBJECTIVE:  Stable overnight. No other overnight issues reported.   Patient seen and examined at bedside today a.m. states improvement in his lightheadedness,  Records reviewed.         Temp (24hrs), Av °F (37.2 °C), Min:98.8 °F (37.1 °C), Max:99.4 °F (37.4 °C)    DIET: ADULT DIET; Regular  CODE: DNR-CCA    Intake/Output Summary (Last 24 hours) at 2024 0958  Last data filed at 2024 2251  Gross per 24 hour   Intake 480 ml   Output 500 ml   Net -20 ml       Review of Systems  All bolded are positive; please see HPI  General:  Fever, chills, diaphoresis, fatigue, malaise, night sweats, weight loss  Psychological:  Anxiety, disorientation, hallucinations.  ENT:  Epistaxis, headaches, vertigo, visual changes.  Cardiovascular:  Chest pain, irregular heartbeats, palpitations, paroxysmal nocturnal dyspnea.  Respiratory:  Shortness of breath, coughing, sputum production, hemoptysis, wheezing, orthopnea.  Gastrointestinal:  Nausea, vomiting, diarrhea, heartburn, constipation, abdominal pain, hematemesis, hematochezia, melena, acholic stools  Genito-Urinary:  Dysuria, urgency, frequency, hematuria  Musculoskeletal:  Joint pain, joint stiffness, joint swelling, muscle pain  Neurology:   Headache, focal neurological deficits, weakness, numbness, paresthesia  Derm:  Rashes, ulcers, excoriations, bruising  Extremities:  Decreased ROM, peripheral edema, mottling      OBJECTIVE:    BP (!) 164/72   Pulse 84   Temp 99.4 °F (37.4 °C) (Oral)   Resp 22   Ht 1.6 m (5' 3\")   Wt 74.8 kg (165 lb)   SpO2 93%   BMI 29.23 kg/m²     General appearance:  awake, alert, and oriented to person, place, time, and purpose; appears stated age and cooperative; no apparent distress no labored breathing  HEENT:  Conjunctivae/corneas clear.   Neck: Supple. No jugular venous distention.   Respiratory: symmetrical; clear to auscultation bilaterally; no wheezes; no rhonchi; no rales  Cardiovascular: rhythm regular; rate controlled; no murmurs  Abdomen: Soft, nontender, nondistended  Extremities:  peripheral pulses present; no peripheral edema; no ulcers  Musculoskeletal: No clubbing, cyanosis, no bilateral lower extremity edema. Brisk capillary refill.   Skin:  No rashes  on visible skin  Neurologic: awake, alert and following commands     ASSESSMENT and PLAN:  Near syncope  Mild to moderate aortic stenosis  Dizziness  Asymptomatic COVID-19  Hypertension  Hyperlipidemia  Mild to moderate aortic stenosis        PLAN:  Admit under telemetry  Orthostatic vitals ordered was negative  Pending TTE to check for valvular abnormality, structural heart disease  PT OT  Continue vitamin C, vitamin D         DVT Prophylaxis [] Lovenox, []  Heparin, [] SCDs, [] Ambulation   GI Prophylaxis [] PPI,  [] H2 Blocker,  [] Carafate,  [] Diet/Tube Feeds   Disposition Patient requires continued admission due to awaiting echocardiogram to check for valvular abnormality, can be discharged once completed   MDM [] Low, [] Moderate,[]  High  Patient's risk as above due to        Medications:  REVIEWED DAILY    Infusion Medications    sodium chloride       Scheduled Medications    melatonin  10 mg Oral Nightly    polyethylene glycol  17 g Oral Daily

## 2024-02-25 NOTE — PLAN OF CARE
Problem: Chronic Conditions and Co-morbidities  Goal: Patient's chronic conditions and co-morbidity symptoms are monitored and maintained or improved  2/24/2024 2248 by Emily Santiago RN  Outcome: Progressing  2/24/2024 1102 by Hansa Garcia RN  Outcome: Progressing     Problem: Safety - Adult  Goal: Free from fall injury  2/24/2024 2248 by Emily Santiago RN  Outcome: Progressing  Flowsheets (Taken 2/24/2024 1951)  Free From Fall Injury: Instruct family/caregiver on patient safety  2/24/2024 1102 by Hansa Garcia RN  Outcome: Progressing     Problem: ABCDS Injury Assessment  Goal: Absence of physical injury  2/24/2024 2248 by Emily Santiago RN  Outcome: Progressing  Flowsheets (Taken 2/24/2024 1951)  Absence of Physical Injury: Implement safety measures based on patient assessment  2/24/2024 1102 by Hansa Garcia RN  Outcome: Progressing     Problem: Skin/Tissue Integrity  Goal: Absence of new skin breakdown  Description: 1.  Monitor for areas of redness and/or skin breakdown  2.  Assess vascular access sites hourly  3.  Every 4-6 hours minimum:  Change oxygen saturation probe site  4.  Every 4-6 hours:  If on nasal continuous positive airway pressure, respiratory therapy assess nares and determine need for appliance change or resting period.  Outcome: Progressing

## 2024-02-26 PROCEDURE — 6370000000 HC RX 637 (ALT 250 FOR IP): Performed by: STUDENT IN AN ORGANIZED HEALTH CARE EDUCATION/TRAINING PROGRAM

## 2024-02-26 PROCEDURE — 97165 OT EVAL LOW COMPLEX 30 MIN: CPT

## 2024-02-26 PROCEDURE — 97530 THERAPEUTIC ACTIVITIES: CPT

## 2024-02-26 PROCEDURE — 6360000002 HC RX W HCPCS: Performed by: STUDENT IN AN ORGANIZED HEALTH CARE EDUCATION/TRAINING PROGRAM

## 2024-02-26 PROCEDURE — 2580000003 HC RX 258: Performed by: STUDENT IN AN ORGANIZED HEALTH CARE EDUCATION/TRAINING PROGRAM

## 2024-02-26 PROCEDURE — 1200000000 HC SEMI PRIVATE

## 2024-02-26 PROCEDURE — 97161 PT EVAL LOW COMPLEX 20 MIN: CPT

## 2024-02-26 RX ORDER — ASPIRIN 81 MG/1
81 TABLET ORAL DAILY
Qty: 30 TABLET | Refills: 3 | Status: SHIPPED | OUTPATIENT
Start: 2024-02-27

## 2024-02-26 RX ORDER — AMLODIPINE BESYLATE 10 MG/1
5 TABLET ORAL NIGHTLY
Qty: 30 TABLET | Refills: 3 | Status: SHIPPED | OUTPATIENT
Start: 2024-02-26

## 2024-02-26 RX ORDER — ASCORBIC ACID 500 MG
500 TABLET ORAL DAILY
Qty: 10 TABLET | Refills: 0 | Status: SHIPPED | OUTPATIENT
Start: 2024-02-27 | End: 2024-03-08

## 2024-02-26 RX ADMIN — SODIUM CHLORIDE, PRESERVATIVE FREE 10 ML: 5 INJECTION INTRAVENOUS at 08:17

## 2024-02-26 RX ADMIN — LEVOTHYROXINE SODIUM 50 MCG: 0.05 TABLET ORAL at 05:17

## 2024-02-26 RX ADMIN — PANTOPRAZOLE SODIUM 40 MG: 40 TABLET, DELAYED RELEASE ORAL at 05:17

## 2024-02-26 RX ADMIN — FOLIC ACID 1 MG: 1 TABLET ORAL at 08:16

## 2024-02-26 RX ADMIN — POLYETHYLENE GLYCOL 3350 17 G: 17 POWDER, FOR SOLUTION ORAL at 08:16

## 2024-02-26 RX ADMIN — FINASTERIDE 5 MG: 5 TABLET, FILM COATED ORAL at 16:42

## 2024-02-26 RX ADMIN — CARVEDILOL 12.5 MG: 6.25 TABLET, FILM COATED ORAL at 16:42

## 2024-02-26 RX ADMIN — Medication 500 MG: at 08:16

## 2024-02-26 RX ADMIN — ASPIRIN 81 MG: 81 TABLET, COATED ORAL at 08:16

## 2024-02-26 RX ADMIN — CARVEDILOL 12.5 MG: 6.25 TABLET, FILM COATED ORAL at 08:15

## 2024-02-26 RX ADMIN — CHOLECALCIFEROL TAB 125 MCG (5000 UNIT) 5000 UNITS: 125 TAB at 08:16

## 2024-02-26 RX ADMIN — SODIUM CHLORIDE, PRESERVATIVE FREE 10 ML: 5 INJECTION INTRAVENOUS at 21:29

## 2024-02-26 RX ADMIN — ENOXAPARIN SODIUM 40 MG: 100 INJECTION SUBCUTANEOUS at 08:15

## 2024-02-26 NOTE — PROGRESS NOTES
CLINICAL PHARMACY NOTE: MEDS TO BEDS    Total # of Prescriptions Filled: 3   The following medications were delivered to the patient:  Vitamin C 500  Aspirin 81  Amlodipine 10    Additional Documentation:   To JOZEF Hankins

## 2024-02-26 NOTE — CARE COORDINATION
02/26/24 CM Update:  Pt admitted from ED for syncope Pt is mostly independent with ADLs wife does help with dressing.  Pt does have chair lift to get to bed/bath Pt does have walker to assist with ambulation on both floors.  PT/OT evals are pending for discharge planning.  Pt was orthostatic positive when working with therapy. Therapy suggests possible [facility] pt has had therapy with [facility] Per pt wife pt will need transported home via [place]     12:14pm Spoke with pt and wife who was on the phone at the time advised that therapy thinks pt would be able to return home but is suggesting PT/OT in the home.  Pt would like to have [facility] again Referral made to Pati at [facility] pt was just d/c from there service 01/24/24.  She will call back to let us know if they can accept  Homecare orders are on chart CM/SW to follow Electronically signed by Mathew Collado RN on 2/26/2024 at 12:17 PM     2:45pm [facility] Electronically signed by Mathew Collado RN CM on 2/26/2024 at 2:46 PM

## 2024-02-26 NOTE — PLAN OF CARE
Problem: Chronic Conditions and Co-morbidities  Goal: Patient's chronic conditions and co-morbidity symptoms are monitored and maintained or improved  Outcome: Progressing     Problem: Safety - Adult  Goal: Free from fall injury  Outcome: Progressing     Problem: ABCDS Injury Assessment  Goal: Absence of physical injury  Outcome: Progressing  Flowsheets (Taken 2/26/2024 1616)  Absence of Physical Injury: Implement safety measures based on patient assessment     Problem: Skin/Tissue Integrity  Goal: Absence of new skin breakdown  Description: 1.  Monitor for areas of redness and/or skin breakdown  2.  Assess vascular access sites hourly  3.  Every 4-6 hours minimum:  Change oxygen saturation probe site  4.  Every 4-6 hours:  If on nasal continuous positive airway pressure, respiratory therapy assess nares and determine need for appliance change or resting period.  Outcome: Progressing

## 2024-02-26 NOTE — CARE COORDINATION
02/26/24 Discharge order noted:  Pt needs to be transported home by [facility] as they help him into the home. Pt has 3 steps to enter and pt is unable to get up the stairs without help.  Called [facility] they do not have a  available to take a COVID patient home at this time and the other  is in [place] and not available today.  Spoke with pt daughter Lainey to see if she would be able to come for transport she states that she recently got over an upper respiratory infection and that she did not want to come and pick him up she also stated that he would not be able to get him into her SUV or up the 3 stairs to enter the home.  Called [facility] for assistance their  is unable to assist the patient into the home. The pt has no qualifying diagnosis to get stretcher transport paid by insurance Pt would be self pay for stretcher in the amount of $867.90 Wife is unable to pay that amount Spoke with Barbra advised to call [facility] in the AM for transport.    Pt wife was updated on transportation issue and that [facility] was set up for patient Electronically signed by Mathew Collado RN on 2/26/2024 at 3:52 PM

## 2024-02-26 NOTE — PLAN OF CARE
Problem: Chronic Conditions and Co-morbidities  Goal: Patient's chronic conditions and co-morbidity symptoms are monitored and maintained or improved  Outcome: Progressing     Problem: Safety - Adult  Goal: Free from fall injury  Outcome: Progressing  Flowsheets (Taken 2/25/2024 2019)  Free From Fall Injury: Instruct family/caregiver on patient safety     Problem: ABCDS Injury Assessment  Goal: Absence of physical injury  Outcome: Progressing  Flowsheets (Taken 2/25/2024 2019)  Absence of Physical Injury: Implement safety measures based on patient assessment     Problem: Skin/Tissue Integrity  Goal: Absence of new skin breakdown  Description: 1.  Monitor for areas of redness and/or skin breakdown  2.  Assess vascular access sites hourly  3.  Every 4-6 hours minimum:  Change oxygen saturation probe site  4.  Every 4-6 hours:  If on nasal continuous positive airway pressure, respiratory therapy assess nares and determine need for appliance change or resting period.  Outcome: Progressing

## 2024-02-26 NOTE — PROGRESS NOTES
Occupational Therapy  OCCUPATIONAL THERAPY INITIAL EVALUATION    [facility]  [address], [place]         Date:2024                                                  Patient Name: Eb Sevilla    MRN: [id number]    : 1930    Room: [address] [place] [address]      Evaluating OT: Aurora Maynard OTR/L [id number]       Referring Provider:Milan Rodriguez MD     Specific Provider Orders/Date: OT eval and treat 24       Diagnosis: Near Syncope COVID 19      Surgery: none      Pertinent Medical History: OA,CHF,CAD,HTN, RTHA,Aortic Stenosis         Precautions:  Fall Risk, droplet plus isolation      Assessment of current deficits    [x] Functional mobility  [x]ADLs  [x] Strength               []Cognition    [x] Functional transfers   [x] IADLs         [x] Safety Awareness   [x]Endurance    [] Fine Coordination              [x] Balance      [] Vision/perception   []Sensation     []Gross Motor Coordination  [] ROM  [] Delirium                   [] Motor Control     OT PLAN OF CARE   OT POC based on physician orders, patient diagnosis and results of clinical assessment    Frequency/Duration 1-3 days/wk for 2 weeks PRN   Specific OT Treatment Interventions to include:   * Instruction/training on adapted ADL techniques and AE recommendations to increase functional independence within precautions       * Training on energy conservation strategies, correct breathing pattern and techniques to improve independence/tolerance for self-care routine  * Functional transfer/mobility training/DME recommendations for increased independence, safety, and fall prevention  * Patient/Family education to increase follow through with safety techniques and functional independence  * Recommendation of environmental modifications for increased safety with functional transfers/mobility and ADLs  * Therapeutic exercise to improve motor endurance, ROM, and functional strength for  ADL tasks       Hearing: WFL   Sensation:  No c/o numbness or tingling   Tone: WFL   Edema: none     Comments: Upon arrival patient was supine in bed and agreeable to OT eval/tx .   At end of session, patient was returned to supine in bed  with call light and phone within reach, all lines and tubes intact.  Overall patient demonstrated  decreased independence and safety during completion of ADL/functional transfer/mobility tasks.  Pt would benefit from continued skilled OT to increase safety and independence with completion of ADL/IADL tasks for functional independence and quality of life.    Treatment: OT treatment provided this date includes:   Instruction/training on safety and adapted techniques for completion of ADLs:    Instruction/training on safe functional mobility/transfer techniques: use of ww    Instruction/training on energy conservation/work simplification for completion of ADLs:. Pt ed on pacing techniques to utilize during functional activities     Neuromuscular Reeducation to facilitate balance/righting reactions for increased function with ADLs tasks: while up and using ww   Proper Positioning/Alignment to prevent skin breakdown         Rehab Potential: Good  for established goals     Patient / Family Goal: \" get home to my Wife\"       Patient and/or family were instructed on functional diagnosis, prognosis/goals and OT plan of care. Demonstrated good  understanding.     Eval Complexity: Low    Time In: 1100   Time Out: 1125   Total Treatment Time: 15    Min Units   OT Eval Low 97111  x     OT Eval Medium 96999      OT Eval High 53457      OT Re-Eval 75157       Therapeutic Ex 46468       Therapeutic Activities 77104  15  1   ADL/Self Care 87967       Orthotic Management 14161       Manual [id number]     Neuro Re-Ed 61334       Non-Billable Time          Evaluation Time additionally includes thorough review of current medical information, gathering information on past medical history/social history and

## 2024-02-26 NOTE — PROGRESS NOTES
Physical Therapy  Physical Therapy Initial Assessment     Name: bE Sevilla  : 1930  MRN: [id number]      Date of Service: 2024    Evaluating PT:  Rowan Osullivan, PT, DPT, [id number]    Room #:  8206/8206-A  Diagnosis:  Near syncope [R55]  COVID [U07.1]  PMHx/PSHx:    Past Medical History:   Diagnosis Date    Anxiety and depression     Aortic stenosis     Arthritis     osteoarthritis left hip    CAD (coronary artery disease)     CHF (congestive heart failure) ([facility])     systolic and diastolic    Hyperlipidemia     Hypertension     Muscle wasting and atrophy, not elsewhere classified, unspecified site     Nonrheumatic tricuspid (valve) insufficiency       Past Surgical History:   Procedure Laterality Date    CORONARY ARTERY BYPASS GRAFT      HIP SURGERY      right    TOTAL HIP ARTHROPLASTY Left 2023    LEFT HIP TOTAL ARTHROPLASTY performed by Adalid Carter MD at [facility] OR    UPPER GASTROINTESTINAL ENDOSCOPY N/A 2023    EGD DIAGNOSTIC ONLY performed by Cesar Herrera DO at [facility] OR      Procedure/Surgery:  none this admission   Precautions:  Fall risk, Droplet Plus Isolation (COVID-19), Monitor BP - orthostatic +, + Alarms  Equipment Needs:  TBD    SUBJECTIVE:    Pt lives with his wife and adult son in a tri-level home with 5 stairs to enter and 1 rail. Pt uses transport company to enter/exit home d/t stairs. Bed is on top floor and bath is on top floor with stair lift to access.  Pt ambulated with WW PTA.    OBJECTIVE:   Initial Evaluation  Date: 24 Treatment Short Term/ Long Term   Goals   AM-PAC 6 Clicks      Was pt agreeable to Eval/treatment? yes     Does pt have pain? No c/o pain     Bed Mobility  Rolling: NT  Supine to sit: SBA  Sit to supine: SBA  Scooting: SBA  Rolling: Independent   Supine to sit: Independent   Sit to supine: Independent   Scooting: Independent    Transfers Sit to stand: CGA  Stand to sit: CGA  Stand pivot: CGA with WW  Sit to stand: Independent   Stand to  notified.     Treatment:  Patient practiced and was instructed in the following treatment:    Bed mobility training - pt given verbal and tactile cues to facilitate proper sequencing and safety during rolling and supine>sit as well as provided with physical assistance to complete task   Sitting EOB for >10 minutes for upright tolerance, postural awareness and BLE ROM  Transfer training - pt was given verbal and tactile cues to facilitate proper hand placement, technique and safety during sit to stand and stand to sit as well as provided with physical assistance.  Gait training- pt was given verbal and tactile cues to facilitate balance and safety during ambulation as well as provided with physical assistance.  Skilled positioning - Pt placed in the chair position with pillows utilized to facilitate upright posture, joint and skin integrity, and interaction with environment.     Skilled monitoring of vitals throughout session.     Pt's/ family goals   1. To return to PLOF    Prognosis is fair for reaching above PT goals.    Patient and or family understand(s) diagnosis, prognosis, and plan of care.  yes    PHYSICAL THERAPY PLAN OF CARE:    PT POC is established based on physician order and patient diagnosis     Referring provider/PT Order:    02/26/24 1000  PT eval and treat  Start:  02/26/24 1000,   End:  02/26/24 1000,   ONE TIME,   Standing Count:  1 Occurrences,   R         Milan Rodriguez MD     Diagnosis:  Near syncope [R55]  COVID [U07.1]  Specific instructions for next treatment:  Maximize safe and independent functional mobility     Current Treatment Recommendations:     [x] Strengthening to improve independence with functional mobility   [] ROM to improve independence with functional mobility   [x] Balance Training to improve static/dynamic balance and to reduce fall risk  [x] Endurance Training to improve activity tolerance during functional mobility   [x] Transfer Training to improve safety and

## 2024-02-27 VITALS
OXYGEN SATURATION: 97 % | TEMPERATURE: 99 F | BODY MASS INDEX: 29.23 KG/M2 | WEIGHT: 165 LBS | HEIGHT: 63 IN | DIASTOLIC BLOOD PRESSURE: 78 MMHG | SYSTOLIC BLOOD PRESSURE: 167 MMHG | RESPIRATION RATE: 18 BRPM | HEART RATE: 89 BPM

## 2024-02-27 PROCEDURE — 2700000000 HC OXYGEN THERAPY PER DAY

## 2024-02-27 PROCEDURE — 6370000000 HC RX 637 (ALT 250 FOR IP): Performed by: STUDENT IN AN ORGANIZED HEALTH CARE EDUCATION/TRAINING PROGRAM

## 2024-02-27 RX ADMIN — PANTOPRAZOLE SODIUM 40 MG: 40 TABLET, DELAYED RELEASE ORAL at 07:14

## 2024-02-27 RX ADMIN — CHOLECALCIFEROL TAB 125 MCG (5000 UNIT) 5000 UNITS: 125 TAB at 09:41

## 2024-02-27 RX ADMIN — LEVOTHYROXINE SODIUM 50 MCG: 0.05 TABLET ORAL at 07:14

## 2024-02-27 RX ADMIN — ASPIRIN 81 MG: 81 TABLET, COATED ORAL at 09:37

## 2024-02-27 RX ADMIN — Medication 500 MG: at 09:37

## 2024-02-27 RX ADMIN — CARVEDILOL 12.5 MG: 6.25 TABLET, FILM COATED ORAL at 09:36

## 2024-02-27 RX ADMIN — FOLIC ACID 1 MG: 1 TABLET ORAL at 09:37

## 2024-02-27 NOTE — DISCHARGE INSTRUCTIONS
pets--out of the sickroom.  Have the person wear a high-quality mask around other people.   This includes when anyone is in the room with them or if they leave their room (for example, to go to the bathroom).  Don't share personal items.   These include dishes, cups, towels, and bedding.  Wash your hands often and well.   Use soap and water, and scrub for at least 20 seconds. This is especially important after you've been around the sick person or touched things they've touched. If soap and water aren't handy, use an alcohol-based hand .  Wear a high-quality mask when caring for someone who is sick.   And wear a mask when you're around other people after you've cared for someone who's sick. For 10 days after your last exposure to the ill person, make sure to wear a mask when you are around other people. You should also get tested for COVID. You may need more than one test. If you test positive, isolate right away  Improve airflow.   If you have to spend time indoors with others, open windows and doors. Or you can use a fan to blow air away from people and out a window.  Avoid touching your mouth, nose, and eyes.  Take care with the person's laundry.   It's okay to wash the sick person's laundry with yours. If you have them, wear disposable gloves when handling their dirty laundry, and wash your hands well after you touch it. Wash items in the warmest water allowed for the fabric type, and dry them completely.  Clean high-touch items every day and anytime the sick person touches them.   These include doorknobs, light switches, toilets, counters, and remote controls. Use a household disinfectant or a homemade bleach solution. (Follow the directions on the label.) If the sick person has their own room, have them disinfect it every day.  Avoid having visitors.   If you have to have visitors, everyone needs to wear a mask. And keep the visit as short as possible. To help protect family and friends, stay in touch  with them only by phone or computer.  Go to the [facility] website at cdc.gov if you have questions.  Where can you learn more?  Go to https://www.[employer].net/patientEd and enter A137 to learn more about \"Learning How to Care for Someone Who Has COVID-19.\"  Current as of: October 28, 2023               Content Version: 13.9  © 9047-0203 [employer].   Care instructions adapted under license by [employer]. If you have questions about a medical condition or this instruction, always ask your healthcare professional. [employer] disclaims any warranty or liability for your use of this information.         Learning About COVID-19 and Flu Symptoms  How can you tell COVID-19 from the flu?     COVID-19 and the flu have similar symptoms. The two can be hard to tell apart. The only way to know for sure which illness you have is to be tested. If you have questions about COVID-19 testing, ask your doctor or check the CDC website at cdc.gov for information.  Since the symptoms are so alike, it makes sense to act as if you have COVID-19 until your test results come back. This means staying home and limiting contact with people in your home. You'll need to wash your hands often and disinfect surfaces that you touch. And be sure to wear a mask when you're around other people. This is also good advice if you think you have the flu.  COVID-19 and the flu have these symptoms in common:  Fever or chills  Cough  Shortness of breath  Fatigue (tiredness)  Sore throat  Runny or stuffy nose  Muscle and body aches  Headache  Vomiting and diarrhea (more common in children than adults)  COVID-19 has another symptom that also may occur:  New loss of taste or smell  COVID-19 symptoms may appear from 2 to 14 days after infection.  Flu symptoms usually appear 1 to 4 days after infection.  Why should you get the flu and COVID-19 vaccines?  It's important to get your yearly flu vaccine and stay up to date on your COVID-19

## 2024-02-27 NOTE — PROGRESS NOTES
Hospitalist Progress Note      SYNOPSIS: Patient admitted on 2024 for Near syncope  Patient presented to the hospital with chief complaint of dizziness and lightheadedness started at 4 AM today while patient was lying in bed.  He tried going to the restroom but he felt lightheaded and was about to fall so he sat down in chair.  He does state that he did not fall, his wife tested positive for COVID last week, he denies any cough, runny nose, fever, chills, shortness of breath, chest pain on exertion.   At emergency department  Blood pressure 154/62, CT head was negative for any stroke, he tested positive for COVID, he was given normal saline, his dizziness improved  Orthostatic vitals was ordered which was negative   orthostatic vitals were positive    SUBJECTIVE:  Stable overnight. No other overnight issues reported.   Patient seen and examined at bedside today a.m. states improvement in his lightheadedness  Records reviewed.         Temp (24hrs), Av.3 °F (36.8 °C), Min:97.2 °F (36.2 °C), Max:99 °F (37.2 °C)    DIET: ADULT DIET; Regular  CODE: [id number]    Intake/Output Summary (Last 24 hours) at 2024 1051  Last data filed at 2024 2129  Gross per 24 hour   Intake --   Output 120 ml   Net -120 ml       Review of Systems  All bolded are positive; please see HPI  General:  Fever, chills, diaphoresis, fatigue, malaise, night sweats, weight loss  Psychological:  Anxiety, disorientation, hallucinations.  ENT:  Epistaxis, headaches, vertigo, visual changes.  Cardiovascular:  Chest pain, irregular heartbeats, palpitations, paroxysmal nocturnal dyspnea.  Respiratory:  Shortness of breath, coughing, sputum production, hemoptysis, wheezing, orthopnea.  Gastrointestinal:  Nausea, vomiting, diarrhea, heartburn, constipation, abdominal pain, hematemesis, hematochezia, melena, acholic stools  Genito-Urinary:  Dysuria, urgency, frequency, hematuria  Musculoskeletal:  Joint pain, joint stiffness, joint  swelling, muscle pain  Neurology:  Headache, focal neurological deficits, weakness, numbness, paresthesia  Derm:  Rashes, ulcers, excoriations, bruising  Extremities:  Decreased ROM, peripheral edema, mottling      OBJECTIVE:    BP (!) 167/78   Pulse 89   Temp 99 °F (37.2 °C) (Temporal)   Resp 18   Ht 1.6 m (5' 3\")   Wt 74.8 kg (165 lb)   SpO2 97%   BMI 29.23 kg/m²     General appearance:  awake, alert, and oriented to person, place, time, and purpose; appears stated age and cooperative; no apparent distress no labored breathing  HEENT:  Conjunctivae/corneas clear.   Neck: Supple. No jugular venous distention.   Respiratory: symmetrical; clear to auscultation bilaterally; no wheezes; no rhonchi; no rales  Cardiovascular: rhythm regular; rate controlled; no murmurs  Abdomen: Soft, nontender, nondistended  Extremities:  peripheral pulses present; no peripheral edema; no ulcers  Musculoskeletal: No clubbing, cyanosis, no bilateral lower extremity edema. Brisk capillary refill.   Skin:  No rashes  on visible skin  Neurologic: awake, alert and following commands     ASSESSMENT and PLAN:  Near syncope  Mild to moderate aortic stenosis  Physical deconditioning  Dizziness  Asymptomatic COVID-19  Hypertension  Hyperlipidemia  Mild to moderate aortic stenosis        PLAN:  Admit under telemetry  Orthostatic vitals ordered were negative initially second time was positive  Pending TTE to check for valvular abnormality, structural heart disease which could not be done due to isolation precaution can be done outpatient setting  PT OT recommended home health care  Continue vitamin C, vitamin D         DVT Prophylaxis [] Lovenox, []  Heparin, [] SCDs, [] Ambulation   GI Prophylaxis [] PPI,  [] H2 Blocker,  [] Carafate,  [] Diet/Tube Feeds   Disposition Patient requires continued admission due to can be discharged from medical standpoint   MDM [] Low, [] Moderate,[]  High  Patient's risk as above due to        Medications:

## 2024-02-27 NOTE — DISCHARGE SUMMARY
Hospital Medicine Discharge Summary    Patient ID: Eb Sevilla      Patient's PCP: Ani Young APRN - CNP    Admit Date: 2/23/2024     Discharge Date:   02/26/2024    Admitting Physician: Milan Rodriguez MD     Discharge Physician: Milan Rodriguez MD     Discharge Diagnoses:  Near Syncope, Orthostatic positive, Physical deconditioning     Active Hospital Problems    Diagnosis Date Noted    Near syncope [R55] 02/23/2024       The patient was seen and examined on day of discharge and this discharge summary is in conjunction with any daily progress note from day of discharge.    Hospital Course:     Patient presented to the hospital with chief complaint of dizziness and lightheadedness started at 4 AM  while patient was lying in bed.  He tried going to the restroom but he felt lightheaded and was about to fall so he sat down in chair.     He does state that he did not fall, his wife tested positive for COVID last week, he denies any cough, runny nose, fever, chills, shortness of breath, chest pain on exertion.     At emergency department  Blood pressure 154/62, CT head was negative for any stroke, he tested positive for COVID, he was given normal saline, his dizziness improved    Orthostatic vitals were ordered which was negative initially, later turned out to be positive.    [facility]    Echocardiogram was ordered to check for valvular abnormality which was not done, as patient in isolation.    Patient stated improvement in his symptom, he is being discharged with Home health care.    Patient needs to setup and echocardiogram outpatient setting to evaluate his aortic stenosis.    Exam:     /62   Pulse 69   Temp 97.2 °F (36.2 °C)   Resp 25   Ht 1.6 m (5' 3\")   Wt 74.8 kg (165 lb)   SpO2 92%   BMI 29.23 kg/m²     General appearance: No apparent distress, appears stated age and cooperative.  HEENT: Pupils equal, round, and reactive to light. 
Refills: 0                Details   amLODIPine (NORVASC) 10 MG tablet Take 0.5 tablets by mouth nightly  Qty: 30 tablet, Refills: 3      aspirin 81 MG EC tablet Take 1 tablet by mouth daily  Qty: 30 tablet, Refills: 3                Details   sennosides-docusate sodium (SENOKOT-S) 8.6-50 MG tablet Take 1 tablet by mouth 2 times daily      DULoxetine (CYMBALTA) 60 MG extended release capsule Take 1 capsule by mouth at bedtime      pantoprazole (PROTONIX) 40 MG tablet Take 1 tablet by mouth every morning (before breakfast)  Qty: 30 tablet, Refills: 3      furosemide (LASIX) 40 MG tablet Take 1 tablet by mouth every other day  Qty: 60 tablet, Refills: 3      polyethylene glycol (GLYCOLAX) 17 g packet Take 1 packet by mouth daily as needed for Constipation      carvedilol (COREG) 12.5 MG tablet Take 1 tablet by mouth 2 times daily (with meals)  Qty: 60 tablet, Refills: 3      tamsulosin (FLOMAX) 0.4 MG capsule Take 1 capsule by mouth every evening      finasteride (PROSCAR) 5 MG tablet Take 1 tablet by mouth every evening      levothyroxine (SYNTHROID) 50 MCG tablet Take 1 tablet by mouth Daily      vitamin D3 (CHOLECALCIFEROL) 125 MCG (5000 UT) TABS tablet Take 1 tablet by mouth daily      folic acid (FOLVITE) 400 MCG tablet Take 2 tablets by mouth daily             Time Spent on discharge is 36 minutes in the examination, evaluation, counseling and review of medications and discharge plan.      Signed:    Milan Rodriguez MD   2/27/2024

## 2024-02-27 NOTE — CARE COORDINATION
2/27/24, MATT spoke with Pamela from [facility] on picking patient up at 10am today to take patient back to his residence where he lives with his wife and son.  Patient is on room air.  [facility] was contacted by this worker and spoke with Vika on patient discharging to home this am.  Informed Vika the [facility] order is on soft chart.  Contacted patient's wife and informed her that patient would be coming home this morning by [facility].  Nursing updated on the above.  SW to follow.      Erica Steven ROGELIO  [facility] Case Management  [phone]

## 2024-02-27 NOTE — CARE COORDINATION
2/27/24, Call placed to [facility] and  left.  Pamela from [facility] contacted this worker back and will figure out a time once she is in the office this am.  SW to follow.      Erica Steven ROGELIO  [facility] Case Management  [phone]

## 2024-03-05 NOTE — PROGRESS NOTES
Physician Progress Note      PATIENT:               FLORESITA GILLILAND  CSN #:                  [id number]  :                       1930  ADMIT DATE:       2024 6:43 AM  DISCH DATE:        2024 11:12 AM  RESPONDING  PROVIDER #:        Milan Rodriguez MD          QUERY TEXT:    Dear Provider,    Patient presents with syncope with documentation of positive orthostatic on    and mild to moderate aortic stenosis. Covid 19 detected. If possible,   please document in progress notes and discharge summary after study the   etiology of the syncope:      The medical record reflects the following:  Risk Factors: 89 yo male with COVID 19, hx htn, mild to moderate aortic   stenosis  Clinical Indicators: Pt presents with syncope. Asymptomatic COVID- 19.    Orthostatic vitals ordered were negative initially  second time was positive.  Pending TTE to check for valvular abnormality,   structural heart disease which could not be  done due to isolation precaution can be done outpatient setting.   CT head: No acute intracranial abnormality.   SARS-Cov-2, PCR Detected   BUN 30/Cr 1.4/GFR 47   BUN 18/Cr 1.1/>60  Treatment: Labs, Imaging, IVF, orthostatic readings, Isolation    Thank you,  Chayito Sommers RN  Clinical Documentation Integrity  [phone]  Options provided:  -- Syncope due to orthostatic hypotension  -- Syncope due to aortic stenosis  -- Syncope due to Covid 19 infection  -- Other - I will add my own diagnosis  -- Disagree - Not applicable / Not valid  -- Disagree - Clinically unable to determine / Unknown  -- Refer to Clinical Documentation Reviewer    PROVIDER RESPONSE TEXT:    The syncope is due to orthostatic hypotension.    Query created by: Chayito Sommers on 2024 3:16 PM      Electronically signed by:  Milan Rodriguez MD 3/5/2024 7:22 AM

## 2024-05-08 ENCOUNTER — OFFICE VISIT (OUTPATIENT)
Dept: CARDIOLOGY CLINIC | Age: 89
End: 2024-05-08
Payer: MEDICARE

## 2024-05-08 VITALS
BODY MASS INDEX: 29.23 KG/M2 | WEIGHT: 165 LBS | HEIGHT: 63 IN | HEART RATE: 66 BPM | SYSTOLIC BLOOD PRESSURE: 137 MMHG | DIASTOLIC BLOOD PRESSURE: 67 MMHG | RESPIRATION RATE: 16 BRPM

## 2024-05-08 DIAGNOSIS — I50.43 CHF (CONGESTIVE HEART FAILURE), NYHA CLASS I, ACUTE ON CHRONIC, COMBINED (HCC): Primary | ICD-10-CM

## 2024-05-08 DIAGNOSIS — I35.0 AORTIC STENOSIS, MODERATE: ICD-10-CM

## 2024-05-08 PROCEDURE — 1123F ACP DISCUSS/DSCN MKR DOCD: CPT | Performed by: INTERNAL MEDICINE

## 2024-05-08 PROCEDURE — 93000 ELECTROCARDIOGRAM COMPLETE: CPT | Performed by: INTERNAL MEDICINE

## 2024-05-08 PROCEDURE — 99214 OFFICE O/P EST MOD 30 MIN: CPT | Performed by: INTERNAL MEDICINE

## 2024-05-08 NOTE — PROGRESS NOTES
OUTPATIENT CARDIOLOGY FOLLOW-UP    Name: Eb Sevilla    Age: 89 y.o.    Primary Care Physician: Ani Young APRN - CNP    Date of Service: 5/8/2024    Chief Complaint:   Chief Complaint   Patient presents with    Congestive Heart Failure     3 month- Patient has no complaints.        Interim History:   Mr. Sevilla is a 89-year-old  male with history of CAD status post CABG underwent 2006 with a LIMA to LAD, SVG to D1, SVG to OM1, SVG to RCA and SVG to RPDA, mild obesity, hypertension, hyperlipidemia, type 2 diabetes diet controlled presented to the hospital with severe dizziness difficulty balancing and progressive increasing dyspnea, leg edema without any chest pain.  Patient also had orthopnea and chronic PND and severe dizziness.  He was seen as a consult on 4/24/2021 after he was admitted to the hospital on 4/24/2021 and was diagnosed with acute on chronic heart failure with preserved ejection fraction and chronic dizziness secondary to clonidine and metoprolol combination.  He was initiated on IV diuretic therapy with the Lasix and clonidine was discontinued.  He was initiated on Norvascfor hypertension.  His dizziness significantly improved after stopping clonidine.  Patient became euvolemic and was discharged home on 4/27/2021.      He is compliant with medications, as well as salt and fluid intake.  He does not take any over-the-counter arthritis medications.   He is complaining of lot of left hip pain and has difficulty ambulating and uses walker for walking.  He received multiple steroid injections to his left hip and also lower back without any significant improvement. Now, he is scheduled to go for surgery.       Patient was seen in the office on 5/5/23, since last visit, he underwent left hip replacement on 6/29/2023 without any perioperative cardiac events.  He was hospitalized in March 2023 following a fall without any loss of consciousness.  He had severe pain in his left hip

## 2024-05-08 NOTE — PATIENT INSTRUCTIONS
Will repeat another echocardiogram to assess severity of the aortic stenosis.  Continue Lasix 40 mg 1 every other day.   Continue current dose of Coreg 12.5 mg p.o. twice daily, amlodipine 10 mg p.o. daily for hypertension  Continue rest of the current medications.  Continue aspirin 81 mg p.o. daily  Patient was advised to stay well-hydrated  Follow-up with me in 12 months.

## 2024-05-10 ENCOUNTER — TELEPHONE (OUTPATIENT)
Dept: CARDIOLOGY | Age: 89
End: 2024-05-10

## 2024-05-10 NOTE — TELEPHONE ENCOUNTER
CALLED PATIENT AND LEFT MESSAGE TO SCHEDULE ECHO    Electronically signed by Ora García on 5/10/2024 at 10:10 AM

## 2024-07-03 ENCOUNTER — HOSPITAL ENCOUNTER (OUTPATIENT)
Dept: CARDIOLOGY | Age: 89
Discharge: HOME | End: 2024-07-05
Attending: INTERNAL MEDICINE
Payer: MEDICARE

## 2024-07-03 VITALS
HEIGHT: 63 IN | WEIGHT: 165 LBS | SYSTOLIC BLOOD PRESSURE: 137 MMHG | BODY MASS INDEX: 29.23 KG/M2 | DIASTOLIC BLOOD PRESSURE: 67 MMHG

## 2024-07-03 DIAGNOSIS — I50.43 CHF (CONGESTIVE HEART FAILURE), NYHA CLASS I, ACUTE ON CHRONIC, COMBINED (HCC): ICD-10-CM

## 2024-07-03 DIAGNOSIS — I35.0 AORTIC STENOSIS, MODERATE: ICD-10-CM

## 2024-07-03 PROCEDURE — 93306 TTE W/DOPPLER COMPLETE: CPT

## 2024-07-05 LAB
ECHO AO ASC DIAM: 3.3 CM
ECHO AO ASCENDING AORTA INDEX: 1.85 CM/M2
ECHO AO SINUS VALSALVA DIAM: 3.5 CM
ECHO AO SINUS VALSALVA INDEX: 1.97 CM/M2
ECHO AV AREA PEAK VELOCITY: 1.3 CM2
ECHO AV AREA PLAN/BSA: 0.62 CM2/M2
ECHO AV AREA PLAN: 1.1 CM2
ECHO AV AREA VTI: 1.3 CM2
ECHO AV AREA/BSA PEAK VELOCITY: 0.7 CM2/M2
ECHO AV AREA/BSA VTI: 0.7 CM2/M2
ECHO AV CUSP MM: 1.3 CM
ECHO AV MEAN GRADIENT: 14 MMHG
ECHO AV MEAN VELOCITY: 1.8 M/S
ECHO AV PEAK GRADIENT: 26 MMHG
ECHO AV PEAK VELOCITY: 2.5 M/S
ECHO AV VELOCITY RATIO: 0.4
ECHO AV VTI: 54.2 CM
ECHO BSA: 1.82 M2
ECHO EST RA PRESSURE: 3 MMHG
ECHO LA DIAMETER INDEX: 2.87 CM/M2
ECHO LA DIAMETER: 5.1 CM
ECHO LA VOL A-L A2C: 43 ML (ref 18–58)
ECHO LA VOL A-L A4C: 61 ML (ref 18–58)
ECHO LA VOL MOD A2C: 42 ML (ref 18–58)
ECHO LA VOL MOD A4C: 58 ML (ref 18–58)
ECHO LA VOLUME AREA LENGTH: 53 ML
ECHO LA VOLUME INDEX A-L A2C: 24 ML/M2 (ref 16–34)
ECHO LA VOLUME INDEX A-L A4C: 34 ML/M2 (ref 16–34)
ECHO LA VOLUME INDEX AREA LENGTH: 30 ML/M2 (ref 16–34)
ECHO LA VOLUME INDEX MOD A2C: 24 ML/M2 (ref 16–34)
ECHO LA VOLUME INDEX MOD A4C: 33 ML/M2 (ref 16–34)
ECHO LV FRACTIONAL SHORTENING: 29 % (ref 28–44)
ECHO LV INTERNAL DIMENSION DIASTOLE INDEX: 2.3 CM/M2
ECHO LV INTERNAL DIMENSION DIASTOLIC: 4.1 CM (ref 4.2–5.9)
ECHO LV INTERNAL DIMENSION SYSTOLIC INDEX: 1.63 CM/M2
ECHO LV INTERNAL DIMENSION SYSTOLIC: 2.9 CM
ECHO LV IVSD: 1.3 CM (ref 0.6–1)
ECHO LV IVSS: 1.5 CM
ECHO LV MASS 2D: 171.7 G (ref 88–224)
ECHO LV MASS INDEX 2D: 96.5 G/M2 (ref 49–115)
ECHO LV POSTERIOR WALL DIASTOLIC: 1.1 CM (ref 0.6–1)
ECHO LV POSTERIOR WALL SYSTOLIC: 1.6 CM
ECHO LV RELATIVE WALL THICKNESS RATIO: 0.54
ECHO LVOT AREA: 3.1 CM2
ECHO LVOT AV VTI INDEX: 0.39
ECHO LVOT DIAM: 2 CM
ECHO LVOT MEAN GRADIENT: 2 MMHG
ECHO LVOT PEAK GRADIENT: 4 MMHG
ECHO LVOT PEAK VELOCITY: 1 M/S
ECHO LVOT STROKE VOLUME INDEX: 37 ML/M2
ECHO LVOT SV: 65.9 ML
ECHO LVOT VTI: 21 CM
ECHO MV "A" WAVE DURATION: 121.1 MSEC
ECHO MV A VELOCITY: 1.04 M/S
ECHO MV AREA PHT: 3.1 CM2
ECHO MV AREA VTI: 2.5 CM2
ECHO MV E DECELERATION TIME (DT): 271.2 MS
ECHO MV E VELOCITY: 0.72 M/S
ECHO MV E/A RATIO: 0.69
ECHO MV LVOT VTI INDEX: 1.24
ECHO MV MAX VELOCITY: 1.4 M/S
ECHO MV MEAN GRADIENT: 2 MMHG
ECHO MV MEAN VELOCITY: 0.6 M/S
ECHO MV PEAK GRADIENT: 8 MMHG
ECHO MV PRESSURE HALF TIME (PHT): 71.4 MS
ECHO MV REGURGITANT VELOCITY PISA: 5.8 M/S
ECHO MV REGURGITANT VTIA: 187.1 CM
ECHO MV VTI: 26.1 CM
ECHO PV MAX VELOCITY: 0.9 M/S
ECHO PV MEAN GRADIENT: 1 MMHG
ECHO PV MEAN VELOCITY: 0.6 M/S
ECHO PV PEAK GRADIENT: 3 MMHG
ECHO PV VTI: 16.4 CM
ECHO PVEIN A DURATION: 96.9 MS
ECHO PVEIN A VELOCITY: 0.3 M/S
ECHO PVEIN PEAK D VELOCITY: 0.4 M/S
ECHO PVEIN PEAK S VELOCITY: 0.8 M/S
ECHO PVEIN S/D RATIO: 2
ECHO RIGHT VENTRICULAR SYSTOLIC PRESSURE (RVSP): 48 MMHG
ECHO RV INTERNAL DIMENSION: 3.3 CM
ECHO RV TAPSE: 1.9 CM (ref 1.7–?)
ECHO TV REGURGITANT MAX VELOCITY: 3.36 M/S
ECHO TV REGURGITANT PEAK GRADIENT: 45 MMHG

## 2024-07-08 ENCOUNTER — TELEPHONE (OUTPATIENT)
Dept: CARDIOLOGY CLINIC | Age: 89
End: 2024-07-08

## 2024-07-08 NOTE — TELEPHONE ENCOUNTER
----- Message from Roxann Rosas MD sent at 7/8/2024  2:14 PM EDT -----  Echocardiogram showed normal heart function, mild to moderate aortic stenosis, no need for any surgery at this time.

## 2025-04-29 ENCOUNTER — APPOINTMENT (OUTPATIENT)
Dept: GENERAL RADIOLOGY | Age: 89
End: 2025-04-29
Payer: MEDICARE

## 2025-04-29 ENCOUNTER — HOSPITAL ENCOUNTER (EMERGENCY)
Age: 89
Discharge: ANOTHER HOSPITAL | End: 2025-04-30
Attending: STUDENT IN AN ORGANIZED HEALTH CARE EDUCATION/TRAINING PROGRAM
Payer: MEDICARE

## 2025-04-29 ENCOUNTER — APPOINTMENT (OUTPATIENT)
Dept: CT IMAGING | Age: 89
End: 2025-04-29
Payer: MEDICARE

## 2025-04-29 DIAGNOSIS — R53.1 GENERAL WEAKNESS: ICD-10-CM

## 2025-04-29 DIAGNOSIS — J96.01 ACUTE RESPIRATORY FAILURE WITH HYPOXIA (HCC): ICD-10-CM

## 2025-04-29 DIAGNOSIS — R62.7 FTT (FAILURE TO THRIVE) IN ADULT: Primary | ICD-10-CM

## 2025-04-29 DIAGNOSIS — I50.9 ACUTE ON CHRONIC CONGESTIVE HEART FAILURE, UNSPECIFIED HEART FAILURE TYPE (HCC): ICD-10-CM

## 2025-04-29 DIAGNOSIS — R26.2 DIFFICULTY IN WALKING: ICD-10-CM

## 2025-04-29 LAB
ALBUMIN SERPL-MCNC: 3.7 G/DL (ref 3.5–5.2)
ALP SERPL-CCNC: 47 U/L (ref 40–129)
ALT SERPL-CCNC: 9 U/L (ref 0–40)
ANION GAP SERPL CALCULATED.3IONS-SCNC: 7 MMOL/L (ref 7–16)
AST SERPL-CCNC: <5 U/L (ref 0–39)
BASOPHILS # BLD: 0.02 K/UL (ref 0–0.2)
BASOPHILS NFR BLD: 1 % (ref 0–2)
BILIRUB SERPL-MCNC: 0.3 MG/DL (ref 0–1.2)
BILIRUB UR QL STRIP: NEGATIVE
BNP SERPL-MCNC: 1498 PG/ML (ref 0–450)
BUN SERPL-MCNC: 43 MG/DL (ref 6–23)
CALCIUM SERPL-MCNC: 9.5 MG/DL (ref 8.6–10.2)
CHLORIDE SERPL-SCNC: 99 MMOL/L (ref 98–107)
CK SERPL-CCNC: 18 U/L (ref 20–200)
CLARITY UR: CLEAR
CO2 SERPL-SCNC: 33 MMOL/L (ref 22–29)
COLOR UR: YELLOW
CREAT SERPL-MCNC: 1.5 MG/DL (ref 0.7–1.2)
D-DIMER QUANTITATIVE: 299 NG/ML DDU (ref 0–230)
EKG ATRIAL RATE: 73 BPM
EKG P AXIS: 17 DEGREES
EKG P-R INTERVAL: 176 MS
EKG Q-T INTERVAL: 380 MS
EKG QRS DURATION: 88 MS
EKG QTC CALCULATION (BAZETT): 418 MS
EKG R AXIS: -44 DEGREES
EKG T AXIS: 64 DEGREES
EKG VENTRICULAR RATE: 73 BPM
EOSINOPHIL # BLD: 0.17 K/UL (ref 0.05–0.5)
EOSINOPHILS RELATIVE PERCENT: 4 % (ref 0–6)
ERYTHROCYTE [DISTWIDTH] IN BLOOD BY AUTOMATED COUNT: 13.1 % (ref 11.5–15)
GFR, ESTIMATED: 43 ML/MIN/1.73M2
GLUCOSE SERPL-MCNC: 143 MG/DL (ref 74–99)
GLUCOSE UR STRIP-MCNC: NEGATIVE MG/DL
HCT VFR BLD AUTO: 31.8 % (ref 37–54)
HGB BLD-MCNC: 10.7 G/DL (ref 12.5–16.5)
HGB UR QL STRIP.AUTO: NEGATIVE
IMM GRANULOCYTES # BLD AUTO: 0.03 K/UL (ref 0–0.58)
IMM GRANULOCYTES NFR BLD: 1 % (ref 0–5)
KETONES UR STRIP-MCNC: NEGATIVE MG/DL
LEUKOCYTE ESTERASE UR QL STRIP: NEGATIVE
LYMPHOCYTES NFR BLD: 0.65 K/UL (ref 1.5–4)
LYMPHOCYTES RELATIVE PERCENT: 16 % (ref 20–42)
MAGNESIUM SERPL-MCNC: 2 MG/DL (ref 1.6–2.6)
MCH RBC QN AUTO: 32.7 PG (ref 26–35)
MCHC RBC AUTO-ENTMCNC: 33.6 G/DL (ref 32–34.5)
MCV RBC AUTO: 97.2 FL (ref 80–99.9)
MONOCYTES NFR BLD: 0.34 K/UL (ref 0.1–0.95)
MONOCYTES NFR BLD: 8 % (ref 2–12)
NEUTROPHILS NFR BLD: 71 % (ref 43–80)
NEUTS SEG NFR BLD: 2.93 K/UL (ref 1.8–7.3)
NITRITE UR QL STRIP: NEGATIVE
PH UR STRIP: 6 [PH] (ref 5–8)
PLATELET # BLD AUTO: 106 K/UL (ref 130–450)
PMV BLD AUTO: 8 FL (ref 7–12)
POTASSIUM SERPL-SCNC: 4.7 MMOL/L (ref 3.5–5)
PROT SERPL-MCNC: 6.4 G/DL (ref 6.4–8.3)
PROT UR STRIP-MCNC: NEGATIVE MG/DL
RBC # BLD AUTO: 3.27 M/UL (ref 3.8–5.8)
RBC #/AREA URNS HPF: NORMAL /HPF
SODIUM SERPL-SCNC: 139 MMOL/L (ref 132–146)
SP GR UR STRIP: 1.01 (ref 1–1.03)
T4 FREE SERPL-MCNC: 1.5 NG/DL (ref 0.9–1.7)
TROPONIN I SERPL HS-MCNC: 65 NG/L (ref 0–22)
TROPONIN I SERPL HS-MCNC: 67 NG/L (ref 0–22)
TSH SERPL DL<=0.05 MIU/L-ACNC: 3.02 UIU/ML (ref 0.27–4.2)
UROBILINOGEN UR STRIP-ACNC: 0.2 EU/DL (ref 0–1)
WBC #/AREA URNS HPF: NORMAL /HPF
WBC OTHER # BLD: 4.1 K/UL (ref 4.5–11.5)

## 2025-04-29 PROCEDURE — 87086 URINE CULTURE/COLONY COUNT: CPT

## 2025-04-29 PROCEDURE — 81001 URINALYSIS AUTO W/SCOPE: CPT

## 2025-04-29 PROCEDURE — 82550 ASSAY OF CK (CPK): CPT

## 2025-04-29 PROCEDURE — 84484 ASSAY OF TROPONIN QUANT: CPT

## 2025-04-29 PROCEDURE — 6360000002 HC RX W HCPCS: Performed by: STUDENT IN AN ORGANIZED HEALTH CARE EDUCATION/TRAINING PROGRAM

## 2025-04-29 PROCEDURE — 6360000004 HC RX CONTRAST MEDICATION: Performed by: RADIOLOGY

## 2025-04-29 PROCEDURE — 80053 COMPREHEN METABOLIC PANEL: CPT

## 2025-04-29 PROCEDURE — 84439 ASSAY OF FREE THYROXINE: CPT

## 2025-04-29 PROCEDURE — 71045 X-RAY EXAM CHEST 1 VIEW: CPT

## 2025-04-29 PROCEDURE — 84443 ASSAY THYROID STIM HORMONE: CPT

## 2025-04-29 PROCEDURE — 93010 ELECTROCARDIOGRAM REPORT: CPT | Performed by: INTERNAL MEDICINE

## 2025-04-29 PROCEDURE — 96374 THER/PROPH/DIAG INJ IV PUSH: CPT

## 2025-04-29 PROCEDURE — 83735 ASSAY OF MAGNESIUM: CPT

## 2025-04-29 PROCEDURE — 93005 ELECTROCARDIOGRAM TRACING: CPT | Performed by: STUDENT IN AN ORGANIZED HEALTH CARE EDUCATION/TRAINING PROGRAM

## 2025-04-29 PROCEDURE — 99285 EMERGENCY DEPT VISIT HI MDM: CPT

## 2025-04-29 PROCEDURE — 70450 CT HEAD/BRAIN W/O DYE: CPT

## 2025-04-29 PROCEDURE — 85025 COMPLETE CBC W/AUTO DIFF WBC: CPT

## 2025-04-29 PROCEDURE — 85379 FIBRIN DEGRADATION QUANT: CPT

## 2025-04-29 PROCEDURE — 83880 ASSAY OF NATRIURETIC PEPTIDE: CPT

## 2025-04-29 PROCEDURE — 71275 CT ANGIOGRAPHY CHEST: CPT

## 2025-04-29 RX ORDER — FUROSEMIDE 10 MG/ML
40 INJECTION INTRAMUSCULAR; INTRAVENOUS ONCE
Status: COMPLETED | OUTPATIENT
Start: 2025-04-29 | End: 2025-04-29

## 2025-04-29 RX ORDER — IOPAMIDOL 755 MG/ML
75 INJECTION, SOLUTION INTRAVASCULAR
Status: COMPLETED | OUTPATIENT
Start: 2025-04-29 | End: 2025-04-29

## 2025-04-29 RX ADMIN — IOPAMIDOL 75 ML: 755 INJECTION, SOLUTION INTRAVENOUS at 15:45

## 2025-04-29 RX ADMIN — FUROSEMIDE 40 MG: 10 INJECTION, SOLUTION INTRAMUSCULAR; INTRAVENOUS at 14:12

## 2025-04-29 ASSESSMENT — PAIN - FUNCTIONAL ASSESSMENT
PAIN_FUNCTIONAL_ASSESSMENT: NONE - DENIES PAIN

## 2025-04-29 ASSESSMENT — ENCOUNTER SYMPTOMS
CONSTIPATION: 0
COUGH: 0
DIARRHEA: 0
SHORTNESS OF BREATH: 1
VOMITING: 0
ABDOMINAL PAIN: 0
NAUSEA: 0

## 2025-04-29 NOTE — ED NOTES
Wife called at home with room assignment; patient given room assignment and plan for transport at [phone]

## 2025-04-29 NOTE — ED PROVIDER NOTES
effort was made to ensure accuracy; however, inadvertent computerized transcription errors may be present            Makayla Stark DO  04/30/25 0604

## 2025-04-29 NOTE — ED NOTES
Oxygen removed x 5 minutes and pulse ox decreased to 89% on room air; oxygen reapplied at 3l/nc and pulse ox increased to 95% on 3l/nc within one minute

## 2025-04-29 NOTE — ED NOTES
Stratasorb waterproof composite dressing applied to Lt buttocks area wound; wife states she has tried this at home but when has BM, dressing comes off. Will try this larger dressing while in ED

## 2025-04-29 NOTE — TRANSFER CENTER NOTE
Transfer note:    89-year-old male presented to [facility] with weakness and shortness of breath.  Found to be hypoxemic to 88% room air.  BNP was elevated.  Concern for possible CHF with initial chest x-ray showing some pulmonary vascular congestion.  Did receive 40 of Lasix with repeat CT scan showing no congestion.  Unable to take care of himself at home.  Being transferred for placement and CHF exacerbation

## 2025-04-30 ENCOUNTER — APPOINTMENT (OUTPATIENT)
Age: 89
DRG: 291 | End: 2025-04-30
Attending: INTERNAL MEDICINE
Payer: MEDICARE

## 2025-04-30 ENCOUNTER — HOSPITAL ENCOUNTER (INPATIENT)
Age: 89
LOS: 5 days | Discharge: NURSING, REHAB OR LONG-TERM CARE | DRG: 291 | End: 2025-05-05
Attending: INTERNAL MEDICINE | Admitting: INTERNAL MEDICINE
Payer: MEDICARE

## 2025-04-30 VITALS
TEMPERATURE: 98 F | SYSTOLIC BLOOD PRESSURE: 168 MMHG | HEART RATE: 92 BPM | WEIGHT: 174 LBS | OXYGEN SATURATION: 97 % | DIASTOLIC BLOOD PRESSURE: 80 MMHG | RESPIRATION RATE: 22 BRPM | BODY MASS INDEX: 30.82 KG/M2

## 2025-04-30 DIAGNOSIS — I50.30 DIASTOLIC CONGESTIVE HEART FAILURE, UNSPECIFIED HF CHRONICITY (HCC): Primary | ICD-10-CM

## 2025-04-30 PROBLEM — I50.9 ACUTE EXACERBATION OF CHRONIC HEART FAILURE (HCC): Status: ACTIVE | Noted: 2025-04-30

## 2025-04-30 LAB
ANION GAP SERPL CALCULATED.3IONS-SCNC: 8 MMOL/L (ref 7–16)
B PARAP IS1001 DNA NPH QL NAA+NON-PROBE: NOT DETECTED
B PERT DNA SPEC QL NAA+PROBE: NOT DETECTED
BUN SERPL-MCNC: 40 MG/DL (ref 6–23)
C PNEUM DNA NPH QL NAA+NON-PROBE: NOT DETECTED
CALCIUM SERPL-MCNC: 9.8 MG/DL (ref 8.6–10.2)
CHLORIDE SERPL-SCNC: 98 MMOL/L (ref 98–107)
CO2 SERPL-SCNC: 35 MMOL/L (ref 22–29)
CREAT SERPL-MCNC: 1.4 MG/DL (ref 0.7–1.2)
ECHO AO ANNULUS INDEX: 1.88 CM/M2
ECHO AO ASC DIAM: 3.1 CM
ECHO AO ASCENDING AORTA INDEX: 1.71 CM/M2
ECHO AV ANNULUS DIAM: 3.4 CM
ECHO AV AREA PEAK VELOCITY: 1.1 CM2
ECHO AV AREA VTI: 1.2 CM2
ECHO AV AREA/BSA PEAK VELOCITY: 0.6 CM2/M2
ECHO AV AREA/BSA VTI: 0.7 CM2/M2
ECHO AV CUSP MM: 0.9 CM
ECHO AV MEAN GRADIENT: 20 MMHG
ECHO AV MEAN VELOCITY: 2.1 M/S
ECHO AV PEAK GRADIENT: 30 MMHG
ECHO AV PEAK VELOCITY: 2.8 M/S
ECHO AV VELOCITY RATIO: 0.32
ECHO AV VTI: 59.8 CM
ECHO BSA: 1.86 M2
ECHO EST RA PRESSURE: 3 MMHG
ECHO LA DIAMETER INDEX: 2.21 CM/M2
ECHO LA DIAMETER: 4 CM
ECHO LA VOL A-L A2C: 86 ML (ref 18–58)
ECHO LA VOL A-L A4C: 79 ML (ref 18–58)
ECHO LA VOL MOD A2C: 83 ML (ref 18–58)
ECHO LA VOL MOD A4C: 74 ML (ref 18–58)
ECHO LA VOLUME AREA LENGTH: 84 ML
ECHO LA VOLUME INDEX A-L A2C: 48 ML/M2 (ref 16–34)
ECHO LA VOLUME INDEX A-L A4C: 44 ML/M2 (ref 16–34)
ECHO LA VOLUME INDEX AREA LENGTH: 46 ML/M2 (ref 16–34)
ECHO LA VOLUME INDEX MOD A2C: 46 ML/M2 (ref 16–34)
ECHO LA VOLUME INDEX MOD A4C: 41 ML/M2 (ref 16–34)
ECHO LV E' LATERAL VELOCITY: 6 CM/S
ECHO LV E' SEPTAL VELOCITY: 5 CM/S
ECHO LV EF PHYSICIAN: 60 %
ECHO LV FRACTIONAL SHORTENING: 33 % (ref 28–44)
ECHO LV INTERNAL DIMENSION DIASTOLE INDEX: 2.21 CM/M2
ECHO LV INTERNAL DIMENSION DIASTOLIC: 4 CM (ref 4.2–5.9)
ECHO LV INTERNAL DIMENSION SYSTOLIC INDEX: 1.49 CM/M2
ECHO LV INTERNAL DIMENSION SYSTOLIC: 2.7 CM
ECHO LV IVSD: 1.4 CM (ref 0.6–1)
ECHO LV IVSS: 1.7 CM
ECHO LV MASS 2D: 186.5 G (ref 88–224)
ECHO LV MASS INDEX 2D: 103.1 G/M2 (ref 49–115)
ECHO LV POSTERIOR WALL DIASTOLIC: 1.2 CM (ref 0.6–1)
ECHO LV POSTERIOR WALL SYSTOLIC: 1.7 CM
ECHO LV RELATIVE WALL THICKNESS RATIO: 0.6
ECHO LVOT AREA: 3.1 CM2
ECHO LVOT AV VTI INDEX: 0.39
ECHO LVOT DIAM: 2 CM
ECHO LVOT MEAN GRADIENT: 2 MMHG
ECHO LVOT PEAK GRADIENT: 4 MMHG
ECHO LVOT PEAK VELOCITY: 0.9 M/S
ECHO LVOT STROKE VOLUME INDEX: 40.9 ML/M2
ECHO LVOT SV: 74.1 ML
ECHO LVOT VTI: 23.6 CM
ECHO MV AREA PHT: 3.5 CM2
ECHO MV AREA VTI: 3.9 CM2
ECHO MV EROA PISA: 0.3 CM2
ECHO MV LVOT VTI INDEX: 0.81
ECHO MV MAX VELOCITY: 1 M/S
ECHO MV MEAN GRADIENT: 2 MMHG
ECHO MV MEAN VELOCITY: 0.7 M/S
ECHO MV PEAK GRADIENT: 4 MMHG
ECHO MV PRESSURE HALF TIME (PHT): 62.2 MS
ECHO MV REGURGITANT ALIASING (NYQUIST) VELOCITY: 37 CM/S
ECHO MV REGURGITANT RADIUS PISA: 0.92 CM
ECHO MV REGURGITANT VELOCITY PISA: 5.8 M/S
ECHO MV REGURGITANT VOLUME PISA: 55.54 ML
ECHO MV REGURGITANT VTIA: 163.8 CM
ECHO MV VTI: 19.2 CM
ECHO PULMONARY ARTERY END DIASTOLIC PRESSURE: 7 MMHG
ECHO PV MAX VELOCITY: 1.2 M/S
ECHO PV MEAN GRADIENT: 3 MMHG
ECHO PV MEAN VELOCITY: 0.8 M/S
ECHO PV PEAK GRADIENT: 5 MMHG
ECHO PV REGURGITANT MAX VELOCITY: 1.3 M/S
ECHO PV VTI: 19.9 CM
ECHO PVEIN PEAK S VELOCITY: 0.7 M/S
ECHO RIGHT VENTRICULAR SYSTOLIC PRESSURE (RVSP): 66 MMHG
ECHO RV BASAL DIMENSION: 4 CM
ECHO RV INTERNAL DIMENSION: 3.3 CM
ECHO RV LONGITUDINAL DIMENSION: 7.9 CM
ECHO RV MID DIMENSION: 3.8 CM
ECHO RV TAPSE: 1.7 CM (ref 1.7–?)
ECHO TV REGURGITANT MAX VELOCITY: 3.96 M/S
ECHO TV REGURGITANT PEAK GRADIENT: 63 MMHG
FLUAV RNA NPH QL NAA+NON-PROBE: NOT DETECTED
FLUBV RNA NPH QL NAA+NON-PROBE: NOT DETECTED
GFR, ESTIMATED: 47 ML/MIN/1.73M2
GLUCOSE SERPL-MCNC: 149 MG/DL (ref 74–99)
HADV DNA NPH QL NAA+NON-PROBE: NOT DETECTED
HCOV 229E RNA NPH QL NAA+NON-PROBE: NOT DETECTED
HCOV HKU1 RNA NPH QL NAA+NON-PROBE: NOT DETECTED
HCOV NL63 RNA NPH QL NAA+NON-PROBE: NOT DETECTED
HCOV OC43 RNA NPH QL NAA+NON-PROBE: NOT DETECTED
HMPV RNA NPH QL NAA+NON-PROBE: NOT DETECTED
HPIV1 RNA NPH QL NAA+NON-PROBE: NOT DETECTED
HPIV2 RNA NPH QL NAA+NON-PROBE: NOT DETECTED
HPIV3 RNA NPH QL NAA+NON-PROBE: NOT DETECTED
HPIV4 RNA NPH QL NAA+NON-PROBE: NOT DETECTED
LEFT VENTRICULAR EJECTION FRACTION HIGH VALUE: 65 %
LEFT VENTRICULAR EJECTION FRACTION MODE: NORMAL
LV EF: 60 %
M PNEUMO DNA NPH QL NAA+NON-PROBE: NOT DETECTED
MAGNESIUM SERPL-MCNC: 2.1 MG/DL (ref 1.6–2.6)
POTASSIUM SERPL-SCNC: 4.5 MMOL/L (ref 3.5–5)
RSV RNA NPH QL NAA+NON-PROBE: NOT DETECTED
RV+EV RNA NPH QL NAA+NON-PROBE: NOT DETECTED
SARS-COV-2 RNA NPH QL NAA+NON-PROBE: NOT DETECTED
SODIUM SERPL-SCNC: 141 MMOL/L (ref 132–146)
SPECIMEN DESCRIPTION: NORMAL
TSH SERPL DL<=0.05 MIU/L-ACNC: 3.08 UIU/ML (ref 0.27–4.2)

## 2025-04-30 PROCEDURE — 83735 ASSAY OF MAGNESIUM: CPT

## 2025-04-30 PROCEDURE — 2500000003 HC RX 250 WO HCPCS: Performed by: INTERNAL MEDICINE

## 2025-04-30 PROCEDURE — 97165 OT EVAL LOW COMPLEX 30 MIN: CPT

## 2025-04-30 PROCEDURE — APPSS45 APP SPLIT SHARED TIME 31-45 MINUTES

## 2025-04-30 PROCEDURE — 6360000002 HC RX W HCPCS: Performed by: STUDENT IN AN ORGANIZED HEALTH CARE EDUCATION/TRAINING PROGRAM

## 2025-04-30 PROCEDURE — 93306 TTE W/DOPPLER COMPLETE: CPT | Performed by: INTERNAL MEDICINE

## 2025-04-30 PROCEDURE — 2500000003 HC RX 250 WO HCPCS

## 2025-04-30 PROCEDURE — 84443 ASSAY THYROID STIM HORMONE: CPT

## 2025-04-30 PROCEDURE — 80048 BASIC METABOLIC PNL TOTAL CA: CPT

## 2025-04-30 PROCEDURE — 2060000000 HC ICU INTERMEDIATE R&B

## 2025-04-30 PROCEDURE — 97161 PT EVAL LOW COMPLEX 20 MIN: CPT

## 2025-04-30 PROCEDURE — 6360000002 HC RX W HCPCS

## 2025-04-30 PROCEDURE — 6370000000 HC RX 637 (ALT 250 FOR IP): Performed by: INTERNAL MEDICINE

## 2025-04-30 PROCEDURE — 2700000000 HC OXYGEN THERAPY PER DAY

## 2025-04-30 PROCEDURE — 93306 TTE W/DOPPLER COMPLETE: CPT

## 2025-04-30 PROCEDURE — 6370000000 HC RX 637 (ALT 250 FOR IP)

## 2025-04-30 PROCEDURE — 0202U NFCT DS 22 TRGT SARS-COV-2: CPT

## 2025-04-30 PROCEDURE — 99223 1ST HOSP IP/OBS HIGH 75: CPT | Performed by: STUDENT IN AN ORGANIZED HEALTH CARE EDUCATION/TRAINING PROGRAM

## 2025-04-30 RX ORDER — DULOXETIN HYDROCHLORIDE 60 MG/1
60 CAPSULE, DELAYED RELEASE ORAL NIGHTLY
Status: DISCONTINUED | OUTPATIENT
Start: 2025-04-30 | End: 2025-05-05 | Stop reason: HOSPADM

## 2025-04-30 RX ORDER — ONDANSETRON 4 MG/1
4 TABLET, ORALLY DISINTEGRATING ORAL EVERY 8 HOURS PRN
Status: DISCONTINUED | OUTPATIENT
Start: 2025-04-30 | End: 2025-05-05 | Stop reason: HOSPADM

## 2025-04-30 RX ORDER — LEVOTHYROXINE SODIUM 50 UG/1
50 TABLET ORAL DAILY
Status: DISCONTINUED | OUTPATIENT
Start: 2025-04-30 | End: 2025-05-05 | Stop reason: HOSPADM

## 2025-04-30 RX ORDER — POLYETHYLENE GLYCOL 3350 17 G/17G
17 POWDER, FOR SOLUTION ORAL DAILY PRN
Status: DISCONTINUED | OUTPATIENT
Start: 2025-04-30 | End: 2025-05-01

## 2025-04-30 RX ORDER — ENOXAPARIN SODIUM 100 MG/ML
30 INJECTION SUBCUTANEOUS DAILY
Status: DISCONTINUED | OUTPATIENT
Start: 2025-04-30 | End: 2025-05-05 | Stop reason: HOSPADM

## 2025-04-30 RX ORDER — AMLODIPINE BESYLATE 5 MG/1
5 TABLET ORAL NIGHTLY
Status: DISCONTINUED | OUTPATIENT
Start: 2025-04-30 | End: 2025-05-05 | Stop reason: HOSPADM

## 2025-04-30 RX ORDER — ASPIRIN 81 MG/1
81 TABLET ORAL DAILY
Status: DISCONTINUED | OUTPATIENT
Start: 2025-04-30 | End: 2025-05-05 | Stop reason: HOSPADM

## 2025-04-30 RX ORDER — ACETAMINOPHEN 650 MG/1
650 SUPPOSITORY RECTAL EVERY 6 HOURS PRN
Status: DISCONTINUED | OUTPATIENT
Start: 2025-04-30 | End: 2025-05-05 | Stop reason: HOSPADM

## 2025-04-30 RX ORDER — FUROSEMIDE 10 MG/ML
40 INJECTION INTRAMUSCULAR; INTRAVENOUS 2 TIMES DAILY
Status: DISCONTINUED | OUTPATIENT
Start: 2025-04-30 | End: 2025-05-01

## 2025-04-30 RX ORDER — PANTOPRAZOLE SODIUM 40 MG/1
40 TABLET, DELAYED RELEASE ORAL
Status: DISCONTINUED | OUTPATIENT
Start: 2025-04-30 | End: 2025-05-05 | Stop reason: HOSPADM

## 2025-04-30 RX ORDER — FUROSEMIDE 10 MG/ML
40 INJECTION INTRAMUSCULAR; INTRAVENOUS DAILY
Status: DISCONTINUED | OUTPATIENT
Start: 2025-04-30 | End: 2025-04-30

## 2025-04-30 RX ORDER — FINASTERIDE 5 MG/1
5 TABLET, FILM COATED ORAL EVERY EVENING
Status: DISCONTINUED | OUTPATIENT
Start: 2025-04-30 | End: 2025-05-05 | Stop reason: HOSPADM

## 2025-04-30 RX ORDER — TAMSULOSIN HYDROCHLORIDE 0.4 MG/1
0.4 CAPSULE ORAL EVERY EVENING
Status: DISCONTINUED | OUTPATIENT
Start: 2025-04-30 | End: 2025-05-05 | Stop reason: HOSPADM

## 2025-04-30 RX ORDER — CARVEDILOL 6.25 MG/1
12.5 TABLET ORAL 2 TIMES DAILY WITH MEALS
Status: DISCONTINUED | OUTPATIENT
Start: 2025-04-30 | End: 2025-05-05 | Stop reason: HOSPADM

## 2025-04-30 RX ORDER — SODIUM CHLORIDE 9 MG/ML
INJECTION, SOLUTION INTRAVENOUS PRN
Status: DISCONTINUED | OUTPATIENT
Start: 2025-04-30 | End: 2025-05-05 | Stop reason: HOSPADM

## 2025-04-30 RX ORDER — SODIUM CHLORIDE 0.9 % (FLUSH) 0.9 %
5-40 SYRINGE (ML) INJECTION EVERY 12 HOURS SCHEDULED
Status: DISCONTINUED | OUTPATIENT
Start: 2025-04-30 | End: 2025-05-05 | Stop reason: HOSPADM

## 2025-04-30 RX ORDER — ACETAMINOPHEN 325 MG/1
650 TABLET ORAL EVERY 6 HOURS PRN
Status: DISCONTINUED | OUTPATIENT
Start: 2025-04-30 | End: 2025-05-05 | Stop reason: HOSPADM

## 2025-04-30 RX ORDER — MAGNESIUM SULFATE IN WATER 40 MG/ML
2000 INJECTION, SOLUTION INTRAVENOUS PRN
Status: DISCONTINUED | OUTPATIENT
Start: 2025-04-30 | End: 2025-04-30

## 2025-04-30 RX ORDER — POTASSIUM CHLORIDE 7.45 MG/ML
10 INJECTION INTRAVENOUS PRN
Status: DISCONTINUED | OUTPATIENT
Start: 2025-04-30 | End: 2025-04-30

## 2025-04-30 RX ORDER — SODIUM CHLORIDE 0.9 % (FLUSH) 0.9 %
5-40 SYRINGE (ML) INJECTION PRN
Status: DISCONTINUED | OUTPATIENT
Start: 2025-04-30 | End: 2025-05-05 | Stop reason: HOSPADM

## 2025-04-30 RX ORDER — ONDANSETRON 2 MG/ML
4 INJECTION INTRAMUSCULAR; INTRAVENOUS EVERY 6 HOURS PRN
Status: DISCONTINUED | OUTPATIENT
Start: 2025-04-30 | End: 2025-05-05 | Stop reason: HOSPADM

## 2025-04-30 RX ADMIN — FUROSEMIDE 40 MG: 10 INJECTION, SOLUTION INTRAMUSCULAR; INTRAVENOUS at 20:19

## 2025-04-30 RX ADMIN — TAMSULOSIN HYDROCHLORIDE 0.4 MG: 0.4 CAPSULE ORAL at 20:20

## 2025-04-30 RX ADMIN — PANTOPRAZOLE SODIUM 40 MG: 40 TABLET, DELAYED RELEASE ORAL at 06:18

## 2025-04-30 RX ADMIN — PETROLATUM: 420 OINTMENT TOPICAL at 20:20

## 2025-04-30 RX ADMIN — ENOXAPARIN SODIUM 30 MG: 100 INJECTION SUBCUTANEOUS at 09:09

## 2025-04-30 RX ADMIN — FINASTERIDE 5 MG: 5 TABLET, FILM COATED ORAL at 20:20

## 2025-04-30 RX ADMIN — SODIUM CHLORIDE, PRESERVATIVE FREE 10 ML: 5 INJECTION INTRAVENOUS at 20:20

## 2025-04-30 RX ADMIN — CARVEDILOL 12.5 MG: 6.25 TABLET, FILM COATED ORAL at 09:09

## 2025-04-30 RX ADMIN — MICONAZOLE NITRATE: 20 POWDER TOPICAL at 20:20

## 2025-04-30 RX ADMIN — CARVEDILOL 12.5 MG: 6.25 TABLET, FILM COATED ORAL at 20:20

## 2025-04-30 RX ADMIN — AMLODIPINE BESYLATE 5 MG: 5 TABLET ORAL at 20:20

## 2025-04-30 RX ADMIN — PETROLATUM: 420 OINTMENT TOPICAL at 09:09

## 2025-04-30 RX ADMIN — MICONAZOLE NITRATE: 20 POWDER TOPICAL at 09:09

## 2025-04-30 RX ADMIN — DULOXETINE 60 MG: 60 CAPSULE, DELAYED RELEASE ORAL at 20:20

## 2025-04-30 RX ADMIN — FUROSEMIDE 40 MG: 10 INJECTION, SOLUTION INTRAMUSCULAR; INTRAVENOUS at 09:09

## 2025-04-30 RX ADMIN — ASPIRIN 81 MG: 81 TABLET, COATED ORAL at 09:09

## 2025-04-30 RX ADMIN — LEVOTHYROXINE SODIUM 50 MCG: 0.05 TABLET ORAL at 06:18

## 2025-04-30 RX ADMIN — SODIUM CHLORIDE, PRESERVATIVE FREE 10 ML: 5 INJECTION INTRAVENOUS at 09:09

## 2025-04-30 NOTE — DISCHARGE INSTRUCTIONS
***HEART FAILURE - CONGESTIVE HEART FAILURE***  DISCHARGE INSTRUCTIONS:  GUIDELINES TO FOLLOW AT [facility]/SNF/ Assisted Living    No future appointments.       MEDICATIONS:  Please notify the doctor if patient is not able to take their medications or if medications are being held for any reasons (such as low blood pressure ect.)  Do not give the patient ibuprofen (Advil or Motrin), naproxen (Aleve) without talking to the doctor first. This could make their heart failure worse.         WEIGHT MONITORING:   Weigh patient every day in the morning after they void (If patient is able to stand, please get a standing weight.)   Notify the doctor of a weight gain of 3 pounds or more in 1 day   OR  a total of 5 pounds or more in 1 week             DIET   Cardiac heart healthy diet:  Low sodium diet: no  more than 2,000mg (2 grams) of salt / sodium per day (which equals to a little less than  a teaspoon of salt)/ Cardiac Diet: Low saturated / low trans fat, no added salt, caffeine restricted    If patient is there for rehab and will be returning home in the near future; reinforce with the patient and the family to follow a low sodium diet (2,000 mg)- avoid using salt at the table, avoid / limit use of canned soups, processed / packaged foods, salted snacks, olives and pickles.  Do not use a salt substitute without checking with the doctor. (Mrs. Hines is safe to use).       NOTIFY THE DOCTOR THE FIRST DAY OF ONSET OF ANY OF THESE   SYMPTOMS:   Weight gain of 3 pounds or more in 1 day         OR 5 pounds or more in one week  More shortness of breath  More swelling in stomach, legs, ankles or feet  Feeling more tired, No energy  Dry hacky cough  Dizziness  More chest pain / discomfort  Hard time breathing laying down

## 2025-04-30 NOTE — PLAN OF CARE
Patient's chart updated to reflect:      .    - HF care plan, HF education points and HF discharge instructions.  -Orders: 2 gram sodium diet, daily weights, I/O.  -PCP or cardiology follow up appointments to be scheduled within 7 days of hospital discharge.  -CHF education session will be provided to the patient prior to hospital discharge.    Nicolasa Askew RN   Heart Failure Navigator

## 2025-04-30 NOTE — H&P
[facility] Hospitalist Group History and Physical      CHIEF COMPLAINT: Acute CHF exacerbation    History of Present Illness:  This is a 89-year-old male with a past medical history of aortic stenosis, CAD, CHF, hyperlipidemia, and hypertension who is being admitted with an acute exacerbation of CHF.  Patient was evaluated at [place] ED with complaints of ambulatory dysfunction due to generalized weakness.  He states he typically uses a walker at baseline, but today he felt very weak and fatigued and was unable to ambulate.  He states his legs felt heavy and were \"like balloons,\" although he states they are now improved.  States he had some dyspnea with exertion and lightheadedness.  Denies cough, congestion, fevers, chills, nausea, vomiting, chest pain, or abdominal pain.  He was hypoxic at 88% on room air on arrival to ED.  Workup was further notable for proBNP 1498, troponin 67, 65.  Chest x-ray was concerning for mild cardiomegaly with pulmonary vascular congestion.  Patient was treated with 40 mg IV Lasix.  CTA was then negative for acute pulmonary process.  CT head negative for acute or subacute intracranial abnormality.    Informant(s) for H&P: Patient and chart review    REVIEW OF SYSTEMS:  A comprehensive review of systems was negative except for: what is in the HPI      PMH:  Past Medical History:   Diagnosis Date    Anxiety and depression     Aortic stenosis     Arthritis     osteoarthritis left hip    CAD (coronary artery disease)     CHF (congestive heart failure) (HCC)     systolic and diastolic    Hyperlipidemia     Hypertension     Muscle wasting and atrophy, not elsewhere classified, unspecified site     Nonrheumatic tricuspid (valve) insufficiency        Surgical History:  Past Surgical History:   Procedure Laterality Date    CORONARY ARTERY BYPASS GRAFT      HIP SURGERY      right    TOTAL HIP ARTHROPLASTY Left 6/29/2023    LEFT HIP TOTAL ARTHROPLASTY performed by Adalid Carter MD at  in AM.  Strict I's and O's, daily weights.  Ambulatory dysfunction: PT/OT consult.  Consult case management for possible rehab placement.  Hypertension: Continue Norvasc and Coreg.  Hypothyroidism: Continue Synthroid.  Check TSH.    Code Status: DNR CCA  DVT prophylaxis: Lovenox    45 minutes or more spent reviewing patient chart, assessing patient, discussing plan of care with patient and family, discussing plan of care with collaborating physician, and documentation.    NOTE: This report was transcribed using voice recognition software. Every effort was made to ensure accuracy; however, inadvertent computerized transcription errors may be present.  Electronically signed by MIHIR Turcios CNP on 4/30/2025 at 5:38 AM

## 2025-04-30 NOTE — PROGRESS NOTES
Physical Therapy  Facility/Department: [address] MED SURG  Physical Therapy Initial Assessment    Name: Eb Sevilla  : 1930  MRN: [id number]  Date of Service: 2025          Patient Diagnosis(es): The encounter diagnosis was Diastolic congestive heart failure, unspecified HF chronicity (HCC).  Past Medical History:  has a past medical history of Anxiety and depression, Aortic stenosis, Arthritis, CAD (coronary artery disease), CHF (congestive heart failure) (HCC), Hyperlipidemia, Hypertension, Muscle wasting and atrophy, not elsewhere classified, unspecified site, and Nonrheumatic tricuspid (valve) insufficiency.  Past Surgical History:  has a past surgical history that includes Coronary artery bypass graft; hip surgery; Total hip arthroplasty (Left, 2023); and Upper gastrointestinal endoscopy (N/A, 2023).         Requires PT Follow-Up: Yes    Evaluating Therapist: Vika Adams PT     Referring Provider:      Romina Smith APRN - CNP       PT order : PT eval and treat     Room #: 603  DIAGNOSIS: The encounter diagnosis was Diastolic congestive heart failure, unspecified HF chronicity (HCC).    PRECAUTIONS: falls, [place]     Social:  Pt lives with  wife  in a tri level  floor plan , stair glide between levels, 5  steps and  1  rails to enter. Per chart, pt uses transport service to get in and out of the home   Prior to admission pt walked with  ww      Initial Evaluation  Date:  2025 Treatment      Short Term/ Long Term   Goals   Was pt agreeable to Eval/treatment? Yes      Does pt have pain?  None reported      Bed Mobility  Rolling:  NT   Supine to sit:  max assist x 2   Sit to supine: NT     Scooting:  max assist    Mod assist x 1-2    Transfers Sit to stand:  max assist    Stand to sit:  max assist   Stand pivot:  max assist x 2    Mod assist x 1-2    Ambulation     A few steps with no AD max assist x 2    10  feet with  ww  with  mod assist        Stair negotiation: ascended and

## 2025-04-30 NOTE — PROGRESS NOTES
Spiritual Health History and Assessment/Progress Note  [facility]    Initial Encounter, Attempted Encounter,  ,  ,      Name: Eb Sevilla MRN: [id number]    Age: 89 y.o.     Sex: male   Language: English   Christian: Mosque   Acute exacerbation of chronic heart failure (HCC)     Date: 4/30/2025                           Spiritual Assessment began in [address] MED SURG        Referral/Consult From: Rounding   Encounter Overview/Reason: Initial Encounter, Attempted Encounter  Service Provided For: Patient, Patient not available    Jennifer, Belief, Meaning:   Patient is connected with a jennifer tradition or spiritual practice  Family/Friends No family/friends present      Importance and Influence:  Patient unable to assess at this time  Family/Friends No family/friends present    Community:  Patient feels well-supported. Support system includes: Spouse/Partner and Children  Family/Friends No family/friends present    Assessment and Plan of Care:     Patient Interventions include: Other: Staff currently with the patient, prayer silently offered for the patient at the time.  Family/Friends Interventions include: No family/friends present    Patient Plan of Care: Spiritual Care available upon further referral  Family/Friends Plan of Care: No family/friends present    Electronically signed by Chaplain Blaze on 4/30/2025 at 2:57 PM

## 2025-04-30 NOTE — PROGRESS NOTES
Wound care consult ordered per protocol for 2 small open wounds on right inner buttock and coccyx/intergluteal cleft.  Consult sent.

## 2025-04-30 NOTE — PROGRESS NOTES
Patient admitted by nighttime hospitalist earlier today.  Patient was seen and examined today.  Patient was admitted for CHF exacerbation.    GEN: NAD  Card: RRR  Pulm: Bibasilar crackles  ABD: NTND, + BS  Ext: 1+ edema    Agree with H&P's assessment and plan. Increased Lasix to 40 mg BID.    Electronically signed by Luciano Faye MD on 4/30/2025 at 12:39 PM

## 2025-04-30 NOTE — CARE COORDINATION
Introduced my self and provided explanation of CM role to patient. Admitted for acute exacerbation of CHF, ambulatory dysfunction.  CHF Nurse is following, awaiting ECHO and PT/OT evals. Currently on IV lasix, 3L O2 baseline is RA, will requires pulse ox testing prior to discharge. He voices he resides in a tri-level home with his spouse and completes his adl's with assistance. Uses ww, patient states he's weak and unable to ambulate. Patient is established with Ani ASH CNP. Awaiting therapy evals to assist with discharge planning. Freedom of choice list provided for SNF and HHC. Will continue to follow.  Ora LUCIANON, RN  Case Management

## 2025-04-30 NOTE — PROGRESS NOTES
Occupational Therapy    OCCUPATIONAL THERAPY INITIAL EVALUATION    [facility]   [address], [place]         Date:2025                                                  Patient Name: Eb Sevilla    MRN: [id number]    : 1930    Room: [address] [place], [place] [address]      Evaluating OT: Sol Huitron OTR/L   [id number]      Referring Provider:Romina Smith APRN - CNP     Specific Provider Orders/Date:OT eval and treat 2025      Diagnosis:  Acute exacerbation of CHF (congestive heart failure) (HCC) [I50.9]  Acute exacerbation of chronic heart failure (HCC) [I50.9]     Pertinent Medical History: anxiety, CHF, HTN, L hip surgery, CABG     Precautions:  Fall Risk, [place]     Assessment of current deficits    [x] Functional mobility  [x]ADLs  [x] Strength               [x]Cognition    [x] Functional transfers   [x] IADLs         [x] Safety Awareness   [x]Endurance    [] Fine Coordination              [x] Balance      [] Vision/perception   []Sensation     []Gross Motor Coordination  [] ROM  [] Delirium                   [] Motor Control     OT PLAN OF CARE   OT POC based on physician orders, patient diagnosis and results of clinical assessment    Frequency/Duration  2-4 days/wk for 2 - 4weeks PRN   Specific OT Treatment Interventions to include:   ADL retraining/adapted techniques and AE recommendations to increase functional independence within precautions                    Energy conservation techniques to improve tolerance for selfcare routine   Functional transfer/mobility training/DME recommendations for increased independence, safety and fall prevention         Patient/family education to increase safety and functional independence             Environmental modifications for safe mobility and completion of ADLs                             Therapeutic activity to improve functional performance during ADLs.                                          Therapeutic exercise to improve tolerance and functional strength for ADLs    Balance retraining/tolerance tasks for facilitation of postural control with dynamic challenges during ADLs .      Positioning to improve functional independence         Recommended Adaptive Equipment: TBD     Home Living: Pt lives with wife, split level with 4-5 steps to enter.   7-8 steps between levels.     Bathroom setup: shower    Equipment owned: walker , wheel chair     Prior Level of Function: assist  with ADLs , and  with IADLs; ambulated with walker     Pain Level: no pain this session ;   Cognition: A&O: pleasant, following commands, conversing    Memory:  fair    Sequencing:  fair    Problem solving:  fair    Judgement/safety:  fair      Functional Assessment:  AM-PAC Daily Activity Raw Score: 14/24   Initial Eval Status  Date: 4/30/2025 Treatment Status  Date: STGs = LTGs  Time frame: 10-14 days   Feeding Set-up   Independent   Grooming Min A,seated   Decrease standing balance  SBA    UB Dressing Mod  A   Min A    LB Dressing Max A   Mod A    Bathing Mod A   Min A    Toileting Max A   Min A    Bed Mobility  Max A   Supine to sit   Min A    Functional Transfers Max A x2  Sit-stand from bed  SPT from bed to chair   Decrease balance  Mod A    Functional Mobility Xfers only this session   Mod A     Balance Sitting:     Static:  CGA/SBA     Dynamic:Mod a   Standing: Max A   SBA/supervision -sitting   Mod A - standing    Activity Tolerance No SOB observed   Fair +  with ADL activity    Visual/  Perceptual Glasses: none by bedside         UE ROM/strength  AROM present throughout   Tolerate UE therapeutic activity/exercises to increase strength/endurance for ADL/xfer activity       Hand Dominance right    Hearing: [place]  Sensation:  No c/o numbness or tingling   Tone: WFL   Edema: none observed     Comments: Upon arrival patient lying in bed .  At end of session, patient sititng in chair  with call light and phone within reach, all

## 2025-05-01 PROBLEM — E03.9 ACQUIRED HYPOTHYROIDISM: Status: ACTIVE | Noted: 2025-05-01

## 2025-05-01 PROBLEM — I50.33 ACUTE ON CHRONIC HEART FAILURE WITH PRESERVED EJECTION FRACTION (HFPEF) (HCC): Status: ACTIVE | Noted: 2025-04-30

## 2025-05-01 LAB
ANION GAP SERPL CALCULATED.3IONS-SCNC: 10 MMOL/L (ref 7–16)
BASOPHILS # BLD: 0.02 K/UL (ref 0–0.2)
BASOPHILS NFR BLD: 0 % (ref 0–2)
BNP SERPL-MCNC: 2064 PG/ML (ref 0–450)
BUN SERPL-MCNC: 40 MG/DL (ref 6–23)
CALCIUM SERPL-MCNC: 9.9 MG/DL (ref 8.6–10.2)
CHLORIDE SERPL-SCNC: 95 MMOL/L (ref 98–107)
CO2 SERPL-SCNC: 35 MMOL/L (ref 22–29)
CREAT SERPL-MCNC: 1.7 MG/DL (ref 0.7–1.2)
EOSINOPHIL # BLD: 0.12 K/UL (ref 0.05–0.5)
EOSINOPHILS RELATIVE PERCENT: 2 % (ref 0–6)
ERYTHROCYTE [DISTWIDTH] IN BLOOD BY AUTOMATED COUNT: 13.1 % (ref 11.5–15)
GFR, ESTIMATED: 37 ML/MIN/1.73M2
GLUCOSE SERPL-MCNC: 210 MG/DL (ref 74–99)
HCT VFR BLD AUTO: 35 % (ref 37–54)
HGB BLD-MCNC: 11.3 G/DL (ref 12.5–16.5)
IMM GRANULOCYTES # BLD AUTO: <0.03 K/UL (ref 0–0.58)
IMM GRANULOCYTES NFR BLD: 0 % (ref 0–5)
LYMPHOCYTES NFR BLD: 0.69 K/UL (ref 1.5–4)
LYMPHOCYTES RELATIVE PERCENT: 14 % (ref 20–42)
MAGNESIUM SERPL-MCNC: 2.1 MG/DL (ref 1.6–2.6)
MCH RBC QN AUTO: 32.3 PG (ref 26–35)
MCHC RBC AUTO-ENTMCNC: 32.3 G/DL (ref 32–34.5)
MCV RBC AUTO: 100 FL (ref 80–99.9)
MICROORGANISM SPEC CULT: ABNORMAL
MONOCYTES NFR BLD: 0.36 K/UL (ref 0.1–0.95)
MONOCYTES NFR BLD: 7 % (ref 2–12)
NEUTROPHILS NFR BLD: 76 % (ref 43–80)
NEUTS SEG NFR BLD: 3.82 K/UL (ref 1.8–7.3)
PLATELET # BLD AUTO: 124 K/UL (ref 130–450)
PMV BLD AUTO: 8.6 FL (ref 7–12)
POTASSIUM SERPL-SCNC: 4.4 MMOL/L (ref 3.5–5)
RBC # BLD AUTO: 3.5 M/UL (ref 3.8–5.8)
SODIUM SERPL-SCNC: 140 MMOL/L (ref 132–146)
SPECIMEN DESCRIPTION: ABNORMAL
WBC OTHER # BLD: 5 K/UL (ref 4.5–11.5)

## 2025-05-01 PROCEDURE — 2060000000 HC ICU INTERMEDIATE R&B

## 2025-05-01 PROCEDURE — 97530 THERAPEUTIC ACTIVITIES: CPT

## 2025-05-01 PROCEDURE — 2500000003 HC RX 250 WO HCPCS

## 2025-05-01 PROCEDURE — 80048 BASIC METABOLIC PNL TOTAL CA: CPT

## 2025-05-01 PROCEDURE — 6360000002 HC RX W HCPCS: Performed by: STUDENT IN AN ORGANIZED HEALTH CARE EDUCATION/TRAINING PROGRAM

## 2025-05-01 PROCEDURE — 85025 COMPLETE CBC W/AUTO DIFF WBC: CPT

## 2025-05-01 PROCEDURE — 83880 ASSAY OF NATRIURETIC PEPTIDE: CPT

## 2025-05-01 PROCEDURE — 2700000000 HC OXYGEN THERAPY PER DAY

## 2025-05-01 PROCEDURE — 6360000002 HC RX W HCPCS

## 2025-05-01 PROCEDURE — 83735 ASSAY OF MAGNESIUM: CPT

## 2025-05-01 PROCEDURE — 99232 SBSQ HOSP IP/OBS MODERATE 35: CPT | Performed by: STUDENT IN AN ORGANIZED HEALTH CARE EDUCATION/TRAINING PROGRAM

## 2025-05-01 PROCEDURE — 6370000000 HC RX 637 (ALT 250 FOR IP)

## 2025-05-01 PROCEDURE — 6370000000 HC RX 637 (ALT 250 FOR IP): Performed by: STUDENT IN AN ORGANIZED HEALTH CARE EDUCATION/TRAINING PROGRAM

## 2025-05-01 RX ORDER — POLYETHYLENE GLYCOL 3350 17 G/17G
17 POWDER, FOR SOLUTION ORAL DAILY
Status: DISCONTINUED | OUTPATIENT
Start: 2025-05-01 | End: 2025-05-05 | Stop reason: HOSPADM

## 2025-05-01 RX ORDER — FUROSEMIDE 40 MG/1
40 TABLET ORAL DAILY
Status: DISCONTINUED | OUTPATIENT
Start: 2025-05-02 | End: 2025-05-03

## 2025-05-01 RX ADMIN — PETROLATUM: 420 OINTMENT TOPICAL at 20:24

## 2025-05-01 RX ADMIN — TAMSULOSIN HYDROCHLORIDE 0.4 MG: 0.4 CAPSULE ORAL at 16:30

## 2025-05-01 RX ADMIN — PETROLATUM: 420 OINTMENT TOPICAL at 10:29

## 2025-05-01 RX ADMIN — CARVEDILOL 12.5 MG: 6.25 TABLET, FILM COATED ORAL at 10:29

## 2025-05-01 RX ADMIN — MICONAZOLE NITRATE: 20 POWDER TOPICAL at 20:24

## 2025-05-01 RX ADMIN — MICONAZOLE NITRATE: 20 POWDER TOPICAL at 10:19

## 2025-05-01 RX ADMIN — POLYETHYLENE GLYCOL 3350 17 G: 17 POWDER, FOR SOLUTION ORAL at 16:30

## 2025-05-01 RX ADMIN — FINASTERIDE 5 MG: 5 TABLET, FILM COATED ORAL at 16:30

## 2025-05-01 RX ADMIN — SODIUM CHLORIDE, PRESERVATIVE FREE 10 ML: 5 INJECTION INTRAVENOUS at 20:24

## 2025-05-01 RX ADMIN — AMLODIPINE BESYLATE 5 MG: 5 TABLET ORAL at 20:24

## 2025-05-01 RX ADMIN — DULOXETINE 60 MG: 60 CAPSULE, DELAYED RELEASE ORAL at 20:24

## 2025-05-01 RX ADMIN — LEVOTHYROXINE SODIUM 50 MCG: 0.05 TABLET ORAL at 05:47

## 2025-05-01 RX ADMIN — PANTOPRAZOLE SODIUM 40 MG: 40 TABLET, DELAYED RELEASE ORAL at 05:47

## 2025-05-01 RX ADMIN — ENOXAPARIN SODIUM 30 MG: 100 INJECTION SUBCUTANEOUS at 10:29

## 2025-05-01 RX ADMIN — FUROSEMIDE 40 MG: 10 INJECTION, SOLUTION INTRAMUSCULAR; INTRAVENOUS at 10:28

## 2025-05-01 RX ADMIN — CARVEDILOL 12.5 MG: 6.25 TABLET, FILM COATED ORAL at 16:30

## 2025-05-01 RX ADMIN — ASPIRIN 81 MG: 81 TABLET, COATED ORAL at 10:29

## 2025-05-01 RX ADMIN — SODIUM CHLORIDE, PRESERVATIVE FREE 10 ML: 5 INJECTION INTRAVENOUS at 10:29

## 2025-05-01 NOTE — CONSULTS
[facility]   Inpatient CHF Nurse Navigator Consult      Cardiologist: Dr Rosas   Primary Care Physician: Ani Young APRN - AIMEE     Eb Sevilla is a 89 y.o. (4/23/1930) male with a history of HFpEF, most recent EF:  Lab Results   Component Value Date    LVEF 58 06/15/2023       Patient was awake and alert, laying in bed during the consultation and is agreeable to heart failure education. He was engaged and asked appropriate questions throughout the education session. He is going to SNF at discharge. He is a max assist. Instructions will be placed on discharge paperwork for facility.     Barriers identified during consult contributing to HF Hospitalization: impairment:  physical: Max assist.     [] Limited medication adherence   [] Poor health literacy, education regarding HF medications provided   [] Pill box provided to patient  [] Difficulty affording medications  [] Difficulty obtaining/ managing medications  [] Prescription assistance information given     [] Not weighing themselves daily  [x] Weight log provided for easy monitoring  [] Scale provided     [] Not following low sodium diet  [] Food insecurity   [x] 2 gram sodium diet education provided   [] Low sodium recipes provided  [] Sodium free seasoning provided   [] Low sodium meal delivery options given to patient  [] Dietician consulted     [] Lack of transportation to appointments     [] Depression, given chronic illness  [] Primary team notified     [] Goals of care need addressed  [] Palliative care consulted     [] CHF community health worker Karen Maier consulted [phone], to assist with       Chart Reviewed:  Diet: ADULT DIET; Regular; No Added Salt (3-4 gm)   Daily Weights: Patient Vitals for the past 96 hrs (Last 3 readings):   Weight   05/01/25 0402 83.6 kg (184 lb 3.2 oz)   04/30/25 0410 78 kg (171 lb 14.4 oz)     I/O:   Intake/Output Summary (Last 24 hours) at 5/1/2025 1246  Last data filed

## 2025-05-01 NOTE — CARE COORDINATION
Met with patient PT/8 OT/14 he is agreeable to SNF, patient could not recall name of SNF in [place] he has hx with. CM spoke with patients wife, Mary Jane via phone, she states facility was [facility] [place], referral made to [facility], they can accept, 7000, BERRY initiated, envelope with demos and transport form in chart. Patient is currently on IV lasix, 1L O2nc.baseline is RA. Will continue to follow.  Ora LUCIANON, RN  Case Management

## 2025-05-01 NOTE — PROGRESS NOTES
[facility] Hospitalist Progress Note    Admitting Date and Time: 4/30/2025  3:59 AM  Admit Dx: Acute exacerbation of CHF (congestive heart failure) (HCC) [I50.9]  Acute exacerbation of chronic heart failure (HCC) [I50.9]    Subjective:  Patient is being followed for Acute exacerbation of CHF (congestive heart failure) (HCC) [I50.9]  Acute exacerbation of chronic heart failure (HCC) [I50.9]   Pt was seen and examined today. Denies any new issues    ROS: denies fever, chills, cp, sob, n/v, HA unless stated above.     polyethylene glycol  17 g Oral Daily    [START ON 5/2/2025] furosemide  40 mg Oral Daily    miconazole   Topical BID    white petrolatum   Topical BID    sodium chloride flush  5-40 mL IntraVENous 2 times per day    enoxaparin  30 mg SubCUTAneous Daily    amLODIPine  5 mg Oral Nightly    aspirin  81 mg Oral Daily    carvedilol  12.5 mg Oral BID WC    levothyroxine  50 mcg Oral Daily    tamsulosin  0.4 mg Oral QPM    pantoprazole  40 mg Oral QAM AC    finasteride  5 mg Oral QPM    DULoxetine  60 mg Oral Nightly     sodium chloride flush, 5-40 mL, PRN  sodium chloride, , PRN  ondansetron, 4 mg, Q8H PRN   Or  ondansetron, 4 mg, Q6H PRN  acetaminophen, 650 mg, Q6H PRN   Or  acetaminophen, 650 mg, Q6H PRN         Objective:    BP (!) 161/77   Pulse 98   Temp 97.5 °F (36.4 °C) (Axillary)   Resp 18   Ht 1.6 m (5' 3\")   Wt 83.6 kg (184 lb 3.2 oz)   SpO2 93%   BMI 32.63 kg/m²     General Appearance: alert and in no acute distress  Skin: warm and dry  Head: normocephalic and atraumatic  Pulmonary/Chest: Improved bibasilar crackles, normal air movement, no respiratory distress  Cardiovascular: normal rate, normal S1 and S2  Abdomen: soft, non-tender, non-distended, normal bowel sounds  Extremities: no cyanosis, no clubbing and 1+ edema      Recent Labs     04/29/25  1307 04/30/25  0624 05/01/25  1202    141 140   K 4.7 4.5 4.4   CL 99 98 95*   CO2 33* 35* 35*   BUN 43* 40* 40*   CREATININE 1.5*

## 2025-05-01 NOTE — PROGRESS NOTES
Occupational Therapy  OT BEDSIDE TREATMENT NOTE      Date:2025  Patient Name: Eb Sevilla  MRN: [id number]  : 1930  Room: [address] [place], [place] [address]     Evaluating OT: Sol Huitron OTR/L   [id number]       Referring Provider:Romina Smith APRN - CNP     Specific Provider Orders/Date:OT eval and treat 2025       Diagnosis:  Acute exacerbation of CHF (congestive heart failure) (HCC) [I50.9]  Acute exacerbation of chronic heart failure (HCC) [I50.9]     Pertinent Medical History: anxiety, CHF, HTN, L hip surgery, CABG     Precautions:  Fall Risk, [place]      Assessment of current deficits    [x] Functional mobility            [x]ADLs           [x] Strength                  [x]Cognition    [x] Functional transfers          [x] IADLs         [x] Safety Awareness   [x]Endurance    [] Fine Coordination                         [x] Balance      [] Vision/perception   []Sensation      []Gross Motor Coordination             [] ROM           [] Delirium                   [] Motor Control      OT PLAN OF CARE   OT POC based on physician orders, patient diagnosis and results of clinical assessment     Frequency/Duration  2-4 days/wk for 2 - 4weeks PRN   Specific OT Treatment Interventions to include:   ADL retraining/adapted techniques and AE recommendations to increase functional independence within precautions                    Energy conservation techniques to improve tolerance for selfcare routine   Functional transfer/mobility training/DME recommendations for increased independence, safety and fall prevention         Patient/family education to increase safety and functional independence             Environmental modifications for safe mobility and completion of ADLs                             Therapeutic activity to improve functional performance during ADLs.                                         Therapeutic exercise to improve tolerance and functional strength for ADLs    Balance retraining/tolerance tasks for

## 2025-05-01 NOTE — PROGRESS NOTES
Physical Therapy  Facility/Department: [address] MED SURG  Physical Therapy Treatment Note    Name: Eb Sevilla  : 1930  MRN: [id number]  Date of Service: 2025          Patient Diagnosis(es): The encounter diagnosis was Diastolic congestive heart failure, unspecified HF chronicity (HCC).  Past Medical History:  has a past medical history of Anxiety and depression, Aortic stenosis, Arthritis, CAD (coronary artery disease), CHF (congestive heart failure) (HCC), Hyperlipidemia, Hypertension, Muscle wasting and atrophy, not elsewhere classified, unspecified site, and Nonrheumatic tricuspid (valve) insufficiency.  Past Surgical History:  has a past surgical history that includes Coronary artery bypass graft; hip surgery; Total hip arthroplasty (Left, 2023); and Upper gastrointestinal endoscopy (N/A, 2023).              Evaluating Therapist: Vika Adams PT     Referring Provider:      Romina Smith APRN - CNP       PT order : PT eval and treat     Room #: 603  DIAGNOSIS: The encounter diagnosis was Diastolic congestive heart failure, unspecified HF chronicity (HCC).    PRECAUTIONS: falls, [place]     Social:  Pt lives with  wife  in a tri level  floor plan , stair glide between levels, 5  steps and  1  rails to enter. Per chart, pt uses transport service to get in and out of the home   Prior to admission pt walked with  ww      Initial Evaluation  Date:  2025 Treatment   2025   Short Term/ Long Term   Goals   Was pt agreeable to Eval/treatment? Yes  yes    Does pt have pain?  None reported  No complaints    Bed Mobility  Rolling:  NT   Supine to sit:  max assist x 2   Sit to supine: NT     Scooting:  max assist  Supine to sit: Mod A  Scooting; Max A seated to EOB  Mod assist x 1-2    Transfers Sit to stand:  max assist    Stand to sit:  max assist   Stand pivot:  max assist x 2  Sit to stand: Mod A  Stand pivot; Mod A  Mod assist x 1-2    Ambulation     A few steps with no AD max assist

## 2025-05-01 NOTE — DISCHARGE INSTR - COC
Continuity of Care Form    Patient Name: Eb Sevilla   :  1930  MRN:  [id number]    Admit date:  2025  Discharge date:  25      Code Status Order: DNR-CCA   Advance Directives:     Admitting Physician:  Ninoska Najera MD  PCP: Ani Young APRN - CNP    Discharging Nurse: Pati Perez RN  Discharging Hospital Unit/Room#: 0603/0603-A  Discharging Unit Phone Number: [phone]      Emergency Contact:   Extended Emergency Contact Information  Primary Emergency Contact: Mary Jane Sevilla  Address: [address] [place] [address] [place] [address] [facility]  Home Phone: [phone]  Relation: Spouse  Secondary Emergency Contact: Dolores Porter  Address: [address] [place] [address] [place] [address] [facility]  Home Phone: [phone]  Mobile Phone: [phone]  Relation: Child    Past Surgical History:  Past Surgical History:   Procedure Laterality Date    CORONARY ARTERY BYPASS GRAFT      HIP SURGERY      right    TOTAL HIP ARTHROPLASTY Left 2023    LEFT HIP TOTAL ARTHROPLASTY performed by Adalid Carter MD at [facility] OR    UPPER GASTROINTESTINAL ENDOSCOPY N/A 2023    EGD DIAGNOSTIC ONLY performed by Cesar Herrera DO at [facility] OR       Immunization History:   Immunization History   Administered Date(s) Administered    COVID-19, MODERNA, , (age 12y+), IM, 50mcg/0.5mL 2023, 10/04/2024    COVID-19, PFIZER Bivalent, DO NOT Dilute, (age 12y+), IM, 30 mcg/0.3 mL 10/07/2022    COVID-19, PFIZER PURPLE top, DILUTE for use, (age 12 y+), 30mcg/0.3mL 2021, 2021, 10/08/2021       Active Problems:  Patient Active Problem List   Diagnosis Code    CHF with unknown LVEF ([facility]) I50.9    Moderate aortic stenosis I35.0    Sick sinus syndrome ([facility]) I49.5    Pacemaker Z95.0    Systolic ejection murmur R01.1    Primary hypertension I10    CHF (congestive heart failure), NYHA class I, acute on chronic, combined ([facility]) I50.43    Type 2 diabetes mellitus

## 2025-05-01 NOTE — FLOWSHEET NOTE
Inpatient Wound Care    Admit Date: 4/30/2025  3:59 AM    Reason for consult:  R buttock    Significant history:  Admitted with CHF. History of OA, SSS, Pacemaker, CHF.    Wound history:  POA    Findings:     05/01/25 1410   Wound 04/30/25 Buttocks Inner;Right   Date First Assessed/Time First Assessed: 04/30/25 0932   Present on Original Admission: Yes  Location: Buttocks  Wound Location Orientation: Inner;Right   Wound Image    Wound Etiology Pressure Stage 2   Wound Cleansed   (bath wipe)   Wound Length (cm) 1 cm   Wound Width (cm) 1 cm   Wound Depth (cm) 0.2 cm   Wound Surface Area (cm^2) 1 cm^2   Change in Wound Size % (l*w) -233.33   Wound Volume (cm^3) 0.2 cm^3   Wound Healing % -567   Wound Assessment Pink/red   Drainage Amount None (dry)   Odor None   Kelly-wound Assessment Denuded         Impression:  Stage 2 pressure injury R buttock.    Interventions in place:  Pt incontinent. R buttock wound stage 2 pressure injury.  Aquaphor is ordered. LOL pump intact. SOS precautions.    Plan:Aquaphor, LOL pump, SOS precautions.  **Informed Consent**    The patient has given verbal consent to have photos taken of Buttocks and inserted into their chart as part of their permanent medical record for purposes of documentation, treatment management and/or medical review.   All Images taken on 5/1/25 of patient name: Eb Sevilla were transmitted and stored on secured Epic  Site located within Media Folder Tab by a registered Epic-[employer] Mobile Application Device.          Linnette Anna RN 5/1/2025 2:11 PM

## 2025-05-02 LAB
ANION GAP SERPL CALCULATED.3IONS-SCNC: 13 MMOL/L (ref 7–16)
BASOPHILS # BLD: 0.02 K/UL (ref 0–0.2)
BASOPHILS NFR BLD: 0 % (ref 0–2)
BUN SERPL-MCNC: 45 MG/DL (ref 6–23)
CALCIUM SERPL-MCNC: 9.8 MG/DL (ref 8.6–10.2)
CHLORIDE SERPL-SCNC: 94 MMOL/L (ref 98–107)
CO2 SERPL-SCNC: 33 MMOL/L (ref 22–29)
CREAT SERPL-MCNC: 1.8 MG/DL (ref 0.7–1.2)
EOSINOPHIL # BLD: 0.2 K/UL (ref 0.05–0.5)
EOSINOPHILS RELATIVE PERCENT: 4 % (ref 0–6)
ERYTHROCYTE [DISTWIDTH] IN BLOOD BY AUTOMATED COUNT: 13.1 % (ref 11.5–15)
GFR, ESTIMATED: 34 ML/MIN/1.73M2
GLUCOSE SERPL-MCNC: 148 MG/DL (ref 74–99)
HCT VFR BLD AUTO: 33.4 % (ref 37–54)
HGB BLD-MCNC: 10.9 G/DL (ref 12.5–16.5)
IMM GRANULOCYTES # BLD AUTO: 0.03 K/UL (ref 0–0.58)
IMM GRANULOCYTES NFR BLD: 1 % (ref 0–5)
LYMPHOCYTES NFR BLD: 0.85 K/UL (ref 1.5–4)
LYMPHOCYTES RELATIVE PERCENT: 16 % (ref 20–42)
MAGNESIUM SERPL-MCNC: 2 MG/DL (ref 1.6–2.6)
MCH RBC QN AUTO: 32.2 PG (ref 26–35)
MCHC RBC AUTO-ENTMCNC: 32.6 G/DL (ref 32–34.5)
MCV RBC AUTO: 98.5 FL (ref 80–99.9)
MONOCYTES NFR BLD: 0.41 K/UL (ref 0.1–0.95)
MONOCYTES NFR BLD: 8 % (ref 2–12)
NEUTROPHILS NFR BLD: 72 % (ref 43–80)
NEUTS SEG NFR BLD: 3.83 K/UL (ref 1.8–7.3)
PLATELET # BLD AUTO: 127 K/UL (ref 130–450)
PMV BLD AUTO: 9.1 FL (ref 7–12)
POTASSIUM SERPL-SCNC: 4.2 MMOL/L (ref 3.5–5)
RBC # BLD AUTO: 3.39 M/UL (ref 3.8–5.8)
SODIUM SERPL-SCNC: 140 MMOL/L (ref 132–146)
WBC OTHER # BLD: 5.3 K/UL (ref 4.5–11.5)

## 2025-05-02 PROCEDURE — 80048 BASIC METABOLIC PNL TOTAL CA: CPT

## 2025-05-02 PROCEDURE — 6360000002 HC RX W HCPCS

## 2025-05-02 PROCEDURE — 2700000000 HC OXYGEN THERAPY PER DAY

## 2025-05-02 PROCEDURE — 6370000000 HC RX 637 (ALT 250 FOR IP)

## 2025-05-02 PROCEDURE — 2060000000 HC ICU INTERMEDIATE R&B

## 2025-05-02 PROCEDURE — 99232 SBSQ HOSP IP/OBS MODERATE 35: CPT | Performed by: STUDENT IN AN ORGANIZED HEALTH CARE EDUCATION/TRAINING PROGRAM

## 2025-05-02 PROCEDURE — 6370000000 HC RX 637 (ALT 250 FOR IP): Performed by: STUDENT IN AN ORGANIZED HEALTH CARE EDUCATION/TRAINING PROGRAM

## 2025-05-02 PROCEDURE — 2500000003 HC RX 250 WO HCPCS

## 2025-05-02 PROCEDURE — 85025 COMPLETE CBC W/AUTO DIFF WBC: CPT

## 2025-05-02 PROCEDURE — 83735 ASSAY OF MAGNESIUM: CPT

## 2025-05-02 RX ADMIN — FINASTERIDE 5 MG: 5 TABLET, FILM COATED ORAL at 16:30

## 2025-05-02 RX ADMIN — MICONAZOLE NITRATE: 20 POWDER TOPICAL at 20:15

## 2025-05-02 RX ADMIN — SODIUM CHLORIDE, PRESERVATIVE FREE 10 ML: 5 INJECTION INTRAVENOUS at 20:15

## 2025-05-02 RX ADMIN — SODIUM CHLORIDE, PRESERVATIVE FREE 10 ML: 5 INJECTION INTRAVENOUS at 09:24

## 2025-05-02 RX ADMIN — CARVEDILOL 12.5 MG: 6.25 TABLET, FILM COATED ORAL at 16:30

## 2025-05-02 RX ADMIN — CARVEDILOL 12.5 MG: 6.25 TABLET, FILM COATED ORAL at 09:21

## 2025-05-02 RX ADMIN — MICONAZOLE NITRATE: 20 POWDER TOPICAL at 09:24

## 2025-05-02 RX ADMIN — PANTOPRAZOLE SODIUM 40 MG: 40 TABLET, DELAYED RELEASE ORAL at 06:23

## 2025-05-02 RX ADMIN — LEVOTHYROXINE SODIUM 50 MCG: 0.05 TABLET ORAL at 06:23

## 2025-05-02 RX ADMIN — DULOXETINE 60 MG: 60 CAPSULE, DELAYED RELEASE ORAL at 20:15

## 2025-05-02 RX ADMIN — FUROSEMIDE 40 MG: 40 TABLET ORAL at 09:22

## 2025-05-02 RX ADMIN — TAMSULOSIN HYDROCHLORIDE 0.4 MG: 0.4 CAPSULE ORAL at 16:30

## 2025-05-02 RX ADMIN — POLYETHYLENE GLYCOL 3350 17 G: 17 POWDER, FOR SOLUTION ORAL at 09:22

## 2025-05-02 RX ADMIN — AMLODIPINE BESYLATE 5 MG: 5 TABLET ORAL at 20:15

## 2025-05-02 RX ADMIN — PETROLATUM: 420 OINTMENT TOPICAL at 09:24

## 2025-05-02 RX ADMIN — ASPIRIN 81 MG: 81 TABLET, COATED ORAL at 09:22

## 2025-05-02 RX ADMIN — ENOXAPARIN SODIUM 30 MG: 100 INJECTION SUBCUTANEOUS at 09:24

## 2025-05-02 RX ADMIN — PETROLATUM: 420 OINTMENT TOPICAL at 20:15

## 2025-05-02 NOTE — PROGRESS NOTES
Discharge held, no word on Precert today.    Electronically signed by Vonda Power RN on 5/2/2025 at 6:44 PM

## 2025-05-02 NOTE — CARE COORDINATION
Discharge plan is [facility], auth submitted 5/2/25. Currently on RA, PO lasix. Will continue to follow.  Ora LUCIANON, RN  Case Management

## 2025-05-02 NOTE — PROGRESS NOTES
Comprehensive Nutrition Assessment    Type and Reason for Visit:  Initial, Positive nutrition screen    Nutrition Recommendations/Plan:   Continue Current Nutrition Tx  Order Wound Healing ONS (Seth) BID  Continue Inpatient Monitoring     Malnutrition Assessment:  Malnutrition Status:  At risk for malnutrition (05/02/25 1003)    Context:  Chronic Illness     Findings of the 6 clinical characteristics of malnutrition:  Energy Intake:  Mild decrease in energy intake  Weight Loss:  Unable to assess (Fluid Shifts/CHF)     Body Fat Loss:  No body fat loss     Muscle Mass Loss:  No muscle mass loss    Fluid Accumulation:  Unable to assess (Multifactorial)     Strength:  Not Performed    Nutrition Assessment:    Pt adm w/Acute on Chr HFpEF. PMH CHF, Mild Obesity, DM2-diet controlled. PTA lives w/his wife-prepares homemade meals, low sodium, reports good appetite, does not feel he has lost wt stated UBW ~ 77kg, was having constipation pt states he feels he has to have a BM-informed nursing. Trigger for wound. Reviewed ONS w/pt + rationale importance for healing receptive to trying Seth BID, did not want to try any other ONS. Will order Seth BID for wound healing-enc intake + importance w/pt.    Nutrition Related Findings:    A+O x4, I+O -3.6L, +BS, +constipation. +1 BLE edema-pitting.  BUN 45, Cr 1.8, GFR 34, glu 148 Wound Type: Stage II (R Buttock. Coccyx-redness)       Current Nutrition Intake & Therapies:    Average Meal Intake: 26-50%, %     ADULT DIET; Regular; No Added Salt (3-4 gm)    Anthropometric Measures:  Height: 160 cm (5' 2.99\")  Ideal Body Weight (IBW): 124 lbs (56 kg)    Admission Body Weight: 78 kg (171 lb 15.3 oz) (4/30 Bed Scale)  Current Body Weight: 83.6 kg (184 lb 4.9 oz) (5/1 bed scale), 148.6 % IBW. Weight Source: Bed scale  Current BMI (kg/m2): 32.7  Usual Body Weight: 74.8 kg (164 lb 14.5 oz) (5/8/2024 OV per EMR)     % Weight Change (Calculated): 11.8  Weight Adjustment For: No

## 2025-05-02 NOTE — PROGRESS NOTES
[facility] Hospitalist Progress Note    Admitting Date and Time: 4/30/2025  3:59 AM  Admit Dx: Acute exacerbation of CHF (congestive heart failure) (HCC) [I50.9]  Acute exacerbation of chronic heart failure (HCC) [I50.9]    Subjective:  Patient is being followed for Acute exacerbation of CHF (congestive heart failure) (HCC) [I50.9]  Acute exacerbation of chronic heart failure (HCC) [I50.9]   Pt was seen and examined today. Denies any new issues    ROS: denies fever, chills, cp, sob, n/v, HA unless stated above.     polyethylene glycol  17 g Oral Daily    furosemide  40 mg Oral Daily    miconazole   Topical BID    white petrolatum   Topical BID    sodium chloride flush  5-40 mL IntraVENous 2 times per day    enoxaparin  30 mg SubCUTAneous Daily    amLODIPine  5 mg Oral Nightly    aspirin  81 mg Oral Daily    carvedilol  12.5 mg Oral BID WC    levothyroxine  50 mcg Oral Daily    tamsulosin  0.4 mg Oral QPM    pantoprazole  40 mg Oral QAM AC    finasteride  5 mg Oral QPM    DULoxetine  60 mg Oral Nightly     sodium chloride flush, 5-40 mL, PRN  sodium chloride, , PRN  ondansetron, 4 mg, Q8H PRN   Or  ondansetron, 4 mg, Q6H PRN  acetaminophen, 650 mg, Q6H PRN   Or  acetaminophen, 650 mg, Q6H PRN         Objective:    BP (!) 127/49   Pulse 80   Temp 98.1 °F (36.7 °C) (Oral)   Resp 18   Ht 1.6 m (5' 2.99\")   Wt 83.6 kg (184 lb 3.2 oz)   SpO2 92%   BMI 32.64 kg/m²     General Appearance: alert and in no acute distress  Skin: warm and dry  Head: normocephalic and atraumatic  Pulmonary/Chest: Improved bibasilar crackles, normal air movement, no respiratory distress  Cardiovascular: normal rate, normal S1 and S2  Abdomen: soft, non-tender, non-distended, normal bowel sounds  Extremities: no cyanosis, no clubbing and no edema      Recent Labs     04/30/25  0624 05/01/25  1202 05/02/25  0300    140 140   K 4.5 4.4 4.2   CL 98 95* 94*   CO2 35* 35* 33*   BUN 40* 40* 45*   CREATININE 1.4* 1.7* 1.8*   GLUCOSE

## 2025-05-03 LAB
ANION GAP SERPL CALCULATED.3IONS-SCNC: 15 MMOL/L (ref 7–16)
BASOPHILS # BLD: 0.02 K/UL (ref 0–0.2)
BASOPHILS NFR BLD: 0 % (ref 0–2)
BUN SERPL-MCNC: 48 MG/DL (ref 6–23)
CALCIUM SERPL-MCNC: 10 MG/DL (ref 8.6–10.2)
CHLORIDE SERPL-SCNC: 93 MMOL/L (ref 98–107)
CO2 SERPL-SCNC: 30 MMOL/L (ref 22–29)
CREAT SERPL-MCNC: 1.7 MG/DL (ref 0.7–1.2)
EOSINOPHIL # BLD: 0.26 K/UL (ref 0.05–0.5)
EOSINOPHILS RELATIVE PERCENT: 5 % (ref 0–6)
ERYTHROCYTE [DISTWIDTH] IN BLOOD BY AUTOMATED COUNT: 13.1 % (ref 11.5–15)
GFR, ESTIMATED: 37 ML/MIN/1.73M2
GLUCOSE SERPL-MCNC: 175 MG/DL (ref 74–99)
HCT VFR BLD AUTO: 35.8 % (ref 37–54)
HGB BLD-MCNC: 11.7 G/DL (ref 12.5–16.5)
IMM GRANULOCYTES # BLD AUTO: <0.03 K/UL (ref 0–0.58)
IMM GRANULOCYTES NFR BLD: 0 % (ref 0–5)
LYMPHOCYTES NFR BLD: 0.72 K/UL (ref 1.5–4)
LYMPHOCYTES RELATIVE PERCENT: 15 % (ref 20–42)
MAGNESIUM SERPL-MCNC: 2.2 MG/DL (ref 1.6–2.6)
MCH RBC QN AUTO: 32.1 PG (ref 26–35)
MCHC RBC AUTO-ENTMCNC: 32.7 G/DL (ref 32–34.5)
MCV RBC AUTO: 98.1 FL (ref 80–99.9)
MONOCYTES NFR BLD: 0.41 K/UL (ref 0.1–0.95)
MONOCYTES NFR BLD: 8 % (ref 2–12)
NEUTROPHILS NFR BLD: 72 % (ref 43–80)
NEUTS SEG NFR BLD: 3.55 K/UL (ref 1.8–7.3)
PLATELET # BLD AUTO: 129 K/UL (ref 130–450)
PMV BLD AUTO: 9.5 FL (ref 7–12)
POTASSIUM SERPL-SCNC: 5.1 MMOL/L (ref 3.5–5)
RBC # BLD AUTO: 3.65 M/UL (ref 3.8–5.8)
SODIUM SERPL-SCNC: 138 MMOL/L (ref 132–146)
WBC OTHER # BLD: 5 K/UL (ref 4.5–11.5)

## 2025-05-03 PROCEDURE — 85025 COMPLETE CBC W/AUTO DIFF WBC: CPT

## 2025-05-03 PROCEDURE — 99232 SBSQ HOSP IP/OBS MODERATE 35: CPT | Performed by: INTERNAL MEDICINE

## 2025-05-03 PROCEDURE — 2500000003 HC RX 250 WO HCPCS

## 2025-05-03 PROCEDURE — 6370000000 HC RX 637 (ALT 250 FOR IP): Performed by: STUDENT IN AN ORGANIZED HEALTH CARE EDUCATION/TRAINING PROGRAM

## 2025-05-03 PROCEDURE — 6370000000 HC RX 637 (ALT 250 FOR IP): Performed by: INTERNAL MEDICINE

## 2025-05-03 PROCEDURE — 6360000002 HC RX W HCPCS

## 2025-05-03 PROCEDURE — 83735 ASSAY OF MAGNESIUM: CPT

## 2025-05-03 PROCEDURE — 2060000000 HC ICU INTERMEDIATE R&B

## 2025-05-03 PROCEDURE — 80048 BASIC METABOLIC PNL TOTAL CA: CPT

## 2025-05-03 PROCEDURE — 2700000000 HC OXYGEN THERAPY PER DAY

## 2025-05-03 PROCEDURE — 6360000002 HC RX W HCPCS: Performed by: INTERNAL MEDICINE

## 2025-05-03 PROCEDURE — 6370000000 HC RX 637 (ALT 250 FOR IP)

## 2025-05-03 RX ORDER — FUROSEMIDE 10 MG/ML
40 INJECTION INTRAMUSCULAR; INTRAVENOUS DAILY
Status: DISCONTINUED | OUTPATIENT
Start: 2025-05-03 | End: 2025-05-04

## 2025-05-03 RX ADMIN — MICONAZOLE NITRATE: 20 POWDER TOPICAL at 19:44

## 2025-05-03 RX ADMIN — CARVEDILOL 12.5 MG: 6.25 TABLET, FILM COATED ORAL at 09:16

## 2025-05-03 RX ADMIN — PETROLATUM: 420 OINTMENT TOPICAL at 09:16

## 2025-05-03 RX ADMIN — PETROLATUM: 420 OINTMENT TOPICAL at 19:44

## 2025-05-03 RX ADMIN — DULOXETINE 60 MG: 60 CAPSULE, DELAYED RELEASE ORAL at 19:45

## 2025-05-03 RX ADMIN — ENOXAPARIN SODIUM 30 MG: 100 INJECTION SUBCUTANEOUS at 09:17

## 2025-05-03 RX ADMIN — TAMSULOSIN HYDROCHLORIDE 0.4 MG: 0.4 CAPSULE ORAL at 17:21

## 2025-05-03 RX ADMIN — SODIUM CHLORIDE, PRESERVATIVE FREE 10 ML: 5 INJECTION INTRAVENOUS at 09:16

## 2025-05-03 RX ADMIN — FUROSEMIDE 40 MG: 10 INJECTION, SOLUTION INTRAMUSCULAR; INTRAVENOUS at 09:16

## 2025-05-03 RX ADMIN — POLYETHYLENE GLYCOL 3350 17 G: 17 POWDER, FOR SOLUTION ORAL at 09:16

## 2025-05-03 RX ADMIN — MICONAZOLE NITRATE: 20 POWDER TOPICAL at 09:16

## 2025-05-03 RX ADMIN — SODIUM ZIRCONIUM CYCLOSILICATE 10 G: 10 POWDER, FOR SUSPENSION ORAL at 09:16

## 2025-05-03 RX ADMIN — FINASTERIDE 5 MG: 5 TABLET, FILM COATED ORAL at 17:21

## 2025-05-03 RX ADMIN — ASPIRIN 81 MG: 81 TABLET, COATED ORAL at 09:16

## 2025-05-03 RX ADMIN — SODIUM CHLORIDE, PRESERVATIVE FREE 10 ML: 5 INJECTION INTRAVENOUS at 19:44

## 2025-05-03 RX ADMIN — LEVOTHYROXINE SODIUM 50 MCG: 0.05 TABLET ORAL at 06:08

## 2025-05-03 RX ADMIN — AMLODIPINE BESYLATE 5 MG: 5 TABLET ORAL at 19:45

## 2025-05-03 RX ADMIN — CARVEDILOL 12.5 MG: 6.25 TABLET, FILM COATED ORAL at 17:20

## 2025-05-03 RX ADMIN — PANTOPRAZOLE SODIUM 40 MG: 40 TABLET, DELAYED RELEASE ORAL at 06:08

## 2025-05-03 NOTE — PROGRESS NOTES
[facility] Hospitalist Progress Note    Admitting Date and Time: 4/30/2025  3:59 AM  Admit Dx: Acute exacerbation of CHF (congestive heart failure) (HCC) [I50.9]  Acute exacerbation of chronic heart failure (HCC) [I50.9]    Subjective:  Patient is being followed for Acute exacerbation of CHF (congestive heart failure) (HCC) [I50.9]  Acute exacerbation of chronic heart failure (HCC) [I50.9]   Pt was seen and examined today. Denies any new issues    ROS: denies fever, chills, cp, sob, n/v, HA unless stated above.     polyethylene glycol  17 g Oral Daily    furosemide  40 mg Oral Daily    miconazole   Topical BID    white petrolatum   Topical BID    sodium chloride flush  5-40 mL IntraVENous 2 times per day    enoxaparin  30 mg SubCUTAneous Daily    amLODIPine  5 mg Oral Nightly    aspirin  81 mg Oral Daily    carvedilol  12.5 mg Oral BID WC    levothyroxine  50 mcg Oral Daily    tamsulosin  0.4 mg Oral QPM    pantoprazole  40 mg Oral QAM AC    finasteride  5 mg Oral QPM    DULoxetine  60 mg Oral Nightly     sodium chloride flush, 5-40 mL, PRN  sodium chloride, , PRN  ondansetron, 4 mg, Q8H PRN   Or  ondansetron, 4 mg, Q6H PRN  acetaminophen, 650 mg, Q6H PRN   Or  acetaminophen, 650 mg, Q6H PRN         Objective:    /79   Pulse 94   Temp 98.2 °F (36.8 °C) (Oral)   Resp 18   Ht 1.6 m (5' 2.99\")   Wt 83.6 kg (184 lb 3.2 oz)   SpO2 90%   BMI 32.64 kg/m²     General Appearance: alert and in no acute distress  Skin: warm and dry  Head: normocephalic and atraumatic  Pulmonary/Chest: Improved bibasilar crackles, normal air movement, no respiratory distress  Cardiovascular: normal rate, normal S1 and S2  Abdomen: soft, non-tender, non-distended, normal bowel sounds  Extremities: no cyanosis, no clubbing and no edema      Recent Labs     05/01/25  1202 05/02/25  0300 05/03/25  0419    140 138   K 4.4 4.2 5.1*   CL 95* 94* 93*   CO2 35* 33* 30*   BUN 40* 45* 48*   CREATININE 1.7* 1.8* 1.7*   GLUCOSE

## 2025-05-03 NOTE — PROGRESS NOTES
Dr. Oneal updated on pt status re:  increased confusion & word scrambling.  Family also concerned with change in mental status from 5/2/25 pm.  Will continue to monitor.

## 2025-05-03 NOTE — PLAN OF CARE
Problem: Chronic Conditions and Co-morbidities  Goal: Patient's chronic conditions and co-morbidity symptoms are monitored and maintained or improved  Outcome: Progressing     Problem: Discharge Planning  Goal: Discharge to home or other facility with appropriate resources  Outcome: Progressing     Problem: Skin/Tissue Integrity  Goal: Skin integrity remains intact  Description: 1.  Monitor for areas of redness and/or skin breakdown2.  Assess vascular access sites hourly3.  Every 4-6 hours minimum:  Change oxygen saturation probe site4.  Every 4-6 hours:  If on nasal continuous positive airway pressure, respiratory therapy assess nares and determine need for appliance change or resting period  Outcome: Progressing     Problem: Safety - Adult  Goal: Free from fall injury  Outcome: Progressing     Problem: ABCDS Injury Assessment  Goal: Absence of physical injury  Outcome: Progressing     Problem: Nutrition Deficit:  Goal: Optimize nutritional status  Outcome: Progressing

## 2025-05-04 LAB
ANION GAP SERPL CALCULATED.3IONS-SCNC: 10 MMOL/L (ref 7–16)
BUN SERPL-MCNC: 51 MG/DL (ref 6–23)
CALCIUM SERPL-MCNC: 9.4 MG/DL (ref 8.6–10.2)
CHLORIDE SERPL-SCNC: 95 MMOL/L (ref 98–107)
CO2 SERPL-SCNC: 35 MMOL/L (ref 22–29)
CREAT SERPL-MCNC: 1.8 MG/DL (ref 0.7–1.2)
GFR, ESTIMATED: 35 ML/MIN/1.73M2
GLUCOSE SERPL-MCNC: 158 MG/DL (ref 74–99)
MAGNESIUM SERPL-MCNC: 2.2 MG/DL (ref 1.6–2.6)
POTASSIUM SERPL-SCNC: 3.9 MMOL/L (ref 3.5–5)
SODIUM SERPL-SCNC: 140 MMOL/L (ref 132–146)

## 2025-05-04 PROCEDURE — 6370000000 HC RX 637 (ALT 250 FOR IP)

## 2025-05-04 PROCEDURE — 83735 ASSAY OF MAGNESIUM: CPT

## 2025-05-04 PROCEDURE — 80048 BASIC METABOLIC PNL TOTAL CA: CPT

## 2025-05-04 PROCEDURE — 2500000003 HC RX 250 WO HCPCS

## 2025-05-04 PROCEDURE — 6370000000 HC RX 637 (ALT 250 FOR IP): Performed by: STUDENT IN AN ORGANIZED HEALTH CARE EDUCATION/TRAINING PROGRAM

## 2025-05-04 PROCEDURE — 2700000000 HC OXYGEN THERAPY PER DAY

## 2025-05-04 PROCEDURE — 6370000000 HC RX 637 (ALT 250 FOR IP): Performed by: INTERNAL MEDICINE

## 2025-05-04 PROCEDURE — 99232 SBSQ HOSP IP/OBS MODERATE 35: CPT | Performed by: INTERNAL MEDICINE

## 2025-05-04 PROCEDURE — 6360000002 HC RX W HCPCS

## 2025-05-04 PROCEDURE — 2060000000 HC ICU INTERMEDIATE R&B

## 2025-05-04 RX ORDER — FUROSEMIDE 40 MG/1
40 TABLET ORAL DAILY
Status: DISCONTINUED | OUTPATIENT
Start: 2025-05-04 | End: 2025-05-05 | Stop reason: HOSPADM

## 2025-05-04 RX ADMIN — LEVOTHYROXINE SODIUM 50 MCG: 0.05 TABLET ORAL at 05:13

## 2025-05-04 RX ADMIN — ASPIRIN 81 MG: 81 TABLET, COATED ORAL at 09:22

## 2025-05-04 RX ADMIN — AMLODIPINE BESYLATE 5 MG: 5 TABLET ORAL at 19:57

## 2025-05-04 RX ADMIN — CARVEDILOL 12.5 MG: 6.25 TABLET, FILM COATED ORAL at 16:57

## 2025-05-04 RX ADMIN — POLYETHYLENE GLYCOL 3350 17 G: 17 POWDER, FOR SOLUTION ORAL at 09:22

## 2025-05-04 RX ADMIN — FINASTERIDE 5 MG: 5 TABLET, FILM COATED ORAL at 16:57

## 2025-05-04 RX ADMIN — ENOXAPARIN SODIUM 30 MG: 100 INJECTION SUBCUTANEOUS at 09:22

## 2025-05-04 RX ADMIN — MICONAZOLE NITRATE: 20 POWDER TOPICAL at 09:22

## 2025-05-04 RX ADMIN — FUROSEMIDE 40 MG: 40 TABLET ORAL at 09:22

## 2025-05-04 RX ADMIN — PETROLATUM: 420 OINTMENT TOPICAL at 19:57

## 2025-05-04 RX ADMIN — SODIUM CHLORIDE, PRESERVATIVE FREE 10 ML: 5 INJECTION INTRAVENOUS at 19:57

## 2025-05-04 RX ADMIN — TAMSULOSIN HYDROCHLORIDE 0.4 MG: 0.4 CAPSULE ORAL at 16:57

## 2025-05-04 RX ADMIN — DULOXETINE 60 MG: 60 CAPSULE, DELAYED RELEASE ORAL at 19:57

## 2025-05-04 RX ADMIN — MICONAZOLE NITRATE: 20 POWDER TOPICAL at 19:57

## 2025-05-04 RX ADMIN — PANTOPRAZOLE SODIUM 40 MG: 40 TABLET, DELAYED RELEASE ORAL at 05:13

## 2025-05-04 RX ADMIN — SODIUM CHLORIDE, PRESERVATIVE FREE 10 ML: 5 INJECTION INTRAVENOUS at 09:23

## 2025-05-04 RX ADMIN — CARVEDILOL 12.5 MG: 6.25 TABLET, FILM COATED ORAL at 09:22

## 2025-05-04 RX ADMIN — PETROLATUM: 420 OINTMENT TOPICAL at 09:22

## 2025-05-04 NOTE — PROGRESS NOTES
[facility] Hospitalist Progress Note    Admitting Date and Time: 4/30/2025  3:59 AM  Admit Dx: Acute exacerbation of CHF (congestive heart failure) (HCC) [I50.9]  Acute exacerbation of chronic heart failure (HCC) [I50.9]    Subjective:  Patient is being followed for Acute exacerbation of CHF (congestive heart failure) (HCC) [I50.9]  Acute exacerbation of chronic heart failure (HCC) [I50.9]   Pt was seen and examined today. Denies any new issues  Sundowning in the evening  ROS: denies fever, chills, cp, sob, n/v, HA unless stated above.     furosemide  40 mg IntraVENous Daily    polyethylene glycol  17 g Oral Daily    miconazole   Topical BID    white petrolatum   Topical BID    sodium chloride flush  5-40 mL IntraVENous 2 times per day    enoxaparin  30 mg SubCUTAneous Daily    amLODIPine  5 mg Oral Nightly    aspirin  81 mg Oral Daily    carvedilol  12.5 mg Oral BID WC    levothyroxine  50 mcg Oral Daily    tamsulosin  0.4 mg Oral QPM    pantoprazole  40 mg Oral QAM AC    finasteride  5 mg Oral QPM    DULoxetine  60 mg Oral Nightly     sodium chloride flush, 5-40 mL, PRN  sodium chloride, , PRN  ondansetron, 4 mg, Q8H PRN   Or  ondansetron, 4 mg, Q6H PRN  acetaminophen, 650 mg, Q6H PRN   Or  acetaminophen, 650 mg, Q6H PRN         Objective:    /86   Pulse 83   Temp 98.4 °F (36.9 °C) (Oral)   Resp 18   Ht 1.6 m (5' 2.99\")   Wt 82.7 kg (182 lb 4.8 oz)   SpO2 92%   BMI 32.30 kg/m²     General Appearance: alert and in no acute distress  Skin: warm and dry  Head: normocephalic and atraumatic  Pulmonary/Chest: Improved bibasilar crackles, normal air movement, no respiratory distress  Cardiovascular: normal rate, S1 and S2, 3-4/6 ejection murmur at the base  Abdomen: soft, non-tender, non-distended, normal bowel sounds  Extremities: no cyanosis, no clubbing and no edema      Recent Labs     05/02/25  0300 05/03/25  0419 05/04/25  0312    138 140   K 4.2 5.1* 3.9   CL 94* 93* 95*   CO2 33* 30*  35*   BUN 45* 48* 51*   CREATININE 1.8* 1.7* 1.8*   GLUCOSE 148* 175* 158*   CALCIUM 9.8 10.0 9.4       Recent Labs     05/01/25  1202 05/02/25  0300 05/03/25  0419   WBC 5.0 5.3 5.0   RBC 3.50* 3.39* 3.65*   HGB 11.3* 10.9* 11.7*   HCT 35.0* 33.4* 35.8*   .0* 98.5 98.1   MCH 32.3 32.2 32.1   MCHC 32.3 32.6 32.7   RDW 13.1 13.1 13.1   * 127* 129*   MPV 8.6 9.1 9.5       Radiology:   No orders to display       Assessment:    Principal Problem:    Acute on chronic heart failure with preserved ejection fraction (HFpEF) ([facility])  Active Problems:    Ambulatory dysfunction    Moderate aortic stenosis    Primary hypertension    Type 2 diabetes mellitus without complication ([facility])    Acquired hypothyroidism  Resolved Problems:    * No resolved hospital problems. *      Plan:    Acute on chronic HFpEF  Moderate aortic stenosis  Hypertension  Patient presented with preserved ejection fraction 65%  Admitted for IV diuresis with a net -5 L for the stay  will switch to p.o. Lasix   Continue to monitor urine output and fluid balance  Monitor electrolytes  Continue amlodipine 5 daily, carvedilol 12.5 twice daily and aspirin 81 mg daily    BRIGETTE on CKD stage IIIa  Serum creatinine 1.8 today  His baseline is probably 1.2-1.4  Switch to po diuresis    Hyperkalemia   resolved    Anemia of chronic disease  H&H 11.7 and 35.8 at baseline  Monitor    Hypothyroidism  On Synthroid 50 mcg daily  TSH 3.08 on 4/30/2025    GERD   On pantoprazole    BPH  On tamsulosin and finasteride    Anxiety/depression  On Cymbalta at bedtime    Ambulatory dysfunction  Pt is unsafe to gait/transfer alone presently.    Pt is making minimal progress toward established Physical Therapy goals.  Continue with physical therapy current plan of care.  Await placement    Obesity BMI 32  Discussed weight loss and lifestyle modification    Code Status: DNR-CCA  DVT ppx: lovenox 30 mg      Time spent reviewing chart, clinical exam, discussing case and answering

## 2025-05-04 NOTE — PLAN OF CARE
Problem: Chronic Conditions and Co-morbidities  Goal: Patient's chronic conditions and co-morbidity symptoms are monitored and maintained or improved  5/3/2025 2107 by Mine Blanc RN  Outcome: Progressing  5/3/2025 1038 by Pati Perez RN  Outcome: Progressing     Problem: Discharge Planning  Goal: Discharge to home or other facility with appropriate resources  5/3/2025 2107 by Mine Blanc RN  Outcome: Progressing  5/3/2025 1038 by Pati Perez RN  Outcome: Progressing     Problem: Skin/Tissue Integrity  Goal: Skin integrity remains intact  Description: 1.  Monitor for areas of redness and/or skin breakdown2.  Assess vascular access sites hourly3.  Every 4-6 hours minimum:  Change oxygen saturation probe site4.  Every 4-6 hours:  If on nasal continuous positive airway pressure, respiratory therapy assess nares and determine need for appliance change or resting period  5/3/2025 2107 by Mine Blanc RN  Outcome: Progressing  5/3/2025 1038 by Pati Perez RN  Outcome: Progressing     Problem: Safety - Adult  Goal: Free from fall injury  5/3/2025 2107 by Mine Blanc RN  Outcome: Progressing  5/3/2025 1038 by Pati Perez RN  Outcome: Progressing     Problem: ABCDS Injury Assessment  Goal: Absence of physical injury  5/3/2025 2107 by Mine Blanc RN  Outcome: Progressing  5/3/2025 1038 by Pati Perez RN  Outcome: Progressing     Problem: Nutrition Deficit:  Goal: Optimize nutritional status  5/3/2025 2107 by Mine Blanc RN  Outcome: Progressing  5/3/2025 1038 by Pati Perez RN  Outcome: Progressing

## 2025-05-05 VITALS
DIASTOLIC BLOOD PRESSURE: 53 MMHG | OXYGEN SATURATION: 96 % | HEART RATE: 82 BPM | TEMPERATURE: 98 F | BODY MASS INDEX: 32.57 KG/M2 | HEIGHT: 63 IN | RESPIRATION RATE: 18 BRPM | SYSTOLIC BLOOD PRESSURE: 131 MMHG | WEIGHT: 183.8 LBS

## 2025-05-05 LAB
ANION GAP SERPL CALCULATED.3IONS-SCNC: 12 MMOL/L (ref 7–16)
BUN SERPL-MCNC: 54 MG/DL (ref 6–23)
CALCIUM SERPL-MCNC: 9.7 MG/DL (ref 8.6–10.2)
CHLORIDE SERPL-SCNC: 96 MMOL/L (ref 98–107)
CO2 SERPL-SCNC: 32 MMOL/L (ref 22–29)
CREAT SERPL-MCNC: 1.7 MG/DL (ref 0.7–1.2)
GFR, ESTIMATED: 38 ML/MIN/1.73M2
GLUCOSE SERPL-MCNC: 138 MG/DL (ref 74–99)
MAGNESIUM SERPL-MCNC: 2.3 MG/DL (ref 1.6–2.6)
POTASSIUM SERPL-SCNC: 4.2 MMOL/L (ref 3.5–5)
SODIUM SERPL-SCNC: 140 MMOL/L (ref 132–146)

## 2025-05-05 PROCEDURE — 2500000003 HC RX 250 WO HCPCS

## 2025-05-05 PROCEDURE — 6370000000 HC RX 637 (ALT 250 FOR IP): Performed by: STUDENT IN AN ORGANIZED HEALTH CARE EDUCATION/TRAINING PROGRAM

## 2025-05-05 PROCEDURE — 83735 ASSAY OF MAGNESIUM: CPT

## 2025-05-05 PROCEDURE — 2700000000 HC OXYGEN THERAPY PER DAY

## 2025-05-05 PROCEDURE — 6370000000 HC RX 637 (ALT 250 FOR IP)

## 2025-05-05 PROCEDURE — 99239 HOSP IP/OBS DSCHRG MGMT >30: CPT | Performed by: INTERNAL MEDICINE

## 2025-05-05 PROCEDURE — 36415 COLL VENOUS BLD VENIPUNCTURE: CPT

## 2025-05-05 PROCEDURE — 6370000000 HC RX 637 (ALT 250 FOR IP): Performed by: INTERNAL MEDICINE

## 2025-05-05 PROCEDURE — 6360000002 HC RX W HCPCS

## 2025-05-05 PROCEDURE — 80048 BASIC METABOLIC PNL TOTAL CA: CPT

## 2025-05-05 RX ADMIN — PANTOPRAZOLE SODIUM 40 MG: 40 TABLET, DELAYED RELEASE ORAL at 05:20

## 2025-05-05 RX ADMIN — ENOXAPARIN SODIUM 30 MG: 100 INJECTION SUBCUTANEOUS at 08:12

## 2025-05-05 RX ADMIN — FUROSEMIDE 40 MG: 40 TABLET ORAL at 05:20

## 2025-05-05 RX ADMIN — PETROLATUM: 420 OINTMENT TOPICAL at 08:12

## 2025-05-05 RX ADMIN — MICONAZOLE NITRATE: 20 POWDER TOPICAL at 08:12

## 2025-05-05 RX ADMIN — SODIUM CHLORIDE, PRESERVATIVE FREE 10 ML: 5 INJECTION INTRAVENOUS at 08:12

## 2025-05-05 RX ADMIN — POLYETHYLENE GLYCOL 3350 17 G: 17 POWDER, FOR SOLUTION ORAL at 08:12

## 2025-05-05 RX ADMIN — LEVOTHYROXINE SODIUM 50 MCG: 0.05 TABLET ORAL at 05:20

## 2025-05-05 RX ADMIN — ASPIRIN 81 MG: 81 TABLET, COATED ORAL at 08:12

## 2025-05-05 RX ADMIN — CARVEDILOL 12.5 MG: 6.25 TABLET, FILM COATED ORAL at 08:12

## 2025-05-05 NOTE — CARE COORDINATION
CM notified patient ok to discharge. Plan is [facility], received auth. Facility will transport at 2pm. Facility, nursing, patient and patients daughterDolores notified.  Ora LUCIANON, RN  Case Management

## 2025-05-05 NOTE — PLAN OF CARE
Problem: Chronic Conditions and Co-morbidities  Goal: Patient's chronic conditions and co-morbidity symptoms are monitored and maintained or improved  5/4/2025 2244 by Mine Blanc RN  Outcome: Progressing  5/4/2025 1028 by Pati Perez RN  Outcome: Progressing     Problem: Discharge Planning  Goal: Discharge to home or other facility with appropriate resources  5/4/2025 2244 by Mine Blanc RN  Outcome: Progressing  5/4/2025 1028 by Pati Perez RN  Outcome: Progressing     Problem: Skin/Tissue Integrity  Goal: Skin integrity remains intact  Description: 1.  Monitor for areas of redness and/or skin breakdown2.  Assess vascular access sites hourly3.  Every 4-6 hours minimum:  Change oxygen saturation probe site4.  Every 4-6 hours:  If on nasal continuous positive airway pressure, respiratory therapy assess nares and determine need for appliance change or resting period  5/4/2025 2244 by Mine Blanc RN  Outcome: Progressing  5/4/2025 1028 by Pati Perez RN  Outcome: Progressing     Problem: Safety - Adult  Goal: Free from fall injury  5/4/2025 2244 by Mine Blanc RN  Outcome: Progressing  5/4/2025 1028 by Pati Perez RN  Outcome: Progressing     Problem: ABCDS Injury Assessment  Goal: Absence of physical injury  5/4/2025 2244 by Mine Blanc RN  Outcome: Progressing  5/4/2025 1028 by Pati Perez RN  Outcome: Progressing     Problem: Nutrition Deficit:  Goal: Optimize nutritional status  5/4/2025 2244 by Mine Blanc RN  Outcome: Progressing  5/4/2025 1028 by Pati Perez RN  Outcome: Progressing

## 2025-05-05 NOTE — PROGRESS NOTES
Dr. Pierre notified that Precert has been obtained.    Electronically signed by Vonda Power RN on 5/5/2025 at 10:08 AM     Patient converted back to sinus rhythm with a HR in the 80s prior to administration of the amiodarone bolus or drip. Ana OCAMPO was notified. Per Ana, do not give the amiodarone bolus or drip at this time. However, if she converts back into Afib with a HR greater than 120, give the bolus and start the drip.

## 2025-05-05 NOTE — DISCHARGE SUMMARY
Hospital Medicine Discharge Summary    Patient ID: Eb Sevilla      Patient's PCP: Ani Young, MIHIR - CNP    Admit Date: 4/30/2025     Discharge Date: 5/5/2025      Admitting Physician: Ninoska Najera MD     Discharge Physician: Cassandra Pierre DO     Discharge Diagnoses:       Active Hospital Problems    Diagnosis Date Noted    Ambulatory dysfunction [R26.2] 03/08/2023     Priority: Medium    Acquired hypothyroidism [E03.9] 05/01/2025    Acute on chronic heart failure with preserved ejection fraction (HFpEF) (HCC) [I50.33] 04/30/2025    Type 2 diabetes mellitus without complication (HCC) [E11.9] 11/02/2019    Primary hypertension [I10]     Moderate aortic stenosis [I35.0] 12/17/2017       The patient was seen and examined on day of discharge and this discharge summary is in conjunction with any daily progress note from day of discharge.    Hospital Course:       Patient presented from outside facility for generalized weakness and difficulty with ambulation. He did have some lower extremity edema. proBNP 1498, troponin 67, 65. Chest x-ray was concerning for mild cardiomegaly with pulmonary vascular congestion. Patient was treated with 40 mg IV Lasix. CTA was then negative for acute pulmonary process. CT head negative for acute or subacute intracranial abnormality. Patient did well with diuresis but did qualify for rehab. He is doing well today on day of discharge with no complaints. Will keep Lasix every other day with the facility to monitor his weight and edema and give additional doses as needed.     CT scan did have possible evidence of esophageal dysmotility. Patient had no issues with swallowing. Follow up outpatient.       Exam:     BP (!) 131/53   Pulse 82   Temp 98 °F (36.7 °C) (Oral)   Resp 18   Ht 1.6 m (5' 2.99\")   Wt 83.4 kg (183 lb 12.8 oz)   SpO2 96%   BMI 32.57 kg/m²     General appearance: No apparent distress, appears stated age and cooperative.    Respiratory:  Normal    vitamin D3 (CHOLECALCIFEROL) 125 MCG (5000 UT) TABS tablet Take 1 tablet by mouth dailyHistorical Med      folic acid (FOLVITE) 400 MCG tablet Take 2 tablets by mouth dailyHistorical Med             Time Spent on discharge was 45 minutes in the examination, evaluation, counseling and review of medications and discharge plan.      Signed:    Cassandra Pierre DO   5/5/2025

## 2025-05-08 NOTE — PROGRESS NOTES
Physician Progress Note      PATIENT:               FLORESITA GILLILAND  CSN #:                  [id number]  :                       1930  ADMIT DATE:       2025 3:59 AM  DISCH DATE:        2025 2:35 PM  RESPONDING  PROVIDER #:        Cassandra Pierre DO          QUERY TEXT:    The patient had BPH is documented in the PN by Bismark Oneal MD on   .Please specify the associated urinary conditions:    The clinical indicators include:  -This is a 89-year-old male who was presented to the hospital congestive heart   failure and pt had DM and HTN, BRIGETTE on CKD.    -\"Benign prostatic hyperplasia\" (BPH) On tamsulosin and finasteride documented   in Progress notes by Bismark Oneal MD on .    -pt on treatment tamsulosin and finasteride.    Thank you,  Nalini Kerr S CDS.  Options provided:  -- BPH with partial/complete urinary obstruction  -- Other - I will add my own diagnosis  -- Disagree - Not applicable / Not valid  -- Disagree - Clinically unable to determine / Unknown  -- Refer to Clinical Documentation Reviewer    PROVIDER RESPONSE TEXT:    This patient has BPH with partial/complete urinary obstruction.    Query created by: Nalini Kerr on 2025 4:18 AM      Electronically signed by:  Cassandra Pierre DO 2025 11:30 AM

## 2025-05-22 ENCOUNTER — OFFICE VISIT (OUTPATIENT)
Age: 89
End: 2025-05-22

## 2025-05-22 ENCOUNTER — RESULTS FOLLOW-UP (OUTPATIENT)
Age: 89
End: 2025-05-22

## 2025-05-22 VITALS
SYSTOLIC BLOOD PRESSURE: 138 MMHG | WEIGHT: 164 LBS | OXYGEN SATURATION: 96 % | BODY MASS INDEX: 29.06 KG/M2 | HEART RATE: 76 BPM | RESPIRATION RATE: 18 BRPM | DIASTOLIC BLOOD PRESSURE: 60 MMHG

## 2025-05-22 DIAGNOSIS — I50.32 CHRONIC HEART FAILURE WITH PRESERVED EJECTION FRACTION (HFPEF) (HCC): Primary | ICD-10-CM

## 2025-05-22 LAB
ANION GAP SERPL CALCULATED.3IONS-SCNC: 11 MMOL/L (ref 7–16)
BUN BLDV-MCNC: 27 MG/DL (ref 8–23)
CALCIUM SERPL-MCNC: 9.2 MG/DL (ref 8.8–10.2)
CHLORIDE BLD-SCNC: 98 MMOL/L (ref 98–107)
CO2: 27 MMOL/L (ref 22–29)
CREAT SERPL-MCNC: 1.7 MG/DL (ref 0.7–1.2)
GFR, ESTIMATED: 37 ML/MIN/1.73M2
GLUCOSE BLD-MCNC: 155 MG/DL (ref 74–99)
NT PRO BNP: 1416 PG/ML (ref 0–450)
POTASSIUM SERPL-SCNC: 4.3 MMOL/L (ref 3.5–5.1)
SODIUM BLD-SCNC: 135 MMOL/L (ref 136–145)

## 2025-05-22 RX ORDER — BISACODYL 10 MG
10 SUPPOSITORY, RECTAL RECTAL DAILY PRN
COMMUNITY

## 2025-05-22 RX ORDER — ACETAMINOPHEN 325 MG/1
650 TABLET ORAL EVERY 4 HOURS PRN
COMMUNITY

## 2025-05-22 ASSESSMENT — ENCOUNTER SYMPTOMS
VOMITING: 0
COLOR CHANGE: 0
CHEST TIGHTNESS: 0
NAUSEA: 0
ABDOMINAL PAIN: 0
SHORTNESS OF BREATH: 0

## 2025-05-22 NOTE — RESULT ENCOUNTER NOTE
Labs reviewed.    HFpEF    Current GDMT:  Lasix 40 mg every other day      Also on:  Aspirin 81 mg p.o. daily  Coreg 12.5 mg p.o. twice daily  Amlodipine 10 mg p.o. daily    NT proBNP 1416 compared to 2064 on 5/1/2025  BNP shows stable renal function and electrolytes compared to prior.  Creatinine 1.7 with BUN of 27 and GFR low at 37.  Potassium 4.3.    1.  Continue cardiac meds the same  2.  Will consider addition of Jardiance in future if recurrent fluid overload and cost not prohibitive  3.  Consider addition of MRA +/- ACE inhibitor/ARB in future if renal function remains stable  4.  Follow-up in CHF clinic on 6/12/2025 as scheduled

## 2025-05-22 NOTE — PATIENT INSTRUCTIONS
Check labs in clinic today.  Will call with any new medication adjustments following review of blood work  Continue cardiac meds the same  Recommend 2000 mg daily sodium restriction  Limit use of high sodium foods such as canned soups, canned vegetables, frozen TV dinners or processed meats  Recommend daily weight every morning and notify CHF clinic if rapid weight gain of 3 pounds or more in 1 to 2 days or 5 pounds in a week  Follow-up with CHF clinic in 2 weeks  Follow-up with cardiology, Dr. Rosas in 3 months    Weigh yourself daily    -Stay Hydrated, 64 oz fluid daily    -Diet should be sodium restricted to 2 grams    -Again watch your daily weight trends and if you gain water weight please follow below instructions.    -If you gain 3-5 pounds in 2-3 days OR notice that you are retaining fluid in anyway just like you did before then take an extra dose of your water pill (furosemide/Lasix) every day until you lose the weight or feel better.     -If you notice that you have taken more than 2 extra doses in 1 week then please call and let us know.    -If at any time you feel that you are retaining fluid, your medications are not working, or you feel ill in anyway, then please call us for either same day appointment or the next day, and for instructions. Our goal is to keep you out of the emergency room and the hospital and we have ways to do it. You just need to call us in a timely manner.     -If you become sick for other reasons, and notice that you are not urinating as much, the urine is very dark, you have significant diarrhea or vomiting, then please DO NOT take your water pill and CALL US immediately.

## 2025-05-22 NOTE — PROGRESS NOTES
Congestive Heart Failure Clinic   [facility]       Reason for Visit: Heart Failure     Primary Cardiologist: Dr. Rosas        History of Present Illness:     Mr. Sevilla is a 89-year-old male with PMHx significant for HFpEF, valvular heart disease with moderate AS/mild AI/mild MR/mild TR per TTE 4/30/2025, CAD s/p CABG in 2006, hypertension, hyperlipidemia--previously on statin but no longer, T2DM--diet controlled, CKD and obesity.    Hospitalized 4/30/2025 through 5/5/2025 for generalized weakness, ambulatory dysfunction and acute HFpEF.  NT proBNP elevated at 1498.  High-sensitivity troponin elevated at 67 and 65.  Chest x-ray on 4/29/2025 showed mild cardiomegaly with persistent blunting of the left costophrenic angle and mild increased markings.  CTA chest showed no evidence of pulmonary embolic disease or acute intrathoracic process, multifocal retained food debris's within the esophagus with no gross hiatal hernia with findings could suggest secondary to reflux or esophageal dysmotility.  He was diuresed with IV Lasix.  He had TTE completed on 4/30/2025 which revealed LVEF 60 to 65%, mild to moderate left ventricular hypertrophy, moderate aortic valve stenosis, mild aortic insufficiency, mild mitral valve regurgitation, mild tricuspid valve regurgitation, RVSP 48 mmHg suggesting mild pulmonary hypertension.  He was seen by heart failure nurse navigator during admission but was not evaluated by cardiology.  He was subsequently discharged to rehab on 5/5/2025.  Cardiac meds at discharge included aspirin 81 mg p.o. daily amlodipine 5 mg p.o. nightly, Coreg 12.5 mg p.o. twice daily, Lasix 40 mg every other day.    Presents today in follow-up.  He is in wheelchair in clinic today.  Presents with his wife and daughter.  Patient currently residing at [facility] in [place] for rehab.  He is reportedly working with physical therapy and ambulating with assistance.  He denies any

## 2025-05-28 NOTE — PROGRESS NOTES
Physician Progress Note      PATIENT:               FLORESITA GILLILAND  CSN #:                  [id number]  :                       1930  ADMIT DATE:       2025 3:59 AM  DISCH DATE:        2025 2:35 PM  RESPONDING  PROVIDER #:        Toni Ortiz MD          QUERY TEXT:    The patient had Acute respiratory failure is documented in the H&P by   Romina Smith APRN - CNP on .pt had Rales noted in bilateral bases,   no wheezes or rhonchi, normal air movement, no respiratory   distress.SPO2-98%,96% On 3liters of NC,RR-20,14,18,HR-84,94,82. Please provide   additional clinical indicators supportive of your documentation. Or please   document if the diagnosis of acute respiratory failure has been ruled out   after study.    The clinical indicators include:  -This is a 89-year-old male who was presented to the hospital CHF and pt had   HTN and CKD and BRIGETTE.    -\"Acute respiratory failure\" secondary to acute HFpEF exacerbation documented   in the H&P by Romina Smith APRN - CNP on .  - He was hypoxic at 88% on room air on arrival to ED.      -As per physical examination Pulmonary/Chest: Rales noted in bilateral bases,   no wheezes or rhonchi, normal air movement, no respiratory distress.    - on SPO2-98% to 96% on 3liters of NC,95% to 96% on 1liters of NC,86% to   88% on RA,96% to 85% 1liters of NC,93% to 95% on 3liters of NC.  -RR-20,14,18,17,18, HR-84,91,82,95,101.    -pt on treatment 3liters of NC, 1liers of NC, serial vitals monitoring.    Thank you,  Nalini Kerr [facility] CDS  Options provided:  -- Acute Hypoxic Respiratory Failure as evidenced by, Please document   evidence.  -- Acute Hypoxic Respiratory Failure ruled out after study  -- Other - I will add my own diagnosis  -- Disagree - Not applicable / Not valid  -- Disagree - Clinically unable to determine / Unknown  -- Refer to Clinical Documentation Reviewer    PROVIDER RESPONSE TEXT:    This patient is in acute Hypoxic

## 2025-06-19 ENCOUNTER — HOSPITAL ENCOUNTER (OUTPATIENT)
Dept: OTHER | Age: 89
Setting detail: THERAPIES SERIES
Discharge: HOME | End: 2025-06-19
Payer: MEDICARE

## 2025-06-19 ENCOUNTER — RESULTS FOLLOW-UP (OUTPATIENT)
Age: 89
End: 2025-06-19

## 2025-06-19 VITALS
HEART RATE: 68 BPM | DIASTOLIC BLOOD PRESSURE: 70 MMHG | SYSTOLIC BLOOD PRESSURE: 130 MMHG | BODY MASS INDEX: 29.06 KG/M2 | WEIGHT: 164 LBS | RESPIRATION RATE: 18 BRPM | OXYGEN SATURATION: 98 %

## 2025-06-19 LAB
ANION GAP SERPL CALCULATED.3IONS-SCNC: 12 MMOL/L (ref 7–16)
BNP SERPL-MCNC: 1424 PG/ML (ref 0–450)
BUN SERPL-MCNC: 31 MG/DL (ref 6–23)
CALCIUM SERPL-MCNC: 9.6 MG/DL (ref 8.6–10.2)
CHLORIDE SERPL-SCNC: 97 MMOL/L (ref 98–107)
CO2 SERPL-SCNC: 26 MMOL/L (ref 22–29)
CREAT SERPL-MCNC: 1.5 MG/DL (ref 0.7–1.2)
GFR, ESTIMATED: 43 ML/MIN/1.73M2
GLUCOSE SERPL-MCNC: 131 MG/DL (ref 74–99)
POTASSIUM SERPL-SCNC: 4.7 MMOL/L (ref 3.5–5)
SODIUM SERPL-SCNC: 135 MMOL/L (ref 132–146)

## 2025-06-19 PROCEDURE — 80048 BASIC METABOLIC PNL TOTAL CA: CPT

## 2025-06-19 PROCEDURE — 99205 OFFICE O/P NEW HI 60 MIN: CPT

## 2025-06-19 PROCEDURE — 83880 ASSAY OF NATRIURETIC PEPTIDE: CPT

## 2025-06-19 PROCEDURE — 36415 COLL VENOUS BLD VENIPUNCTURE: CPT

## 2025-06-19 RX ORDER — MENTHOL AND ZINC OXIDE .44; 20.625 G/100G; G/100G
OINTMENT TOPICAL 3 TIMES DAILY PRN
COMMUNITY

## 2025-06-19 ASSESSMENT — PATIENT HEALTH QUESTIONNAIRE - PHQ9
SUM OF ALL RESPONSES TO PHQ QUESTIONS 1-9: 0
1. LITTLE INTEREST OR PLEASURE IN DOING THINGS: NOT AT ALL
2. FEELING DOWN, DEPRESSED OR HOPELESS: NOT AT ALL

## 2025-06-19 NOTE — DISCHARGE INSTRUCTIONS
HEART FAILURE  / CONGESTIVE HEART FAILURE  DISCHARGE INSTRUCTIONS:  GUIDELINES TO FOLLOW AT HOME    Future Appointments   Date Time Provider Department Center   7/21/2025 10:40 AM Roxann Rosas MD [place] [facility]       Self- Managed Care:     MEDICATIONS:  Take your medication as directed. If you are experiencing any side effects, inform your doctor, Do not stop taking any of your medications without letting your doctor know.   Check with your doctor before taking any over-the-counter medications / herbal / or dietary supplements. They may interfere with your other medications.  Do not take ibuprofen (Advil or Motrin) and naproxen (Aleve) without talking to your doctor first. They could make your heart failure worse.         WEIGHT MONITORING:   Weigh yourself everyday (with the same scale) around the same time of the day and write it down. (you can chart them on a calendar or keep track of them on paper.   Notify your doctor of a weight gain of 3 pounds or more in 1 day   OR a total of 5 pounds or more in 1 week    Take your weight record to your doctor visits  Also, the same goes if you loose more than 3# in one day, let your heart doctor know.         DIET:   Cardiac heart healthy diet- Low saturated / low trans fat, no added salt, caffeine restricted, Low sodium diet-   No more than 2,000mg (2 grams) of salt / sodium per day (which equals to a little less than  a teaspoon of salt)  If your doctor wants you on a fluid restriction...it is usually recommended a fluid limit of 2,000cc -  Fluid restriction- 2,000 ml (milliliters) = 64 ounces = you can have 8 glasses of fluid per day (each glass 8 ounces)    Follow a low salt diet - avoid using salt at the table, avoid / limit use of canned soups, processed / packaged foods, salted snacks, olives and pickles.  Do not use a salt substitute without checking with your doctor, they may contain a high amount of potassioum. (Mrs. Hines is safe to

## 2025-06-19 NOTE — PROGRESS NOTES
Congestive Heart Failure Clinic   [facility]      Referring Provider: Dr. Rosas  Primary Care Physician: Ani Young APRN - CNP   Cardiologist: Dr. Rosas  Nephrologist:       HISTORY OF PRESENT ILLNESS:     Eb Sevilla is a 89 y.o. (4/23/1930) male with a history of HFpEF (EF> 50%)  Pre Cupid:     Lab Results   Component Value Date    LVEF 60 04/30/2025     Post Cupid:  No results found for: \"EFBP\"      He presents to the CHF clinic for ongoing evaluation and monitoring of heart failure.    In the CHF clinic today he denies any adverse symptoms except:  [] Dizziness or lightheadedness   [] Syncope or near syncope  [] Recent Fall  [] Shortness of breath at rest   [] Dyspnea with exertion  [] Decline in functional capacity (unable to perform activities they had previously been able to do)  [] Fatigue   [] Orthopnea  [] PND  [] Nocturnal cough  [] Hemoptysis  [] Chest pain, pressure, or discomfort  [] Palpitations  [] Abdominal distention  [] Abdominal bloating  [] Early satiety  [] Blood in stool   [] Diarrhea  [] Constipation  [] Nausea/Vomiting  [] Decreased urinary response to oral diuretic   [] Scrotal swelling   [] Lower extremity edema  [] Used PRN doses of oral diuretic   [] Weight gain    Wt Readings from Last 3 Encounters:   06/19/25 74.4 kg (164 lb)   05/22/25 74.4 kg (164 lb)   05/05/25 83.4 kg (183 lb 12.8 oz)           SOCIAL HISTORY:  [x] Denies tobacco, alcohol or illicit drug abuse  [] Tobacco use:  [] ETOH use:  [] Illicit drug use:        MEDICATIONS:    Allergies   Allergen Reactions    Zocor [Simvastatin] Rash     GENERIC STATIN - CAN TAKE BRAND     Prior to Visit Medications    Medication Sig Taking? Authorizing Provider   menthol-zinc oxide (CALMOSEPTINE) 0.44-20.625 % OINT ointment Apply topically 3 times daily as needed Max 30 ml per day. Yes Provider, MD Roseanna   amLODIPine (NORVASC) 10 MG tablet Take 0.5 tablets by

## 2025-07-15 ENCOUNTER — HOSPITAL ENCOUNTER (OUTPATIENT)
Dept: OTHER | Age: 89
Setting detail: THERAPIES SERIES
Discharge: HOME | End: 2025-07-15
Payer: MEDICARE

## 2025-07-15 VITALS
WEIGHT: 162.6 LBS | HEART RATE: 66 BPM | DIASTOLIC BLOOD PRESSURE: 62 MMHG | SYSTOLIC BLOOD PRESSURE: 134 MMHG | RESPIRATION RATE: 18 BRPM | BODY MASS INDEX: 28.81 KG/M2 | OXYGEN SATURATION: 92 %

## 2025-07-15 LAB
ANION GAP SERPL CALCULATED.3IONS-SCNC: 11 MMOL/L (ref 7–16)
BNP SERPL-MCNC: 1632 PG/ML (ref 0–450)
BUN SERPL-MCNC: 38 MG/DL (ref 8–23)
CALCIUM SERPL-MCNC: 9.6 MG/DL (ref 8.8–10.2)
CHLORIDE SERPL-SCNC: 97 MMOL/L (ref 98–107)
CO2 SERPL-SCNC: 27 MMOL/L (ref 22–29)
CREAT SERPL-MCNC: 1.7 MG/DL (ref 0.7–1.2)
GFR, ESTIMATED: 37 ML/MIN/1.73M2
GLUCOSE SERPL-MCNC: 155 MG/DL (ref 74–99)
POTASSIUM SERPL-SCNC: 4.5 MMOL/L (ref 3.5–5.1)
SODIUM SERPL-SCNC: 135 MMOL/L (ref 136–145)

## 2025-07-15 PROCEDURE — 36415 COLL VENOUS BLD VENIPUNCTURE: CPT

## 2025-07-15 PROCEDURE — 6360000002 HC RX W HCPCS

## 2025-07-15 PROCEDURE — 99214 OFFICE O/P EST MOD 30 MIN: CPT

## 2025-07-15 PROCEDURE — 96374 THER/PROPH/DIAG INJ IV PUSH: CPT

## 2025-07-15 PROCEDURE — 2500000003 HC RX 250 WO HCPCS: Performed by: NURSE PRACTITIONER

## 2025-07-15 PROCEDURE — 83880 ASSAY OF NATRIURETIC PEPTIDE: CPT

## 2025-07-15 PROCEDURE — 80048 BASIC METABOLIC PNL TOTAL CA: CPT

## 2025-07-15 RX ORDER — FUROSEMIDE 10 MG/ML
INJECTION INTRAMUSCULAR; INTRAVENOUS
Status: COMPLETED
Start: 2025-07-15 | End: 2025-07-15

## 2025-07-15 RX ORDER — FUROSEMIDE 10 MG/ML
40 INJECTION INTRAMUSCULAR; INTRAVENOUS ONCE
Status: COMPLETED | OUTPATIENT
Start: 2025-07-15 | End: 2025-07-15

## 2025-07-15 RX ORDER — SODIUM CHLORIDE 0.9 % (FLUSH) 0.9 %
10 SYRINGE (ML) INJECTION ONCE
Status: COMPLETED | OUTPATIENT
Start: 2025-07-15 | End: 2025-07-15

## 2025-07-15 RX ADMIN — FUROSEMIDE 40 MG: 10 INJECTION INTRAMUSCULAR; INTRAVENOUS at 12:31

## 2025-07-15 RX ADMIN — SODIUM CHLORIDE, PRESERVATIVE FREE 10 ML: 5 INJECTION INTRAVENOUS at 12:31

## 2025-07-15 RX ADMIN — FUROSEMIDE 40 MG: 10 INJECTION, SOLUTION INTRAMUSCULAR; INTRAVENOUS at 12:31

## 2025-07-15 NOTE — PROGRESS NOTES
Congestive Heart Failure Clinic   [facility]      Referring Provider: Dr. Rosas  Primary Care Physician: Ani Young APRN - CNP   Cardiologist: Dr. Rosas  Nephrologist:       HISTORY OF PRESENT ILLNESS:     Eb Sevilla is a 89 y.o. (4/23/1930) male with a history of HFpEF (EF> 50%)  Pre Cupid:     Lab Results   Component Value Date    LVEF 60 04/30/2025     Post Cupid:  No results found for: \"EFBP\"      He presents to the CHF clinic for ongoing evaluation and monitoring of heart failure.    In the CHF clinic today he denies any adverse symptoms except:  [] Dizziness or lightheadedness   [] Syncope or near syncope  [] Recent Fall  [] Shortness of breath at rest   [x] Dyspnea with exertion  [] Decline in functional capacity (unable to perform activities they had previously been able to do)  [] Fatigue   [] Orthopnea  [] PND  [] Nocturnal cough  [] Hemoptysis  [] Chest pain, pressure, or discomfort  [] Palpitations  [] Abdominal distention  [] Abdominal bloating  [] Early satiety  [] Blood in stool   [] Diarrhea  [] Constipation  [] Nausea/Vomiting  [] Decreased urinary response to oral diuretic   [] Scrotal swelling   [x] Lower extremity edema  [] Used PRN doses of oral diuretic   [] Weight gain    Wt Readings from Last 3 Encounters:   07/15/25 73.8 kg (162 lb 9.6 oz)   06/19/25 74.4 kg (164 lb)   05/22/25 74.4 kg (164 lb)           SOCIAL HISTORY:  [x] Denies tobacco, alcohol or illicit drug abuse  [] Tobacco use:  [] ETOH use:  [] Illicit drug use:        MEDICATIONS:    Allergies   Allergen Reactions    Zocor [Simvastatin] Rash     GENERIC STATIN - CAN TAKE BRAND     Prior to Visit Medications    Medication Sig Taking? Authorizing Provider   amLODIPine (NORVASC) 10 MG tablet Take 0.5 tablets by mouth nightly Yes Milan Rodriguez MD   aspirin 81 MG EC tablet Take 1 tablet by mouth daily Yes Milan Rodriguez MD   sennosides-docusate sodium

## 2025-07-21 ENCOUNTER — OFFICE VISIT (OUTPATIENT)
Dept: CARDIOLOGY CLINIC | Age: 89
End: 2025-07-21
Payer: MEDICARE

## 2025-07-21 VITALS
HEART RATE: 63 BPM | HEIGHT: 63 IN | WEIGHT: 162.6 LBS | DIASTOLIC BLOOD PRESSURE: 68 MMHG | SYSTOLIC BLOOD PRESSURE: 128 MMHG | BODY MASS INDEX: 28.81 KG/M2 | RESPIRATION RATE: 18 BRPM | TEMPERATURE: 96.6 F | OXYGEN SATURATION: 92 %

## 2025-07-21 DIAGNOSIS — I35.0 MODERATE AORTIC STENOSIS: ICD-10-CM

## 2025-07-21 DIAGNOSIS — I50.32 CHRONIC HEART FAILURE WITH PRESERVED EJECTION FRACTION (HFPEF) (HCC): Primary | ICD-10-CM

## 2025-07-21 DIAGNOSIS — I10 PRIMARY HYPERTENSION: ICD-10-CM

## 2025-07-21 PROCEDURE — 1123F ACP DISCUSS/DSCN MKR DOCD: CPT | Performed by: INTERNAL MEDICINE

## 2025-07-21 PROCEDURE — 1159F MED LIST DOCD IN RCRD: CPT | Performed by: INTERNAL MEDICINE

## 2025-07-21 PROCEDURE — 99214 OFFICE O/P EST MOD 30 MIN: CPT | Performed by: INTERNAL MEDICINE

## 2025-07-21 PROCEDURE — 93000 ELECTROCARDIOGRAM COMPLETE: CPT | Performed by: INTERNAL MEDICINE

## 2025-07-21 PROCEDURE — G2211 COMPLEX E/M VISIT ADD ON: HCPCS | Performed by: INTERNAL MEDICINE

## 2025-07-21 NOTE — PATIENT INSTRUCTIONS
Recent echo done in April 2025 was reviewed, normal LV function indeterminate diastolic function,  Continue Lasix 40 mg 1 every other day.   Continue current dose of Coreg 12.5 mg p.o. twice daily, amlodipine 10 mg p.o. daily for hypertension  Continue rest of the current medications.  Continue aspirin 81 mg p.o. daily  Patient was advised to stay well-hydrated  Follow-up with me in 12 months.

## 2025-07-21 NOTE — PROGRESS NOTES
OUTPATIENT CARDIOLOGY FOLLOW-UP    Name: Eb Sevilla    Age: 89 y.o.    Primary Care Physician: Ani Young APRN - CNP    Date of Service: 7/21/2025    Chief Complaint:   Chief Complaint   Patient presents with    Follow-Up from Hospital       Interim History:   Mr. Sevilla is a 89-year-old  male with history of CAD status post CABG underwent 2006 with a LIMA to LAD, SVG to D1, SVG to OM1, SVG to RCA and SVG to RPDA, mild obesity, hypertension, hyperlipidemia, type 2 diabetes diet controlled presented to the hospital with severe dizziness difficulty balancing and progressive increasing dyspnea, leg edema without any chest pain.  Patient also had orthopnea and chronic PND and severe dizziness.  He was seen as a consult on 4/24/2021 after he was admitted to the hospital on 4/24/2021 and was diagnosed with acute on chronic heart failure with preserved ejection fraction and chronic dizziness secondary to clonidine and metoprolol combination.  He was initiated on IV diuretic therapy with the Lasix and clonidine was discontinued.  He was initiated on Norvascfor hypertension.  His dizziness significantly improved after stopping clonidine.  Patient became euvolemic and was discharged home on 4/27/2021.      He is compliant with medications, as well as salt and fluid intake.  He does not take any over-the-counter arthritis medications.   He is complaining of lot of left hip pain and has difficulty ambulating and uses walker for walking.  He received multiple steroid injections to his left hip and also lower back without any significant improvement. Now, he is scheduled to go for surgery.       Patient was seen in the office on 5/8/24, since last visit, he was hospitalized from 4/30/2025 to 5/5/2025 with generalized weakness difficulty ambulation and lower extremity edema with elevated proBNP level.  Patient was diuresed and chest x-ray showed mild cardiomegaly and pulm vascular congestion.  Patient was

## 2025-08-14 ENCOUNTER — HOSPITAL ENCOUNTER (OUTPATIENT)
Dept: OTHER | Age: 89
Setting detail: THERAPIES SERIES
Discharge: HOME | End: 2025-08-14
Payer: MEDICARE

## 2025-08-14 VITALS
HEART RATE: 73 BPM | RESPIRATION RATE: 16 BRPM | OXYGEN SATURATION: 93 % | SYSTOLIC BLOOD PRESSURE: 114 MMHG | DIASTOLIC BLOOD PRESSURE: 58 MMHG

## 2025-08-14 LAB
ANION GAP SERPL CALCULATED.3IONS-SCNC: 12 MMOL/L (ref 7–16)
BNP SERPL-MCNC: 3732 PG/ML (ref 0–450)
BUN SERPL-MCNC: 39 MG/DL (ref 8–23)
CALCIUM SERPL-MCNC: 9.9 MG/DL (ref 8.8–10.2)
CHLORIDE SERPL-SCNC: 99 MMOL/L (ref 98–107)
CO2 SERPL-SCNC: 28 MMOL/L (ref 22–29)
CREAT SERPL-MCNC: 1.8 MG/DL (ref 0.7–1.2)
GFR, ESTIMATED: 35 ML/MIN/1.73M2
GLUCOSE SERPL-MCNC: 145 MG/DL (ref 74–99)
POTASSIUM SERPL-SCNC: 4.5 MMOL/L (ref 3.5–5.1)
SODIUM SERPL-SCNC: 139 MMOL/L (ref 136–145)

## 2025-08-14 PROCEDURE — 80048 BASIC METABOLIC PNL TOTAL CA: CPT

## 2025-08-14 PROCEDURE — 83880 ASSAY OF NATRIURETIC PEPTIDE: CPT

## 2025-08-14 PROCEDURE — 99214 OFFICE O/P EST MOD 30 MIN: CPT

## 2025-08-14 PROCEDURE — 36415 COLL VENOUS BLD VENIPUNCTURE: CPT

## (undated) DEVICE — BLADE,STAINLESS-STEEL,10,STRL,DISPOSABLE: Brand: MEDLINE

## (undated) DEVICE — COVER HNDL LT DISP

## (undated) DEVICE — SYRINGE, LUER LOCK, 10ML: Brand: MEDLINE

## (undated) DEVICE — STERILE PVP: Brand: MEDLINE INDUSTRIES, INC.

## (undated) DEVICE — BLADE CLIPPER GEN PURP NS

## (undated) DEVICE — IMPLANTABLE DEVICE
Type: IMPLANTABLE DEVICE | Site: HIP | Status: NON-FUNCTIONAL
Brand: G7 ACETABULAR SCREW

## (undated) DEVICE — GLOVE SURG SZ 65 L12IN FNGR THK79MIL GRN LTX FREE

## (undated) DEVICE — GAUZE,SPONGE,4"X4",8PLY,STRL,LF,10/TRAY: Brand: MEDLINE

## (undated) DEVICE — KIT SURG W7XL11IN 2 PKT UNTREATED NA

## (undated) DEVICE — Y-TYPE TUR/BLADDER IRRIGATION SET, REGULATING CLAMP

## (undated) DEVICE — DRAPE,REIN 53X77,STERILE: Brand: MEDLINE

## (undated) DEVICE — SPONGE GZ W4XL4IN RAYON POLY CVR W/NONWOVEN FAB STRL 2/PK

## (undated) DEVICE — GLOVE SURG SZ 65 THK91MIL LTX FREE SYN POLYISOPRENE

## (undated) DEVICE — GRADUATE TRIANG MEASURE 1000ML BLK PRNT

## (undated) DEVICE — TUBING, SUCTION, 9/32" X 10', STRAIGHT: Brand: MEDLINE

## (undated) DEVICE — PILLOW POS W15XH6XL22IN RASPBERRY FOAM ABD W/ STRP DISP FOR

## (undated) DEVICE — BLADE SAW W11XL77.5MM THK1.23MM CUT THK1.17MM S STL RECIP

## (undated) DEVICE — SPONGE LAP W18XL18IN WHT COT 4 PLY FLD STRUNG RADPQ DISP ST 2 PER PACK

## (undated) DEVICE — Z INACTIVE NO ACTIVE SUPPLIER APPLICATOR MEDICATED 26 CC TINT HI-LITE ORNG STRL CHLORAPREP

## (undated) DEVICE — TOTAL HIP PK

## (undated) DEVICE — PACK PROCEDURE SURG GEN CUST

## (undated) DEVICE — TOWEL,OR,DSP,ST,BLUE,STD,6/PK,12PK/CS: Brand: MEDLINE

## (undated) DEVICE — GLOVE SURG SZ 8 L12IN FNGR THK94MIL TRNSLUC YEL LTX HYDRGEL

## (undated) DEVICE — SOLUTION IV 1000ML 0.9% SOD CHL PH 5 INJ USP VIAFLX PLAS

## (undated) DEVICE — 3M™ IOBAN™ 2 ANTIMICROBIAL INCISE DRAPE 6650EZ: Brand: IOBAN™ 2

## (undated) DEVICE — DOUBLE BASIN SET: Brand: MEDLINE INDUSTRIES, INC.

## (undated) DEVICE — GLOVE ORTHO 8   MSG9480

## (undated) DEVICE — DRAPE,TOP,102X53,STERILE: Brand: MEDLINE

## (undated) DEVICE — SYRINGE 20ML LL S/C 50

## (undated) DEVICE — BASIC SINGLE BASIN 1-LF: Brand: MEDLINE INDUSTRIES, INC.

## (undated) DEVICE — SOLUTION IRRIG 2000ML 0.9% SOD CHL USP UROMATIC PLAS CONT

## (undated) DEVICE — BIT DRL L40MM DIA3.2MM DISP FOR G7 2 MOBILITY CONSTRUCT

## (undated) DEVICE — GLOVE SURG SZ 8 CRM LTX FREE POLYISOPRENE POLYMER BEAD ANTI

## (undated) DEVICE — BLADE ES L6IN ELASTOMERIC COAT EXT DURABLE BEND UPTO 90DEG

## (undated) DEVICE — NEEDLE FLTR 18GA L1.5IN MEM THK5UM BLNT DISP

## (undated) DEVICE — STRIP,CLOSURE,WOUND,MEDI-STRIP,1/2X4: Brand: MEDLINE

## (undated) DEVICE — BLOCK BITE 60FR RUBBER ADLT DENTAL

## (undated) DEVICE — 3M™ STERI-DRAPE™ U-DRAPE 1015: Brand: STERI-DRAPE™

## (undated) DEVICE — FORCEPS BX OVL CUP FEN DISPOSABLE CAP L 160CM RAD JAW 4

## (undated) DEVICE — GOWN,SIRUS,POLYRNF,BRTHSLV,XL,30/CS: Brand: MEDLINE

## (undated) DEVICE — ELECTRODE PT RET AD L9FT HI MOIST COND ADH HYDRGEL CORDED

## (undated) DEVICE — DRESSING HYDROFIBER AQUACEL AG ADVANTAGE 3.5X10 IN

## (undated) DEVICE — HANDPIECE SET WITH COAXIAL HIGH FLOW TIP AND SUCTION TUBE: Brand: INTERPULSE

## (undated) DEVICE — MARKER,SKIN,WI/RULER AND LABELS: Brand: MEDLINE

## (undated) DEVICE — BIT DRL L20MM DIA3.2MM DISP FOR G7 2 MOBILITY CONSTRUCT

## (undated) DEVICE — CONVERTORS STOCKINETTE: Brand: CONVERTORS

## (undated) DEVICE — NEEDLE,22GX1.5",REG,BEVEL: Brand: MEDLINE

## (undated) DEVICE — 4-PORT MANIFOLD: Brand: NEPTUNE 2

## (undated) DEVICE — 1000 S-DRAPE TOWEL DRAPE 10/BX: Brand: STERI-DRAPE™